# Patient Record
Sex: FEMALE | Race: WHITE | NOT HISPANIC OR LATINO | Employment: OTHER | URBAN - METROPOLITAN AREA
[De-identification: names, ages, dates, MRNs, and addresses within clinical notes are randomized per-mention and may not be internally consistent; named-entity substitution may affect disease eponyms.]

---

## 2017-03-31 ENCOUNTER — RESOLUTE PROFESSIONAL BILLING HOSPITAL PROF FEE (OUTPATIENT)
Dept: HOSPITALIST | Facility: MEDICAL CENTER | Age: 67
End: 2017-03-31
Payer: MEDICARE

## 2017-03-31 ENCOUNTER — APPOINTMENT (OUTPATIENT)
Dept: RADIOLOGY | Facility: MEDICAL CENTER | Age: 67
DRG: 470 | End: 2017-03-31
Attending: EMERGENCY MEDICINE
Payer: MEDICARE

## 2017-03-31 ENCOUNTER — HOSPITAL ENCOUNTER (INPATIENT)
Facility: MEDICAL CENTER | Age: 67
LOS: 5 days | DRG: 470 | End: 2017-04-05
Attending: EMERGENCY MEDICINE | Admitting: INTERNAL MEDICINE
Payer: MEDICARE

## 2017-03-31 DIAGNOSIS — S72.002A HIP FRACTURE, LEFT, CLOSED, INITIAL ENCOUNTER (HCC): ICD-10-CM

## 2017-03-31 LAB
ABO GROUP BLD: NORMAL
ABO GROUP BLD: NORMAL
ALBUMIN SERPL BCP-MCNC: 3.9 G/DL (ref 3.2–4.9)
ALBUMIN/GLOB SERPL: 1.1 G/DL
ALP SERPL-CCNC: 73 U/L (ref 30–99)
ALT SERPL-CCNC: 9 U/L (ref 2–50)
ANION GAP SERPL CALC-SCNC: 8 MMOL/L (ref 0–11.9)
APTT PPP: 27.9 SEC (ref 24.7–36)
AST SERPL-CCNC: 13 U/L (ref 12–45)
BASOPHILS # BLD AUTO: 0.5 % (ref 0–1.8)
BASOPHILS # BLD: 0.06 K/UL (ref 0–0.12)
BILIRUB SERPL-MCNC: 0.4 MG/DL (ref 0.1–1.5)
BLD GP AB SCN SERPL QL: NORMAL
BUN SERPL-MCNC: 15 MG/DL (ref 8–22)
CALCIUM SERPL-MCNC: 9.2 MG/DL (ref 8.5–10.5)
CHLORIDE SERPL-SCNC: 107 MMOL/L (ref 96–112)
CO2 SERPL-SCNC: 24 MMOL/L (ref 20–33)
CREAT SERPL-MCNC: 0.72 MG/DL (ref 0.5–1.4)
EKG IMPRESSION: NORMAL
EOSINOPHIL # BLD AUTO: 0.04 K/UL (ref 0–0.51)
EOSINOPHIL NFR BLD: 0.3 % (ref 0–6.9)
ERYTHROCYTE [DISTWIDTH] IN BLOOD BY AUTOMATED COUNT: 40 FL (ref 35.9–50)
GFR SERPL CREATININE-BSD FRML MDRD: >60 ML/MIN/1.73 M 2
GLOBULIN SER CALC-MCNC: 3.5 G/DL (ref 1.9–3.5)
GLUCOSE BLD-MCNC: 163 MG/DL (ref 65–99)
GLUCOSE SERPL-MCNC: 164 MG/DL (ref 65–99)
HCT VFR BLD AUTO: 43 % (ref 37–47)
HGB BLD-MCNC: 14.2 G/DL (ref 12–16)
IMM GRANULOCYTES # BLD AUTO: 0.04 K/UL (ref 0–0.11)
IMM GRANULOCYTES NFR BLD AUTO: 0.3 % (ref 0–0.9)
INR PPP: 0.98 (ref 0.87–1.13)
LYMPHOCYTES # BLD AUTO: 0.51 K/UL (ref 1–4.8)
LYMPHOCYTES NFR BLD: 4.4 % (ref 22–41)
MCH RBC QN AUTO: 28.7 PG (ref 27–33)
MCHC RBC AUTO-ENTMCNC: 33 G/DL (ref 33.6–35)
MCV RBC AUTO: 87 FL (ref 81.4–97.8)
MONOCYTES # BLD AUTO: 0.33 K/UL (ref 0–0.85)
MONOCYTES NFR BLD AUTO: 2.8 % (ref 0–13.4)
NEUTROPHILS # BLD AUTO: 10.67 K/UL (ref 2–7.15)
NEUTROPHILS NFR BLD: 91.7 % (ref 44–72)
NRBC # BLD AUTO: 0.06 K/UL
NRBC BLD AUTO-RTO: 0.5 /100 WBC
PLATELET # BLD AUTO: 304 K/UL (ref 164–446)
PMV BLD AUTO: 10.4 FL (ref 9–12.9)
POTASSIUM SERPL-SCNC: 3.7 MMOL/L (ref 3.6–5.5)
PROT SERPL-MCNC: 7.4 G/DL (ref 6–8.2)
PROTHROMBIN TIME: 13.3 SEC (ref 12–14.6)
RBC # BLD AUTO: 4.94 M/UL (ref 4.2–5.4)
RH BLD: NORMAL
SODIUM SERPL-SCNC: 139 MMOL/L (ref 135–145)
WBC # BLD AUTO: 11.7 K/UL (ref 4.8–10.8)

## 2017-03-31 PROCEDURE — 500122 HCHG BOVIE, BLADE: Performed by: ORTHOPAEDIC SURGERY

## 2017-03-31 PROCEDURE — 86850 RBC ANTIBODY SCREEN: CPT

## 2017-03-31 PROCEDURE — 86900 BLOOD TYPING SEROLOGIC ABO: CPT

## 2017-03-31 PROCEDURE — 700105 HCHG RX REV CODE 258: Performed by: EMERGENCY MEDICINE

## 2017-03-31 PROCEDURE — 85025 COMPLETE CBC W/AUTO DIFF WBC: CPT

## 2017-03-31 PROCEDURE — 36415 COLL VENOUS BLD VENIPUNCTURE: CPT

## 2017-03-31 PROCEDURE — 700111 HCHG RX REV CODE 636 W/ 250 OVERRIDE (IP): Performed by: EMERGENCY MEDICINE

## 2017-03-31 PROCEDURE — 99223 1ST HOSP IP/OBS HIGH 75: CPT | Performed by: INTERNAL MEDICINE

## 2017-03-31 PROCEDURE — 160035 HCHG PACU - 1ST 60 MINS PHASE I: Performed by: ORTHOPAEDIC SURGERY

## 2017-03-31 PROCEDURE — 93005 ELECTROCARDIOGRAM TRACING: CPT

## 2017-03-31 PROCEDURE — 93005 ELECTROCARDIOGRAM TRACING: CPT | Performed by: EMERGENCY MEDICINE

## 2017-03-31 PROCEDURE — 160031 HCHG SURGERY MINUTES - 1ST 30 MINS LEVEL 5: Performed by: ORTHOPAEDIC SURGERY

## 2017-03-31 PROCEDURE — 80053 COMPREHEN METABOLIC PANEL: CPT

## 2017-03-31 PROCEDURE — 72170 X-RAY EXAM OF PELVIS: CPT

## 2017-03-31 PROCEDURE — 500093 HCHG BONE CEMENT MIXER: Performed by: ORTHOPAEDIC SURGERY

## 2017-03-31 PROCEDURE — 96374 THER/PROPH/DIAG INJ IV PUSH: CPT

## 2017-03-31 PROCEDURE — 110382 HCHG SHELL REV 271: Performed by: ORTHOPAEDIC SURGERY

## 2017-03-31 PROCEDURE — 502240 HCHG MISC OR SUPPLY RC 0272: Performed by: ORTHOPAEDIC SURGERY

## 2017-03-31 PROCEDURE — 700102 HCHG RX REV CODE 250 W/ 637 OVERRIDE(OP): Performed by: INTERNAL MEDICINE

## 2017-03-31 PROCEDURE — 85610 PROTHROMBIN TIME: CPT

## 2017-03-31 PROCEDURE — 500891 HCHG PACK, ORTHO MAJOR: Performed by: ORTHOPAEDIC SURGERY

## 2017-03-31 PROCEDURE — 501486 HCHG STRYKER IRRIG SET HC W/TUBING: Performed by: ORTHOPAEDIC SURGERY

## 2017-03-31 PROCEDURE — 82962 GLUCOSE BLOOD TEST: CPT

## 2017-03-31 PROCEDURE — 501485 HCHG STRYKER BRUSH FEMORAL CANAL: Performed by: ORTHOPAEDIC SURGERY

## 2017-03-31 PROCEDURE — 501487 HCHG STRYKER TIP: Performed by: ORTHOPAEDIC SURGERY

## 2017-03-31 PROCEDURE — 700111 HCHG RX REV CODE 636 W/ 250 OVERRIDE (IP)

## 2017-03-31 PROCEDURE — 500096 HCHG BONE CEMENT, SIMPLEX ANTIBIOTIC: Performed by: ORTHOPAEDIC SURGERY

## 2017-03-31 PROCEDURE — 73552 X-RAY EXAM OF FEMUR 2/>: CPT | Mod: LT

## 2017-03-31 PROCEDURE — 304562 HCHG STAT O2 MASK/CANNULA

## 2017-03-31 PROCEDURE — 700101 HCHG RX REV CODE 250

## 2017-03-31 PROCEDURE — 96375 TX/PRO/DX INJ NEW DRUG ADDON: CPT

## 2017-03-31 PROCEDURE — 99291 CRITICAL CARE FIRST HOUR: CPT

## 2017-03-31 PROCEDURE — 160048 HCHG OR STATISTICAL LEVEL 1-5: Performed by: ORTHOPAEDIC SURGERY

## 2017-03-31 PROCEDURE — 85730 THROMBOPLASTIN TIME PARTIAL: CPT

## 2017-03-31 PROCEDURE — 500921 HCHG PILLOW, ABDUCTION HIP MED: Performed by: ORTHOPAEDIC SURGERY

## 2017-03-31 PROCEDURE — 96376 TX/PRO/DX INJ SAME DRUG ADON: CPT

## 2017-03-31 PROCEDURE — 502000 HCHG MISC OR IMPLANTS RC 0278: Performed by: ORTHOPAEDIC SURGERY

## 2017-03-31 PROCEDURE — A4606 OXYGEN PROBE USED W OXIMETER: HCPCS | Performed by: ORTHOPAEDIC SURGERY

## 2017-03-31 PROCEDURE — 0SRS0J9 REPLACEMENT OF LEFT HIP JOINT, FEMORAL SURFACE WITH SYNTHETIC SUBSTITUTE, CEMENTED, OPEN APPROACH: ICD-10-PCS | Performed by: ORTHOPAEDIC SURGERY

## 2017-03-31 PROCEDURE — 160036 HCHG PACU - EA ADDL 30 MINS PHASE I: Performed by: ORTHOPAEDIC SURGERY

## 2017-03-31 PROCEDURE — 160002 HCHG RECOVERY MINUTES (STAT): Performed by: ORTHOPAEDIC SURGERY

## 2017-03-31 PROCEDURE — 160009 HCHG ANES TIME/MIN: Performed by: ORTHOPAEDIC SURGERY

## 2017-03-31 PROCEDURE — 700111 HCHG RX REV CODE 636 W/ 250 OVERRIDE (IP): Performed by: INTERNAL MEDICINE

## 2017-03-31 PROCEDURE — 501838 HCHG SUTURE GENERAL: Performed by: ORTHOPAEDIC SURGERY

## 2017-03-31 PROCEDURE — 160042 HCHG SURGERY MINUTES - EA ADDL 1 MIN LEVEL 5: Performed by: ORTHOPAEDIC SURGERY

## 2017-03-31 PROCEDURE — 86901 BLOOD TYPING SEROLOGIC RH(D): CPT

## 2017-03-31 PROCEDURE — 770006 HCHG ROOM/CARE - MED/SURG/GYN SEMI*

## 2017-03-31 PROCEDURE — 71010 DX-CHEST-LIMITED (1 VIEW): CPT

## 2017-03-31 DEVICE — IMPLANT TANDEM BIPOLAR COCR 48OD 28ID: Type: IMPLANTABLE DEVICE | Status: FUNCTIONAL

## 2017-03-31 DEVICE — IMPLANT CONQ FX FEM COMP SZ 11: Type: IMPLANTABLE DEVICE | Status: FUNCTIONAL

## 2017-03-31 DEVICE — DISTAL INVIS CENT SZ 10MM: Type: IMPLANTABLE DEVICE | Status: FUNCTIONAL

## 2017-03-31 DEVICE — BONE CEMENT SIMPLEX ANTIBIO - (10/PK): Type: IMPLANTABLE DEVICE | Status: FUNCTIONAL

## 2017-03-31 DEVICE — IMPLANTABLE DEVICE: Type: IMPLANTABLE DEVICE | Status: FUNCTIONAL

## 2017-03-31 DEVICE — IMPLANT COCR 12/14 FEM HEAD 28 + 4: Type: IMPLANTABLE DEVICE | Status: FUNCTIONAL

## 2017-03-31 RX ORDER — MAGNESIUM HYDROXIDE 1200 MG/15ML
LIQUID ORAL
Status: DISCONTINUED | OUTPATIENT
Start: 2017-03-31 | End: 2017-04-01 | Stop reason: HOSPADM

## 2017-03-31 RX ORDER — OXYCODONE HYDROCHLORIDE 5 MG/1
5 TABLET ORAL
Status: DISCONTINUED | OUTPATIENT
Start: 2017-03-31 | End: 2017-04-02

## 2017-03-31 RX ORDER — CELECOXIB 200 MG/1
200 CAPSULE ORAL 2 TIMES DAILY WITH MEALS
Status: DISCONTINUED | OUTPATIENT
Start: 2017-04-01 | End: 2017-04-05 | Stop reason: HOSPADM

## 2017-03-31 RX ORDER — POLYETHYLENE GLYCOL 3350 17 G/17G
1 POWDER, FOR SOLUTION ORAL
Status: DISCONTINUED | OUTPATIENT
Start: 2017-03-31 | End: 2017-04-05 | Stop reason: HOSPADM

## 2017-03-31 RX ORDER — DOCUSATE SODIUM 100 MG/1
100 CAPSULE, LIQUID FILLED ORAL 2 TIMES DAILY
Status: DISCONTINUED | OUTPATIENT
Start: 2017-03-31 | End: 2017-04-05 | Stop reason: HOSPADM

## 2017-03-31 RX ORDER — ONDANSETRON 2 MG/ML
4 INJECTION INTRAMUSCULAR; INTRAVENOUS EVERY 4 HOURS PRN
Status: DISCONTINUED | OUTPATIENT
Start: 2017-03-31 | End: 2017-04-05 | Stop reason: HOSPADM

## 2017-03-31 RX ORDER — HALOPERIDOL 5 MG/ML
1 INJECTION INTRAMUSCULAR EVERY 6 HOURS PRN
Status: DISCONTINUED | OUTPATIENT
Start: 2017-03-31 | End: 2017-04-05 | Stop reason: HOSPADM

## 2017-03-31 RX ORDER — GABAPENTIN 100 MG/1
100 CAPSULE ORAL
Status: DISCONTINUED | OUTPATIENT
Start: 2017-03-31 | End: 2017-04-05 | Stop reason: HOSPADM

## 2017-03-31 RX ORDER — CEFAZOLIN SODIUM 2 G/100ML
2 INJECTION, SOLUTION INTRAVENOUS EVERY 8 HOURS
Status: COMPLETED | OUTPATIENT
Start: 2017-04-01 | End: 2017-04-01

## 2017-03-31 RX ORDER — SODIUM CHLORIDE 9 MG/ML
INJECTION, SOLUTION INTRAVENOUS CONTINUOUS
Status: DISCONTINUED | OUTPATIENT
Start: 2017-03-31 | End: 2017-03-31

## 2017-03-31 RX ORDER — DEXAMETHASONE SODIUM PHOSPHATE 4 MG/ML
4 INJECTION, SOLUTION INTRA-ARTICULAR; INTRALESIONAL; INTRAMUSCULAR; INTRAVENOUS; SOFT TISSUE
Status: DISCONTINUED | OUTPATIENT
Start: 2017-03-31 | End: 2017-04-05 | Stop reason: HOSPADM

## 2017-03-31 RX ORDER — CARVEDILOL 6.25 MG/1
3.12 TABLET ORAL 2 TIMES DAILY WITH MEALS
Status: DISCONTINUED | OUTPATIENT
Start: 2017-03-31 | End: 2017-04-03

## 2017-03-31 RX ORDER — OXYCODONE HYDROCHLORIDE 5 MG/1
2.5 TABLET ORAL
Status: DISCONTINUED | OUTPATIENT
Start: 2017-03-31 | End: 2017-04-02

## 2017-03-31 RX ORDER — ACETAMINOPHEN 325 MG/1
650 TABLET ORAL EVERY 6 HOURS PRN
Status: DISCONTINUED | OUTPATIENT
Start: 2017-04-05 | End: 2017-04-05 | Stop reason: HOSPADM

## 2017-03-31 RX ORDER — ENALAPRILAT 1.25 MG/ML
1.25 INJECTION INTRAVENOUS EVERY 6 HOURS PRN
Status: DISCONTINUED | OUTPATIENT
Start: 2017-03-31 | End: 2017-04-05 | Stop reason: HOSPADM

## 2017-03-31 RX ORDER — HEPARIN SODIUM 5000 [USP'U]/ML
5000 INJECTION, SOLUTION INTRAVENOUS; SUBCUTANEOUS EVERY 8 HOURS
Status: DISCONTINUED | OUTPATIENT
Start: 2017-03-31 | End: 2017-04-05 | Stop reason: HOSPADM

## 2017-03-31 RX ORDER — ATORVASTATIN CALCIUM 40 MG/1
40 TABLET, FILM COATED ORAL EVERY EVENING
Status: DISCONTINUED | OUTPATIENT
Start: 2017-03-31 | End: 2017-04-05 | Stop reason: HOSPADM

## 2017-03-31 RX ORDER — ONDANSETRON 4 MG/1
4 TABLET, ORALLY DISINTEGRATING ORAL EVERY 4 HOURS PRN
Status: DISCONTINUED | OUTPATIENT
Start: 2017-03-31 | End: 2017-04-05 | Stop reason: HOSPADM

## 2017-03-31 RX ORDER — ACETAMINOPHEN 500 MG
1000 TABLET ORAL EVERY 6 HOURS
Status: DISCONTINUED | OUTPATIENT
Start: 2017-04-01 | End: 2017-04-05 | Stop reason: HOSPADM

## 2017-03-31 RX ORDER — ONDANSETRON 2 MG/ML
4 INJECTION INTRAMUSCULAR; INTRAVENOUS ONCE
Status: COMPLETED | OUTPATIENT
Start: 2017-03-31 | End: 2017-03-31

## 2017-03-31 RX ORDER — ACETAMINOPHEN 325 MG/1
650 TABLET ORAL EVERY 6 HOURS PRN
Status: DISCONTINUED | OUTPATIENT
Start: 2017-03-31 | End: 2017-03-31

## 2017-03-31 RX ORDER — AMOXICILLIN 250 MG
2 CAPSULE ORAL 2 TIMES DAILY
Status: DISCONTINUED | OUTPATIENT
Start: 2017-03-31 | End: 2017-04-05 | Stop reason: HOSPADM

## 2017-03-31 RX ORDER — CAPTOPRIL 12.5 MG/1
12.5 TABLET ORAL 2 TIMES DAILY
Status: DISCONTINUED | OUTPATIENT
Start: 2017-04-01 | End: 2017-04-02

## 2017-03-31 RX ORDER — BISACODYL 10 MG
10 SUPPOSITORY, RECTAL RECTAL
Status: DISCONTINUED | OUTPATIENT
Start: 2017-03-31 | End: 2017-04-05 | Stop reason: HOSPADM

## 2017-03-31 RX ORDER — DEXTROSE MONOHYDRATE 25 G/50ML
25 INJECTION, SOLUTION INTRAVENOUS
Status: DISCONTINUED | OUTPATIENT
Start: 2017-03-31 | End: 2017-04-05 | Stop reason: HOSPADM

## 2017-03-31 RX ADMIN — HYDROMORPHONE HYDROCHLORIDE 1 MG: 1 INJECTION, SOLUTION INTRAMUSCULAR; INTRAVENOUS; SUBCUTANEOUS at 16:16

## 2017-03-31 RX ADMIN — SODIUM CHLORIDE 1000 ML: 9 INJECTION, SOLUTION INTRAVENOUS at 16:15

## 2017-03-31 RX ADMIN — HYDROMORPHONE HYDROCHLORIDE 0.25 MG: 1 INJECTION, SOLUTION INTRAMUSCULAR; INTRAVENOUS; SUBCUTANEOUS at 19:24

## 2017-03-31 RX ADMIN — ONDANSETRON 4 MG: 2 INJECTION, SOLUTION INTRAMUSCULAR; INTRAVENOUS at 16:16

## 2017-03-31 ASSESSMENT — PAIN SCALES - GENERAL
PAINLEVEL_OUTOF10: 0
PAINLEVEL_OUTOF10: 10
PAINLEVEL_OUTOF10: 0
PAINLEVEL_OUTOF10: 0

## 2017-03-31 ASSESSMENT — LIFESTYLE VARIABLES: EVER_SMOKED: YES

## 2017-03-31 NOTE — ED PROVIDER NOTES
ED Provider Note    Scribed for Kevin Santiago M.D. by Louis Jensen. 3/31/2017  4:02 PM    Primary care provider: Kevin Rea M.D.  Means of arrival: ambulance  History obtained from: patient  History limited by: none    CHIEF COMPLAINT  Chief Complaint   Patient presents with   • Hip Injury     Left       HPI  Alice Yarbrough is a 66 y.o. female who presents to the Emergency Department for evaluation status post ground level fall that occurred today. Per patient, she fell at the gas station and injured her left hip.  She was unable to get back up. Witnesses called EMS, but she refused to take an ambulance. Someone pulled her car around for her and she pulled herself into her car to drive back home. When she got home, her son helped her into the house. She is experiencing associated hip and back pain. Because of her severe pain, her  was prompted to call EMS again. The patient states her hip pain is excruciating, and is unable to move her left leg as a result. Patient denies neck pain or abdominal pain.     REVIEW OF SYSTEMS  Pertinent positives include falls, hip pain, back pain. Pertinent negatives include no neck pain, abdominal pain.  All other systems reviewed and negative.    PAST MEDICAL HISTORY   has a past medical history of MI (myocardial infarction) (CMS-HCC) (Dec 30, 2007); COPD; CAD (coronary artery disease); Hypertension; Type II or unspecified type diabetes mellitus without mention of complication, not stated as uncontrolled; and Hyperlipidemia.    SURGICAL HISTORY   has past surgical history that includes other abdominal surgery (hysterectomy) and abdominal hysterectomy total.    SOCIAL HISTORY  Social History   Substance Use Topics   • Smoking status: Former Smoker -- 4.00 packs/day for 40 years     Types: Cigarettes     Quit date: 06/03/2008   • Smokeless tobacco: Never Used   • Alcohol Use: No      History   Drug Use No       FAMILY HISTORY  Family History   Problem Relation  "Age of Onset   • Diabetes Mother    • Hypertension Father    • Psychiatry Brother        CURRENT MEDICATIONS  No current facility-administered medications on file prior to encounter.     Current Outpatient Prescriptions on File Prior to Encounter   Medication Sig Dispense Refill   • carvedilol (COREG) 3.125 MG Tab Take 1 Tab by mouth 2 times a day, with meals. 180 Tab 3   • metformin (GLUCOPHAGE) 1000 MG tablet Take 1 Tab by mouth 2 times a day, with meals. 60 Tab 11   • potassium chloride ER (KLOR-CON) 10 MEQ tablet Take 1 Tab by mouth every day. 30 Tab 3   • atorvastatin (LIPITOR) 40 MG Tab Take 1 Tab by mouth every day. 30 Tab 11   • captopril (CAPOTEN) 12.5 MG Tab Take 1 Tab by mouth 2 times a day. 60 Tab 6   • gabapentin (NEURONTIN) 100 MG Cap Take 1 Cap by mouth every bedtime. 30 Cap 6   • lidocaine 5%/vitamin A&D (LIDO/A&D) Apply 1 g to affected area(s) 4 times a day as needed. 360 g 4   • [employer] MICROFILL TEST strip      • furosemide (LASIX) 40 MG TABS Take 1 Tab by mouth every day. 90 Tab 3   • ASPIRIN Take 81 mg by mouth every day. Low dose        ALLERGIES  Allergies   Allergen Reactions   • Codeine        PHYSICAL EXAM  VITAL SIGNS: /73 mmHg  Pulse 81  Temp(Src) 36 °C (96.8 °F)  Resp 18  Ht 1.727 m (5' 7.99\")  Wt 73.483 kg (162 lb)  BMI 24.64 kg/m2  SpO2 93%    Vital signs reviewed.  Constitutional:  Alert, non-toxic appearance. Mild pain distress.  Head: Normocephalic, atraumatic.  Mouth/Throat: Oropharynx dry.   Neck: C-spine soft. Non tender.   Cardiovascular: Normal rate and rhythm. Distal pulses intact.   Pulmonary/Chest: Lungs clear to ausculation.   Abdominal: Soft, nontender  Musculoskeletal: Left leg externally rotated and shortened. Tenderness with internal and external left hip rotation.  Neurological: Awake, Alert and oriented x3  Lymphadenopathy: None  Skin: Warm, dry, no rashes  Psychiatric: Normal mood and affect.     LABS  Results for orders placed or performed during the " hospital encounter of 03/31/17   CBC WITH DIFFERENTIAL   Result Value Ref Range    WBC 11.7 (H) 4.8 - 10.8 K/uL    RBC 4.94 4.20 - 5.40 M/uL    Hemoglobin 14.2 12.0 - 16.0 g/dL    Hematocrit 43.0 37.0 - 47.0 %    MCV 87.0 81.4 - 97.8 fL    MCH 28.7 27.0 - 33.0 pg    MCHC 33.0 (L) 33.6 - 35.0 g/dL    RDW 40.0 35.9 - 50.0 fL    Platelet Count 304 164 - 446 K/uL    MPV 10.4 9.0 - 12.9 fL    Neutrophils-Polys 91.70 (H) 44.00 - 72.00 %    Lymphocytes 4.40 (L) 22.00 - 41.00 %    Monocytes 2.80 0.00 - 13.40 %    Eosinophils 0.30 0.00 - 6.90 %    Basophils 0.50 0.00 - 1.80 %    Immature Granulocytes 0.30 0.00 - 0.90 %    Nucleated RBC 0.50 /100 WBC    Neutrophils (Absolute) 10.67 (H) 2.00 - 7.15 K/uL    Lymphs (Absolute) 0.51 (L) 1.00 - 4.80 K/uL    Monos (Absolute) 0.33 0.00 - 0.85 K/uL    Eos (Absolute) 0.04 0.00 - 0.51 K/uL    Baso (Absolute) 0.06 0.00 - 0.12 K/uL    Immature Granulocytes (abs) 0.04 0.00 - 0.11 K/uL    NRBC (Absolute) 0.06 K/uL   COMP METABOLIC PANEL   Result Value Ref Range    Sodium 139 135 - 145 mmol/L    Potassium 3.7 3.6 - 5.5 mmol/L    Chloride 107 96 - 112 mmol/L    Co2 24 20 - 33 mmol/L    Anion Gap 8.0 0.0 - 11.9    Glucose 164 (H) 65 - 99 mg/dL    Bun 15 8 - 22 mg/dL    Creatinine 0.72 0.50 - 1.40 mg/dL    Calcium 9.2 8.5 - 10.5 mg/dL    AST(SGOT) 13 12 - 45 U/L    ALT(SGPT) 9 2 - 50 U/L    Alkaline Phosphatase 73 30 - 99 U/L    Total Bilirubin 0.4 0.1 - 1.5 mg/dL    Albumin 3.9 3.2 - 4.9 g/dL    Total Protein 7.4 6.0 - 8.2 g/dL    Globulin 3.5 1.9 - 3.5 g/dL    A-G Ratio 1.1 g/dL   PROTHROMBIN TIME   Result Value Ref Range    PT 13.3 12.0 - 14.6 sec    INR 0.98 0.87 - 1.13   APTT   Result Value Ref Range    APTT 27.9 24.7 - 36.0 sec   COD (ADULT)   Result Value Ref Range    ABO Grouping Only O     Rh Grouping Only POS     Antibody Screen-Cod NEG    ESTIMATED GFR   Result Value Ref Range    GFR If African American >60 >60 mL/min/1.73 m 2    GFR If Non African American >60 >60 mL/min/1.73 m 2    EKG (ER)   Result Value Ref Range    Report       [facility] Emergency Dept.    Test Date:  2017  Pt Name:    JANE GARCIA           Department: ER  MRN:        [id number]                      Room:       BL 19  Gender:     F                            Technician: [id number]  :        1950                   Requested By:ER TRIAGE PROTOCOL  Order #:    [id number]                    Reading MD:    Measurements  Intervals                                Axis  Rate:       85                           P:          68  OR:         140                          QRS:        40  QRSD:       112                          T:          259  QT:         404  QTc:        481    Interpretive Statements  SINUS RHYTHM  CONSIDER LEFT ATRIAL ABNORMALITY  NONSPECIFIC INTRAVENTRICULAR CONDUCTION DELAY  INFERIOR INFARCT, AGE INDETERMINATE  Compared to ECG 10/19/2009 18:48:22  Intraventricular conduction delay now present  T-wave abnormality no longer present  Myocardial infarct finding still present        All labs reviewed by me.    EKG  12 Lead EKG interpreted by me to show sinus rhythm at 80. Normal P waves. Abnormal QRS with a nonspecific ventricular conduction relay. Q waves in 2, 3, aVF. Normal ST segments. T waves inverted in 2, 3, aVF, V5, and V6. Abnormal EKG, no old EKG for comparison.     RADIOLOGY  DX-PELVIS-1 OR 2 VIEWS   Final Result         1. Acute displaced fracture through the femoral neck with lateral apex angulation.      DX-FEMUR-2+ LEFT   Final Result         Acute displaced fracture through the left femoral neck with lateral apex angulation.      DX-CHEST-LIMITED (1 VIEW)   Final Result         No pulmonary infiltrates or consolidations are noted.      Cardiomegaly.        The radiologist's interpretation of all radiological studies have been reviewed by me.    COURSE & MEDICAL DECISION MAKING  Pertinent Labs & Imaging studies reviewed. (See chart for details) The patient's [facility]  Nursing and past medical  records were reviewed    4:02 PM - Patient seen and examined at bedside. Patient will be treated with continuous NS infusion, 1 mg Dilaudid, 5 mg Zofran. Ordered pelvic x-ray, femur x-ray, chest x-ray CBC with differential, CMP, PTT, APTT, COD, EKG to evaluate her symptoms. The differential diagnoses include but are not limited to: hip fracture vs. Dislocation. I discussed the treatment plan with the patient as above. The patient understood and verbalized agreement.     5:15 PM - I discussed the patient's case and the above findings with Dr. Galarza (Hospitalist) who agrees to admit the patient under his care.      6:03 PM - Paged Orthopedics    6:09 PM - I discussed the patient's case and the above findings with Dr. Howard (Orthopedics) who agrees to examine the patient at bedside.        DISPOSITION:  Patient will be admitted to Dr. Galarza in guarded condition.     FINAL IMPRESSION  1. Hip fracture, left, closed, initial encounter (CMS-[facility])          Louis WOLFF (Scribe), am scribing for, and in the presence of, Kevin Santiago M.D..    Electronically signed by: Louis Jensen (Scribe), 3/31/2017    Kevin WOLFF M.D. personally performed the services described in this documentation, as scribed by Louis Jensen in my presence, and it is both accurate and complete.    The note accurately reflects work and decisions made by me.  Kevin Santiago  3/31/2017  7:06 PM

## 2017-03-31 NOTE — IP AVS SNAPSHOT
4/5/2017          Alice Yarbrough  [address] [address]    Dear Alice:    [facility] wants to ensure your discharge home is safe and you or your loved ones have had all your questions answered regarding your care after you leave the hospital.    You may receive a telephone call within two days of your discharge.  This call is to make certain you understand your discharge instructions as well as ensure we provided you with the best care possible during your stay with us.     The call will only last approximately 3-5 minutes and will be done by a nurse.    Once again, we want to ensure your discharge home is safe and that you have a clear understanding of any next steps in your care.  If you have any questions or concerns, please do not hesitate to contact us, we are here for you.  Thank you for choosing [facility] for your healthcare needs.    Sincerely,    Jordi Berry    [facility]

## 2017-03-31 NOTE — IP AVS SNAPSHOT
" Home Care Instructions                                                                                                                  Name:Alice Yarbrough  Medical Record Number:[id number]  CSN: [id number]    YOB: 1950   Age: 66 y.o.  Sex: female  HT:1.727 m (5' 7.99\") WT: 74 kg (163 lb 2.3 oz)          Admit Date: 3/31/2017     Discharge Date:   Today's Date: 4/5/2017  Attending Doctor:  Danial Galarza M.D.                  Allergies:  Review of patient's allergies indicates no known allergies.            Discharge Instructions       Discharge Instructions    Discharged to home by car with relative. Discharged via wheelchair, hospital escort: Yes.  Special equipment needed: Walker    Be sure to schedule a follow-up appointment with your primary care doctor or any specialists as instructed.     Discharge Plan:   Diet Plan: Discussed  Activity Level: Discussed  Confirmed Follow up Appointment: Patient to Call and Schedule Appointment  Confirmed Symptoms Management: Discussed  Medication Reconciliation Updated: Yes  Influenza Vaccine Indication: Patient Refuses    I understand that a diet low in cholesterol, fat, and sodium is recommended for good health. Unless I have been given specific instructions below for another diet, I accept this instruction as my diet prescription.   Other diet: Diabetic   Increase water intact    Special Instructions: Discharge instructions for the Orthopedic Patient    Follow up with Primary Care Physician within 2 weeks of discharge to home, regarding:  Review of medications and diagnostic testing.  Surveillance for medical complications.  Workup and treatment of osteoporosis, if appropriate.     -Is this a Joint Replacement patient? Yes   Total Joint Hip Replacement Discharge Instructions    Pain  - The goal is to slowly wean off the prescription pain medicine.  - Ice can be used for pain control.  20 minutes at a time is recommended, and never directly against your " skin or incision.  - Most patients are off the pain pills by 3 weeks; others may require a low level of pain medications for many months. If your pain continues to be severe, follow up with your physician.  Infection  Deep hip joint infections that require removal of the prostheses occur in less than 0.1% of patients. Lesser infections in the skin (cellulites) are more common and much more easily treated.  - Keep the incision as clean and dry as possible.  - Always wash your hands before touching your incision.  - Skin infections tend to develop around 7-10 days after surgery, most can be treated with oral antibiotics.  - Dental Care should be delayed for 3 months after surgery, your surgeon recommends taking a dose of antibiotics 1 hour prior to any dental procedure.  After 2 years, most surgeons recommend antibiotics only before an extensive procedure.  Ask your surgeon what he recommends.  - Signs and symptoms of infection can include:  low grade fever, redness, pain, swelling and drainage from your incision.  Notify your surgeon immediately if you develop any of these symptoms.  Post op Disturbances  - Bowel habits - constipation is extremely common and is caused by a combination of anesthesia, lack of mobility and pain medicine.  Use stool softeners or laxatives if necessary. It is important not to ignore this problem, as bowel obstructions can be a serious complication after joint replacement surgery.  - Mood/Energy Level - Many patients experience a lack of energy and endurance for up to 2-3 months after surgery.  Some may also feel down and can even become depressed.  This is likely due to the postoperative anemia, change in activity level, lack of sleep, pain medicine and just the emotional reaction to the surgery itself that is a big disruption in a person’s life.  This usually passes.  If symptoms persist, follow up with your primary physician.  - Returning to work - Your surgeon will give you more  specific instructions.  Generally, if you work a sedentary job requiring little standing or walking, most patients may return within 2-6 weeks.  Manual labor jobs involving walking, lifting and standing may take 3-4 months.  Your surgeon’s office can provide a release to part-time or light duty work early on in your recovery and progress you to full duty as able.  - Driving - You can begin driving an automatic shift car in 4 to 8 weeks, provided you are no longer taking narcotic pain medication. If you have a stick-shift car and your right hip was replaced, do not begin driving until your doctor says you can.   - Avoiding falls -  throw rugs and tack down loose carpeting.  Be aware of floor hazards such as pets, small objects or uneven surfaces.   -  Airport Metal Detectors - The sensitivity of metal detectors varies and it is likely that your prosthesis will cause an alarm. Inform the  that you have an artificial joint.  Diet  - Resume your normal diet as tolerated.  - It is important to achieve a healthy nutritional status by eating a well balanced diet on a regular basis.  - Your physician may recommend that you take iron and vitamin supplements.   - Continue to drink plenty of fluids.  Shower/Bathing  - You may shower as soon as you get home from the hospital unless otherwise instructed.  - Keep your incision out of water.  To keep the incision dry when showering, cover it with a plastic bag or plastic wrap.  - Pat incision dry if it gets wet.  Don’t rub.  - Do not submerge in a bath until staples are out and the incision is completely healed. (Approximately 6-8 weeks after surgery).  Dressing Change:  Procedure (if recommended by your physician)  - Wash hands.  - Open all dressing change materials.  - Remove old dressing and discard.  - Inspect incision for redness, increase in clear drainage, yellow/green drainage, odor and surrounding skin hot to touch.  -  ABD (large gauze) pad  by one corner and lay over the incision.  Be careful not to touch the inside of the dressing that will lay over the incision.  - Secure in place as instructed (Ace wrap or tape).    Swelling/Bruising  - Swelling is normal after hip replacement and can involve the thigh, knee, calf and foot.  - Swelling can last from 3-6 months.  - Elevate your leg higher than your heart while reclining.  The first week you are home you should elevate your leg an equal amount of time, as you are active.    - Anti-inflammatory pills can be taken once you have stopped the blood thinners.  - The swelling is usually worse after you go home since you are upright for longer periods of time.  - Bruising is common and can involve the entire leg including the thigh, calf and even foot.  Bruising often does not appear until after you arrive home and it can be quite dramatic- purple, black, green.  The bruising you can see is not usually concerning and will subside without any treatment.      Blood Clot Prevention  Blood clots in the legs and the less common, but frightening, clots that travel to the lungs are a real focus of our preventative. Most patients are at standard risk for them, but those patients who are at higher risk include people who have had previous clots, a family history of clotting, smoking, diabetes, obesity, advanced age, use of estrogen and a sedentary lifestyle.    - Signs of blood clots in legs - Swelling in thigh, calf or ankle that does not go down with elevation.  Pain, heat and tenderness in calf, back of calf or groin area.  NOTE: blood clots can occur in either leg.  - You have been receiving anticoagulant therapy (blood thinners) in the hospital and you may be instructed to continue at home depending on your risk factors.  - Your risk for developing a clot continues for up to 2-3 months after surgery.  You should avoid prolonged sitting and dehydration during that time (long air trips and car trips).  If you do  take a trip during this time, please get up and move around every 1- 1.5 hours.  - If you are prescribed blood thinning medication for home, follow instructions as directed. (Handouts provided if applicable).      Activity    Once you get home, you should stay active. The key is not to overdo it! While you can expect some good days and some bad days, you should notice a gradual improvement over time you should notice a gradual improvement and a gradual increase in your endurance over the next 6 to 12 months.    - Weight Bearing - If you have undergone cemented or hybrid hip replacement, you can put some weight on the leg immediately using a cane or walker, and you should continue to use some support for 4 to 6 weeks to help the muscles recover.   - Sleeping Positions - Sleep on your back with your legs slightly apart or on your side with a regular pillow between your knees. Be sure to use the pillow for at least 6 weeks, or until your doctor says you can do without it. Sleeping on your stomach should be all right  - Sitting - For at least the first 3 months, sit only in chairs that have arms. Do not sit on low chairs, low stools, or reclining chairs. Do not cross your legs at the knees. The physical therapist will show you how to sit and stand from a chair, keeping your affected leg out in front of you. Get up and move around on a regular basis--at least once every hour.  - Walking - Walk as much as you like once your doctor gives you the go-ahead, but remember that walking is no substitute for your prescribed exercises. Walking with a pair of trekking poles is helpful and adds as much as 40% to the exercise you get when you walk  - Therapy may be needed in some cases, to strengthen your muscles and improve your gait (walking pattern).  This decision will be made at your post-operative appointment.  Follow your therapist recommended post-operative exercises (handout provided by Therapist).  - Swimming is also  recommended; you can begin as soon as the sutures have been removed and the wound is healed, approximately 6 to 8 weeks after surgery. Using a pair of training fins may make swimming a more enjoyable and effective exercise.  - Other activities - Lower impact activities are preferred.  If you have specific questions, consult your Surgeon.    - Sexual activity - Your surgeon can tell you when it should be safe to resume sexual activity.      When to Call the Doctor   Call the physician if:   - Fever over 100.5? F  - Increased pain, drainage, redness, odor or heat around the incision area  - Shaking chills  - Increased knee pain with activity and rest  - Increased pain in calf, tenderness or redness above or below the knee  - Increased swelling of calf, ankle, foot  - Sudden increased shortness of breath, sudden onset of chest pain, localized chest pain with coughing  - Incision opening  Or, if there are any questions or concerns about medications or care.       -Is this patient being discharged with medication to prevent blood clots?  Yes, Aspirin     · Is patient discharged on Warfarin / Coumadin?   No     · Is patient Post Blood Transfusion?  No    Depression / Suicide Risk    As you are discharged from this [facility] facility, it is important to learn how to keep safe from harming yourself.    Recognize the warning signs:  · Abrupt changes in personality, positive or negative- including increase in energy   · Giving away possessions  · Change in eating patterns- significant weight changes-  positive or negative  · Change in sleeping patterns- unable to sleep or sleeping all the time   · Unwillingness or inability to communicate  · Depression  · Unusual sadness, discouragement and loneliness  · Talk of wanting to die  · Neglect of personal appearance   · Rebelliousness- reckless behavior  · Withdrawal from people/activities they love  · Confusion- inability to concentrate     If you or a loved one observes any  of these behaviors or has concerns about self-harm, here's what you can do:  · Talk about it- your feelings and reasons for harming yourself  · Remove any means that you might use to hurt yourself (examples: pills, rope, extension cords, firearm)  · Get professional help from the community (Mental Health, Substance Abuse, psychological counseling)  · Do not be alone:Call your Safe Contact- someone whom you trust who will be there for you.  · Call your local CRISIS HOTLINE 434-1870 or 851-635-0799  · Call your local Children's Mobile Crisis Response Team [place] (378) 033-3970 or www.SiBEAM  · Call the toll free National Suicide Prevention Hotlines   · National Suicide Prevention Lifeline 920-995-QNYT (7300)  · [employer] [facility] 800-SUICIDE (301-8457)    Aspirin, ASA oral tablets  What is this medicine?  ASPIRIN (AS pir in) is a pain reliever. It is used to treat mild pain and fever. This medicine is also used as directed by a doctor to prevent and to treat heart attacks, to prevent strokes, and to treat arthritis or inflammation.  This medicine may be used for other purposes; ask your health care provider or pharmacist if you have questions.  COMMON BRAND NAME(S): Aspir-Low, Aspir-Jihan , Aspirtab , Life360 Advanced Aspirin, Life360 Aspirin Extra Strength, Life360 Aspirin Plus, Logan Aspirin, Life360 Genuine Aspirin, Life360 Womens Aspirin , Bufferin Extra Strength, Bufferin Low Dose, Bufferin  What should I tell my health care provider before I take this medicine?  They need to know if you have any of these conditions:  -anemia  -asthma  -bleeding problems  -child with chickenpox, the flu, or other viral infection  -diabetes  -gout  -if you frequently drink alcohol containing drinks  -kidney disease  -liver disease  -low level of vitamin K  -lupus  -smoke tobacco  -stomach ulcers or other problems  -an unusual or allergic reaction to aspirin, tartrazine dye, other medicines, dyes, or  preservatives  -pregnant or trying to get pregnant  -breast-feeding  How should I use this medicine?  Take this medicine by mouth with a glass of water. Follow the directions on the package or prescription label. You can take this medicine with or without food. If it upsets your stomach, take it with food. Do not take your medicine more often than directed.  Talk to your pediatrician regarding the use of this medicine in children. While this drug may be prescribed for children as young as 12 years of age for selected conditions, precautions do apply. Children and teenagers should not use this medicine to treat chicken pox or flu symptoms unless directed by a doctor.  Patients over 65 years old may have a stronger reaction and need a smaller dose.  Overdosage: If you think you have taken too much of this medicine contact a poison control center or emergency room at once.  NOTE: This medicine is only for you. Do not share this medicine with others.  What if I miss a dose?  If you are taking this medicine on a regular schedule and miss a dose, take it as soon as you can. If it is almost time for your next dose, take only that dose. Do not take double or extra doses.  What may interact with this medicine?  Do not take this medicine with any of the following medications:  -cidofovir  -ketorolac  -probenecid  This medicine may also interact with the following medications:  -alcohol  -alendronate  -bismuth subsalicylate  -flavocoxid  -herbal supplements like feverfew, garlic, carrie, ginkgo biloba, horse chestnut  -medicines for diabetes or glaucoma like acetazolamide, methazolamide  -medicines for gout  -medicines that treat or prevent blood clots like enoxaparin, heparin, ticlopidine, warfarin  -other aspirin and aspirin-like medicines  -NSAIDs, medicines for pain and inflammation, like ibuprofen or naproxen  -pemetrexed  -sulfinpyrazone  -varicella live vaccine  This list may not describe all possible interactions. Give  your health care provider a list of all the medicines, herbs, non-prescription drugs, or dietary supplements you use. Also tell them if you smoke, drink alcohol, or use illegal drugs. Some items may interact with your medicine.  What should I watch for while using this medicine?  If you are treating yourself for pain, tell your doctor or health care professional if the pain lasts more than 10 days, if it gets worse, or if there is a new or different kind of pain. Tell your doctor if you see redness or swelling. Also, check with your doctor if you have a fever that lasts for more than 3 days. Only take this medicine to prevent heart attacks or blood clotting if prescribed by your doctor or health care professional.  Do not take aspirin or aspirin-like medicines with this medicine. Too much aspirin can be dangerous. Always read the labels carefully.  This medicine can irritate your stomach or cause bleeding problems. Do not smoke cigarettes or drink alcohol while taking this medicine. Do not lie down for 30 minutes after taking this medicine to prevent irritation to your throat.  If you are scheduled for any medical or dental procedure, tell your healthcare provider that you are taking this medicine. You may need to stop taking this medicine before the procedure.  What side effects may I notice from receiving this medicine?  Side effects that you should report to your doctor or health care professional as soon as possible:  -allergic reactions like skin rash, itching or hives, swelling of the face, lips, or tongue  -black, tarry stools  -bloody, coffee ground-like vomit  -breathing problems  -changes in hearing, ringing in the ears  -confusion  -general ill feeling or flu-like symptoms  -pain on swallowing  -redness, blistering, peeling or loosening of the skin, including inside the mouth or nose  -trouble passing urine or change in the amount of urine  -unusual bleeding or bruising  -unusually weak or  tired  -yellowing of the eyes or skin  Side effects that usually do not require medical attention (report to your doctor or health care professional if they continue or are bothersome):  -diarrhea or constipation  -nausea, vomiting  -stomach gas, heartburn  This list may not describe all possible side effects. Call your doctor for medical advice about side effects. You may report side effects to FDA at 2-488-KVI-1842.  Where should I keep my medicine?  Keep out of the reach of children.  Store at room temperature between 15 and 30 degrees C (59 and 86 degrees F). Protect from heat and moisture. Do not use this medicine if it has a strong vinegar smell. Throw away any unused medicine after the expiration date.  NOTE: This sheet is a summary. It may not cover all possible information. If you have questions about this medicine, talk to your doctor, pharmacist, or health care provider.  © 2014, Elsevier/Gold Standard. (3/10/2009 10:44:17 AM)  Captopril tablets  What is this medicine?  CAPTOPRIL (GLENDA toe pril) is an ACE inhibitor. This medicine is used to treat high blood pressure and heart failure. It is used to treat heart damage after a heart attack. It can also slow the progression of kidney disease in diabetic patients.  This medicine may be used for other purposes; ask your health care provider or pharmacist if you have questions.  COMMON BRAND NAME(S): Capoten  What should I tell my health care provider before I take this medicine?  They need to know if you have any of these conditions:  -bone marrow disease  -heart or blood vessel disease  -if you are on a special diet, such as a low salt diet  -immune system disease like lupus or scleroderma  -kidney disease  -low blood pressure  -previous swelling of the tongue, face, or lips with difficulty breathing, difficulty swallowing, hoarseness, or tightening of the throat  -an unusual or allergic reaction to captopril, other ACE inhibitors, insect venom, foods, dyes,  or preservatives  -pregnant or trying to get pregnant  -breast-feeding  How should I use this medicine?  Take this medicine by mouth with a glass of water. Follow the directions on your prescription label. Take this medicine on an empty stomach, at least 1 hour before meals. Take your doses at regular intervals. Do not take more medicine than directed. Do not stop taking this medicine except on the advice of your doctor or health care professional.  Talk to your pediatrician regarding the use of this medicine in children. Special care may be needed.  Overdosage: If you think you have taken too much of this medicine contact a poison control center or emergency room at once.  NOTE: This medicine is only for you. Do not share this medicine with others.  What if I miss a dose?  If you miss a dose, take it as soon as you can. If it is almost time for your next dose, take only that dose. Do not take double or extra doses.  What may interact with this medicine?  -antacids  -diuretics  -lithium  -medicines for chest pain like nitroglycerin  -medicines for high blood pressure  -NSAIDs, medicines for pain and inflammation, like ibuprofen or naproxen  -over-the-counter herbal supplements like hawthorn  -potassium salts or potassium supplements  This list may not describe all possible interactions. Give your health care provider a list of all the medicines, herbs, non-prescription drugs, or dietary supplements you use. Also tell them if you smoke, drink alcohol, or use illegal drugs. Some items may interact with your medicine.  What should I watch for while using this medicine?  Visit your doctor or health care professional for regular checks on your progress. Check your blood pressure as directed. Ask your doctor or health care professional what your blood pressure should be and when you should contact him or her. Call your doctor or health care professional if you notice an irregular or fast heart beat.  Women should inform  their doctor if they wish to become pregnant or think they might be pregnant. There is a potential for serious side effects to an unborn child. Talk to your health care professional or pharmacist for more information.  Check with your doctor or health care professional if you get an attack of severe diarrhea, nausea and vomiting, or if you sweat a lot. The loss of too much body fluid can make it dangerous for you to take this medicine.  You may get drowsy or dizzy. Do not drive, use machinery, or do anything that needs mental alertness until you know how this drug affects you. Do not stand or sit up quickly, especially if you are an older patient. This reduces the risk of dizzy or fainting spells. Alcohol can make you more drowsy and dizzy. Avoid alcoholic drinks.  Avoid salt substitutes unless you are told otherwise by your doctor or health care professional.  Do not treat yourself for coughs, colds, or pain while you are taking this medicine without asking your doctor or health care professional for advice. Some ingredients may increase your blood pressure.  What side effects may I notice from receiving this medicine?  Side effects that you should report to your doctor or health care professional as soon as possible:  -allergic reactions like skin rash, itching or hives, swelling of the face, lips, or tongue  -breathing problems  -chest pain  -dark urine  -feeling faint or lightheaded, falls  -fever or sore throat  -irregular heart beat  -pain or difficulty passing urine  -redness, blistering, peeling or loosening of the skin, including inside the mouth  -stomach pain with or without nausea or vomiting  -unusually weak  -yellowing of the eyes or skin  Side effects that usually do not require medical attention (report to your doctor or health care professional if they continue or are bothersome):  -change in sex drive or performance  -cough  -loss of taste  -sun sensitivity  -tiredness  This list may not describe  all possible side effects. Call your doctor for medical advice about side effects. You may report side effects to FDA at 9-850-FDA-7096.  Where should I keep my medicine?  Keep out of the reach of children.  Store at room temperature below 30 degrees C (86 degrees F). Protect from moisture. Keep container tightly closed. Throw away any unused medicine after the expiration date.  NOTE: This sheet is a summary. It may not cover all possible information. If you have questions about this medicine, talk to your doctor, pharmacist, or health care provider.  © 2014, [employer]vier/Gold Standard. (3/24/2009 3:16:03 PM)  Hydrocodone; Ibuprofen tablets  What is this medicine?  HYDROCODONE; IBUPROFEN (farhan droe KOE done; eye BYOO proe fen) is a pain reliever. It is used to treat moderate to severe pain.  This medicine may be used for other purposes; ask your health care provider or pharmacist if you have questions.  COMMON BRAND NAME(S): Ibudone, Reprexain, Vicoprofen  What should I tell my health care provider before I take this medicine?  They need to know if you have any of these conditions:  -brain tumor  -cigarette smoker  -Crohn's disease, inflammatory bowel disease, or ulcerative colitis  -drink more than 3 alcohol-containing drinks per day  -drug abuse or addiction  -head injury  -heart disease or circulation problems such as heart failure or leg edema (fluid retention)  -hemophilia or bleeding problems  -high blood pressure  -kidney disease or problems going to the bathroom  -liver disease  -lung disease, asthma, or breathing problems  -stomach bleeding or ulcers  -an unusual or allergic reaction to ibuprofen, hydrocodone, aspirin, other NSAIDs, other medicines, foods, dyes, or preservatives  -pregnant or trying to get pregnant  -breast-feeding  How should I use this medicine?  Take this medicine by mouth with a full glass of water. Follow the directions on the prescription label. You can take it with or without food. If it  upsets your stomach, take it with food. Try to not lie down for at least 10 minutes after you take the medicine. Take your medicine at regular intervals. Do not take it more often than directed.  A special MedGuide will be given to you by the pharmacist with each prescription and refill. Be sure to read this information carefully each time.  Talk to your pediatrician regarding the use of this medicine in children. While this drug may be prescribed for children as young as 16 years of age for selected conditions, precautions do apply.  Overdosage: If you think you have taken too much of this medicine contact a poison control center or emergency room at once.  NOTE: This medicine is only for you. Do not share this medicine with others.  What if I miss a dose?  If you miss a dose, take it as soon as you can. If it is almost time for your next dose, take only that dose. Do not take double or extra doses.  What may interact with this medicine?  -alcohol  -antihistamines  -aspirin  -cidofovir  -diuretics  -medicines for depression, anxiety, or psychotic disturbances  -medicines for sleep  -methotrexate  -muscle relaxants  -naltrexone  -narcotic medicines (opiates) for pain  -other drugs for inflammation like ketorolac or prednisone  -pemetrexed  -phenobarbital  -ritonavir  -tramadol  -warfarin  This list may not describe all possible interactions. Give your health care provider a list of all the medicines, herbs, non-prescription drugs, or dietary supplements you use. Also tell them if you smoke, drink alcohol, or use illegal drugs. Some items may interact with your medicine.  What should I watch for while using this medicine?  Tell your doctor or health care professional if your pain does not go away, if it gets worse, or if you have new or a different type of pain. You may develop tolerance to the medicine. Tolerance means that you will need a higher dose of the medicine for pain relief. Tolerance is normal and is  expected if you take this medicine for a long time.  Do not suddenly stop taking this medicine because you may develop a severe reaction. Your body becomes used to this medicine. This does NOT mean you are addicted. Addiction is a behavior related to getting and using a drug for a non-medical reason. If you have pain, you have a medical reason to take pain medicine. Your doctor will tell you how much medicine to take. If your doctor wants you to stop this medicine, the dose will be slowly lowered over time to avoid any side effects.  There are different types of narcotic medicines (opiates) for pain. If you take more than one type at the same time, you may have more side effects. Give your health care provider a list of all medicines you use. Your doctor will tell you how much medicine to take. Do not take more medicine than directed. Call emergency for help if you have problems breathing.  This medicine will cause constipation. Try to have a bowel movement at least every 2 to 3 days. If you do not have a bowel movement for 3 days, call your doctor or health care professional.  This medicine does not prevent heart attack or stroke. In fact, this medicine may increase the chance of a heart attack or stroke. The chance may increase with longer use of this medicine and in people who have heart disease. If you take aspirin to prevent heart attack or stroke, talk with your doctor or health care professional.  Do not take naproxen with this medicine. Side effects such as stomach upset, nausea, or ulcers may be more likely to occur. Many medicines available without a prescription should not be taken with this medicine.  This medicine can cause ulcers and bleeding in the stomach and intestines at any time during treatment. Do not smoke cigarettes or drink alcohol. These increase irritation to your stomach and can make it more susceptible to damage from this medicine. Ulcers and bleeding can happen without warning symptoms  and can cause death.  You may get drowsy or dizzy. Do not drive, use machinery, or do anything that needs mental alertness until you know how this medicine affects you. Do not stand or sit up quickly, especially if you are an older patient. This reduces the risk of dizzy or fainting spells.  This medicine can cause you to bleed more easily. Try to avoid damage to your teeth and gums when you brush or floss your teeth.  What side effects may I notice from receiving this medicine?  Side effects that you should report to your doctor or health care professional as soon as possible:  -confusion  -black or bloody stools, blood in the urine or vomit  -blurred vision  -chest pain  -difficulty breathing or wheezing  -skin rash, skin redness, blistering or peeling skin, hives, or itching  -slurred speech or weakness on one side of the body  -swelling of eyelids, throat, lips  -unexplained weight gain or swelling  -unusually weak or tired  -yellowing of eyes or skin  -vomiting  Side effects that usually do not require medical attention (report to your doctor or health care professional if they continue or are bothersome):  -headache  -heartburn  -nausea  This list may not describe all possible side effects. Call your doctor for medical advice about side effects. You may report side effects to FDA at 6-152-FDA-1458.  Where should I keep my medicine?  Keep out of the reach of children. This medicine can be abused. Keep your medicine in a safe place to protect it from theft. Do not share this medicine with anyone. Selling or giving away this medicine is dangerous and against the law.  Store at room temperature between 15 and 30 degrees C (59 and 86 degrees F). Protect from light. Keep container tightly closed.   Throw away any unused medicine after the expiration date. Discard unused medicine and used packaging carefully. Pets and children can be harmed if they find used or lost packages.  NOTE: This sheet is a summary. It may  not cover all possible information. If you have questions about this medicine, talk to your doctor, pharmacist, or health care provider.  © 2014, [employer]. (8/27/2012 10:18:58 AM)  Tamsulosin capsules  What is this medicine?  TAMSULOSIN (cantor MISAEL tawanda sin) is used to treat enlargement of the prostate gland in men, a condition called benign prostatic hyperplasia or BPH. It is not for use in women. It works by relaxing muscles in the prostate and bladder neck. This improves urine flow and reduces BPH symptoms.  This medicine may be used for other purposes; ask your health care provider or pharmacist if you have questions.  COMMON BRAND NAME(S): Flomax  What should I tell my health care provider before I take this medicine?  They need to know if you have any of the following conditions:  -advanced kidney disease  -advanced liver disease  -low blood pressure  -prostate cancer  -an unusual or allergic reaction to tamsulosin, sulfa drugs, other medicines, foods, dyes, or preservatives  -pregnant or trying to get pregnant  -breast-feeding  How should I use this medicine?  Take this medicine by mouth about 30 minutes after the same meal every day. Follow the directions on the prescription label. Swallow the capsules whole with a glass of water. Do not crush, chew, or open capsules. Do not take your medicine more often than directed. Do not stop taking your medicine unless your doctor tells you to.  Talk to your pediatrician regarding the use of this medicine in children. Special care may be needed.  Overdosage: If you think you have taken too much of this medicine contact a poison control center or emergency room at once.  NOTE: This medicine is only for you. Do not share this medicine with others.  What if I miss a dose?  If you miss a dose, take it as soon as you can. If it is almost time for your next dose, take only that dose. Do not take double or extra doses. If you stop taking your medicine for several  days or more, ask your doctor or health care professional what dose you should start back on.  What may interact with this medicine?  -cimetidine  -fluoxetine  -ketoconazole  -medicines for erectile disfunction like sildenafil, tadalafil, vardenafil  -medicines for high blood pressure  -other alpha-blockers like alfuzosin, doxazosin, phentolamine, phenoxybenzamine, prazosin, terazosin  -warfarin  This list may not describe all possible interactions. Give your health care provider a list of all the medicines, herbs, non-prescription drugs, or dietary supplements you use. Also tell them if you smoke, drink alcohol, or use illegal drugs. Some items may interact with your medicine.  What should I watch for while using this medicine?  Visit your doctor or health care professional for regular check ups. You will need lab work done before you start this medicine and regularly while you are taking it. Check your blood pressure as directed. Ask your health care professional what your blood pressure should be, and when you should contact him or her.  This medicine may make you feel dizzy or lightheaded. This is more likely to happen after the first dose, after an increase in dose, or during hot weather or exercise. Drinking alcohol and taking some medicines can make this worse. Do not drive, use machinery, or do anything that needs mental alertness until you know how this medicine affects you. Do not sit or stand up quickly. If you begin to feel dizzy, sit down until you feel better. These effects can decrease once your body adjusts to the medicine.  Contact your doctor or health care professional right away if you have an erection that lasts longer than 4 hours or if it becomes painful. This may be a sign of a serious problem and must be treated right away to prevent permanent damage.  If you are thinking of having cataract surgery, tell your eye surgeon that you have taken this medicine.  What side effects may I notice from  receiving this medicine?  Side effects that you should report to your doctor or health care professional as soon as possible:  -allergic reactions like skin rash or itching, hives, swelling of the lips, mouth, tongue, or throat  -breathing problems  -change in vision  -feeling faint or lightheaded  -irregular heartbeat  -prolonged or painful erection  -weakness  Side effects that usually do not require medical attention (report to your doctor or health care professional if they continue or are bothersome):  -back pain  -change in sex drive or performance  -constipation, nausea or vomiting  -cough  -drowsy  -runny or stuffy nose  -trouble sleeping  This list may not describe all possible side effects. Call your doctor for medical advice about side effects. You may report side effects to FDA at 9-580-FDA-5731.  Where should I keep my medicine?  Keep out of the reach of children.  Store at room temperature between 15 and 30 degrees C (59 and 86 degrees F). Throw away any unused medicine after the expiration date.  NOTE: This sheet is a summary. It may not cover all possible information. If you have questions about this medicine, talk to your doctor, pharmacist, or health care provider.  © 2014, [employer]/Gold Standard. (12/18/2013 2:11:34 PM)      Your appointments     May 10, 2017  2:20 PM   Established Patient with Kevin Rea M.D.   [facility] [address] [place] [address] ([address])    [address] [place] [id number] [phone]           You will be receiving a confirmation call a few days before your appointment from our automated call confirmation system.              Follow-up Information     1. Follow up with Kevin Rea M.D. In 2 weeks.    Specialty:  Internal Medicine    Contact information    [address] [facility] [address] [facility] [address]  [place] [id number] [phone]          2. Follow up with Deon Howard M.D. In 1 week.    Specialty:  Orthopaedics    Contact information    [address]  [id number]  [phone]          3. Follow up with [facility] .    Specialty:  Home Health Services    Contact information    [address] [phone]           Discharge Medication Instructions:    Below are the medications your physician expects you to take upon discharge:    Review all your home medications and newly ordered medications with your doctor and/or pharmacist. Follow medication instructions as directed by your doctor and/or pharmacist.    Please keep your medication list with you and share with your physician.               Medication List      START taking these medications        Instructions    acetaminophen 325 MG Tabs   Last time this was given:  1,000 mg on 4/5/2017  1:15 PM   Commonly known as:  TYLENOL    Take 2 Tabs by mouth every 6 hours as needed (Mild Pain; (Pain scale 1-3); Temp greater than 100.5 F).   Dose:  650 mg       Aspirin 325 MG Tbec   Last time this was given:  325 mg on 4/5/2017  9:14 AM    Take 1 Tab by mouth 2 Times a Day for 30 days.   Dose:  325 mg       hydrocodone-acetaminophen 5-325 MG Tabs per tablet   Commonly known as:  NORCO    Take 1-2 Tabs by mouth every 6 hours as needed.   Dose:  1-2 Tab       tamsulosin 0.4 MG capsule   Last time this was given:  0.4 mg on 4/5/2017  9:14 AM   Commonly known as:  FLOMAX    Take 1 Cap by mouth every day.   Dose:  0.4 mg         CHANGE how you take these medications        Instructions    captopril 12.5 MG Tabs   What changed:  how much to take   Last time this was given:  6.25 mg on 4/5/2017  9:13 AM   Commonly known as:  CAPOTEN    Take 0.5 Tabs by mouth 2 times a day.   Dose:  6.25 mg         CONTINUE taking these medications        Instructions    atorvastatin 40 MG Tabs   Last time this was given:  40 mg on 4/3/2017  9:20 PM   Commonly known as:  LIPITOR    Take 1 Tab by mouth every day.   Dose:  40 mg       carvedilol 3.125 MG Tabs   Last time this was given:  3.125 mg on 4/5/2017  9:13 AM   Commonly known  as:  COREG    Take 1 Tab by mouth 2 times a day, with meals.   Dose:  3.125 mg       metformin 1000 MG tablet   Commonly known as:  GLUCOPHAGE    Take 1 Tab by mouth 2 times a day, with meals.   Dose:  1000 mg               Instructions           Diet / Nutrition:    Follow any diet instructions given to you by your doctor or the dietician, including how much salt (sodium) you are allowed each day.    If you are overweight, talk to your doctor about a weight reduction plan.    Activity:    Remain physically active following your doctor's instructions about exercise and activity.    Rest often.     Any time you become even a little tired or short of breath, SIT DOWN and rest.    Worsening Symptoms:    Report any of the following signs and symptoms to the doctor's office immediately:    *Pain of jaw, arm, or neck  *Chest pain not relieved by medication                               *Dizziness or loss of consciousness  *Difficulty breathing even when at rest   *More tired than usual                                       *Bleeding drainage or swelling of surgical site  *Swelling of feet, ankles, legs or stomach                 *Fever (>100ºF)  *Pink or blood tinged sputum  *Weight gain (3lbs/day or 5lbs /week)           *Shock from internal defibrillator (if applicable)  *Palpitations or irregular heartbeats                *Cool and/or numb extremities    Stroke Awareness    Common Risk Factors for Stroke include:    Age  Atrial Fibrillation  Carotid Artery Stenosis  Diabetes Mellitus  Excessive alcohol consumption  High blood pressure  Overweight   Physical inactivity  Smoking    Warning signs and symptoms of a stroke include:    *Sudden numbness or weakness of the face, arm or leg (especially on one side of the body).  *Sudden confusion, trouble speaking or understanding.  *Sudden trouble seeing in one or both eyes.  *Sudden trouble walking, dizziness, loss of balance or coordination.Sudden severe headache with no  known cause.    It is very important to get treatment quickly when a stroke occurs. If you experience any of the above warning signs, call 911 immediately.                   Disclaimer         Quit Smoking / Tobacco Use:    I understand the use of any tobacco products increases my chance of suffering from future heart disease or stroke and could cause other illnesses which may shorten my life. Quitting the use of tobacco products is the single most important thing I can do to improve my health. For further information on smoking / tobacco cessation call a Toll Free Quit Line at 1-232.237.5796 (*[employer] [place]) or 1-652.732.2649 (American Lung Association) or you can access the web based program at www.lungusa.org.    [place] Tobacco Users Help Line:  (871) 941-3437       Toll Free: 1-455.288.8629  Quit Tobacco Program [facility] (575)726-6035    Crisis Hotline:    [place] Crisis Hotline:  8-070-PIYVMPF or 1-876.176.2773    [place] Crisis Hotline:    1-981.578.7282 or 796-781-7453    Discharge Survey:   Thank you for choosing [facility]. We hope we did everything we could to make your hospital stay a pleasant one. You may be receiving a phone survey and we would appreciate your time and participation in answering the questions. Your input is very valuable to us in our efforts to improve our service to our patients and their families.        My signature on this form indicates that:    1. I have reviewed and understand the above information.  2. My questions regarding this information have been answered to my satisfaction.  3. I have formulated a plan with my discharge nurse to obtain my prescribed medications for home.                  Disclaimer         __________________________________                     __________       ________                       Patient Signature                                                 Date                    Time

## 2017-03-31 NOTE — IP AVS SNAPSHOT
[facility] Access Code: [email or web address]  Expires: 4/30/2017  4:33 PM    [facility]  A secure, online tool to manage your health information     [employer]’s [employer]® is a secure, online tool that connects you to your personalized health information from the privacy of your home -- day or night - making it very easy for you to manage your healthcare. Once the activation process is completed, you can even access your medical information using the [facility] effie, which is available for free in the Apple Effie store or Google Play store.     [facility] provides the following levels of access (as shown below):   My Chart Features   [facility] Primary Care Doctor [facility]  Specialists [facility]  Urgent  Care Non-[facility]  Primary Care  Doctor   Email your healthcare team securely and privately 24/7 X X X X   Manage appointments: schedule your next appointment; view details of past/upcoming appointments X      Request prescription refills. X      View recent personal medical records, including lab and immunizations X X X X   View health record, including health history, allergies, medications X X X X   Read reports about your outpatient visits, procedures, consult and ER notes X X X X   See your discharge summary, which is a recap of your hospital and/or ER visit that includes your diagnosis, lab results, and care plan. X X       How to register for [employer] [facility]:  1. Go to  [email or web address].  2. Click on the Sign Up Now box, which takes you to the New Member Sign Up page. You will need to provide the following information:  a. Enter your [facility] [employer] Access Code exactly as it appears at the top of this page. (You will not need to use this code after you’ve completed the sign-up process. If you do not sign up before the expiration date, you must request a new code.)   b. Enter your date of birth.   c. Enter your home email address.   d. Click Submit, and follow the next screen’s instructions.  3. Create a [facility] ID. This will be your [facility]  login ID and cannot be changed, so think of one that is secure and easy to remember.  4. Create a [employer] password. You can change your password at any time.  5. Enter your Password Reset Question and Answer. This can be used at a later time if you forget your password.   6. Enter your e-mail address. This allows you to receive e-mail notifications when new information is available in [employer].  7. Click Sign Up. You can now view your health information.    For assistance activating your [employer] account, call [phone]

## 2017-03-31 NOTE — IP AVS SNAPSHOT
" <p align=\"LEFT\"><IMG SRC=\"//[email or web address]$/[email or web address]\" alt=\"Image\" WIDTH=\"50%\" HEIGHT=\"200\" BORDER=\"\"></p>                   Name:Alice Yarbrough  Medical Record Number:[id number]  CSN: [id number]    YOB: 1950   Age: 66 y.o.  Sex: female  HT:1.727 m (5' 7.99\") WT: 74 kg (163 lb 2.3 oz)          Admit Date: 3/31/2017     Discharge Date:   Today's Date: 4/5/2017  Attending Doctor:  Danial Galarza M.D.                  Allergies:  Review of patient's allergies indicates no known allergies.          Your appointments     May 10, 2017  2:20 PM   Established Patient with Kevin Rea M.D.   [address] ([address])    [address] [place] [id number] [phone]           You will be receiving a confirmation call a few days before your appointment from our automated call confirmation system.              Follow-up Information     1. Follow up with Kevin Rea M.D. In 2 weeks.    Specialty:  Internal Medicine    Contact information    [address] [address] [phone]          2. Follow up with Deon Howard M.D. In 1 week.    Specialty:  Orthopaedics    Contact information    [address]  [facility] [address] [phone]          3. Follow up with [facility] .    Specialty:  Home Health Services    Contact information    [address] [facility] [address]           Medication List      Take these Medications        Instructions    acetaminophen 325 MG Tabs   Commonly known as:  TYLENOL    Take 2 Tabs by mouth every 6 hours as needed (Mild Pain; (Pain scale 1-3); Temp greater than 100.5 F).   Dose:  650 mg       Aspirin 325 MG Tbec    Take 1 Tab by mouth 2 Times a Day for 30 days.   Dose:  325 mg       atorvastatin 40 MG Tabs   Commonly known as:  LIPITOR    Take 1 Tab by mouth every day.   Dose:  40 mg       captopril 12.5 MG Tabs   What changed:  how much to take   Commonly known as:  CAPOTEN    Take 0.5 Tabs by " mouth 2 times a day.   Dose:  6.25 mg       carvedilol 3.125 MG Tabs   Commonly known as:  COREG    Take 1 Tab by mouth 2 times a day, with meals.   Dose:  3.125 mg       hydrocodone-acetaminophen 5-325 MG Tabs per tablet   Commonly known as:  NORCO    Take 1-2 Tabs by mouth every 6 hours as needed.   Dose:  1-2 Tab       metformin 1000 MG tablet   Commonly known as:  GLUCOPHAGE    Take 1 Tab by mouth 2 times a day, with meals.   Dose:  1000 mg       tamsulosin 0.4 MG capsule   Commonly known as:  FLOMAX    Take 1 Cap by mouth every day.   Dose:  0.4 mg

## 2017-04-01 PROBLEM — E55.9 VITAMIN D DEFICIENCY: Status: ACTIVE | Noted: 2017-04-01

## 2017-04-01 PROBLEM — M19.90 DEGENERATIVE JOINT DISEASE: Status: ACTIVE | Noted: 2017-04-01

## 2017-04-01 LAB
25(OH)D3 SERPL-MCNC: 16 NG/ML (ref 30–100)
ALBUMIN SERPL BCP-MCNC: 3.4 G/DL (ref 3.2–4.9)
ALBUMIN/GLOB SERPL: 1.1 G/DL
ALP SERPL-CCNC: 65 U/L (ref 30–99)
ALT SERPL-CCNC: 8 U/L (ref 2–50)
ANION GAP SERPL CALC-SCNC: 7 MMOL/L (ref 0–11.9)
AST SERPL-CCNC: 14 U/L (ref 12–45)
BASOPHILS # BLD AUTO: 0.4 % (ref 0–1.8)
BASOPHILS # BLD: 0.03 K/UL (ref 0–0.12)
BILIRUB SERPL-MCNC: 0.4 MG/DL (ref 0.1–1.5)
BUN SERPL-MCNC: 13 MG/DL (ref 8–22)
CALCIUM SERPL-MCNC: 8.7 MG/DL (ref 8.5–10.5)
CHLORIDE SERPL-SCNC: 107 MMOL/L (ref 96–112)
CO2 SERPL-SCNC: 23 MMOL/L (ref 20–33)
CREAT SERPL-MCNC: 0.57 MG/DL (ref 0.5–1.4)
EOSINOPHIL # BLD AUTO: 0.01 K/UL (ref 0–0.51)
EOSINOPHIL NFR BLD: 0.1 % (ref 0–6.9)
ERYTHROCYTE [DISTWIDTH] IN BLOOD BY AUTOMATED COUNT: 40.3 FL (ref 35.9–50)
GFR SERPL CREATININE-BSD FRML MDRD: >60 ML/MIN/1.73 M 2
GLOBULIN SER CALC-MCNC: 3 G/DL (ref 1.9–3.5)
GLUCOSE BLD-MCNC: 127 MG/DL (ref 65–99)
GLUCOSE BLD-MCNC: 130 MG/DL (ref 65–99)
GLUCOSE BLD-MCNC: 164 MG/DL (ref 65–99)
GLUCOSE BLD-MCNC: 168 MG/DL (ref 65–99)
GLUCOSE BLD-MCNC: 201 MG/DL (ref 65–99)
GLUCOSE SERPL-MCNC: 171 MG/DL (ref 65–99)
HCT VFR BLD AUTO: 38.1 % (ref 37–47)
HGB BLD-MCNC: 12.5 G/DL (ref 12–16)
IMM GRANULOCYTES # BLD AUTO: 0.03 K/UL (ref 0–0.11)
IMM GRANULOCYTES NFR BLD AUTO: 0.4 % (ref 0–0.9)
LYMPHOCYTES # BLD AUTO: 0.97 K/UL (ref 1–4.8)
LYMPHOCYTES NFR BLD: 11.8 % (ref 22–41)
MAGNESIUM SERPL-MCNC: 1.8 MG/DL (ref 1.5–2.5)
MCH RBC QN AUTO: 28.8 PG (ref 27–33)
MCHC RBC AUTO-ENTMCNC: 32.8 G/DL (ref 33.6–35)
MCV RBC AUTO: 87.8 FL (ref 81.4–97.8)
MONOCYTES # BLD AUTO: 0.44 K/UL (ref 0–0.85)
MONOCYTES NFR BLD AUTO: 5.3 % (ref 0–13.4)
NEUTROPHILS # BLD AUTO: 6.75 K/UL (ref 2–7.15)
NEUTROPHILS NFR BLD: 82 % (ref 44–72)
NRBC # BLD AUTO: 0 K/UL
NRBC BLD AUTO-RTO: 0 /100 WBC
PHOSPHATE SERPL-MCNC: 3.3 MG/DL (ref 2.5–4.5)
PLATELET # BLD AUTO: 269 K/UL (ref 164–446)
PMV BLD AUTO: 10.3 FL (ref 9–12.9)
POTASSIUM SERPL-SCNC: 3.8 MMOL/L (ref 3.6–5.5)
PROT SERPL-MCNC: 6.4 G/DL (ref 6–8.2)
RBC # BLD AUTO: 4.34 M/UL (ref 4.2–5.4)
SODIUM SERPL-SCNC: 137 MMOL/L (ref 135–145)
WBC # BLD AUTO: 8.2 K/UL (ref 4.8–10.8)

## 2017-04-01 PROCEDURE — G8978 MOBILITY CURRENT STATUS: HCPCS | Mod: CK

## 2017-04-01 PROCEDURE — A9270 NON-COVERED ITEM OR SERVICE: HCPCS

## 2017-04-01 PROCEDURE — 82962 GLUCOSE BLOOD TEST: CPT

## 2017-04-01 PROCEDURE — 36415 COLL VENOUS BLD VENIPUNCTURE: CPT

## 2017-04-01 PROCEDURE — 700111 HCHG RX REV CODE 636 W/ 250 OVERRIDE (IP)

## 2017-04-01 PROCEDURE — 700105 HCHG RX REV CODE 258

## 2017-04-01 PROCEDURE — A9270 NON-COVERED ITEM OR SERVICE: HCPCS | Performed by: INTERNAL MEDICINE

## 2017-04-01 PROCEDURE — 80053 COMPREHEN METABOLIC PANEL: CPT

## 2017-04-01 PROCEDURE — 700111 HCHG RX REV CODE 636 W/ 250 OVERRIDE (IP): Performed by: ORTHOPAEDIC SURGERY

## 2017-04-01 PROCEDURE — 700102 HCHG RX REV CODE 250 W/ 637 OVERRIDE(OP): Performed by: ORTHOPAEDIC SURGERY

## 2017-04-01 PROCEDURE — A9270 NON-COVERED ITEM OR SERVICE: HCPCS | Performed by: ORTHOPAEDIC SURGERY

## 2017-04-01 PROCEDURE — 83735 ASSAY OF MAGNESIUM: CPT

## 2017-04-01 PROCEDURE — 84100 ASSAY OF PHOSPHORUS: CPT

## 2017-04-01 PROCEDURE — 770001 HCHG ROOM/CARE - MED/SURG/GYN PRIV*

## 2017-04-01 PROCEDURE — G8979 MOBILITY GOAL STATUS: HCPCS | Mod: CI

## 2017-04-01 PROCEDURE — 700111 HCHG RX REV CODE 636 W/ 250 OVERRIDE (IP): Performed by: INTERNAL MEDICINE

## 2017-04-01 PROCEDURE — 99232 SBSQ HOSP IP/OBS MODERATE 35: CPT | Performed by: HOSPITALIST

## 2017-04-01 PROCEDURE — 51798 US URINE CAPACITY MEASURE: CPT

## 2017-04-01 PROCEDURE — A9270 NON-COVERED ITEM OR SERVICE: HCPCS | Performed by: HOSPITALIST

## 2017-04-01 PROCEDURE — 97162 PT EVAL MOD COMPLEX 30 MIN: CPT

## 2017-04-01 PROCEDURE — 700102 HCHG RX REV CODE 250 W/ 637 OVERRIDE(OP): Performed by: INTERNAL MEDICINE

## 2017-04-01 PROCEDURE — 700102 HCHG RX REV CODE 250 W/ 637 OVERRIDE(OP)

## 2017-04-01 PROCEDURE — 700112 HCHG RX REV CODE 229: Performed by: ORTHOPAEDIC SURGERY

## 2017-04-01 PROCEDURE — 82306 VITAMIN D 25 HYDROXY: CPT

## 2017-04-01 PROCEDURE — 85025 COMPLETE CBC W/AUTO DIFF WBC: CPT

## 2017-04-01 PROCEDURE — 700102 HCHG RX REV CODE 250 W/ 637 OVERRIDE(OP): Performed by: HOSPITALIST

## 2017-04-01 RX ORDER — OXYCODONE HCL 5 MG/5 ML
SOLUTION, ORAL ORAL
Status: COMPLETED
Start: 2017-04-01 | End: 2017-04-01

## 2017-04-01 RX ORDER — SODIUM CHLORIDE 9 MG/ML
INJECTION, SOLUTION INTRAVENOUS
Status: COMPLETED
Start: 2017-04-01 | End: 2017-04-01

## 2017-04-01 RX ADMIN — STANDARDIZED SENNA CONCENTRATE AND DOCUSATE SODIUM 2 TABLET: 8.6; 5 TABLET, FILM COATED ORAL at 09:27

## 2017-04-01 RX ADMIN — ACETAMINOPHEN 1000 MG: 500 TABLET, FILM COATED ORAL at 01:05

## 2017-04-01 RX ADMIN — OXYCODONE HYDROCHLORIDE 10 MG: 5 SOLUTION ORAL at 00:10

## 2017-04-01 RX ADMIN — CEFAZOLIN SODIUM 2 G: 2 INJECTION, SOLUTION INTRAVENOUS at 13:30

## 2017-04-01 RX ADMIN — OXYCODONE HYDROCHLORIDE 5 MG: 5 TABLET ORAL at 09:19

## 2017-04-01 RX ADMIN — OXYCODONE HYDROCHLORIDE 5 MG: 5 TABLET ORAL at 15:26

## 2017-04-01 RX ADMIN — HEPARIN SODIUM 5000 UNITS: 5000 INJECTION, SOLUTION INTRAVENOUS; SUBCUTANEOUS at 01:04

## 2017-04-01 RX ADMIN — ACETAMINOPHEN 1000 MG: 500 TABLET, FILM COATED ORAL at 17:35

## 2017-04-01 RX ADMIN — CAPTOPRIL 12.5 MG: 12.5 TABLET ORAL at 10:07

## 2017-04-01 RX ADMIN — CELECOXIB 200 MG: 200 CAPSULE ORAL at 09:19

## 2017-04-01 RX ADMIN — CALCIUM CARBONATE-CHOLECALCIFEROL TAB 250 MG-125 UNIT 1 TABLET: 250-125 TAB at 09:19

## 2017-04-01 RX ADMIN — OXYCODONE HYDROCHLORIDE 5 MG: 5 TABLET ORAL at 20:21

## 2017-04-01 RX ADMIN — ATORVASTATIN CALCIUM 40 MG: 40 TABLET, FILM COATED ORAL at 01:05

## 2017-04-01 RX ADMIN — SODIUM CHLORIDE: 9 INJECTION, SOLUTION INTRAVENOUS at 05:15

## 2017-04-01 RX ADMIN — GABAPENTIN 100 MG: 100 CAPSULE ORAL at 01:06

## 2017-04-01 RX ADMIN — HEPARIN SODIUM 5000 UNITS: 5000 INJECTION, SOLUTION INTRAVENOUS; SUBCUTANEOUS at 14:09

## 2017-04-01 RX ADMIN — ASPIRIN 325 MG: 325 TABLET, DELAYED RELEASE ORAL at 20:21

## 2017-04-01 RX ADMIN — DOCUSATE SODIUM 100 MG: 100 CAPSULE ORAL at 20:20

## 2017-04-01 RX ADMIN — ACETAMINOPHEN 1000 MG: 500 TABLET, FILM COATED ORAL at 12:34

## 2017-04-01 RX ADMIN — CARVEDILOL 3.12 MG: 6.25 TABLET, FILM COATED ORAL at 09:18

## 2017-04-01 RX ADMIN — STANDARDIZED SENNA CONCENTRATE AND DOCUSATE SODIUM 2 TABLET: 8.6; 5 TABLET, FILM COATED ORAL at 01:05

## 2017-04-01 RX ADMIN — CAPTOPRIL 12.5 MG: 12.5 TABLET ORAL at 20:20

## 2017-04-01 RX ADMIN — CELECOXIB 200 MG: 200 CAPSULE ORAL at 17:35

## 2017-04-01 RX ADMIN — GABAPENTIN 100 MG: 100 CAPSULE ORAL at 20:20

## 2017-04-01 RX ADMIN — CARVEDILOL 3.12 MG: 6.25 TABLET, FILM COATED ORAL at 17:35

## 2017-04-01 RX ADMIN — ACETAMINOPHEN 1000 MG: 500 TABLET, FILM COATED ORAL at 05:14

## 2017-04-01 RX ADMIN — DOCUSATE SODIUM 100 MG: 100 CAPSULE ORAL at 01:05

## 2017-04-01 RX ADMIN — CEFAZOLIN SODIUM 2 G: 2 INJECTION, SOLUTION INTRAVENOUS at 05:15

## 2017-04-01 RX ADMIN — STANDARDIZED SENNA CONCENTRATE AND DOCUSATE SODIUM 2 TABLET: 8.6; 5 TABLET, FILM COATED ORAL at 20:20

## 2017-04-01 RX ADMIN — HEPARIN SODIUM 5000 UNITS: 5000 INJECTION, SOLUTION INTRAVENOUS; SUBCUTANEOUS at 20:21

## 2017-04-01 RX ADMIN — ATORVASTATIN CALCIUM 40 MG: 40 TABLET, FILM COATED ORAL at 20:20

## 2017-04-01 RX ADMIN — INSULIN LISPRO 1 UNITS: 100 INJECTION, SOLUTION INTRAVENOUS; SUBCUTANEOUS at 20:29

## 2017-04-01 RX ADMIN — ASPIRIN 325 MG: 325 TABLET, DELAYED RELEASE ORAL at 09:19

## 2017-04-01 RX ADMIN — DOCUSATE SODIUM 100 MG: 100 CAPSULE ORAL at 09:19

## 2017-04-01 RX ADMIN — HEPARIN SODIUM 5000 UNITS: 5000 INJECTION, SOLUTION INTRAVENOUS; SUBCUTANEOUS at 05:14

## 2017-04-01 ASSESSMENT — GAIT ASSESSMENTS
DISTANCE (FEET): 2
GAIT LEVEL OF ASSIST: MINIMAL ASSIST
ASSISTIVE DEVICE: FRONT WHEEL WALKER
DEVIATION: ANTALGIC;STEP TO;BRADYKINETIC

## 2017-04-01 ASSESSMENT — ENCOUNTER SYMPTOMS
ABDOMINAL PAIN: 0
CHILLS: 0
DIZZINESS: 0
MYALGIAS: 1
SHORTNESS OF BREATH: 0
HEADACHES: 0
FEVER: 0
SENSORY CHANGE: 0
VOMITING: 0
SPEECH CHANGE: 0
COUGH: 0
WEAKNESS: 1
PALPITATIONS: 0
TREMORS: 0

## 2017-04-01 ASSESSMENT — LIFESTYLE VARIABLES
ALCOHOL_USE: NO
EVER_SMOKED: YES

## 2017-04-01 ASSESSMENT — PAIN SCALES - GENERAL
PAINLEVEL_OUTOF10: 2
PAINLEVEL_OUTOF10: 8
PAINLEVEL_OUTOF10: 5
PAINLEVEL_OUTOF10: 7
PAINLEVEL_OUTOF10: 2
PAINLEVEL_OUTOF10: 4
PAINLEVEL_OUTOF10: 0
PAINLEVEL_OUTOF10: 2

## 2017-04-01 NOTE — PROGRESS NOTES
Pt stated she needed to void and did not want to get up. Put on bedpan, but was unable to urinate. Bladder scan done and showed 256 mL. Pt denies discomfort. Will try again later.

## 2017-04-01 NOTE — H&P
PRIMARY CARE PHYSICIAN:  Kevin Rea MD    CHIEF COMPLAINT:  Ground-level fall.    HISTORY OF PRESENTING ILLNESS:  This is a pleasant 66-year-old female who   presented to the emergency room after having a ground-level fall and landing   on her left hip with subsequent left-sided hip pain.  She is found to have a   left femoral neck fracture.  Hospitalist service has been consulted for   inpatient admission needs.  Upon evaluation, the patient complains of   left-sided hip pain, denies having any other symptoms.  She denies any oxygen   usage at home.  She informed me that at baseline she does not walk a lot.    Because she is unable to walk too good.  She informed me that she is able to   go up and down a flight of stairs by hanging on to the pole without having any   chest pain or shortness of breath.  She goes for her own grocery shopping.    She informed me that she uses the scooter available at the grocery store again   secondary to underlying degenerative joint disease, but reports that whenever   she walks she does not have any chest pain or shortness of breath.  Upon   evaluation, she denies having any headaches, chest pain, shortness of breath   or cough.  She denies any abdominal pain, complains of chronic diarrhea.    Denies any dysuria, frequency, urgency or hematuria.  Otherwise, the patient   does have a history of coronary artery disease, diabetes, hypertension, and   hyperlipidemia.  She had a percutaneous coronary intervention done in 2006   according to the patient where the right-sided stent was placed.    HOME MEDICATIONS:  1.  Carvedilol 3.125 mg twice a day.  2.  Metformin 1000 mg twice a day.  3.  Potassium chloride 10 mEq daily.  4.  Atorvastatin 40 mg daily.  5.  Captopril 12.5 mg twice a day.  6.  Gabapentin 100 mg at bedtime.  7.  Furosemide 40 mg daily.  8.  Aspirin 81 mg daily.    PAST MEDICAL HISTORY:  1.  Coronary artery disease.  2.  Chronic obstructive pulmonary disease.  3.   Diabetes mellitus type 2.  4.  Hypertension.  5.  Hyperlipidemia.  6.  History of percutaneous coronary intervention in 2006.  7.  History of nicotine dependence.    PAST SURGICAL HISTORY:  1.  Hysterectomy  2.  Percutaneous coronary intervention in 2006.    FAMILY HISTORY:  Mother with a history of Alzheimer disease.  Father had a   history of leg amputation.  Grandfather had a history of cancer, unknown to   the type.  Grandmother had a history of diabetes mellitus.    SOCIAL HISTORY:  Patient is an ex-smoker who quit _____.  Denies current use   of alcohol or drugs of abuse.  She lives with her  and 2 sons, reports   independence with activities of daily living and IADLs.    ALLERGIES:  PATIENT HAS REPORTED ALLERGIES TO CODEINE.    REVIEW OF SYSTEMS:  A detailed review of systems was reviewed with the patient   and negative other than as listed in the history of presenting illness and   past medical history.    PHYSICAL EXAMINATION:  VITAL SIGNS:  On presentation, temperature of 36 degrees Celsius, pulse of 81,   respiratory rate of 18, blood pressure of 120/73, weight of 73.4 kilograms   and oxygen saturation of 93% on 3 liters of nasal cannula.  GENERAL:  Patient is alert and oriented x4 in no acute distress.  HEENT:  Head is normocephalic.  Extraocular movements are intact with pupils   equal, round, and reactive to light and accommodation.  No conjunctival pallor   or scleral icterus is seen.  NECK:  No jugular venous distention.  CARDIOVASCULAR:  Regular rate and rhythm.  S1 plus S2.  No murmurs, rubs or   gallops.  RESPIRATORY:  Clear to auscultation bilaterally.  No wheezing or rhonchi.  ABDOMEN:  Soft, nontender, nondistended.  Bowel sounds are positive and normal   in frequency.  GENITOURINARY:  Not examined.  MUSCULOSKELETAL:  No joint swelling or tenderness on examination.  SKIN:  No rashes, erythema or wound.  NEUROLOGIC:  Cranial nerves without any gross deficits.  Motor strength of 5/5   in  bilateral upper and lower extremities.  No gross sensory deficits.  EXTREMITIES:  Pulses are 2+ in bilateral lower extremities.  Bilateral lower   extremities are warm and well perfused.  Left-sided lower extremity pulse is   noted.    LABORATORY STUDIES:  White blood cell count of 11.7, hemoglobin of 14.2,   platelet count of 304.  Sodium 139, potassium of 3.7, BUN of 15, creatinine of   0.72.  Liver function tests are within normal limits.  INR of 0.98.    IMAGING STUDIES:  This reveals acute displaced fracture through the femoral   neck with lateral apex angulation.    IMPRESSION:  1.  Closed left proximal femoral neck fracture.  2.  Stress-induced leukocytosis without any evidence of infectious process.  3.  History of coronary artery disease.  4.  Chronic obstructive pulmonary disease without acute exacerbation.  5.  Hypertension.  6.  Hyperlipidemia.  7.  Diabetes mellitus type 2, controlled with manifestations of neuropathy.  8.  Chronic debility.  9.  Chronic degenerative joint disease.    PLAN:  The patient will be admitted to the hospital.  Patient presents with   acute left femoral neck fracture.  The patient will be admitted to the   orthopedic unit.  I have discussed the case with the emergency room physician   who will be obtaining an orthopedic consultation for this patient.  I   anticipate that this patient will need surgical interventions to correct her   underlying left femoral neck fracture.  Given this, the patient will be kept   n.p.o.  Patient at baseline has a functional capacity of roughly 4 METs.  She   does not have any acute cardiac conditions.  Surgery planned will be an intermediate risk surgery.  At this point   in time, I would recommend obtaining a baseline EKG and proceeding to the OR   with no further interventions given the surgery is semi-emergent.  Given her   underlying fracture, I would recommend obtaining vitamin D levels and   proceeding with an outpatient bone densitometry  scan with her primary care   physician.  Pain control with oral and intravenous narcotics will be ordered.    Recommend continuing her baseline beta-blockers including the day of OR and   withholding her captopril on the day of OR.  DVT preventive prophylaxis with   sequential compression device and subcutaneous heparin has been ordered.    Subcutaneous heparin should be held on the day of OR.  Meanwhile, we will   continue her baseline regimen of aspirin.  Continue baseline regimen of   atorvastatin for underlying hyperlipidemia.  Withholding her baseline regimen   of hypoglycemic agents during hospital stay and maintaining the patient on   sliding scale insulin therapy for now.  Stress ulcer prophylaxis are not   indicated.  Code status was discussed with the patient and patient wishes to   maintain a full code status.       ____________________________________     MD TAMIKA GTZ / ALKA    DD:  03/31/2017 18:01:11  DT:  03/31/2017 20:04:54    D#:  [id number]  Job#:  [id number]

## 2017-04-01 NOTE — PROGRESS NOTES
Pt arrived to floor with  and son at bedside. Pt drowsy but oriented x4. 2LO2 sat is 99%. Abductor wedge in place. Trapeze set up by traction. Skin check done with AGNIESZKA Patel. No skin issues observed. Dressing on left thigh is clean, dry, and intact. Bed alarm is on, call light within reach. Pt updated on plan of care, questions answered. Will monitor.

## 2017-04-01 NOTE — RESPIRATORY CARE
COPD EDUCATION by COPD CLINICAL EDUCATOR  4/1/2017 at 10:32 AM by Brielle Arnold     Patient reviewed by COPD education team. Patient does not qualify for COPD program.

## 2017-04-01 NOTE — OP REPORT
DOS:  3/31/17  Pre-operative Diagnosis: L intracapsular hip fx   Post-operative Diagnosis: Same   Procedure:  hip cemented hemiarthroplasty   Surgeon: Deon Howard   Assistant:  Lefty Kent CSA  Anesthesia: General endotracheal anesthesia   Estimated Blood Loss: 150 mL   Drains: None   Specimens: None   Complications: None   Condition: Extubated and transferred to PACU in stable condition.   Implants: ?   [employer]hew Synergy hemiarthroplasty    48 mm head    Size 11 mm stem     +4mm 28mm head     Indications: ?   The patient sustained a femoral neck fracture, diagnosed by xray. The patient was seen and evaluated by me in the hospital. I have explained all options of treatment for the patient to the patient and their family, and the family has elected to proceed with operative management. I have explained all risks, benefits, and alternatives of the proposed procedure. The risks that we have discussed include death, blindness, nerve damage, infection, failure of surgery to alleviate pre-operative symptoms, and possible need for further operation. In addition to the aforementioned procedure, I also discussed with the patient that other procedures may be indicated during the course of surgery.     Pre-operative:   The proposed incision site was marked in the pre-operative holding area by me. The patient was then taken to the operating room in stable condition. Following smooth induction of general anesthesia, the patient was positioned in the lateral position on a Corona table with all down surfaces well-padded. The patient was then prepped and draped in the usual sterile fashion. Pre-operative antibiotics were administered within one hour of the incision. Tranexamic acid was used to minimze bleeding. A surgical timeout was performed, and all parties involved in the procedure were in agreement on the correct patient, location, and procedure to be performed.     Approach:   A standard posterolateral ([facility])  approach to the hip was taken. The incision was curvilinear, centered on the greater trochanter and in line with the femoral shaft distally. Total incision length was 12cm. The subcutaneous fat was then dissected using electrocautery, and the fascia was cut in line with the femoral shaft using a scalpel. A charnley retractor was then used to spread the fascia. Fat was cauterized and swept from the external rotators using a Felix elevator. The gluteus and medius were then gently retracted using a cobra retractor. The short external rotators and capsule were then taken off of the posterior femur in one layer and tagged with two #5 Ticron sutures for later repair. The hip was then dislocated easily.     Femoral bone preparation:   The femoral head was levered out of place and removed with a Felix elevator. The head was sized to the same size as the native femoral head. A jaws retractor was used under the femur to elevate and present it into the field. A cobra retractor was used under the remaining femoral neck. The remaining femoral neck was then cut using a reciprocating saw and the bone discarded; the cut was made 1cm proximal to the lesser trochanter. A canal finder was then introduced into the femoral canal. A lateralitzing reamer was used. Then sequential reamers were used to a size that was 2mm over the final femoral stem. The canal was broached to a size that was 2mm under the final reamer size and the canal irrigated. A trial reduction using trial implants was performed and was stable. The canal was suctioned dry.     Implant placement:   Two packets of gentamycin-impregnated cement were then mixed. A cement restrictor was placed into the femoral canal. Cement was then pressurized into the canal. The femoral stem with a centralizer was placed into the femoral canal and excess cement was removed. The implant was held in place until the cement was dry. An additional trial reduction was made and the final head and  appropriate offset neck were selected. The trunion was then dried and the head impacted into place. The acetabulum was inspected ad was free of debris. The hip was reduced and noted to be stable throughout a physiologic range of motion.     Closure:   The field was irrigated copiously with pulse lavage. Two drill holes using a 2mm drill were made in the posterior and inferior aspect of the greater trochanter. The ethibond sutures in the capsule/external rotators were passed through using a Hewson suture passer. These were tied together over a bony bridge. The surgical field was then copiously irrigated with sterile saline. Looped 0 PDS suture was used to repair the fascia. Then 2-0 braided interrupted sutures were used to repair the dermal layer, and staples were used to repair the skin. A sterile island dressing was applied over the surgical incision. An abduction pillow was applied to the legs. A surgical count was performed before initiation of closure and following the procedure, and all were correct. I was present for the entire procedure.     Recovery:   The patient was extubated uneventfully in the operating room. The patient was taken to the recovery room in stable condition. Sequential compression devices for VTE prophylaxis were applied to the patients’ lower extremities, and were ordered to be used while the patient was non-ambulatory. Chemical VTE prophylaxis was started post-operatively.     Surgical assistant:   The use of a surgical assistant was deemed necessary for positioning of the hip to facilitate the operation.     Deon Howard MD

## 2017-04-01 NOTE — OR NURSING
REPORT TO AGNIESZKA SOLITARIO. AT TRANSFER PT STATED THAT L HIP WITH BURNING SENSATION. REPORTS 3-4 PAIN. MEDICATED WITH 10 MG OXYCODONE. AGNIESZKA SOLITARIO UPDATED.

## 2017-04-01 NOTE — OR NURSING
Pt received via bed with sr^ escourted by or staff. Report received vs wnl. Oral airway in use. Dressing left hip sliver wedge between legs . c or rn note for type. No visible bleeding or drainage. l foot cool + dp pulse. Movement assessed at 2300. Oral airway. Prior. Denies pain. Sensation unable to assess due to post op sedation. Vs wnl .

## 2017-04-01 NOTE — PROGRESS NOTES
Hospital Medicine Progress Note, Adult, Complex               Author: Manuel Weinberg Date & Time created: 4/1/2017  8:10 AM     CC:  65 yo hx CAD, COPD, DM II diastolic dysfxn admitted with Fall with left hip pain-- dx left hip fx.     Interval History:    Post left hip ORIF overnight. Co left hip pain. Mild hypotension- sbp 90s , asympt.    Review of Systems:  Review of Systems   Constitutional: Negative for fever and chills.   HENT: Negative for congestion.    Respiratory: Negative for cough and shortness of breath.    Cardiovascular: Negative for chest pain and palpitations.   Gastrointestinal: Negative for vomiting and abdominal pain.   Musculoskeletal: Positive for myalgias and joint pain.   Neurological: Positive for weakness. Negative for dizziness, tremors, sensory change, speech change and headaches.       Physical Exam:  Physical Exam   Constitutional: She is oriented to person, place, and time. No distress.   Eyes: EOM are normal. No scleral icterus.   Neck: Neck supple.   Cardiovascular: Normal rate and regular rhythm.    No murmur heard.  Pulmonary/Chest: No stridor. She has no wheezes. She has no rales.   Abdominal: Soft. Bowel sounds are normal. She exhibits no distension.   Musculoskeletal: She exhibits edema and tenderness.   Left hip surgical site dressed, leg binder present  Mild swelling, warm, perfusing.   Neurological: She is alert and oriented to person, place, and time.   Skin: Skin is warm and dry. She is not diaphoretic.   Psychiatric: She has a normal mood and affect. Her behavior is normal.       Labs:        Invalid input(s): [id number]      Recent Labs      03/31/17   1620  04/01/17   0304   SODIUM  139  137   POTASSIUM  3.7  3.8   CHLORIDE  107  107   CO2  24  23   BUN  15  13   CREATININE  0.72  0.57   MAGNESIUM   --   1.8   PHOSPHORUS   --   3.3   CALCIUM  9.2  8.7     Recent Labs      03/31/17   1620  04/01/17   0304   ALTSGPT  9  8   ASTSGOT  13  14   ALKPHOSPHAT  73  65    TBILIRUBIN  0.4  0.4   GLUCOSE  164*  171*     Recent Labs      17   1620  17   0304   RBC  4.94  4.34   HEMOGLOBIN  14.2  12.5   HEMATOCRIT  43.0  38.1   PLATELETCT  304  269   PROTHROMBTM  13.3   --    APTT  27.9   --    INR  0.98   --      Recent Labs      17   1620  17   0304   WBC  11.7*  8.2   NEUTSPOLYS  91.70*  82.00*   LYMPHOCYTES  4.40*  11.80*   MONOCYTES  2.80  5.30   EOSINOPHILS  0.30  0.10   BASOPHILS  0.50  0.40   ASTSGOT  13  14   ALTSGPT  9  8   ALKPHOSPHAT  73  65   TBILIRUBIN  0.4  0.4           Hemodynamics:  Temp (24hrs), Av.2 °C (97.2 °F), Min:35.9 °C (96.6 °F), Max:36.7 °C (98 °F)  Temperature: 36.7 °C (98 °F)  Pulse  Av.6  Min: 77  Max: 97Heart Rate (Monitored): 97  Blood Pressure : (!) 99/63 mmHg, NIBP: 144/79 mmHg     Respiratory:    Respiration: 16, Pulse Oximetry: 96 %     Work Of Breathing / Effort: Mild  RML Breath Sounds: Diminished, RLL Breath Sounds: Diminished, LLL Breath Sounds: Diminished  Fluids:    Intake/Output Summary (Last 24 hours) at 17 0810  Last data filed at 17 0000   Gross per 24 hour   Intake   1640 ml   Output    350 ml   Net   1290 ml     Weight: 73.483 kg (162 lb)  GI/Nutrition:  Orders Placed This Encounter   Procedures   • DIET ORDER     Standing Status: Standing      Number of Occurrences: 1      Standing Expiration Date:      Order Specific Question:  Diet:     Answer:  Regular [1]     Order Specific Question:  Diet:     Answer:  Diabetic [3]     Medical Decision Making, by Problem:  Active Hospital Problems    Diagnosis   • *Closed left hip fracture (CMS-HCC) [S72.002A]- post ORIF  Pain control - prn oxycodone ,  IV dilaudid.  Dvt prophyl heparin   Wound care.  Complete ivania op atbs  Ortho to assess for activity    • Vitamin D deficiency [E55.9]  Start Vit D / Ca   • Diabetes type 2, labile  (CMS-HCC) [E11.65]  ISS coverage   • COPD (chronic obstructive pulmonary disease) (CMS-HCC) [J44.9] stable symptoms  RT  protocols, encourage IS   • CAD (coronary artery disease) [I25.10] asympt  Asa, statin, bb therapy    • Degenerative joint disease [M19.90]   • Dyslipidemia [E78.5]       Labs reviewed, Medications reviewed and Radiology images reviewed  Greene catheter: No Greene      DVT Prophylaxis: Heparin

## 2017-04-01 NOTE — THERAPY
"Physical Therapy Evaluation completed.   Bed Mobility:  Supine to Sit: Minimal Assist  Transfers: Sit to Stand: Contact Guard Assist  Gait: Level Of Assist: Minimal Assist with Front-Wheel Walker       Plan of Care: Will benefit from Physical Therapy 4 times per week  Discharge Recommendations: Equipment: Will Continue to Assess for Equipment Needs. Post-acute therapy Discharge to a transitional care facility for continued skilled therapy services. and Discharge to home with outpatient or home health for additional skilled therapy services.    See \"Rehab Therapy-Acute\" Patient Summary Report for complete documentation.     "

## 2017-04-02 LAB
GLUCOSE BLD-MCNC: 112 MG/DL (ref 65–99)
GLUCOSE BLD-MCNC: 120 MG/DL (ref 65–99)
GLUCOSE BLD-MCNC: 144 MG/DL (ref 65–99)
GLUCOSE BLD-MCNC: 172 MG/DL (ref 65–99)

## 2017-04-02 PROCEDURE — 700102 HCHG RX REV CODE 250 W/ 637 OVERRIDE(OP): Performed by: HOSPITALIST

## 2017-04-02 PROCEDURE — 770001 HCHG ROOM/CARE - MED/SURG/GYN PRIV*

## 2017-04-02 PROCEDURE — 82962 GLUCOSE BLOOD TEST: CPT | Mod: 91

## 2017-04-02 PROCEDURE — A9270 NON-COVERED ITEM OR SERVICE: HCPCS | Performed by: HOSPITALIST

## 2017-04-02 PROCEDURE — 99233 SBSQ HOSP IP/OBS HIGH 50: CPT | Performed by: HOSPITALIST

## 2017-04-02 PROCEDURE — A9270 NON-COVERED ITEM OR SERVICE: HCPCS | Performed by: ORTHOPAEDIC SURGERY

## 2017-04-02 PROCEDURE — 700111 HCHG RX REV CODE 636 W/ 250 OVERRIDE (IP): Performed by: HOSPITALIST

## 2017-04-02 PROCEDURE — G8987 SELF CARE CURRENT STATUS: HCPCS | Mod: CK

## 2017-04-02 PROCEDURE — 700111 HCHG RX REV CODE 636 W/ 250 OVERRIDE (IP): Performed by: INTERNAL MEDICINE

## 2017-04-02 PROCEDURE — 51798 US URINE CAPACITY MEASURE: CPT

## 2017-04-02 PROCEDURE — 700112 HCHG RX REV CODE 229: Performed by: ORTHOPAEDIC SURGERY

## 2017-04-02 PROCEDURE — 700102 HCHG RX REV CODE 250 W/ 637 OVERRIDE(OP): Performed by: ORTHOPAEDIC SURGERY

## 2017-04-02 PROCEDURE — 97166 OT EVAL MOD COMPLEX 45 MIN: CPT

## 2017-04-02 PROCEDURE — A9270 NON-COVERED ITEM OR SERVICE: HCPCS | Performed by: INTERNAL MEDICINE

## 2017-04-02 PROCEDURE — G8988 SELF CARE GOAL STATUS: HCPCS | Mod: CI

## 2017-04-02 PROCEDURE — 700102 HCHG RX REV CODE 250 W/ 637 OVERRIDE(OP): Performed by: INTERNAL MEDICINE

## 2017-04-02 PROCEDURE — 700105 HCHG RX REV CODE 258: Performed by: HOSPITALIST

## 2017-04-02 RX ORDER — TAMSULOSIN HYDROCHLORIDE 0.4 MG/1
0.4 CAPSULE ORAL
Status: DISCONTINUED | OUTPATIENT
Start: 2017-04-02 | End: 2017-04-05 | Stop reason: HOSPADM

## 2017-04-02 RX ORDER — SODIUM CHLORIDE 9 MG/ML
500 INJECTION, SOLUTION INTRAVENOUS ONCE
Status: COMPLETED | OUTPATIENT
Start: 2017-04-02 | End: 2017-04-02

## 2017-04-02 RX ORDER — CAPTOPRIL 12.5 MG/1
6.25 TABLET ORAL 2 TIMES DAILY
Status: DISCONTINUED | OUTPATIENT
Start: 2017-04-02 | End: 2017-04-02

## 2017-04-02 RX ORDER — OXYCODONE HYDROCHLORIDE 10 MG/1
10 TABLET ORAL EVERY 4 HOURS PRN
Status: DISCONTINUED | OUTPATIENT
Start: 2017-04-02 | End: 2017-04-02

## 2017-04-02 RX ORDER — SODIUM CHLORIDE 9 MG/ML
1000 INJECTION, SOLUTION INTRAVENOUS CONTINUOUS
Status: DISCONTINUED | OUTPATIENT
Start: 2017-04-02 | End: 2017-04-05 | Stop reason: HOSPADM

## 2017-04-02 RX ORDER — OXYCODONE HYDROCHLORIDE 10 MG/1
10 TABLET ORAL EVERY 4 HOURS PRN
Status: DISCONTINUED | OUTPATIENT
Start: 2017-04-02 | End: 2017-04-05 | Stop reason: HOSPADM

## 2017-04-02 RX ORDER — OXYCODONE HYDROCHLORIDE 5 MG/1
5 TABLET ORAL EVERY 4 HOURS PRN
Status: DISCONTINUED | OUTPATIENT
Start: 2017-04-02 | End: 2017-04-02

## 2017-04-02 RX ORDER — CAPTOPRIL 12.5 MG/1
6.25 TABLET ORAL TWICE DAILY
Status: DISCONTINUED | OUTPATIENT
Start: 2017-04-02 | End: 2017-04-03

## 2017-04-02 RX ORDER — OXYCODONE HYDROCHLORIDE 5 MG/1
5 TABLET ORAL EVERY 4 HOURS PRN
Status: DISCONTINUED | OUTPATIENT
Start: 2017-04-02 | End: 2017-04-05 | Stop reason: HOSPADM

## 2017-04-02 RX ADMIN — ACETAMINOPHEN 1000 MG: 500 TABLET, FILM COATED ORAL at 05:53

## 2017-04-02 RX ADMIN — OXYCODONE HYDROCHLORIDE 10 MG: 10 TABLET ORAL at 18:13

## 2017-04-02 RX ADMIN — ACETAMINOPHEN 1000 MG: 500 TABLET, FILM COATED ORAL at 00:17

## 2017-04-02 RX ADMIN — CAPTOPRIL 6.25 MG: 12.5 TABLET ORAL at 20:35

## 2017-04-02 RX ADMIN — ASPIRIN 325 MG: 325 TABLET, DELAYED RELEASE ORAL at 20:35

## 2017-04-02 RX ADMIN — SODIUM CHLORIDE 500 ML: 9 INJECTION, SOLUTION INTRAVENOUS at 11:46

## 2017-04-02 RX ADMIN — INSULIN LISPRO 1 UNITS: 100 INJECTION, SOLUTION INTRAVENOUS; SUBCUTANEOUS at 20:41

## 2017-04-02 RX ADMIN — OXYCODONE HYDROCHLORIDE 5 MG: 5 TABLET ORAL at 05:53

## 2017-04-02 RX ADMIN — CAPTOPRIL 12.5 MG: 12.5 TABLET ORAL at 08:59

## 2017-04-02 RX ADMIN — SODIUM CHLORIDE 1000 ML: 9 INJECTION, SOLUTION INTRAVENOUS at 23:11

## 2017-04-02 RX ADMIN — STANDARDIZED SENNA CONCENTRATE AND DOCUSATE SODIUM 2 TABLET: 8.6; 5 TABLET, FILM COATED ORAL at 20:35

## 2017-04-02 RX ADMIN — GABAPENTIN 100 MG: 100 CAPSULE ORAL at 20:35

## 2017-04-02 RX ADMIN — OXYCODONE HYDROCHLORIDE 5 MG: 5 TABLET ORAL at 08:58

## 2017-04-02 RX ADMIN — STANDARDIZED SENNA CONCENTRATE AND DOCUSATE SODIUM 2 TABLET: 8.6; 5 TABLET, FILM COATED ORAL at 08:59

## 2017-04-02 RX ADMIN — HEPARIN SODIUM 5000 UNITS: 5000 INJECTION, SOLUTION INTRAVENOUS; SUBCUTANEOUS at 05:53

## 2017-04-02 RX ADMIN — CARVEDILOL 3.12 MG: 6.25 TABLET, FILM COATED ORAL at 18:12

## 2017-04-02 RX ADMIN — TAMSULOSIN HYDROCHLORIDE 0.4 MG: 0.4 CAPSULE ORAL at 11:45

## 2017-04-02 RX ADMIN — HEPARIN SODIUM 5000 UNITS: 5000 INJECTION, SOLUTION INTRAVENOUS; SUBCUTANEOUS at 15:10

## 2017-04-02 RX ADMIN — DOCUSATE SODIUM 100 MG: 100 CAPSULE ORAL at 08:58

## 2017-04-02 RX ADMIN — DOCUSATE SODIUM 100 MG: 100 CAPSULE ORAL at 20:35

## 2017-04-02 RX ADMIN — HEPARIN SODIUM 5000 UNITS: 5000 INJECTION, SOLUTION INTRAVENOUS; SUBCUTANEOUS at 22:59

## 2017-04-02 RX ADMIN — CARVEDILOL 3.12 MG: 6.25 TABLET, FILM COATED ORAL at 08:59

## 2017-04-02 RX ADMIN — CALCIUM CARBONATE-CHOLECALCIFEROL TAB 250 MG-125 UNIT 1 TABLET: 250-125 TAB at 08:59

## 2017-04-02 RX ADMIN — ACETAMINOPHEN 1000 MG: 500 TABLET, FILM COATED ORAL at 22:59

## 2017-04-02 RX ADMIN — SODIUM CHLORIDE 1000 ML: 9 INJECTION, SOLUTION INTRAVENOUS at 12:23

## 2017-04-02 RX ADMIN — CELECOXIB 200 MG: 200 CAPSULE ORAL at 08:58

## 2017-04-02 RX ADMIN — ASPIRIN 325 MG: 325 TABLET, DELAYED RELEASE ORAL at 08:59

## 2017-04-02 RX ADMIN — ACETAMINOPHEN 1000 MG: 500 TABLET, FILM COATED ORAL at 18:13

## 2017-04-02 RX ADMIN — ATORVASTATIN CALCIUM 40 MG: 40 TABLET, FILM COATED ORAL at 20:39

## 2017-04-02 RX ADMIN — CELECOXIB 200 MG: 200 CAPSULE ORAL at 18:12

## 2017-04-02 RX ADMIN — ACETAMINOPHEN 1000 MG: 500 TABLET, FILM COATED ORAL at 11:44

## 2017-04-02 ASSESSMENT — PAIN SCALES - GENERAL
PAINLEVEL_OUTOF10: 8
PAINLEVEL_OUTOF10: 8
PAINLEVEL_OUTOF10: 3
PAINLEVEL_OUTOF10: 7
PAINLEVEL_OUTOF10: 2
PAINLEVEL_OUTOF10: 3
PAINLEVEL_OUTOF10: 2
PAINLEVEL_OUTOF10: 3

## 2017-04-02 ASSESSMENT — ENCOUNTER SYMPTOMS
MYALGIAS: 1
SHORTNESS OF BREATH: 0
CHILLS: 0
FEVER: 0
COUGH: 0
SPEECH CHANGE: 0
DIZZINESS: 0
SENSORY CHANGE: 0
TREMORS: 0
ABDOMINAL PAIN: 0
PALPITATIONS: 0
VOMITING: 0
WEAKNESS: 1

## 2017-04-02 ASSESSMENT — ACTIVITIES OF DAILY LIVING (ADL): TOILETING: INDEPENDENT

## 2017-04-02 NOTE — THERAPY
"Occupational Therapy Evaluation completed.   Functional Status:  Mod A supine to sit.  Max A LB dressing.  Pt with max prompts for posterior hip precautions, pt continued to bend over despite frequent education.  Pt became slightly disoriented when sitting EOB, pt's BP found to be 65/44.  Pt returned to supine with mod A, BP increased to 87/47.  Plan of Care: Will benefit from Occupational Therapy 3 times per week  Discharge Recommendations:  Equipment: Will Continue to Assess for Equipment Needs. Post-acute therapy Discharge to a transitional care facility for continued skilled therapy services.    See \"Rehab Therapy-Acute\" Patient Summary Report for complete documentation.    "

## 2017-04-02 NOTE — PROGRESS NOTES
Hospital Medicine Progress Note, Adult, Complex               Author: Manuel Weinberg Date & Time created: 4/2/2017  10:12 AM     CC:  65 yo hx CAD, COPD, DM II diastolic dysfxn admitted with Fall with left hip pain-- dx left hip fx.     Interval History:    Hypotensive this morning- sbp 80s.  Up to chair w assist. PT eval pending. Urinary retention noted.   notes wife has chronic incontinence.     Review of Systems:  Review of Systems   Constitutional: Negative for fever and chills.   Respiratory: Negative for cough and shortness of breath.    Cardiovascular: Negative for chest pain and palpitations.   Gastrointestinal: Negative for vomiting and abdominal pain.   Genitourinary: Positive for frequency (incontinence, chronic ).   Musculoskeletal: Positive for myalgias and joint pain.   Neurological: Positive for weakness. Negative for dizziness, tremors, sensory change and speech change.       Physical Exam:  Physical Exam   Constitutional: She is oriented to person, place, and time. No distress.   Eyes: EOM are normal. No scleral icterus.   Neck: Neck supple.   Cardiovascular: Normal rate and regular rhythm.    No murmur heard.  Pulmonary/Chest: No stridor. She has no wheezes. She has no rales.   Abdominal: Soft. Bowel sounds are normal. She exhibits no distension.   Musculoskeletal: She exhibits edema and tenderness.   Left hip surgical site dressed,   Mild swelling, warm, perfusing.   Neurological: She is alert and oriented to person, place, and time.   Skin: Skin is warm and dry. She is not diaphoretic.   Psychiatric: She has a normal mood and affect. Her behavior is normal.       Labs:        Invalid input(s): [id number]      Recent Labs      03/31/17   1620  04/01/17   0304   SODIUM  139  137   POTASSIUM  3.7  3.8   CHLORIDE  107  107   CO2  24  23   BUN  15  13   CREATININE  0.72  0.57   MAGNESIUM   --   1.8   PHOSPHORUS   --   3.3   CALCIUM  9.2  8.7     Recent Labs      03/31/17   1620  04/01/17    0304   ALTSGPT  9  8   ASTSGOT  13  14   ALKPHOSPHAT  73  65   TBILIRUBIN  0.4  0.4   GLUCOSE  164*  171*     Recent Labs      17   1620  17   0304   RBC  4.94  4.34   HEMOGLOBIN  14.2  12.5   HEMATOCRIT  43.0  38.1   PLATELETCT  304  269   PROTHROMBTM  13.3   --    APTT  27.9   --    INR  0.98   --      Recent Labs      17   1620  17   0304   WBC  11.7*  8.2   NEUTSPOLYS  91.70*  82.00*   LYMPHOCYTES  4.40*  11.80*   MONOCYTES  2.80  5.30   EOSINOPHILS  0.30  0.10   BASOPHILS  0.50  0.40   ASTSGOT  13  14   ALTSGPT  9  8   ALKPHOSPHAT  73  65   TBILIRUBIN  0.4  0.4           Hemodynamics:  Temp (24hrs), Av.4 °C (97.6 °F), Min:36 °C (96.8 °F), Max:37.2 °C (98.9 °F)  Temperature: 36.4 °C (97.6 °F)  Pulse  Av.5  Min: 77  Max: 98   Blood Pressure : (!) 83/52 mmHg     Respiratory:    Respiration: 15, Pulse Oximetry: 97 %        RML Breath Sounds: Diminished, RLL Breath Sounds: Diminished, LLL Breath Sounds: Diminished  Fluids:    Intake/Output Summary (Last 24 hours) at 17 1012  Last data filed at 17 0500   Gross per 24 hour   Intake    559 ml   Output    625 ml   Net    -66 ml        GI/Nutrition:  Orders Placed This Encounter   Procedures   • DIET ORDER     Standing Status: Standing      Number of Occurrences: 1      Standing Expiration Date:      Order Specific Question:  Diet:     Answer:  Regular [1]     Order Specific Question:  Diet:     Answer:  Diabetic [3]     Medical Decision Making, by Problem:  Active Hospital Problems    Diagnosis   • *Closed left hip fracture (CMS-[facility]) [S72.002A]- post ORIF w debility , pain  PT,  prn oxycodone ,  IV dilaudid.  Dvt prophyl heparin   Wound care.  WBAT.    • Vitamin D deficiency [E55.9]   Vit D / Ca   • Diabetes type 2, labile  (CMS-HCC) [E11.65] fair control.  ISS coverage   • COPD (chronic obstructive pulmonary disease) (CMS-HCC) [J44.9] stable symptoms  RT protocols, encourage IS   • CAD (coronary artery disease) [I25.10]  asympt  Asa, statin, bb therapy    • Degenerative joint disease [M19.90]   • Dyslipidemia [E78.5]  Hypotension, mild - reported chronic - gentle ivf bolus.  Decrease captopril w holding parameters  Hx systolic CHF- w/o decompensation   Coreg , ace inh.   Urinary incontinence chronic  w  Increased residuals 600s  Trial flomax.       Labs reviewed, Medications reviewed and Radiology images reviewed  Greene catheter: No Greene      DVT Prophylaxis: Heparin

## 2017-04-02 NOTE — PROGRESS NOTES
Pt is AAOx4.  Reports a 6/10 L hip pain.  Medicated per MAR.  Pt very fatigued, but easily aroused.  L hip dressing in place, CDI.  1L O2 running via NC, pt usually price snot wear O2.  Pt reports she still cannot void, will bladder scan q6hrs from last cath.  SCDs in place.  POC discussed.  All needs met at this time.  Bed in low position.  Call light within reach.  Rounding in place.

## 2017-04-02 NOTE — PROGRESS NOTES
"Patient unable to void today. Up to commode, bed pan, says \"she doesn't feel like she needs to\". Straight cathed for 625 cc of concentrated urine.  "

## 2017-04-02 NOTE — CARE PLAN
Problem: Pain Management  Goal: Pain level will decrease to patient’s comfort goal  Pain managed with PRN pain med. Will continue to keep at comfort goal.     Problem: Fluid Volume:  Goal: Will maintain balanced intake and output  Pt unable to urinate. Will bladder scan and straight cath PRN.

## 2017-04-03 LAB
ANION GAP SERPL CALC-SCNC: 6 MMOL/L (ref 0–11.9)
BASOPHILS # BLD AUTO: 0.3 % (ref 0–1.8)
BASOPHILS # BLD: 0.02 K/UL (ref 0–0.12)
BUN SERPL-MCNC: 20 MG/DL (ref 8–22)
CALCIUM SERPL-MCNC: 8.7 MG/DL (ref 8.5–10.5)
CHLORIDE SERPL-SCNC: 110 MMOL/L (ref 96–112)
CO2 SERPL-SCNC: 23 MMOL/L (ref 20–33)
CREAT SERPL-MCNC: 0.62 MG/DL (ref 0.5–1.4)
EOSINOPHIL # BLD AUTO: 0.25 K/UL (ref 0–0.51)
EOSINOPHIL NFR BLD: 4.2 % (ref 0–6.9)
ERYTHROCYTE [DISTWIDTH] IN BLOOD BY AUTOMATED COUNT: 41.5 FL (ref 35.9–50)
GFR SERPL CREATININE-BSD FRML MDRD: >60 ML/MIN/1.73 M 2
GLUCOSE BLD-MCNC: 104 MG/DL (ref 65–99)
GLUCOSE BLD-MCNC: 104 MG/DL (ref 65–99)
GLUCOSE BLD-MCNC: 138 MG/DL (ref 65–99)
GLUCOSE BLD-MCNC: 159 MG/DL (ref 65–99)
GLUCOSE SERPL-MCNC: 137 MG/DL (ref 65–99)
HCT VFR BLD AUTO: 31.7 % (ref 37–47)
HGB BLD-MCNC: 10.3 G/DL (ref 12–16)
IMM GRANULOCYTES # BLD AUTO: 0.02 K/UL (ref 0–0.11)
IMM GRANULOCYTES NFR BLD AUTO: 0.3 % (ref 0–0.9)
LYMPHOCYTES # BLD AUTO: 1.09 K/UL (ref 1–4.8)
LYMPHOCYTES NFR BLD: 18.4 % (ref 22–41)
MCH RBC QN AUTO: 28.9 PG (ref 27–33)
MCHC RBC AUTO-ENTMCNC: 32.5 G/DL (ref 33.6–35)
MCV RBC AUTO: 89 FL (ref 81.4–97.8)
MONOCYTES # BLD AUTO: 0.42 K/UL (ref 0–0.85)
MONOCYTES NFR BLD AUTO: 7.1 % (ref 0–13.4)
NEUTROPHILS # BLD AUTO: 4.14 K/UL (ref 2–7.15)
NEUTROPHILS NFR BLD: 69.7 % (ref 44–72)
NRBC # BLD AUTO: 0 K/UL
NRBC BLD AUTO-RTO: 0 /100 WBC
PLATELET # BLD AUTO: 220 K/UL (ref 164–446)
PMV BLD AUTO: 10.5 FL (ref 9–12.9)
POTASSIUM SERPL-SCNC: 3.7 MMOL/L (ref 3.6–5.5)
RBC # BLD AUTO: 3.56 M/UL (ref 4.2–5.4)
SODIUM SERPL-SCNC: 139 MMOL/L (ref 135–145)
WBC # BLD AUTO: 5.9 K/UL (ref 4.8–10.8)

## 2017-04-03 PROCEDURE — A9270 NON-COVERED ITEM OR SERVICE: HCPCS | Performed by: ORTHOPAEDIC SURGERY

## 2017-04-03 PROCEDURE — 82962 GLUCOSE BLOOD TEST: CPT | Mod: 91

## 2017-04-03 PROCEDURE — 80048 BASIC METABOLIC PNL TOTAL CA: CPT

## 2017-04-03 PROCEDURE — 700105 HCHG RX REV CODE 258: Performed by: HOSPITALIST

## 2017-04-03 PROCEDURE — A9270 NON-COVERED ITEM OR SERVICE: HCPCS | Performed by: INTERNAL MEDICINE

## 2017-04-03 PROCEDURE — 770001 HCHG ROOM/CARE - MED/SURG/GYN PRIV*

## 2017-04-03 PROCEDURE — 700112 HCHG RX REV CODE 229: Performed by: ORTHOPAEDIC SURGERY

## 2017-04-03 PROCEDURE — 700111 HCHG RX REV CODE 636 W/ 250 OVERRIDE (IP): Performed by: INTERNAL MEDICINE

## 2017-04-03 PROCEDURE — 51798 US URINE CAPACITY MEASURE: CPT

## 2017-04-03 PROCEDURE — 700102 HCHG RX REV CODE 250 W/ 637 OVERRIDE(OP): Performed by: INTERNAL MEDICINE

## 2017-04-03 PROCEDURE — 99232 SBSQ HOSP IP/OBS MODERATE 35: CPT | Performed by: HOSPITALIST

## 2017-04-03 PROCEDURE — 85025 COMPLETE CBC W/AUTO DIFF WBC: CPT

## 2017-04-03 PROCEDURE — A9270 NON-COVERED ITEM OR SERVICE: HCPCS | Performed by: HOSPITALIST

## 2017-04-03 PROCEDURE — 700102 HCHG RX REV CODE 250 W/ 637 OVERRIDE(OP): Performed by: ORTHOPAEDIC SURGERY

## 2017-04-03 PROCEDURE — 36415 COLL VENOUS BLD VENIPUNCTURE: CPT

## 2017-04-03 PROCEDURE — 700102 HCHG RX REV CODE 250 W/ 637 OVERRIDE(OP): Performed by: HOSPITALIST

## 2017-04-03 RX ORDER — CARVEDILOL 6.25 MG/1
3.12 TABLET ORAL 2 TIMES DAILY WITH MEALS
Status: DISCONTINUED | OUTPATIENT
Start: 2017-04-03 | End: 2017-04-05 | Stop reason: HOSPADM

## 2017-04-03 RX ORDER — CAPTOPRIL 12.5 MG/1
6.25 TABLET ORAL TWICE DAILY
Status: DISCONTINUED | OUTPATIENT
Start: 2017-04-03 | End: 2017-04-05 | Stop reason: HOSPADM

## 2017-04-03 RX ADMIN — ATORVASTATIN CALCIUM 40 MG: 40 TABLET, FILM COATED ORAL at 21:20

## 2017-04-03 RX ADMIN — HEPARIN SODIUM 5000 UNITS: 5000 INJECTION, SOLUTION INTRAVENOUS; SUBCUTANEOUS at 06:36

## 2017-04-03 RX ADMIN — STANDARDIZED SENNA CONCENTRATE AND DOCUSATE SODIUM 2 TABLET: 8.6; 5 TABLET, FILM COATED ORAL at 09:00

## 2017-04-03 RX ADMIN — SODIUM CHLORIDE 1000 ML: 9 INJECTION, SOLUTION INTRAVENOUS at 10:38

## 2017-04-03 RX ADMIN — HEPARIN SODIUM 5000 UNITS: 5000 INJECTION, SOLUTION INTRAVENOUS; SUBCUTANEOUS at 21:21

## 2017-04-03 RX ADMIN — INSULIN LISPRO 1 UNITS: 100 INJECTION, SOLUTION INTRAVENOUS; SUBCUTANEOUS at 16:37

## 2017-04-03 RX ADMIN — CARVEDILOL 3.12 MG: 6.25 TABLET, FILM COATED ORAL at 09:02

## 2017-04-03 RX ADMIN — ACETAMINOPHEN 1000 MG: 500 TABLET, FILM COATED ORAL at 12:38

## 2017-04-03 RX ADMIN — CELECOXIB 200 MG: 200 CAPSULE ORAL at 08:59

## 2017-04-03 RX ADMIN — STANDARDIZED SENNA CONCENTRATE AND DOCUSATE SODIUM 2 TABLET: 8.6; 5 TABLET, FILM COATED ORAL at 21:20

## 2017-04-03 RX ADMIN — DOCUSATE SODIUM 100 MG: 100 CAPSULE ORAL at 09:00

## 2017-04-03 RX ADMIN — ACETAMINOPHEN 1000 MG: 500 TABLET, FILM COATED ORAL at 17:43

## 2017-04-03 RX ADMIN — ASPIRIN 325 MG: 325 TABLET, DELAYED RELEASE ORAL at 21:21

## 2017-04-03 RX ADMIN — CALCIUM CARBONATE-CHOLECALCIFEROL TAB 250 MG-125 UNIT 1 TABLET: 250-125 TAB at 08:59

## 2017-04-03 RX ADMIN — CELECOXIB 200 MG: 200 CAPSULE ORAL at 17:43

## 2017-04-03 RX ADMIN — OXYCODONE HYDROCHLORIDE 5 MG: 5 TABLET ORAL at 03:58

## 2017-04-03 RX ADMIN — ASPIRIN 325 MG: 325 TABLET, DELAYED RELEASE ORAL at 08:59

## 2017-04-03 RX ADMIN — ACETAMINOPHEN 1000 MG: 500 TABLET, FILM COATED ORAL at 23:40

## 2017-04-03 RX ADMIN — DOCUSATE SODIUM 100 MG: 100 CAPSULE ORAL at 21:21

## 2017-04-03 RX ADMIN — CAPTOPRIL 6.25 MG: 12.5 TABLET ORAL at 22:16

## 2017-04-03 RX ADMIN — GABAPENTIN 100 MG: 100 CAPSULE ORAL at 21:21

## 2017-04-03 RX ADMIN — TAMSULOSIN HYDROCHLORIDE 0.4 MG: 0.4 CAPSULE ORAL at 09:00

## 2017-04-03 RX ADMIN — ACETAMINOPHEN 1000 MG: 500 TABLET, FILM COATED ORAL at 06:35

## 2017-04-03 RX ADMIN — HEPARIN SODIUM 5000 UNITS: 5000 INJECTION, SOLUTION INTRAVENOUS; SUBCUTANEOUS at 14:39

## 2017-04-03 ASSESSMENT — PAIN SCALES - GENERAL
PAINLEVEL_OUTOF10: 4
PAINLEVEL_OUTOF10: 6
PAINLEVEL_OUTOF10: 3
PAINLEVEL_OUTOF10: 4
PAINLEVEL_OUTOF10: 7
PAINLEVEL_OUTOF10: 6
PAINLEVEL_OUTOF10: 5
PAINLEVEL_OUTOF10: 3

## 2017-04-03 ASSESSMENT — ENCOUNTER SYMPTOMS
FEVER: 0
SPEECH CHANGE: 0
MYALGIAS: 1
DIZZINESS: 0
SHORTNESS OF BREATH: 0
CHILLS: 0
ABDOMINAL PAIN: 0
VOMITING: 0
FOCAL WEAKNESS: 1
WEAKNESS: 1
COUGH: 0
TREMORS: 0

## 2017-04-03 NOTE — CARE PLAN
Problem: Safety  Goal: Will remain free from falls  Outcome: PROGRESSING AS EXPECTED  Bed in low position, wheels locked, call light within reach, hourly rounding in place.    Problem: Venous Thromboembolism (VTW)/Deep Vein Thrombosis (DVT) Prevention:  Goal: Patient will participate in Venous Thrombosis (VTE)/Deep Vein Thrombosis (DVT)Prevention Measures  Outcome: PROGRESSING AS EXPECTED  Intervention: Assess and monitor for anticoagulation complications  No complications noted  Intervention: Ensure patient wears graduated elastic stockings (OSVALDO hose) and/or SCDs, if ordered, when in bed or chair (Remove at least once per shift for skin check)  SCDs in place.

## 2017-04-03 NOTE — PROGRESS NOTES
Assumed care of pt @1900. Bedside report received. Pt AOX 4. Pt complains about pain. Scheduled Celebrex given. Greene in place draining to gravity. Pt up to chair for lunch. Left hip dressing CDI. PIV running NS at 100 ml/hr. Fall precaution in place. POC discussed with pt, all questions answered at this time. Pt makes needs known, call light within reach, hourly rounding in place.

## 2017-04-03 NOTE — CARE PLAN
Problem: Mobility  Goal: Risk for activity intolerance will decrease  Outcome: PROGRESSING SLOWER THAN EXPECTED  Pt sitting up in chair for lunch.     Problem: Respiratory:  Goal: Respiratory status will improve  Outcome: PROGRESSING AS EXPECTED  Pt encouraged to use IS.

## 2017-04-03 NOTE — DISCHARGE PLANNING
Care Transition Team Assessment    Information Source  Orientation : Oriented x 4  Information Given By: Patient  Informant's Name: Alice  Who is responsible for making decisions for patient? : Patient    Readmission Evaluation  Is this a readmission?: No    Elopement Risk  Legal Hold: No  Ambulatory or Self Mobile in Wheelchair: No-Not an Elopement Risk  Elopement Risk: Not at Risk for Elopement    Interdisciplinary Discharge Planning  Does Admitting Nurse Feel This Could be a Complex Discharge?: No  Primary Care Physician: Dr. Rea  Lives with - Patient's Self Care Capacity: Spouse, Adult Children  Patient or legal guardian wants to designate a caregiver (see row info): No  Support Systems: Children, Friends / Neighbors, Spouse / Significant Other  Housing / Facility: [address]  Do You Take your Prescribed Medications Regularly: Yes  Able to Return to Previous ADL's: Future Time w/Therapy  Mobility Issues: Yes  Prior Services: Home-Independent  Assistance Needed: Yes  Durable Medical Equipment: Walker    Discharge Preparedness  What is your plan after discharge?: Home with help  What are your discharge supports?: Child, Spouse  Prior Functional Level: Ambulatory, Drives Self, Independent with Activities of Daily Living, Independent with Medication Management  Difficulity with ADLs: None  Difficulity with IADLs: None    Functional Assesment  Prior Functional Level: Ambulatory, Drives Self, Independent with Activities of Daily Living, Independent with Medication Management    Finances  Financial Barriers to Discharge: No  Prescription Coverage: Yes    Vision / Hearing Impairment  Vision Impairment : Yes  Right Eye Vision: Impaired, Wears Glasses  Left Eye Vision: Impaired, Wears Glasses  Hearing Impairment : No    Values / Beliefs / Concerns  Values / Beliefs Concerns : No         Domestic Abuse  Have you ever been the victim of abuse or violence?: No  Physical Abuse or Sexual Abuse: No  Verbal Abuse or  Emotional Abuse: No    Psychological Assessment  History of Substance Abuse: None  History of Psychiatric Problems: No  Non-compliant with Treatment: No  Newly Diagnosed Illness: No    Discharge Risks or Barriers  Discharge risks or barriers?: No    Anticipated Discharge Information  Anticipated discharge disposition: Home

## 2017-04-03 NOTE — DIETARY
Nutrition Services: Unplanned weight loss noted in nutrition admit screen. 67 yo female with admit dx: closed left hip fracture. The patient is currently on a diabetic diet. Documentation of meals shows % PO intake. No other triggers noted per chart review. Nutrition Rep to see patient daily for meal choices. RD will continue to monitor per department policy.

## 2017-04-03 NOTE — DISCHARGE PLANNING
TCN met with patient at bedside to discuss her transitional care options. Patient reports she lives in [place] with her  and older children who can all assist her as needed. Patient reports her plan is to DC home without HH. Patient report she cant afford co pays for SNF and feels if she needs therapy she will do OP. Patient is aware that if she needs HH or OP therapy she will need to contact her PCP or orthopedic surgeon to submit referral. At this time patient is declining all transitional care services. TCN available if needed. Per IDT rounds anticipate DC today or tomorrow.

## 2017-04-03 NOTE — CONSULTS
DATE OF SERVICE:  03/31/2017    DATE AND TIME:  03/31/2017 at 7 p.m.    REASON FOR CONSULTATION:  Left hip pain.    HISTORY OF PRESENT ILLNESS:  Patient is a pleasant 66-year-old female who   sustained a ground level fall earlier today, resulting in a left hip injury.    X-rays were taken in the emergency room, she was diagnosed with left   intracapsular hip fracture.  Of note, baseline of the patient is somewhat   limited.  Physically, she uses a walker most times and occasionally even a   wheelchair.  Overall, I get the impression that she is not extremely high   functioning.    PHYSICAL EXAMINATION:  Left lower extremity is shortened and externally   rotated.  Grossly, she is neurologically intact about the foot and ankle.  She   has no sensory, vascular and motor deficits that I can detect, limited by   pain.    X-rays reviewed, demonstrate a left femoral neck fracture.    IMPRESSION:  Left femoral neck fracture.    ASSESSMENT AND PLAN:  I went over surgical options with the patient.    Obviously, this is not a reparable fracture pattern.  Given the patient's age,   one might consider a total hip replacement, but given her functional status,   I think a bipolar hemiarthroplasty is certainly going to be the best option   for her.  In light of her anticipated poor bone quality, I will probably use   cement to ensure proper fixation of this.  I explained all risks, benefits,   and alternatives to the patient as well as her  and son who were   present and they agreed to move forward with operative management.  All   questions were answered at this time.       ____________________________________     MD CHRIS Lemon / ALKA    DD:  04/03/2017 10:09:29  DT:  04/03/2017 11:09:32    D#:  [id number]  Job#:  [id number]

## 2017-04-03 NOTE — PROGRESS NOTES
Hospital Medicine Progress Note, Adult, Complex               Author: Manuel Weinberg Date & Time created: 4/3/2017  9:31 AM     CC:  65 yo hx CAD, COPD, DM II diastolic dysfxn admitted with Fall with left hip pain-- dx left hip fx.     Interval History:    BP low overnight- Recovered w Ivf boluses . Refused getting up. Urinary retention recurrent - silver placed.    Review of Systems:  Review of Systems   Constitutional: Negative for fever and chills.   Respiratory: Negative for cough and shortness of breath.    Cardiovascular: Positive for leg swelling. Negative for chest pain.   Gastrointestinal: Negative for vomiting and abdominal pain.   Genitourinary: Positive for frequency (incontinence, chronic ).   Musculoskeletal: Positive for myalgias and joint pain.   Neurological: Positive for focal weakness and weakness. Negative for dizziness, tremors and speech change.       Physical Exam:  Physical Exam   Constitutional: She is oriented to person, place, and time. No distress.   Eyes: EOM are normal. No scleral icterus.   Neck: No JVD present.   Cardiovascular: Normal rate and regular rhythm.    No murmur heard.  Pulmonary/Chest: No stridor. She has no wheezes. She has no rales.   Abdominal: Soft. Bowel sounds are normal. She exhibits no distension. There is no tenderness.   Musculoskeletal: She exhibits edema and tenderness.   Left hip surgical site dressed,   Mild swelling, warm, perfusing. Sensation intact.   Neurological: She is alert and oriented to person, place, and time. No cranial nerve deficit. Coordination abnormal.   Skin: Skin is warm and dry. She is not diaphoretic.   Psychiatric: She has a normal mood and affect. Her behavior is normal.       Labs:        Invalid input(s): [id number]      Recent Labs      03/31/17   1620  04/01/17   0304  04/03/17   0303   SODIUM  139  137  139   POTASSIUM  3.7  3.8  3.7   CHLORIDE  107  107  110   CO2  24  23  23   BUN  15  13  20   CREATININE  0.72  0.57  0.62    MAGNESIUM   --   1.8   --    PHOSPHORUS   --   3.3   --    CALCIUM  9.2  8.7  8.7     Recent Labs      17   0303   ALTSGPT  9  8   --    ASTSGOT  13  14   --    ALKPHOSPHAT  73  65   --    TBILIRUBIN  0.4  0.4   --    GLUCOSE  164*  171*  137*     Recent Labs      17   030   RBC  4.94  4.34  3.56*   HEMOGLOBIN  14.2  12.5  10.3*   HEMATOCRIT  43.0  38.1  31.7*   PLATELETCT  304  269  220   PROTHROMBTM  13.3   --    --    APTT  27.9   --    --    INR  0.98   --    --      Recent Labs      17   030   WBC  11.7*  8.2  5.9   NEUTSPOLYS  91.70*  82.00*  69.70   LYMPHOCYTES  4.40*  11.80*  18.40*   MONOCYTES  2.80  5.30  7.10   EOSINOPHILS  0.30  0.10  4.20   BASOPHILS  0.50  0.40  0.30   ASTSGOT  13  14   --    ALTSGPT  9  8   --    ALKPHOSPHAT  73  65   --    TBILIRUBIN  0.4  0.4   --            Hemodynamics:  Temp (24hrs), Av.3 °C (97.4 °F), Min:36 °C (96.8 °F), Max:36.8 °C (98.2 °F)  Temperature: 36.3 °C (97.3 °F)  Pulse  Av.4  Min: 77  Max: 98   Blood Pressure : 109/61 mmHg     Respiratory:    Respiration: 16, Pulse Oximetry: 99 %     Work Of Breathing / Effort: Mild  RML Breath Sounds: Diminished, RLL Breath Sounds: Diminished, LLL Breath Sounds: Diminished  Fluids:    Intake/Output Summary (Last 24 hours) at 17 0931  Last data filed at 17 0330   Gross per 24 hour   Intake    500 ml   Output    875 ml   Net   -375 ml        GI/Nutrition:  Orders Placed This Encounter   Procedures   • DIET ORDER     Standing Status: Standing      Number of Occurrences: 1      Standing Expiration Date:      Order Specific Question:  Diet:     Answer:  Regular [1]     Order Specific Question:  Diet:     Answer:  Diabetic [3]     Medical Decision Making, by Problem:  Active Hospital Problems    Diagnosis   • *Closed left hip fracture (CMS-HCC) [S72.002A]- post ORIF w debility , pain  Encouraged   mobility / participation in PT, WBAT  prn oxycodone ,  IV dilaudid.  Dvt prophyl heparin    • Vitamin D deficiency [E55.9]   Vit D / Ca   • Diabetes type 2, labile  (CMS-HCC) [E11.65] fair control.  ISS coverage   • COPD (chronic obstructive pulmonary disease) (CMS-HCC) [J44.9] stable symptoms  RT protocols, encourage IS   • CAD (coronary artery disease) [I25.10] asympt  Asa, statin, bb therapy    • Degenerative joint disease [M19.90]   • Dyslipidemia [E78.5]  Hypotension, mild - reported chronic, possible component of systolic dsfxn-  gentle ivf bolus.  Coreg , recently decreased captopril w holding parameters  Hx systolic CHF- w/o decompensation   Coreg , ace inh.   Urinary incontinence chronic  w  Increased residuals 600s  Started  flomax- dc silver when more fxnal.       Labs reviewed, Medications reviewed and Radiology images reviewed  Silver catheter: No Silver      DVT Prophylaxis: Heparin

## 2017-04-04 ENCOUNTER — HOME HEALTH ADMISSION (OUTPATIENT)
Dept: HOME HEALTH SERVICES | Facility: HOME HEALTHCARE | Age: 67
End: 2017-04-04
Payer: MEDICARE

## 2017-04-04 LAB
GLUCOSE BLD-MCNC: 126 MG/DL (ref 65–99)
GLUCOSE BLD-MCNC: 128 MG/DL (ref 65–99)
GLUCOSE BLD-MCNC: 135 MG/DL (ref 65–99)

## 2017-04-04 PROCEDURE — 700102 HCHG RX REV CODE 250 W/ 637 OVERRIDE(OP): Performed by: INTERNAL MEDICINE

## 2017-04-04 PROCEDURE — A9270 NON-COVERED ITEM OR SERVICE: HCPCS | Performed by: ORTHOPAEDIC SURGERY

## 2017-04-04 PROCEDURE — 97116 GAIT TRAINING THERAPY: CPT

## 2017-04-04 PROCEDURE — A9270 NON-COVERED ITEM OR SERVICE: HCPCS | Performed by: HOSPITALIST

## 2017-04-04 PROCEDURE — 82962 GLUCOSE BLOOD TEST: CPT | Mod: 91

## 2017-04-04 PROCEDURE — 700102 HCHG RX REV CODE 250 W/ 637 OVERRIDE(OP): Performed by: ORTHOPAEDIC SURGERY

## 2017-04-04 PROCEDURE — 99232 SBSQ HOSP IP/OBS MODERATE 35: CPT | Performed by: HOSPITALIST

## 2017-04-04 PROCEDURE — 770006 HCHG ROOM/CARE - MED/SURG/GYN SEMI*

## 2017-04-04 PROCEDURE — 700102 HCHG RX REV CODE 250 W/ 637 OVERRIDE(OP): Performed by: HOSPITALIST

## 2017-04-04 PROCEDURE — 700112 HCHG RX REV CODE 229: Performed by: ORTHOPAEDIC SURGERY

## 2017-04-04 PROCEDURE — A9270 NON-COVERED ITEM OR SERVICE: HCPCS | Performed by: INTERNAL MEDICINE

## 2017-04-04 PROCEDURE — 97535 SELF CARE MNGMENT TRAINING: CPT

## 2017-04-04 PROCEDURE — 97530 THERAPEUTIC ACTIVITIES: CPT

## 2017-04-04 PROCEDURE — 700111 HCHG RX REV CODE 636 W/ 250 OVERRIDE (IP): Performed by: INTERNAL MEDICINE

## 2017-04-04 RX ADMIN — CELECOXIB 200 MG: 200 CAPSULE ORAL at 18:15

## 2017-04-04 RX ADMIN — GABAPENTIN 100 MG: 100 CAPSULE ORAL at 21:39

## 2017-04-04 RX ADMIN — ACETAMINOPHEN 1000 MG: 500 TABLET, FILM COATED ORAL at 11:48

## 2017-04-04 RX ADMIN — CALCIUM CARBONATE-CHOLECALCIFEROL TAB 250 MG-125 UNIT 1 TABLET: 250-125 TAB at 09:16

## 2017-04-04 RX ADMIN — STANDARDIZED SENNA CONCENTRATE AND DOCUSATE SODIUM 2 TABLET: 8.6; 5 TABLET, FILM COATED ORAL at 21:39

## 2017-04-04 RX ADMIN — CARVEDILOL 3.12 MG: 6.25 TABLET, FILM COATED ORAL at 18:16

## 2017-04-04 RX ADMIN — CAPTOPRIL 6.25 MG: 12.5 TABLET ORAL at 21:40

## 2017-04-04 RX ADMIN — ASPIRIN 325 MG: 325 TABLET, DELAYED RELEASE ORAL at 09:17

## 2017-04-04 RX ADMIN — TAMSULOSIN HYDROCHLORIDE 0.4 MG: 0.4 CAPSULE ORAL at 09:17

## 2017-04-04 RX ADMIN — ASPIRIN 325 MG: 325 TABLET, DELAYED RELEASE ORAL at 21:39

## 2017-04-04 RX ADMIN — OXYCODONE HYDROCHLORIDE 5 MG: 5 TABLET ORAL at 04:18

## 2017-04-04 RX ADMIN — HEPARIN SODIUM 5000 UNITS: 5000 INJECTION, SOLUTION INTRAVENOUS; SUBCUTANEOUS at 14:00

## 2017-04-04 RX ADMIN — HEPARIN SODIUM 5000 UNITS: 5000 INJECTION, SOLUTION INTRAVENOUS; SUBCUTANEOUS at 06:33

## 2017-04-04 RX ADMIN — CELECOXIB 200 MG: 200 CAPSULE ORAL at 09:16

## 2017-04-04 RX ADMIN — HEPARIN SODIUM 5000 UNITS: 5000 INJECTION, SOLUTION INTRAVENOUS; SUBCUTANEOUS at 21:41

## 2017-04-04 RX ADMIN — ACETAMINOPHEN 1000 MG: 500 TABLET, FILM COATED ORAL at 18:16

## 2017-04-04 RX ADMIN — ACETAMINOPHEN 1000 MG: 500 TABLET, FILM COATED ORAL at 06:33

## 2017-04-04 RX ADMIN — DOCUSATE SODIUM 100 MG: 100 CAPSULE ORAL at 21:39

## 2017-04-04 ASSESSMENT — ENCOUNTER SYMPTOMS
COUGH: 0
CHILLS: 0
SHORTNESS OF BREATH: 0
SPEECH CHANGE: 0
FOCAL WEAKNESS: 1
MYALGIAS: 1
FEVER: 0
WEAKNESS: 1
VOMITING: 0
ABDOMINAL PAIN: 0

## 2017-04-04 ASSESSMENT — PAIN SCALES - GENERAL
PAINLEVEL_OUTOF10: 4
PAINLEVEL_OUTOF10: 4
PAINLEVEL_OUTOF10: 3
PAINLEVEL_OUTOF10: 6
PAINLEVEL_OUTOF10: 6

## 2017-04-04 ASSESSMENT — GAIT ASSESSMENTS
DEVIATION: ANTALGIC;STEP TO;BRADYKINETIC;SHUFFLED GAIT
ASSISTIVE DEVICE: FRONT WHEEL WALKER
DISTANCE (FEET): 25
GAIT LEVEL OF ASSIST: CONTACT GUARD ASSIST

## 2017-04-04 NOTE — THERAPY
"Physical Therapy Evaluation completed.   Bed Mobility:  Supine to Sit:  (pt up at EOB)  Transfers: Sit to Stand: Minimal Assist (cueing for post hip precautions, poor attention, high fall risk  Gait: Level Of Assist: Contact Guard Assist with Front-Wheel Walker x 25 feet, poor attention, high fall risk   Plan of Care: Will benefit from Physical Therapy 4 times per week  Discharge Recommendations: Equipment: Front-Wheel Walker. Post-acute therapy Discharge to a transitional care facility for continued skilled therapy services or Discharge to home with outpatient or home health for additional skilled therapy  Family training is set up for noon 4/5 with OT and PT, plan to DC home with home health services pending training.     See \"Rehab Therapy-Acute\" Patient Summary Report for complete documentation.     "

## 2017-04-04 NOTE — FACE TO FACE
Face to Face Note  -  Durable Medical Equipment    Manuel Weinberg M.D. - NPI: [id number]  I certify that this patient is under my care and that they had a durable medical equipment(DME)face to face encounter by myself that meets the physician DME face-to-face encounter requirements with this patient on:    Date of encounter:   Patient:                    MRN:                       YOB: 2016  Alice Yarbrough  [id number]  1950     The encounter with the patient was in whole, or in part, for the following medical condition, which is the primary reason for durable medical equipment:  Total Joint Replacement and Post-Op Surgery    I certify that, based on my findings, the following durable medical equipment is medically necessary:  Walkers.    HOME O2 Saturation Measurements:(Values must be present for Home Oxygen orders)         ,     ,         My Clinical findings support the need for the above equipment due to:  Abnormal Gait    Supporting Symptoms: left hip fx orif.

## 2017-04-04 NOTE — DISCHARGE PLANNING
[facility] received a DME choice form per the choice form the referral has been sent to [facility]

## 2017-04-04 NOTE — FACE TO FACE
Face to Face Note  -  Durable Medical Equipment    Manuel Weinberg M.D. - NPI: [id number]  I certify that this patient is under my care and that they have had a durable medical equipment(DME)face to face encounter by myself that meets the physician DME face-to-face encounter requirements with this patient on:    Date of encounter:   Patient:                    MRN:                       YOB: 2016  Alice Yarbrough  [id number]  1950     The encounter with the patient was in whole, or in part, for the following medical condition, which is the primary reason for durable medical equipment:  Total Joint Replacement and Post-Op Surgery    I certify that, based on my findings, the following durable medical equipment is medically necessary:  Walkers.    HOME O2 Saturation Measurements:(Values must be present for Home Oxygen orders)         ,     ,         My Clinical findings support the need for the above equipment due to:  Abnormal Gait, Wound/Incision    Supporting Symptoms: post hip surgery , unsteady gait        ------------------------------------------------------------------------------------------------------------------    Face to Face Supporting Documentation - Home Health    The encounter with this patient was in whole or in part the primary reason for home health admission.    Date of encounter:   Patient:                    MRN:                       YOB: 2016  Alice Yarbrough  [id number]  1950     Home health to see patient for:  Skilled Nursing care for assessment, interventions & education, Physical Therapy evaluation and treatment, Wound Care, Occupational therapy evaluation and treatment, Medical social work consult and Home health aide    Skilled need for:  Surgical Aftercare left hip orif    Skilled nursing interventions to include:  Comment: PT/OT    Homebound evidenced status by:  Need the aid of supportive devices such as crutches,  canes, wheelchairs or walkers or Needs the assistance of another person in order to leave the home. Leaving home must require a considerable and taxing effort. There must exist a normal inability to leave the home.    Community Physician to provide follow up care: Kevin Rea M.D.     Optional Interventions    Wound information & treatment:    Home Infusion Therapy orders:    Line/Drain/Airway:    I certify the face to face encounter for this home care referral meets the CMS requirements and the encounter/clinical assessment with the patient was, in whole, or in part, for the medical condition(s) listed above, which is the primary reason for home health care. Based on my clinical findings: the service(s) are medically necessary, support the need for home health care, and the homebound criteria are met.  I certify that this patient has had a face to face encounter by myself.  Manuel Weinberg M.D. - NPI: [id number]    *Debility, frailty and advanced age in the absence of an acute deterioration or exacerbation of a condition do not qualify a patient for home health.

## 2017-04-04 NOTE — DISCHARGE PLANNING
Medical Social Work    Discharge Plan: Attending physician ordered a walker for the pt. DME choice form initialed and signed consenting for referral to be sent to [facility]. RACHEL faxed choice form to [facility].     Home Health choice form initialed and signed consenting for referral to be sent to [facility]. RACHEL faxed choice form to [facility].     PCP: Kevin Rea M.D.  Address: [address] [place] [address]  Phone Number: [phone]

## 2017-04-04 NOTE — CARE PLAN
Problem: Safety  Goal: Will remain free from injury  Intervention: Provide assistance with mobility  Hourly rounding, safety education, fall precautions including bed alarm      Problem: Pain Management  Goal: Pain level will decrease to patient’s comfort goal  Intervention: Follow pain managment plan developed in collaboration with patient and Interdisciplinary Team  Pain meds given as needed per orders

## 2017-04-04 NOTE — PROGRESS NOTES
Hospital Medicine Progress Note, Adult, Complex               Author: Manuel Weinberg Date & Time created: 4/4/2017  9:12 AM     CC:  65 yo hx CAD, COPD, DM II diastolic dysfxn admitted with Fall with left hip pain-- dx left hip fx.     Interval History:    Hypotension recovered. Improved pain control.  Up chair w assist.     Review of Systems:  Review of Systems   Constitutional: Negative for fever and chills.   Respiratory: Negative for cough and shortness of breath.    Cardiovascular: Positive for leg swelling. Negative for chest pain.   Gastrointestinal: Negative for vomiting and abdominal pain.   Genitourinary: Positive for frequency (incontinence, chronic ).   Musculoskeletal: Positive for myalgias and joint pain.   Neurological: Positive for focal weakness and weakness. Negative for speech change.       Physical Exam:  Physical Exam   Constitutional: She is oriented to person, place, and time. No distress.   Eyes: EOM are normal. No scleral icterus.   Neck: No JVD present.   Cardiovascular: Normal rate and regular rhythm.    No murmur heard.  Pulmonary/Chest: No stridor. She has no wheezes. She has no rales.   Abdominal: Soft. Bowel sounds are normal. She exhibits no distension. There is no tenderness.   Musculoskeletal: She exhibits edema and tenderness.   Left hip surgical site dressed,   Mild swelling, warm, perfusing. Sensation intact.   Neurological: She is alert and oriented to person, place, and time. No cranial nerve deficit. Coordination abnormal.   Skin: Skin is warm and dry. She is not diaphoretic.       Labs:        Invalid input(s): [id number]      Recent Labs      04/03/17   0303   SODIUM  139   POTASSIUM  3.7   CHLORIDE  110   CO2  23   BUN  20   CREATININE  0.62   CALCIUM  8.7     Recent Labs      04/03/17   0303   GLUCOSE  137*     Recent Labs      04/03/17   0304   RBC  3.56*   HEMOGLOBIN  10.3*   HEMATOCRIT  31.7*   PLATELETCT  220     Recent Labs      04/03/17   0304   WBC  5.9    NEUTSPOLYS  69.70   LYMPHOCYTES  18.40*   MONOCYTES  7.10   EOSINOPHILS  4.20   BASOPHILS  0.30           Hemodynamics:  Temp (24hrs), Av.5 °C (97.7 °F), Min:35.9 °C (96.7 °F), Max:37.1 °C (98.8 °F)  Temperature: 36.4 °C (97.6 °F)  Pulse  Av.6  Min: 77  Max: 104   Blood Pressure : 108/49 mmHg     Respiratory:    Respiration: 17, Pulse Oximetry: 96 %     Work Of Breathing / Effort: Mild  RML Breath Sounds: Diminished, RLL Breath Sounds: Diminished, LLL Breath Sounds: Diminished  Fluids:    Intake/Output Summary (Last 24 hours) at 17 0912  Last data filed at 17 0400   Gross per 24 hour   Intake   1450 ml   Output   1525 ml   Net    -75 ml     Weight: 74 kg (163 lb 2.3 oz)  GI/Nutrition:  Orders Placed This Encounter   Procedures   • DIET ORDER     Standing Status: Standing      Number of Occurrences: 1      Standing Expiration Date:      Order Specific Question:  Diet:     Answer:  Regular [1]     Order Specific Question:  Diet:     Answer:  Diabetic [3]     Medical Decision Making, by Problem:  Active Hospital Problems    Diagnosis   • *Closed left hip fracture (CMS-HCC) [S72.002A]- post ORIF w debility , pain   PT, WBAT- PT re eval.  prn oxycodone   Dvt prophyl heparin    • Vitamin D deficiency [E55.9]  Recently started Vit D / Ca   • Diabetes type 2, labile  (CMS-HCC) [E11.65] fair control.  ISS coverage   • COPD (chronic obstructive pulmonary disease) (CMS-HCC) [J44.9] stable symptoms  RT protocols, encourage IS   • CAD (coronary artery disease) [I25.10] asympt  Asa, statin, bb therapy    • Degenerative joint disease [M19.90]   • Dyslipidemia [E78.5]  Hypotension, mild - reported chronic, possible component of systolic dsfxn-- corrected with ivfs.  Coreg ,captopril w holding parameters  Hx systolic CHF- w/o decompensation   Coreg , ace inh.   Urinary incontinence chronic  w  Urinary retention   flomax- dc silver - monitor voiding, uop.     Declined SNF -now more willing to have Home PT will  arrange walker- possible dc home to  depending on PT eval.    Labs reviewed, Medications reviewed and Radiology images reviewed  Greene catheter: No Greene      DVT Prophylaxis: Heparin

## 2017-04-04 NOTE — DISCHARGE PLANNING
Received choice form from RACHEL Cash at 1415. Referral sent to [facility] at [phone].

## 2017-04-04 NOTE — PROGRESS NOTES
Bedside report received from Heri AARON, pt a/a attempting to sit on side of bed, reminded of fall precautions however due to pt's impulsiveness and noncompliance bed alarm is in place and pt re-educated.  Oxygen by NC, unable to establish IV and pt prefers none.  Greene in place for retention, dressing to Lt hip is cdi, scds on, pt given call light again and reminded of fall precautions.  Patient is planning for discharge home, however due to impulsive behaviors and instability with ambulation pt will need to have HHC at minimum.

## 2017-04-04 NOTE — PROGRESS NOTES
Greene out per physician verbal order, pt tolerated well, fluids encouraged, pt ambulated with FWW from bed to chair with minimal assistance and tolerated well, re-educated on proper movement and alignment as well as fall precautions, chair alarm active.

## 2017-04-05 VITALS
HEIGHT: 68 IN | WEIGHT: 163.14 LBS | TEMPERATURE: 97.1 F | BODY MASS INDEX: 24.73 KG/M2 | RESPIRATION RATE: 16 BRPM | SYSTOLIC BLOOD PRESSURE: 133 MMHG | DIASTOLIC BLOOD PRESSURE: 63 MMHG | OXYGEN SATURATION: 95 % | HEART RATE: 94 BPM

## 2017-04-05 LAB — GLUCOSE BLD-MCNC: 142 MG/DL (ref 65–99)

## 2017-04-05 PROCEDURE — 97530 THERAPEUTIC ACTIVITIES: CPT

## 2017-04-05 PROCEDURE — 700102 HCHG RX REV CODE 250 W/ 637 OVERRIDE(OP): Performed by: HOSPITALIST

## 2017-04-05 PROCEDURE — A9270 NON-COVERED ITEM OR SERVICE: HCPCS | Performed by: ORTHOPAEDIC SURGERY

## 2017-04-05 PROCEDURE — 700111 HCHG RX REV CODE 636 W/ 250 OVERRIDE (IP): Performed by: INTERNAL MEDICINE

## 2017-04-05 PROCEDURE — 97535 SELF CARE MNGMENT TRAINING: CPT

## 2017-04-05 PROCEDURE — 97116 GAIT TRAINING THERAPY: CPT

## 2017-04-05 PROCEDURE — 99239 HOSP IP/OBS DSCHRG MGMT >30: CPT | Performed by: HOSPITALIST

## 2017-04-05 PROCEDURE — G8980 MOBILITY D/C STATUS: HCPCS | Mod: CJ

## 2017-04-05 PROCEDURE — 82962 GLUCOSE BLOOD TEST: CPT

## 2017-04-05 PROCEDURE — G8979 MOBILITY GOAL STATUS: HCPCS | Mod: CJ

## 2017-04-05 PROCEDURE — 700102 HCHG RX REV CODE 250 W/ 637 OVERRIDE(OP): Performed by: ORTHOPAEDIC SURGERY

## 2017-04-05 PROCEDURE — A9270 NON-COVERED ITEM OR SERVICE: HCPCS | Performed by: HOSPITALIST

## 2017-04-05 RX ORDER — TAMSULOSIN HYDROCHLORIDE 0.4 MG/1
0.4 CAPSULE ORAL DAILY
Qty: 30 CAP | Refills: 0 | Status: SHIPPED | OUTPATIENT
Start: 2017-04-05 | End: 2017-04-19

## 2017-04-05 RX ORDER — ASPIRIN 325 MG
325 TABLET, DELAYED RELEASE (ENTERIC COATED) ORAL 2 TIMES DAILY
Qty: 60 TAB | Refills: 0 | Status: SHIPPED | OUTPATIENT
Start: 2017-04-05 | End: 2017-05-05

## 2017-04-05 RX ORDER — HYDROCODONE BITARTRATE AND ACETAMINOPHEN 5; 325 MG/1; MG/1
1-2 TABLET ORAL EVERY 6 HOURS PRN
Qty: 30 TAB | Refills: 0 | Status: SHIPPED | OUTPATIENT
Start: 2017-04-05 | End: 2017-08-17

## 2017-04-05 RX ORDER — CAPTOPRIL 12.5 MG/1
6.25 TABLET ORAL 2 TIMES DAILY
Qty: 60 TAB | Refills: 0 | Status: SHIPPED | OUTPATIENT
Start: 2017-04-05 | End: 2017-10-04 | Stop reason: SDUPTHER

## 2017-04-05 RX ORDER — ACETAMINOPHEN 325 MG/1
650 TABLET ORAL EVERY 6 HOURS PRN
Qty: 30 TAB | Refills: 0 | Status: SHIPPED | OUTPATIENT
Start: 2017-04-05 | End: 2018-01-01

## 2017-04-05 RX ADMIN — ASPIRIN 325 MG: 325 TABLET, DELAYED RELEASE ORAL at 09:14

## 2017-04-05 RX ADMIN — CELECOXIB 200 MG: 200 CAPSULE ORAL at 09:14

## 2017-04-05 RX ADMIN — ACETAMINOPHEN 1000 MG: 500 TABLET, FILM COATED ORAL at 13:15

## 2017-04-05 RX ADMIN — CARVEDILOL 3.12 MG: 6.25 TABLET, FILM COATED ORAL at 09:13

## 2017-04-05 RX ADMIN — HEPARIN SODIUM 5000 UNITS: 5000 INJECTION, SOLUTION INTRAVENOUS; SUBCUTANEOUS at 06:00

## 2017-04-05 RX ADMIN — TAMSULOSIN HYDROCHLORIDE 0.4 MG: 0.4 CAPSULE ORAL at 09:14

## 2017-04-05 RX ADMIN — CALCIUM CARBONATE-CHOLECALCIFEROL TAB 250 MG-125 UNIT 1 TABLET: 250-125 TAB at 09:14

## 2017-04-05 RX ADMIN — CAPTOPRIL 6.25 MG: 12.5 TABLET ORAL at 09:13

## 2017-04-05 RX ADMIN — ACETAMINOPHEN 1000 MG: 500 TABLET, FILM COATED ORAL at 05:39

## 2017-04-05 ASSESSMENT — LIFESTYLE VARIABLES
EVER_SMOKED: YES
DO YOU DRINK ALCOHOL: NO

## 2017-04-05 ASSESSMENT — GAIT ASSESSMENTS
DISTANCE (FEET): 30
ASSISTIVE DEVICE: FRONT WHEEL WALKER
DEVIATION: ANTALGIC;STEP TO
GAIT LEVEL OF ASSIST: CONTACT GUARD ASSIST

## 2017-04-05 NOTE — THERAPY
"Physical Therapy Treatment completed.   Bed Mobility:  Supine to Sit: Minimal Assist  Transfers: Sit to Stand: Contact Guard Assist  Gait: Level Of Assist: Contact Guard Assist with Front-Wheel Walker       Plan of Care: Patient with no further skilled PT needs in the acute care setting at this time  Discharge Recommendations: Equipment: Front-Wheel Walker. Post-acute therapy Discharge to home with outpatient or home health for additional skilled therapy services. Pt will need home health PT services at [place].     See \"Rehab Therapy-Acute\" Patient Summary Report for complete documentation.       "

## 2017-04-05 NOTE — CARE PLAN
Problem: Discharge Barriers/Planning  Goal: Patient’s continuum of care needs will be met  Outcome: PROGRESSING SLOWER THAN EXPECTED  Will need HH set up for safe DC home and continued PT    Problem: Skin Integrity  Goal: Risk for impaired skin integrity will decrease  Outcome: PROGRESSING AS EXPECTED  No breakdown noted

## 2017-04-05 NOTE — PROGRESS NOTES
Bedside report received from Idalmis RN, pt resting quietly with no s/s of discomfort or distress noted, room air with oxygen by NC as needed, dressing to lt hip is cdi, scds on, fall precautions including bed alarm in place, pt calls for assistance.

## 2017-04-05 NOTE — THERAPY
"Occupational Therapy Treatment completed with focus on ADLs, ADL transfers, patient education and caregiver training.  Functional Status:  Assist with adls and assist with safe tub/toilet transfers to adhere to precautions  Plan of Care: Will benefit from Occupational Therapy 2 times per week  Discharge Recommendations:  Equipment Front-Wheel Walker, Shower Chair, Raised Toilet Seat, Bedside Commode and Grab Bars ADL dressing kit. Post-acute therapy Discharge to home with outpatient or home health for additional skilled therapy services.    Patient seen for OT treat focused on ADLs, txfrs, and family training with ADL kit. Issued resource guide and reviewed as presented items/scenarios. Patient demo poor carryover of ADL kit tools and precaution adherence. Family verbalize will assist with ADLs, txfrs, and general safety and verbalize familiarity with dressing kit prior to this event. Patient would benefit from repitition of ADL kit use with family guidance and higher chairs to environmentall support precautions. Anticipate DC to home with services and family support.     See \"Rehab Therapy-Acute\" Patient Summary Report for complete documentation.   "

## 2017-04-05 NOTE — DISCHARGE INSTRUCTIONS
Discharge Instructions    Discharged to home by car with relative. Discharged via wheelchair, hospital escort: Yes.  Special equipment needed: Walker    Be sure to schedule a follow-up appointment with your primary care doctor or any specialists as instructed.     Discharge Plan:   Diet Plan: Discussed  Activity Level: Discussed  Confirmed Follow up Appointment: Patient to Call and Schedule Appointment  Confirmed Symptoms Management: Discussed  Medication Reconciliation Updated: Yes  Influenza Vaccine Indication: Patient Refuses    I understand that a diet low in cholesterol, fat, and sodium is recommended for good health. Unless I have been given specific instructions below for another diet, I accept this instruction as my diet prescription.   Other diet: Diabetic   Increase water intact    Special Instructions: Discharge instructions for the Orthopedic Patient    Follow up with Primary Care Physician within 2 weeks of discharge to home, regarding:  Review of medications and diagnostic testing.  Surveillance for medical complications.  Workup and treatment of osteoporosis, if appropriate.     -Is this a Joint Replacement patient? Yes   Total Joint Hip Replacement Discharge Instructions    Pain  - The goal is to slowly wean off the prescription pain medicine.  - Ice can be used for pain control.  20 minutes at a time is recommended, and never directly against your skin or incision.  - Most patients are off the pain pills by 3 weeks; others may require a low level of pain medications for many months. If your pain continues to be severe, follow up with your physician.  Infection  Deep hip joint infections that require removal of the prostheses occur in less than 0.1% of patients. Lesser infections in the skin (cellulites) are more common and much more easily treated.  - Keep the incision as clean and dry as possible.  - Always wash your hands before touching your incision.  - Skin infections tend to develop around  7-10 days after surgery, most can be treated with oral antibiotics.  - Dental Care should be delayed for 3 months after surgery, your surgeon recommends taking a dose of antibiotics 1 hour prior to any dental procedure.  After 2 years, most surgeons recommend antibiotics only before an extensive procedure.  Ask your surgeon what he recommends.  - Signs and symptoms of infection can include:  low grade fever, redness, pain, swelling and drainage from your incision.  Notify your surgeon immediately if you develop any of these symptoms.  Post op Disturbances  - Bowel habits - constipation is extremely common and is caused by a combination of anesthesia, lack of mobility and pain medicine.  Use stool softeners or laxatives if necessary. It is important not to ignore this problem, as bowel obstructions can be a serious complication after joint replacement surgery.  - Mood/Energy Level - Many patients experience a lack of energy and endurance for up to 2-3 months after surgery.  Some may also feel down and can even become depressed.  This is likely due to the postoperative anemia, change in activity level, lack of sleep, pain medicine and just the emotional reaction to the surgery itself that is a big disruption in a person’s life.  This usually passes.  If symptoms persist, follow up with your primary physician.  - Returning to work - Your surgeon will give you more specific instructions.  Generally, if you work a sedentary job requiring little standing or walking, most patients may return within 2-6 weeks.  Manual labor jobs involving walking, lifting and standing may take 3-4 months.  Your surgeon’s office can provide a release to part-time or light duty work early on in your recovery and progress you to full duty as able.  - Driving - You can begin driving an automatic shift car in 4 to 8 weeks, provided you are no longer taking narcotic pain medication. If you have a stick-shift car and your right hip was replaced,  do not begin driving until your doctor says you can.   - Avoiding falls -  throw rugs and tack down loose carpeting.  Be aware of floor hazards such as pets, small objects or uneven surfaces.   -  Airport Metal Detectors - The sensitivity of metal detectors varies and it is likely that your prosthesis will cause an alarm. Inform the  that you have an artificial joint.  Diet  - Resume your normal diet as tolerated.  - It is important to achieve a healthy nutritional status by eating a well balanced diet on a regular basis.  - Your physician may recommend that you take iron and vitamin supplements.   - Continue to drink plenty of fluids.  Shower/Bathing  - You may shower as soon as you get home from the hospital unless otherwise instructed.  - Keep your incision out of water.  To keep the incision dry when showering, cover it with a plastic bag or plastic wrap.  - Pat incision dry if it gets wet.  Don’t rub.  - Do not submerge in a bath until staples are out and the incision is completely healed. (Approximately 6-8 weeks after surgery).  Dressing Change:  Procedure (if recommended by your physician)  - Wash hands.  - Open all dressing change materials.  - Remove old dressing and discard.  - Inspect incision for redness, increase in clear drainage, yellow/green drainage, odor and surrounding skin hot to touch.  -  ABD (large gauze) pad by one corner and lay over the incision.  Be careful not to touch the inside of the dressing that will lay over the incision.  - Secure in place as instructed (Ace wrap or tape).    Swelling/Bruising  - Swelling is normal after hip replacement and can involve the thigh, knee, calf and foot.  - Swelling can last from 3-6 months.  - Elevate your leg higher than your heart while reclining.  The first week you are home you should elevate your leg an equal amount of time, as you are active.    - Anti-inflammatory pills can be taken once you have stopped the blood  thinners.  - The swelling is usually worse after you go home since you are upright for longer periods of time.  - Bruising is common and can involve the entire leg including the thigh, calf and even foot.  Bruising often does not appear until after you arrive home and it can be quite dramatic- purple, black, green.  The bruising you can see is not usually concerning and will subside without any treatment.      Blood Clot Prevention  Blood clots in the legs and the less common, but frightening, clots that travel to the lungs are a real focus of our preventative. Most patients are at standard risk for them, but those patients who are at higher risk include people who have had previous clots, a family history of clotting, smoking, diabetes, obesity, advanced age, use of estrogen and a sedentary lifestyle.    - Signs of blood clots in legs - Swelling in thigh, calf or ankle that does not go down with elevation.  Pain, heat and tenderness in calf, back of calf or groin area.  NOTE: blood clots can occur in either leg.  - You have been receiving anticoagulant therapy (blood thinners) in the hospital and you may be instructed to continue at home depending on your risk factors.  - Your risk for developing a clot continues for up to 2-3 months after surgery.  You should avoid prolonged sitting and dehydration during that time (long air trips and car trips).  If you do take a trip during this time, please get up and move around every 1- 1.5 hours.  - If you are prescribed blood thinning medication for home, follow instructions as directed. (Handouts provided if applicable).      Activity    Once you get home, you should stay active. The key is not to overdo it! While you can expect some good days and some bad days, you should notice a gradual improvement over time you should notice a gradual improvement and a gradual increase in your endurance over the next 6 to 12 months.    - Weight Bearing - If you have undergone cemented  or hybrid hip replacement, you can put some weight on the leg immediately using a cane or walker, and you should continue to use some support for 4 to 6 weeks to help the muscles recover.   - Sleeping Positions - Sleep on your back with your legs slightly apart or on your side with a regular pillow between your knees. Be sure to use the pillow for at least 6 weeks, or until your doctor says you can do without it. Sleeping on your stomach should be all right  - Sitting - For at least the first 3 months, sit only in chairs that have arms. Do not sit on low chairs, low stools, or reclining chairs. Do not cross your legs at the knees. The physical therapist will show you how to sit and stand from a chair, keeping your affected leg out in front of you. Get up and move around on a regular basis--at least once every hour.  - Walking - Walk as much as you like once your doctor gives you the go-ahead, but remember that walking is no substitute for your prescribed exercises. Walking with a pair of trekking poles is helpful and adds as much as 40% to the exercise you get when you walk  - Therapy may be needed in some cases, to strengthen your muscles and improve your gait (walking pattern).  This decision will be made at your post-operative appointment.  Follow your therapist recommended post-operative exercises (handout provided by Therapist).  - Swimming is also recommended; you can begin as soon as the sutures have been removed and the wound is healed, approximately 6 to 8 weeks after surgery. Using a pair of training fins may make swimming a more enjoyable and effective exercise.  - Other activities - Lower impact activities are preferred.  If you have specific questions, consult your Surgeon.    - Sexual activity - Your surgeon can tell you when it should be safe to resume sexual activity.      When to Call the Doctor   Call the physician if:   - Fever over 100.5? F  - Increased pain, drainage, redness, odor or heat  around the incision area  - Shaking chills  - Increased knee pain with activity and rest  - Increased pain in calf, tenderness or redness above or below the knee  - Increased swelling of calf, ankle, foot  - Sudden increased shortness of breath, sudden onset of chest pain, localized chest pain with coughing  - Incision opening  Or, if there are any questions or concerns about medications or care.       -Is this patient being discharged with medication to prevent blood clots?  Yes, Aspirin     · Is patient discharged on Warfarin / Coumadin?   No     · Is patient Post Blood Transfusion?  No    Depression / Suicide Risk    As you are discharged from this [facility] facility, it is important to learn how to keep safe from harming yourself.    Recognize the warning signs:  · Abrupt changes in personality, positive or negative- including increase in energy   · Giving away possessions  · Change in eating patterns- significant weight changes-  positive or negative  · Change in sleeping patterns- unable to sleep or sleeping all the time   · Unwillingness or inability to communicate  · Depression  · Unusual sadness, discouragement and loneliness  · Talk of wanting to die  · Neglect of personal appearance   · Rebelliousness- reckless behavior  · Withdrawal from people/activities they love  · Confusion- inability to concentrate     If you or a loved one observes any of these behaviors or has concerns about self-harm, here's what you can do:  · Talk about it- your feelings and reasons for harming yourself  · Remove any means that you might use to hurt yourself (examples: pills, rope, extension cords, firearm)  · Get professional help from the community (Mental Health, Substance Abuse, psychological counseling)  · Do not be alone:Call your Safe Contact- someone whom you trust who will be there for you.  · Call your local CRISIS HOTLINE 742-9677 or 399-204-1157  · Call your local Children's Mobile Crisis Response Team [place] (436) 876-0413 or www.[employer]  · Call the toll free National Suicide Prevention Hotlines   · National Suicide Prevention Lifeline 604-286-IAFT (7981)  · [facility] Hope Line Network 800-SUICIDE (145-9955)    Aspirin, ASA oral tablets  What is this medicine?  ASPIRIN (AS pir in) is a pain reliever. It is used to treat mild pain and fever. This medicine is also used as directed by a doctor to prevent and to treat heart attacks, to prevent strokes, and to treat arthritis or inflammation.  This medicine may be used for other purposes; ask your health care provider or pharmacist if you have questions.  COMMON BRAND NAME(S): Aspir-Low, Aspir-Jihan , Aspirtab , Logan Advanced Aspirin, Logan Aspirin Extra Strength, Logan Aspirin Plus, Logan Aspirin, Logan Genuine Aspirin, Logan Womens Aspirin , Bufferin Extra Strength, Bufferin Low Dose, Bufferin  What should I tell my health care provider before I take this medicine?  They need to know if you have any of these conditions:  -anemia  -asthma  -bleeding problems  -child with chickenpox, the flu, or other viral infection  -diabetes  -gout  -if you frequently drink alcohol containing drinks  -kidney disease  -liver disease  -low level of vitamin K  -lupus  -smoke tobacco  -stomach ulcers or other problems  -an unusual or allergic reaction to aspirin, tartrazine dye, other medicines, dyes, or preservatives  -pregnant or trying to get pregnant  -breast-feeding  How should I use this medicine?  Take this medicine by mouth with a glass of water. Follow the directions on the package or prescription label. You can take this medicine with or without food. If it upsets your stomach, take it with food. Do not take your medicine more often than directed.  Talk to your pediatrician regarding the use of this medicine in children. While this drug may be prescribed for children as young as 12 years of age for selected conditions, precautions do apply. Children and teenagers should  not use this medicine to treat chicken pox or flu symptoms unless directed by a doctor.  Patients over 65 years old may have a stronger reaction and need a smaller dose.  Overdosage: If you think you have taken too much of this medicine contact a poison control center or emergency room at once.  NOTE: This medicine is only for you. Do not share this medicine with others.  What if I miss a dose?  If you are taking this medicine on a regular schedule and miss a dose, take it as soon as you can. If it is almost time for your next dose, take only that dose. Do not take double or extra doses.  What may interact with this medicine?  Do not take this medicine with any of the following medications:  -cidofovir  -ketorolac  -probenecid  This medicine may also interact with the following medications:  -alcohol  -alendronate  -bismuth subsalicylate  -flavocoxid  -herbal supplements like feverfew, garlic, carrie, ginkgo biloba, horse chestnut  -medicines for diabetes or glaucoma like acetazolamide, methazolamide  -medicines for gout  -medicines that treat or prevent blood clots like enoxaparin, heparin, ticlopidine, warfarin  -other aspirin and aspirin-like medicines  -NSAIDs, medicines for pain and inflammation, like ibuprofen or naproxen  -pemetrexed  -sulfinpyrazone  -varicella live vaccine  This list may not describe all possible interactions. Give your health care provider a list of all the medicines, herbs, non-prescription drugs, or dietary supplements you use. Also tell them if you smoke, drink alcohol, or use illegal drugs. Some items may interact with your medicine.  What should I watch for while using this medicine?  If you are treating yourself for pain, tell your doctor or health care professional if the pain lasts more than 10 days, if it gets worse, or if there is a new or different kind of pain. Tell your doctor if you see redness or swelling. Also, check with your doctor if you have a fever that lasts for more  than 3 days. Only take this medicine to prevent heart attacks or blood clotting if prescribed by your doctor or health care professional.  Do not take aspirin or aspirin-like medicines with this medicine. Too much aspirin can be dangerous. Always read the labels carefully.  This medicine can irritate your stomach or cause bleeding problems. Do not smoke cigarettes or drink alcohol while taking this medicine. Do not lie down for 30 minutes after taking this medicine to prevent irritation to your throat.  If you are scheduled for any medical or dental procedure, tell your healthcare provider that you are taking this medicine. You may need to stop taking this medicine before the procedure.  What side effects may I notice from receiving this medicine?  Side effects that you should report to your doctor or health care professional as soon as possible:  -allergic reactions like skin rash, itching or hives, swelling of the face, lips, or tongue  -black, tarry stools  -bloody, coffee ground-like vomit  -breathing problems  -changes in hearing, ringing in the ears  -confusion  -general ill feeling or flu-like symptoms  -pain on swallowing  -redness, blistering, peeling or loosening of the skin, including inside the mouth or nose  -trouble passing urine or change in the amount of urine  -unusual bleeding or bruising  -unusually weak or tired  -yellowing of the eyes or skin  Side effects that usually do not require medical attention (report to your doctor or health care professional if they continue or are bothersome):  -diarrhea or constipation  -nausea, vomiting  -stomach gas, heartburn  This list may not describe all possible side effects. Call your doctor for medical advice about side effects. You may report side effects to FDA at 3-568-FDA-9036.  Where should I keep my medicine?  Keep out of the reach of children.  Store at room temperature between 15 and 30 degrees C (59 and 86 degrees F). Protect from heat and moisture.  Do not use this medicine if it has a strong vinegar smell. Throw away any unused medicine after the expiration date.  NOTE: This sheet is a summary. It may not cover all possible information. If you have questions about this medicine, talk to your doctor, pharmacist, or health care provider.  © 2014, [employer]. (3/10/2009 10:44:17 AM)  Captopril tablets  What is this medicine?  CAPTOPRIL (GLENDA toe pril) is an ACE inhibitor. This medicine is used to treat high blood pressure and heart failure. It is used to treat heart damage after a heart attack. It can also slow the progression of kidney disease in diabetic patients.  This medicine may be used for other purposes; ask your health care provider or pharmacist if you have questions.  COMMON BRAND NAME(S): Capoten  What should I tell my health care provider before I take this medicine?  They need to know if you have any of these conditions:  -bone marrow disease  -heart or blood vessel disease  -if you are on a special diet, such as a low salt diet  -immune system disease like lupus or scleroderma  -kidney disease  -low blood pressure  -previous swelling of the tongue, face, or lips with difficulty breathing, difficulty swallowing, hoarseness, or tightening of the throat  -an unusual or allergic reaction to captopril, other ACE inhibitors, insect venom, foods, dyes, or preservatives  -pregnant or trying to get pregnant  -breast-feeding  How should I use this medicine?  Take this medicine by mouth with a glass of water. Follow the directions on your prescription label. Take this medicine on an empty stomach, at least 1 hour before meals. Take your doses at regular intervals. Do not take more medicine than directed. Do not stop taking this medicine except on the advice of your doctor or health care professional.  Talk to your pediatrician regarding the use of this medicine in children. Special care may be needed.  Overdosage: If you think you have taken too  much of this medicine contact a poison control center or emergency room at once.  NOTE: This medicine is only for you. Do not share this medicine with others.  What if I miss a dose?  If you miss a dose, take it as soon as you can. If it is almost time for your next dose, take only that dose. Do not take double or extra doses.  What may interact with this medicine?  -antacids  -diuretics  -lithium  -medicines for chest pain like nitroglycerin  -medicines for high blood pressure  -NSAIDs, medicines for pain and inflammation, like ibuprofen or naproxen  -over-the-counter herbal supplements like hawthorn  -potassium salts or potassium supplements  This list may not describe all possible interactions. Give your health care provider a list of all the medicines, herbs, non-prescription drugs, or dietary supplements you use. Also tell them if you smoke, drink alcohol, or use illegal drugs. Some items may interact with your medicine.  What should I watch for while using this medicine?  Visit your doctor or health care professional for regular checks on your progress. Check your blood pressure as directed. Ask your doctor or health care professional what your blood pressure should be and when you should contact him or her. Call your doctor or health care professional if you notice an irregular or fast heart beat.  Women should inform their doctor if they wish to become pregnant or think they might be pregnant. There is a potential for serious side effects to an unborn child. Talk to your health care professional or pharmacist for more information.  Check with your doctor or health care professional if you get an attack of severe diarrhea, nausea and vomiting, or if you sweat a lot. The loss of too much body fluid can make it dangerous for you to take this medicine.  You may get drowsy or dizzy. Do not drive, use machinery, or do anything that needs mental alertness until you know how this drug affects you. Do not stand or  sit up quickly, especially if you are an older patient. This reduces the risk of dizzy or fainting spells. Alcohol can make you more drowsy and dizzy. Avoid alcoholic drinks.  Avoid salt substitutes unless you are told otherwise by your doctor or health care professional.  Do not treat yourself for coughs, colds, or pain while you are taking this medicine without asking your doctor or health care professional for advice. Some ingredients may increase your blood pressure.  What side effects may I notice from receiving this medicine?  Side effects that you should report to your doctor or health care professional as soon as possible:  -allergic reactions like skin rash, itching or hives, swelling of the face, lips, or tongue  -breathing problems  -chest pain  -dark urine  -feeling faint or lightheaded, falls  -fever or sore throat  -irregular heart beat  -pain or difficulty passing urine  -redness, blistering, peeling or loosening of the skin, including inside the mouth  -stomach pain with or without nausea or vomiting  -unusually weak  -yellowing of the eyes or skin  Side effects that usually do not require medical attention (report to your doctor or health care professional if they continue or are bothersome):  -change in sex drive or performance  -cough  -loss of taste  -sun sensitivity  -tiredness  This list may not describe all possible side effects. Call your doctor for medical advice about side effects. You may report side effects to FDA at 3-355-FDA-5809.  Where should I keep my medicine?  Keep out of the reach of children.  Store at room temperature below 30 degrees C (86 degrees F). Protect from moisture. Keep container tightly closed. Throw away any unused medicine after the expiration date.  NOTE: This sheet is a summary. It may not cover all possible information. If you have questions about this medicine, talk to your doctor, pharmacist, or health care provider.  © 2014, [employer]. (3/24/2009  3:16:03 PM)  Hydrocodone; Ibuprofen tablets  What is this medicine?  HYDROCODONE; IBUPROFEN (farhan droe KOE done; eye BYOO proe fen) is a pain reliever. It is used to treat moderate to severe pain.  This medicine may be used for other purposes; ask your health care provider or pharmacist if you have questions.  COMMON BRAND NAME(S): Ibudone, Reprexain, Vicoprofen  What should I tell my health care provider before I take this medicine?  They need to know if you have any of these conditions:  -brain tumor  -cigarette smoker  -Crohn's disease, inflammatory bowel disease, or ulcerative colitis  -drink more than 3 alcohol-containing drinks per day  -drug abuse or addiction  -head injury  -heart disease or circulation problems such as heart failure or leg edema (fluid retention)  -hemophilia or bleeding problems  -high blood pressure  -kidney disease or problems going to the bathroom  -liver disease  -lung disease, asthma, or breathing problems  -stomach bleeding or ulcers  -an unusual or allergic reaction to ibuprofen, hydrocodone, aspirin, other NSAIDs, other medicines, foods, dyes, or preservatives  -pregnant or trying to get pregnant  -breast-feeding  How should I use this medicine?  Take this medicine by mouth with a full glass of water. Follow the directions on the prescription label. You can take it with or without food. If it upsets your stomach, take it with food. Try to not lie down for at least 10 minutes after you take the medicine. Take your medicine at regular intervals. Do not take it more often than directed.  A special MedGuide will be given to you by the pharmacist with each prescription and refill. Be sure to read this information carefully each time.  Talk to your pediatrician regarding the use of this medicine in children. While this drug may be prescribed for children as young as 16 years of age for selected conditions, precautions do apply.  Overdosage: If you think you have taken too much of this  medicine contact a poison control center or emergency room at once.  NOTE: This medicine is only for you. Do not share this medicine with others.  What if I miss a dose?  If you miss a dose, take it as soon as you can. If it is almost time for your next dose, take only that dose. Do not take double or extra doses.  What may interact with this medicine?  -alcohol  -antihistamines  -aspirin  -cidofovir  -diuretics  -medicines for depression, anxiety, or psychotic disturbances  -medicines for sleep  -methotrexate  -muscle relaxants  -naltrexone  -narcotic medicines (opiates) for pain  -other drugs for inflammation like ketorolac or prednisone  -pemetrexed  -phenobarbital  -ritonavir  -tramadol  -warfarin  This list may not describe all possible interactions. Give your health care provider a list of all the medicines, herbs, non-prescription drugs, or dietary supplements you use. Also tell them if you smoke, drink alcohol, or use illegal drugs. Some items may interact with your medicine.  What should I watch for while using this medicine?  Tell your doctor or health care professional if your pain does not go away, if it gets worse, or if you have new or a different type of pain. You may develop tolerance to the medicine. Tolerance means that you will need a higher dose of the medicine for pain relief. Tolerance is normal and is expected if you take this medicine for a long time.  Do not suddenly stop taking this medicine because you may develop a severe reaction. Your body becomes used to this medicine. This does NOT mean you are addicted. Addiction is a behavior related to getting and using a drug for a non-medical reason. If you have pain, you have a medical reason to take pain medicine. Your doctor will tell you how much medicine to take. If your doctor wants you to stop this medicine, the dose will be slowly lowered over time to avoid any side effects.  There are different types of narcotic medicines (opiates) for  pain. If you take more than one type at the same time, you may have more side effects. Give your health care provider a list of all medicines you use. Your doctor will tell you how much medicine to take. Do not take more medicine than directed. Call emergency for help if you have problems breathing.  This medicine will cause constipation. Try to have a bowel movement at least every 2 to 3 days. If you do not have a bowel movement for 3 days, call your doctor or health care professional.  This medicine does not prevent heart attack or stroke. In fact, this medicine may increase the chance of a heart attack or stroke. The chance may increase with longer use of this medicine and in people who have heart disease. If you take aspirin to prevent heart attack or stroke, talk with your doctor or health care professional.  Do not take naproxen with this medicine. Side effects such as stomach upset, nausea, or ulcers may be more likely to occur. Many medicines available without a prescription should not be taken with this medicine.  This medicine can cause ulcers and bleeding in the stomach and intestines at any time during treatment. Do not smoke cigarettes or drink alcohol. These increase irritation to your stomach and can make it more susceptible to damage from this medicine. Ulcers and bleeding can happen without warning symptoms and can cause death.  You may get drowsy or dizzy. Do not drive, use machinery, or do anything that needs mental alertness until you know how this medicine affects you. Do not stand or sit up quickly, especially if you are an older patient. This reduces the risk of dizzy or fainting spells.  This medicine can cause you to bleed more easily. Try to avoid damage to your teeth and gums when you brush or floss your teeth.  What side effects may I notice from receiving this medicine?  Side effects that you should report to your doctor or health care professional as soon as  possible:  -confusion  -black or bloody stools, blood in the urine or vomit  -blurred vision  -chest pain  -difficulty breathing or wheezing  -skin rash, skin redness, blistering or peeling skin, hives, or itching  -slurred speech or weakness on one side of the body  -swelling of eyelids, throat, lips  -unexplained weight gain or swelling  -unusually weak or tired  -yellowing of eyes or skin  -vomiting  Side effects that usually do not require medical attention (report to your doctor or health care professional if they continue or are bothersome):  -headache  -heartburn  -nausea  This list may not describe all possible side effects. Call your doctor for medical advice about side effects. You may report side effects to FDA at 1-421-FDA-0478.  Where should I keep my medicine?  Keep out of the reach of children. This medicine can be abused. Keep your medicine in a safe place to protect it from theft. Do not share this medicine with anyone. Selling or giving away this medicine is dangerous and against the law.  Store at room temperature between 15 and 30 degrees C (59 and 86 degrees F). Protect from light. Keep container tightly closed.   Throw away any unused medicine after the expiration date. Discard unused medicine and used packaging carefully. Pets and children can be harmed if they find used or lost packages.  NOTE: This sheet is a summary. It may not cover all possible information. If you have questions about this medicine, talk to your doctor, pharmacist, or health care provider.  © 2014, [employer]. (8/27/2012 10:18:58 AM)  Tamsulosin capsules  What is this medicine?  TAMSULOSIN (cantor MISAEL tawanda sin) is used to treat enlargement of the prostate gland in men, a condition called benign prostatic hyperplasia or BPH. It is not for use in women. It works by relaxing muscles in the prostate and bladder neck. This improves urine flow and reduces BPH symptoms.  This medicine may be used for other purposes; ask  your health care provider or pharmacist if you have questions.  COMMON BRAND NAME(S): Flomax  What should I tell my health care provider before I take this medicine?  They need to know if you have any of the following conditions:  -advanced kidney disease  -advanced liver disease  -low blood pressure  -prostate cancer  -an unusual or allergic reaction to tamsulosin, sulfa drugs, other medicines, foods, dyes, or preservatives  -pregnant or trying to get pregnant  -breast-feeding  How should I use this medicine?  Take this medicine by mouth about 30 minutes after the same meal every day. Follow the directions on the prescription label. Swallow the capsules whole with a glass of water. Do not crush, chew, or open capsules. Do not take your medicine more often than directed. Do not stop taking your medicine unless your doctor tells you to.  Talk to your pediatrician regarding the use of this medicine in children. Special care may be needed.  Overdosage: If you think you have taken too much of this medicine contact a poison control center or emergency room at once.  NOTE: This medicine is only for you. Do not share this medicine with others.  What if I miss a dose?  If you miss a dose, take it as soon as you can. If it is almost time for your next dose, take only that dose. Do not take double or extra doses. If you stop taking your medicine for several days or more, ask your doctor or health care professional what dose you should start back on.  What may interact with this medicine?  -cimetidine  -fluoxetine  -ketoconazole  -medicines for erectile disfunction like sildenafil, tadalafil, vardenafil  -medicines for high blood pressure  -other alpha-blockers like alfuzosin, doxazosin, phentolamine, phenoxybenzamine, prazosin, terazosin  -warfarin  This list may not describe all possible interactions. Give your health care provider a list of all the medicines, herbs, non-prescription drugs, or dietary supplements you use.  Also tell them if you smoke, drink alcohol, or use illegal drugs. Some items may interact with your medicine.  What should I watch for while using this medicine?  Visit your doctor or health care professional for regular check ups. You will need lab work done before you start this medicine and regularly while you are taking it. Check your blood pressure as directed. Ask your health care professional what your blood pressure should be, and when you should contact him or her.  This medicine may make you feel dizzy or lightheaded. This is more likely to happen after the first dose, after an increase in dose, or during hot weather or exercise. Drinking alcohol and taking some medicines can make this worse. Do not drive, use machinery, or do anything that needs mental alertness until you know how this medicine affects you. Do not sit or stand up quickly. If you begin to feel dizzy, sit down until you feel better. These effects can decrease once your body adjusts to the medicine.  Contact your doctor or health care professional right away if you have an erection that lasts longer than 4 hours or if it becomes painful. This may be a sign of a serious problem and must be treated right away to prevent permanent damage.  If you are thinking of having cataract surgery, tell your eye surgeon that you have taken this medicine.  What side effects may I notice from receiving this medicine?  Side effects that you should report to your doctor or health care professional as soon as possible:  -allergic reactions like skin rash or itching, hives, swelling of the lips, mouth, tongue, or throat  -breathing problems  -change in vision  -feeling faint or lightheaded  -irregular heartbeat  -prolonged or painful erection  -weakness  Side effects that usually do not require medical attention (report to your doctor or health care professional if they continue or are bothersome):  -back pain  -change in sex drive or performance  -constipation,  nausea or vomiting  -cough  -drowsy  -runny or stuffy nose  -trouble sleeping  This list may not describe all possible side effects. Call your doctor for medical advice about side effects. You may report side effects to FDA at 0-392-WQX-7842.  Where should I keep my medicine?  Keep out of the reach of children.  Store at room temperature between 15 and 30 degrees C (59 and 86 degrees F). Throw away any unused medicine after the expiration date.  NOTE: This sheet is a summary. It may not cover all possible information. If you have questions about this medicine, talk to your doctor, pharmacist, or health care provider.  © 2014, [employer]. (12/18/2013 2:11:34 PM)

## 2017-04-05 NOTE — PROGRESS NOTES
Dr. Weinberg paged for discharge orders, medications and plans.  Previously confirmed with Luciano CARRILLO pt may transition to 325mg Aspirin PO BID per protocol.  Patient aware of plan, DME delivered this morning and pt/ot completed.  Family at bedside, awaiting discharge.  Per verbal approval from Dr. Weinberg patient is cleared to discharge home with family and [facility], all scripts reviewed with pt and family extensively prior to discharge.  All discharge paperwork reviewed with patient and family including follow up.  1440Patient taken by volunteer wheelchair with all belongings to private vehicle home with family.

## 2017-04-05 NOTE — CARE PLAN
Problem: Safety  Goal: Will remain free from injury  Intervention: Provide assistance with mobility  Hourly rounding, safety education, fall precautions including bed/chair alarm, pt calls for assistance      Problem: Pain Management  Goal: Pain level will decrease to patient’s comfort goal  Intervention: Follow pain managment plan developed in collaboration with patient and Interdisciplinary Team  Pain meds given as needed per orders

## 2017-04-06 ENCOUNTER — PATIENT OUTREACH (OUTPATIENT)
Dept: HEALTH INFORMATION MANAGEMENT | Facility: OTHER | Age: 67
End: 2017-04-06

## 2017-04-07 ENCOUNTER — HOME CARE VISIT (OUTPATIENT)
Dept: HOME HEALTH SERVICES | Facility: HOME HEALTHCARE | Age: 67
End: 2017-04-07
Payer: MEDICARE

## 2017-04-07 VITALS
BODY MASS INDEX: 25.43 KG/M2 | TEMPERATURE: 98.6 F | HEIGHT: 67 IN | HEART RATE: 77 BPM | SYSTOLIC BLOOD PRESSURE: 106 MMHG | RESPIRATION RATE: 18 BRPM | WEIGHT: 162 LBS | DIASTOLIC BLOOD PRESSURE: 62 MMHG

## 2017-04-07 PROCEDURE — G0162 HHC RN E&M PLAN SVS, 15 MIN: HCPCS

## 2017-04-07 PROCEDURE — 665001 SOC-HOME HEALTH

## 2017-04-07 SDOH — ECONOMIC STABILITY: HOUSING INSECURITY: HOME SAFETY: CLUTTERS AND A AREA RUG

## 2017-04-07 SDOH — ECONOMIC STABILITY: HOUSING INSECURITY: UNSAFE COOKING RANGE AREA: 0

## 2017-04-07 SDOH — ECONOMIC STABILITY: HOUSING INSECURITY: UNSAFE APPLIANCES: 0

## 2017-04-07 ASSESSMENT — ACTIVITIES OF DAILY LIVING (ADL)
HOME_HEALTH_OASIS: 01
OASIS_M1830: 03

## 2017-04-07 ASSESSMENT — PATIENT HEALTH QUESTIONNAIRE - PHQ9
2. FEELING DOWN, DEPRESSED, IRRITABLE, OR HOPELESS: 00
1. LITTLE INTEREST OR PLEASURE IN DOING THINGS: 00

## 2017-04-07 NOTE — DISCHARGE SUMMARY
ADMISSION DIAGNOSES:  1.  Closed left femoral neck fracture, post-ground level fall.  2.  Steroid-induced leukocytosis.  3.  History of coronary artery disease, stable.  4.  Chronic obstructive pulmonary disease with exacerbation.  5.  Hypertension.  6.  Diabetes mellitus type 2.  7.  Chronic debility.  8.  Chronic degenerative disease.  9.  Dyslipidemia.    DISCHARGE DIAGNOSES:  1.  Post hip hemiarthroplasty 03/31/2017.  2.  Improved debility.  3.  Vitamin D deficiency.  4.  Chronic urinary incontinence with mild urinary retention symptoms,   improved.  5.  History of systolic heart failure without decompensation, EF of 30% per   March 2008 echocardiogram.      IMAGING TESTS:  Patient had a pelvic x-ray 03/31/2017, revealing acute   displaced fracture through the femoral neck and apex angulation.    Femur x-ray revealed acute displaced left femoral neck and lateral and apex   angulation.  Chest x-ray 03/31/2017 revealed no acute cardiopulmonary process,   cardiomegaly.    CONSULTS:  Dr. Howard with orthopedics.    HOSPITAL COURSE:  Patient is a 66-year-old female with history of coronary   artery disease, CHF, systolic dysfunction, EF 30%, COPD, diabetes,   hypertension, dyslipidemia, was admitted with ground level fall and left hip   pain.  Patient was found to have acute displaced femoral neck fracture.  She   underwent per Dr. Howard, left hip hemiarthroplasty on 03/31/2017.  She had   little findings of low vitamin D level of 16 and was started on supplements.    Her pain was controlled with p.r.n. oxycodone.  She had physical therapy with   recommended weightbearing as tolerated, activities, physical therapy.  Her    states he could care for her and was not interested in rehab,   eventually changed her mind about home physical therapy and this was arranged.    Discussed with orthopedics PA, recommended aspirin for DVT prophylaxis.  She   did have mild low blood pressure, asymptomatic, possibly due to  her systolic   heart failure in which she had no acute respiratory symptoms or swelling.  Her   captopril was decreased at discharge.  Her  reports she is chronically   incontinent, did have some increased postvoid residuals, started on Flomax.    Greene was removed without any urinary complaints, recommend outpatient   followup with primary care provider and future referral to urology in addition   to orthopedics.    DISCHARGE INSTRUCTIONS:  Discharge home.    DIET:  ADA 1800 kilocalorie.    DISCHARGE MEDICATIONS:  1.  Tylenol 650 q. 6 hours p.r.n. mild pain.  2.  Aspirin 325 mg b.i.d. for 30 days and continue with 81 mg daily.  3.  Norco 10/325 one to two q. 6 hours p.r.n. moderate pain.  4.  Flomax 0.4 mg daily.  5.  Captopril decreased to 6.25 b.i.d.  6.  Lipitor 40 mg daily.  7.  Coreg 3.125 b.i.d.  8.  Metformin 1000 mg b.i.d.    Follow up with Dr. Bala Edwards, primary care provider in approximately 2   weeks, Dr. Deon Howard in approximately 1 week.  [facility] is   arranged.    Total discharge time approximately 40 minutes.       ____________________________________     EFRAIN MELÉNDEZ MD    QBV / NTS    DD:  04/06/2017 07:50:27  DT:  04/06/2017 11:45:04    D#:  [id number]  Job#:  [id number]    cc: Deon Howard MD, BALA EDWARDS MD

## 2017-04-10 ENCOUNTER — HOME CARE VISIT (OUTPATIENT)
Dept: HOME HEALTH SERVICES | Facility: HOME HEALTHCARE | Age: 67
End: 2017-04-10
Payer: MEDICARE

## 2017-04-10 PROCEDURE — G0495 RN CARE TRAIN/EDU IN HH: HCPCS

## 2017-04-10 PROCEDURE — G0151 HHCP-SERV OF PT,EA 15 MIN: HCPCS

## 2017-04-11 ENCOUNTER — HOME CARE VISIT (OUTPATIENT)
Dept: HOME HEALTH SERVICES | Facility: HOME HEALTHCARE | Age: 67
End: 2017-04-11
Payer: MEDICARE

## 2017-04-11 VITALS
TEMPERATURE: 97.3 F | SYSTOLIC BLOOD PRESSURE: 94 MMHG | RESPIRATION RATE: 19 BRPM | HEART RATE: 78 BPM | DIASTOLIC BLOOD PRESSURE: 60 MMHG

## 2017-04-11 PROCEDURE — G0152 HHCP-SERV OF OT,EA 15 MIN: HCPCS

## 2017-04-11 ASSESSMENT — ENCOUNTER SYMPTOMS
DEBILITATING PAIN: 1
POOR JUDGMENT: 1

## 2017-04-11 ASSESSMENT — ACTIVITIES OF DAILY LIVING (ADL)
ADLS_COMMENTS: <!--EPICS-->SEE OT EVALUATION<!--EPICE-->
IADLS_COMMENTS: <!--EPICS-->SEE OT EVALUATION.<!--EPICE-->

## 2017-04-12 ENCOUNTER — HOME CARE VISIT (OUTPATIENT)
Dept: HOME HEALTH SERVICES | Facility: HOME HEALTHCARE | Age: 67
End: 2017-04-12
Payer: MEDICARE

## 2017-04-12 VITALS
TEMPERATURE: 97.3 F | HEART RATE: 78 BPM | BODY MASS INDEX: 25.44 KG/M2 | WEIGHT: 160 LBS | SYSTOLIC BLOOD PRESSURE: 94 MMHG | RESPIRATION RATE: 19 BRPM | DIASTOLIC BLOOD PRESSURE: 60 MMHG

## 2017-04-12 VITALS
TEMPERATURE: 207.9 F | HEART RATE: 86 BPM | WEIGHT: 158 LBS | SYSTOLIC BLOOD PRESSURE: 90 MMHG | BODY MASS INDEX: 25.12 KG/M2 | RESPIRATION RATE: 18 BRPM | DIASTOLIC BLOOD PRESSURE: 52 MMHG

## 2017-04-12 PROCEDURE — G0495 RN CARE TRAIN/EDU IN HH: HCPCS

## 2017-04-12 PROCEDURE — G0151 HHCP-SERV OF PT,EA 15 MIN: HCPCS

## 2017-04-12 SDOH — ECONOMIC STABILITY: HOUSING INSECURITY: UNSAFE APPLIANCES: 0

## 2017-04-12 SDOH — ECONOMIC STABILITY: HOUSING INSECURITY: UNSAFE COOKING RANGE AREA: 0

## 2017-04-12 ASSESSMENT — ENCOUNTER SYMPTOMS
NAUSEA: DENIES
VOMITING: DENIES
DEBILITATING PAIN: 1

## 2017-04-13 ENCOUNTER — HOME CARE VISIT (OUTPATIENT)
Dept: HOME HEALTH SERVICES | Facility: HOME HEALTHCARE | Age: 67
End: 2017-04-13
Payer: MEDICARE

## 2017-04-13 VITALS
TEMPERATURE: 97.7 F | HEART RATE: 80 BPM | SYSTOLIC BLOOD PRESSURE: 90 MMHG | DIASTOLIC BLOOD PRESSURE: 52 MMHG | RESPIRATION RATE: 14 BRPM | WEIGHT: 159 LBS | BODY MASS INDEX: 25.28 KG/M2

## 2017-04-13 VITALS — TEMPERATURE: 99 F | WEIGHT: 161.2 LBS | BODY MASS INDEX: 25.63 KG/M2 | RESPIRATION RATE: 16 BRPM

## 2017-04-13 PROCEDURE — G0156 HHCP-SVS OF AIDE,EA 15 MIN: HCPCS

## 2017-04-13 ASSESSMENT — ENCOUNTER SYMPTOMS
VOMITING: DENIES
NAUSEA: DENIES

## 2017-04-14 ENCOUNTER — HOME CARE VISIT (OUTPATIENT)
Dept: HOME HEALTH SERVICES | Facility: HOME HEALTHCARE | Age: 67
End: 2017-04-14
Payer: MEDICARE

## 2017-04-14 VITALS
DIASTOLIC BLOOD PRESSURE: 48 MMHG | BODY MASS INDEX: 25.76 KG/M2 | RESPIRATION RATE: 12 BRPM | TEMPERATURE: 98.7 F | SYSTOLIC BLOOD PRESSURE: 84 MMHG | WEIGHT: 162 LBS | HEART RATE: 97 BPM

## 2017-04-14 PROCEDURE — G0495 RN CARE TRAIN/EDU IN HH: HCPCS

## 2017-04-14 PROCEDURE — G0151 HHCP-SERV OF PT,EA 15 MIN: HCPCS

## 2017-04-14 ASSESSMENT — ACTIVITIES OF DAILY LIVING (ADL)
TOILETING_ASSISTANCE: 1
TOILETING_EQUIPMENT_USED: 3:1 COMMODE
BATHING_ASSISTANCE: 6
LAUNDRY_ASSISTANCE: 6
ORAL_CARE_ASSISTANCE: 1
HOUSEKEEPING_ASSISTANCE: 6
DRESSING_UB_ASSISTANCE: 4
DRESSING_LB_ASSISTANCE: 5
BATHING_ASSISTIVE_EQUIPMENT_USED: 3:1 COMMODE
TRANSPORTATION_ASSISTANCE: 6
EATING_ASSISTANCE: 1
BATHING_ASSISTIVE_EQUIPMENT_NEEDED: TUB TRANSFER BENCH
GROOMING_ASSISTANCE: 1
MEAL_PREP_ASSISTANCE: 6
SHOPPING_ASSISTANCE: 6
BATHING_ASSISTANCE: 5
TELEPHONE_ASSISTANCE: 0

## 2017-04-14 ASSESSMENT — ENCOUNTER SYMPTOMS: DEBILITATING PAIN: 1

## 2017-04-15 VITALS
WEIGHT: 163 LBS | RESPIRATION RATE: 16 BRPM | HEART RATE: 102 BPM | SYSTOLIC BLOOD PRESSURE: 86 MMHG | BODY MASS INDEX: 25.92 KG/M2 | DIASTOLIC BLOOD PRESSURE: 54 MMHG | TEMPERATURE: 99.7 F

## 2017-04-15 ASSESSMENT — ENCOUNTER SYMPTOMS
VOMITING: DENIES
NAUSEA: DENIES

## 2017-04-17 ENCOUNTER — HOME CARE VISIT (OUTPATIENT)
Dept: HOME HEALTH SERVICES | Facility: HOME HEALTHCARE | Age: 67
End: 2017-04-17
Payer: MEDICARE

## 2017-04-17 VITALS — HEART RATE: 87 BPM | BODY MASS INDEX: 25.31 KG/M2 | WEIGHT: 159.2 LBS | TEMPERATURE: 98.2 F | RESPIRATION RATE: 16 BRPM

## 2017-04-17 VITALS
SYSTOLIC BLOOD PRESSURE: 82 MMHG | RESPIRATION RATE: 18 BRPM | TEMPERATURE: 98.9 F | DIASTOLIC BLOOD PRESSURE: 48 MMHG | HEART RATE: 102 BPM

## 2017-04-17 PROCEDURE — G0152 HHCP-SERV OF OT,EA 15 MIN: HCPCS

## 2017-04-17 PROCEDURE — G0151 HHCP-SERV OF PT,EA 15 MIN: HCPCS

## 2017-04-17 PROCEDURE — G0180 MD CERTIFICATION HHA PATIENT: HCPCS | Performed by: INTERNAL MEDICINE

## 2017-04-17 ASSESSMENT — ENCOUNTER SYMPTOMS
DEBILITATING PAIN: 1
MENTAL STATUS CHANGE: 0

## 2017-04-18 ENCOUNTER — HOME CARE VISIT (OUTPATIENT)
Dept: HOME HEALTH SERVICES | Facility: HOME HEALTHCARE | Age: 67
End: 2017-04-18
Payer: MEDICARE

## 2017-04-18 VITALS
HEART RATE: 87 BPM | BODY MASS INDEX: 25.31 KG/M2 | TEMPERATURE: 98.2 F | SYSTOLIC BLOOD PRESSURE: 98 MMHG | RESPIRATION RATE: 16 BRPM | WEIGHT: 159.2 LBS | DIASTOLIC BLOOD PRESSURE: 52 MMHG

## 2017-04-18 ASSESSMENT — ENCOUNTER SYMPTOMS: DEBILITATING PAIN: 1

## 2017-04-19 ENCOUNTER — HOME CARE VISIT (OUTPATIENT)
Dept: HOME HEALTH SERVICES | Facility: HOME HEALTHCARE | Age: 67
End: 2017-04-19
Payer: MEDICARE

## 2017-04-19 ENCOUNTER — OFFICE VISIT (OUTPATIENT)
Dept: MEDICAL GROUP | Facility: MEDICAL CENTER | Age: 67
End: 2017-04-19
Payer: MEDICARE

## 2017-04-19 VITALS
RESPIRATION RATE: 16 BRPM | OXYGEN SATURATION: 94 % | DIASTOLIC BLOOD PRESSURE: 58 MMHG | TEMPERATURE: 98.6 F | WEIGHT: 160 LBS | HEIGHT: 68 IN | BODY MASS INDEX: 24.25 KG/M2 | HEART RATE: 72 BPM | SYSTOLIC BLOOD PRESSURE: 92 MMHG

## 2017-04-19 VITALS
SYSTOLIC BLOOD PRESSURE: 90 MMHG | BODY MASS INDEX: 24.55 KG/M2 | HEART RATE: 73 BPM | DIASTOLIC BLOOD PRESSURE: 60 MMHG | TEMPERATURE: 97.4 F | WEIGHT: 161.4 LBS | RESPIRATION RATE: 18 BRPM

## 2017-04-19 DIAGNOSIS — Z78.0 POST-MENOPAUSAL: ICD-10-CM

## 2017-04-19 DIAGNOSIS — E78.5 DYSLIPIDEMIA: ICD-10-CM

## 2017-04-19 DIAGNOSIS — Z12.31 VISIT FOR SCREENING MAMMOGRAM: ICD-10-CM

## 2017-04-19 DIAGNOSIS — S72.002D CLOSED LEFT HIP FRACTURE, WITH ROUTINE HEALING, SUBSEQUENT ENCOUNTER: ICD-10-CM

## 2017-04-19 DIAGNOSIS — E11.42 CONTROLLED TYPE 2 DIABETES MELLITUS WITH DIABETIC POLYNEUROPATHY, WITHOUT LONG-TERM CURRENT USE OF INSULIN (HCC): ICD-10-CM

## 2017-04-19 DIAGNOSIS — Z12.11 SCREENING FOR COLON CANCER: ICD-10-CM

## 2017-04-19 LAB
HBA1C MFR BLD: 6 % (ref ?–5.8)
INT CON NEG: NEGATIVE
INT CON POS: POSITIVE

## 2017-04-19 PROCEDURE — G8432 DEP SCR NOT DOC, RNG: HCPCS | Performed by: INTERNAL MEDICINE

## 2017-04-19 PROCEDURE — G0156 HHCP-SVS OF AIDE,EA 15 MIN: HCPCS

## 2017-04-19 PROCEDURE — 1111F DSCHRG MED/CURRENT MED MERGE: CPT | Performed by: INTERNAL MEDICINE

## 2017-04-19 PROCEDURE — 3014F SCREEN MAMMO DOC REV: CPT | Performed by: INTERNAL MEDICINE

## 2017-04-19 PROCEDURE — 1100F PTFALLS ASSESS-DOCD GE2>/YR: CPT | Performed by: INTERNAL MEDICINE

## 2017-04-19 PROCEDURE — 0518F FALL PLAN OF CARE DOCD: CPT | Mod: 8P | Performed by: INTERNAL MEDICINE

## 2017-04-19 PROCEDURE — 92250 FUNDUS PHOTOGRAPHY W/I&R: CPT | Mod: TC | Performed by: INTERNAL MEDICINE

## 2017-04-19 PROCEDURE — G8420 CALC BMI NORM PARAMETERS: HCPCS | Performed by: INTERNAL MEDICINE

## 2017-04-19 PROCEDURE — 1036F TOBACCO NON-USER: CPT | Performed by: INTERNAL MEDICINE

## 2017-04-19 PROCEDURE — 99214 OFFICE O/P EST MOD 30 MIN: CPT | Performed by: INTERNAL MEDICINE

## 2017-04-19 PROCEDURE — 83036 HEMOGLOBIN GLYCOSYLATED A1C: CPT | Performed by: INTERNAL MEDICINE

## 2017-04-19 PROCEDURE — 3044F HG A1C LEVEL LT 7.0%: CPT | Performed by: INTERNAL MEDICINE

## 2017-04-19 PROCEDURE — 4040F PNEUMOC VAC/ADMIN/RCVD: CPT | Performed by: INTERNAL MEDICINE

## 2017-04-19 PROCEDURE — G8598 ASA/ANTIPLAT THER USED: HCPCS | Performed by: INTERNAL MEDICINE

## 2017-04-19 PROCEDURE — 3288F FALL RISK ASSESSMENT DOCD: CPT | Mod: 8P | Performed by: INTERNAL MEDICINE

## 2017-04-19 RX ORDER — ATORVASTATIN CALCIUM 40 MG/1
40 TABLET, FILM COATED ORAL DAILY
Qty: 30 TAB | Refills: 11 | Status: SHIPPED | OUTPATIENT
Start: 2017-04-19 | End: 2017-08-17 | Stop reason: SDUPTHER

## 2017-04-19 NOTE — PROGRESS NOTES
CC: Follow-up hip fracture and other issues.    HPI:   Alice presents today with the following.    1. Closed left hip fracture, with routine healing, subsequent encounter  Presents after a fall in parking lot fracture hip status post surgery with lm placement. She does have follow to orthopedist and was just seen yesterday to have suture removal. She is getting pain medications from the specialist and reports adequate control. No signs of infection the wound. She has not had a bone density as of yet.    2. Controlled type 2 diabetes mellitus with diabetic polyneuropathy, without long-term current use of insulin (CMS-HCC)  Dear for recheck of A1c checked in office today at 6.0 reports good control at home.    3. Post-menopausal  Never had bone density.    4. Visit for screening mammogram      5. Dyslipidemia  Needing refills of statin good control as last check.      Patient Active Problem List    Diagnosis Date Noted   • Closed left hip fracture (CMS-HCC) 03/31/2017     Priority: High   • Vitamin D deficiency 04/01/2017     Priority: Medium   • Controlled type 2 diabetes mellitus without complication, without long-term current use of insulin (CMS-HCC) 09/05/2016     Priority: Medium   • COPD (chronic obstructive pulmonary disease) (CMS-HCC) 09/15/2014     Priority: Medium   • CAD (coronary artery disease) 08/26/2013     Priority: Medium   • Degenerative joint disease 04/01/2017     Priority: Low   • Dyslipidemia 08/26/2013     Priority: Low   • Controlled type 2 diabetes mellitus with diabetic polyneuropathy, without long-term current use of insulin (CMS-HCC) 11/08/2016   • Low back pain 09/17/2015   • Carotid bruit present 09/17/2015   • Diastolic dysfunction 08/26/2013       Current Outpatient Prescriptions   Medication Sig Dispense Refill   • atorvastatin (LIPITOR) 40 MG Tab Take 1 Tab by mouth every day. 30 Tab 11   • [employer] MICROFILL TEST strip      • acetaminophen (TYLENOL) 325 MG Tab Take 2 Tabs by  "mouth every 6 hours as needed (Mild Pain; (Pain scale 1-3); Temp greater than 100.5 F). 30 Tab 0   • aspirin  MG Tablet Delayed Response Take 1 Tab by mouth 2 Times a Day for 30 days. 60 Tab 0   • hydrocodone-acetaminophen (NORCO) 5-325 MG Tab per tablet Take 1-2 Tabs by mouth every 6 hours as needed. 30 Tab 0   • captopril (CAPOTEN) 12.5 MG Tab Take 0.5 Tabs by mouth 2 times a day. 60 Tab 0   • carvedilol (COREG) 3.125 MG Tab Take 1 Tab by mouth 2 times a day, with meals. 180 Tab 3   • metformin (GLUCOPHAGE) 1000 MG tablet Take 1 Tab by mouth 2 times a day, with meals. 60 Tab 11     No current facility-administered medications for this visit.         Allergies as of 04/19/2017   • (No Known Allergies)        ROS: As per HPI.    BP 92/58 mmHg  Pulse 72  Temp(Src) 37 °C (98.6 °F)  Resp 16  Ht 1.727 m (5' 8\")  Wt 72.576 kg (160 lb)  BMI 24.33 kg/m2  SpO2 94%    Physical Exam:  Gen:         Alert and oriented, No apparent distress.  Neck:        No Lymphadenopathy or Bruits.  Lungs:     Clear to auscultation bilaterally  CV:          Regular rate and rhythm. No murmurs, rubs or gallops.               Ext:          No clubbing, cyanosis, edema.      Assessment and Plan.   66 y.o. female with the following issues.    1. Closed left hip fracture, with routine healing, subsequent encounter  Reports feeling well does have follow-up with surgeon. I have written for bone density as below.    2. Controlled type 2 diabetes mellitus with diabetic polyneuropathy, without long-term current use of insulin (CMS-[facility])  Currently well controlled no changes. Discussed diet and exercise recheck A1c in 6 months. Reminded about yearly eye exam. Attemti amosy exam in office today we'll await results.  - POCT  A1C  - POCT Retinal Eye Exam    3. Post-menopausal    - DS-BONE DENSITY STUDY (DEXA); Future    4. Visit for screening mammogram    - MA-SCREEN MAMMO W/CAD-BILAT; Future    5. Dyslipidemia  Refill medication  - atorvastatin " (LIPITOR) 40 MG Tab; Take 1 Tab by mouth every day.  Dispense: 30 Tab; Refill: 11      Addendum:  As a result of her hip fracture patient having difficulties with ADLs would benefit from having a shower chair as well as an adjustable showerhead.

## 2017-04-19 NOTE — MR AVS SNAPSHOT
"        Alice Yarbrough   2017 2:40 PM   Office Visit   MRN: [id number]    Department:  [address] [place] [address]   Dept Phone:  [phone]    Description:  Female : 1950   Provider:  Kevin Rea M.D.           Reason for Visit     Hospital Follow-up           Allergies as of 2017     No Known Allergies      You were diagnosed with     Closed left hip fracture, with routine healing, subsequent encounter   [288085]       Controlled type 2 diabetes mellitus with diabetic polyneuropathy, without long-term current use of insulin (CMS-[facility])   [6000525]       Post-menopausal   [412314]       Visit for screening mammogram   [918640]       Dyslipidemia   [912965]       Screening for colon cancer   [916805]         Vital Signs     Blood Pressure Pulse Temperature Respirations Height Weight    92/58 mmHg 72 37 °C (98.6 °F) 16 1.727 m (5' 8\") 72.576 kg (160 lb)    Body Mass Index Oxygen Saturation Smoking Status             24.33 kg/m2 94% Former Smoker         Basic Information     Date Of Birth Sex Race Ethnicity Preferred Language    1950 Female White Non- English      Your appointments     2017 12:00 PM   OT-HH-HOME VISIT with [facility] (--)    [address]  [place] [address]   [phone]            2017 To Be Determined   SN-HH-HOME VISIT with Ne Ingram R.N.   [facility] (--)    [address]  [place] [address]   [phone]            2017 11:00 AM   PT-HH-HOME VISIT with Joan K Ormonde, P.T.   [facility] (--)    [address]  [place] [address]   [phone]            2017 12:00 PM   OT-HH-HOME VISIT with [facility] (--)    [address]  [place] [address]   [phone]            2017 To Be Determined   SN-HH-HOME VISIT with Nina Puri R.N.   [facility] (--)    [address]  [facility] [address] [phone]   "         Apr 26, 2017 To Be Determined   AIDE-HH-HOME VISIT with Raina Carrera C.N.A.   [facility] (--)    [address]  [place] [address]   [phone]            Apr 27, 2017 12:00 PM   OT-HH-HOME VISIT with MAHESH WhaleyTAmrit   [facility] (--)    [address]  [place] [address]   [phone]            Apr 28, 2017 To Be Determined   SN-HH-HOME VISIT with Nina Puri R.N.   [facility] (--)    [address]  [place] [address]   [phone]            May 01, 2017 12:00 PM   OT-HH-HOME VISIT with MAHESH WhaleyTAmrit   [facility] (--)    [address]  [place] [address]   [phone]            May 02, 2017 To Be Determined   SN-HH-HOME VISIT with Nina Puri R.N.   [facility] (--)    [address]  [place] [address]   [phone]            May 03, 2017 To Be Determined   AIDE-HH-HOME VISIT with Raina Carrera C.N.A.   [facility] (--)    [address]  [place] [address]   [phone]            May 04, 2017 12:00 PM   MO-LR-KDWVLDTVEH DISCHARGE with MAHESH WhaleyTAmrit   [facility] (--)    [address]  [place] [address]   [phone]            May 05, 2017 To Be Determined   SN-HH-HOME VISIT with Nina Puri R.N.   [facility] (--)    [address]  [place] [address]   [phone]            May 09, 2017 To Be Determined   SN-HH-HOME VISIT with Nina Puri R.N.   [facility] (--)    [address]  [place] [address]   [phone]            May 10, 2017  2:20 PM   Established Patient with Kevin Rea M.D.   [facility] [address])    [address] [facility] [address]  [place] [address]   [phone]           You will be receiving a confirmation call a few days before your appointment from our automated call confirmation system.            May 11, 2017 To Be Determined   AIDE-HH-HOME VISIT with Raina Carrera,  JAIDEN   [facility] (--)    [address]  [place] [address]   [phone]            May 12, 2017 To Be Determined   SN-HH-HOME VISIT with Nina Puri R.N.   [facility] (--)    [address]  [place] [address]   [phone]            May 16, 2017 To Be Determined   SN-HH-HOME VISIT with Nina Puri R.N.   [facility] (--)    [address]  [place] [address]   [phone]            May 17, 2017 To Be Determined   AIDE-HH-HOME VISIT with Raina Carrera C.N.A.   [facility] (--)    [address]  [place] [address]   [phone]            May 19, 2017 To Be Determined   [facility] (--)    [address]  [place] [address]   [phone]            May 24, 2017 To Be Determined   SN-HH-HOME VISIT with Nina Puri R.N.   [facility] (--)    [address]  [place] [address]   [phone]            May 25, 2017 To Be Determined   AIDE-HH-HOME VISIT with Raina Carrera C.N.A.   [facility] (--)    [address]  [place] [address]   [phone]            May 31, 2017 To Be Determined   SN-HH-OASIS DISCHARGE with Nina Puri R.N.   [facility] (--)    [address]  [place] [address]   [phone]            May 31, 2017 To Be Determined   AIDE-HH-HOME VISIT with Raina Carrera C.N.A.   [facility] (--)    [address]  [place] [address]   [phone]              Problem List              ICD-10-CM Priority Class Noted - Resolved    Diastolic dysfunction I51.9   8/26/2013 - Present    CAD (coronary artery disease) I25.10 Medium  8/26/2013 - Present    Dyslipidemia E78.5 Low  8/26/2013 - Present    COPD (chronic obstructive pulmonary disease) (CMS-HCC) J44.9 Medium  9/15/2014 - Present    Low back pain M54.5   9/17/2015 - Present    Carotid bruit present R09.89   9/17/2015 - Present    Controlled type 2 diabetes  mellitus without complication, without long-term current use of insulin (CMS-HCC) E11.9 Medium  9/5/2016 - Present    Controlled type 2 diabetes mellitus with diabetic polyneuropathy, without long-term current use of insulin (CMS-HCC) E11.42   11/8/2016 - Present    Closed left hip fracture (CMS-HCC) S72.002A High  3/31/2017 - Present    Vitamin D deficiency E55.9 Medium  4/1/2017 - Present    Degenerative joint disease M19.90 Low  4/1/2017 - Present      Health Maintenance        Date Due Completion Dates    COLON CANCER SCREENING ANNUAL FIT 1950 ---    RETINAL SCREENING 12/27/1969 ---    IMM DTaP/Tdap/Td Vaccine (1 - Tdap) 12/27/1970 ---    MAMMOGRAM 12/27/1991 ---    IMM ZOSTER VACCINE 12/27/2011 ---    BONE DENSITY 12/27/2016 ---    A1C SCREENING 4/12/2017 10/12/2016 (Done), 12/30/2007    Override on 10/12/2016: Done    IMM PNEUMOCOCCAL 65+ (ADULT) LOW/MEDIUM RISK SERIES (2 of 2 - PPSV23) 5/6/2017 5/6/2016    FASTING LIPID PROFILE 10/12/2017 10/12/2016, 3/10/2008, 12/30/2007    URINE ACR / MICROALBUMIN 10/12/2017 10/12/2016    DIABETES MONOFILAMENT / LE EXAM 10/12/2017 10/12/2016, 10/12/2016 (Done)    Override on 10/12/2016: Done    PAP SMEAR 1/7/2018 1/7/2015 (Declined)    Override on 1/7/2015: Patient Declined (hysterectomy)    SERUM CREATININE 4/3/2018 4/3/2017, 4/1/2017, 3/31/2017, 10/12/2016, 10/19/2009, 4/30/2008, 3/10/2008, 3/9/2008, 1/16/2008, 1/15/2008, 1/6/2008, 1/5/2008, 1/4/2008, 1/3/2008, 1/2/2008, 1/1/2008, 12/31/2007, 12/30/2007            Results     POCT  A1C      Component    Glycohemoglobin    6.0    Internal Control Negative    Negative    Internal Control Positive    Positive                        Current Immunizations     13-VALENT PCV PREVNAR 5/6/2016    Influenza Vaccine Quad Inj (Pf) 11/8/2016    Influenza Vaccine Quad Inj (Preserved) 9/6/2013      Below and/or attached are the medications your provider expects you to take. Review all of your home medications and newly ordered  medications with your provider and/or pharmacist. Follow medication instructions as directed by your provider and/or pharmacist. Please keep your medication list with you and share with your provider. Update the information when medications are discontinued, doses are changed, or new medications (including over-the-counter products) are added; and carry medication information at all times in the event of emergency situations     Allergies:  No Known Allergies          Medications  Valid as of: April 19, 2017 -  3:03 PM    Generic Name Brand Name Tablet Size Instructions for use    Acetaminophen (Tab) TYLENOL 325 MG Take 2 Tabs by mouth every 6 hours as needed (Mild Pain; (Pain scale 1-3); Temp greater than 100.5 F).        Aspirin (Tablet Delayed Response) Aspirin 325 MG Take 1 Tab by mouth 2 Times a Day for 30 days.        Atorvastatin Calcium (Tab) LIPITOR 40 MG Take 1 Tab by mouth every day.        Captopril (Tab) CAPOTEN 12.5 MG Take 0.5 Tabs by mouth 2 times a day.        Carvedilol (Tab) COREG 3.125 MG Take 1 Tab by mouth 2 times a day, with meals.        Glucose Blood (Strip) ASCENSIA MICROFILL TEST          Hydrocodone-Acetaminophen (Tab) NORCO 5-325 MG Take 1-2 Tabs by mouth every 6 hours as needed.        MetFORMIN HCl (Tab) GLUCOPHAGE 1000 MG Take 1 Tab by mouth 2 times a day, with meals.        .                 Medicines prescribed today were sent to:     [facility] #[id number] - [address] AT [address] [address]    Phone: [phone] Fax: [phone]    Open 24 Hours?: No    [facility] - [place], [place] - [address]     [address] [facility] [address]    Phone: [phone] Fax: [phone]    Open 24 Hours?: No      Medication refill instructions:       If your prescription bottle indicates you have medication refills left, it is not necessary to call your provider’s office. Please contact your pharmacy and they will  refill your medication.    If your prescription bottle indicates you do not have any refills left, you may request refills at any time through one of the following ways: The online [employer] system (except Urgent Care), by calling your provider’s office, or by asking your pharmacy to contact your provider’s office with a refill request. Medication refills are processed only during regular business hours and may not be available until the next business day. Your provider may request additional information or to have a follow-up visit with you prior to refilling your medication.   *Please Note: Medication refills are assigned a new Rx number when refilled electronically. Your pharmacy may indicate that no refills were authorized even though a new prescription for the same medication is available at the pharmacy. Please request the medicine by name with the pharmacy before contacting your provider for a refill.        Your To Do List     Future Labs/Procedures Complete By Expires    DS-BONE DENSITY STUDY (DEXA)  As directed 10/20/2017    MA-SCREEN MAMMO W/CAD-BILAT  As directed 5/21/2018    OCCULT BLOOD FECES IMMUNOASSAY  As directed 4/20/2018         [employer] Access Code: Activation code not generated  Current [employer] Status: Active

## 2017-04-20 ENCOUNTER — HOME CARE VISIT (OUTPATIENT)
Dept: HOME HEALTH SERVICES | Facility: HOME HEALTHCARE | Age: 67
End: 2017-04-20
Payer: MEDICARE

## 2017-04-20 PROCEDURE — G0152 HHCP-SERV OF OT,EA 15 MIN: HCPCS

## 2017-04-21 ENCOUNTER — HOME CARE VISIT (OUTPATIENT)
Dept: HOME HEALTH SERVICES | Facility: HOME HEALTHCARE | Age: 67
End: 2017-04-21
Payer: MEDICARE

## 2017-04-21 VITALS
SYSTOLIC BLOOD PRESSURE: 100 MMHG | HEART RATE: 65 BPM | RESPIRATION RATE: 18 BRPM | TEMPERATURE: 207.7 F | DIASTOLIC BLOOD PRESSURE: 54 MMHG

## 2017-04-21 PROCEDURE — G0495 RN CARE TRAIN/EDU IN HH: HCPCS

## 2017-04-21 PROCEDURE — G0151 HHCP-SERV OF PT,EA 15 MIN: HCPCS

## 2017-04-22 VITALS — RESPIRATION RATE: 18 BRPM | HEART RATE: 78 BPM | TEMPERATURE: 97.6 F

## 2017-04-22 SDOH — ECONOMIC STABILITY: HOUSING INSECURITY: UNSAFE COOKING RANGE AREA: 0

## 2017-04-22 SDOH — ECONOMIC STABILITY: HOUSING INSECURITY: HOME SAFETY: INSTRUCTED ON FALL PREVENTION,REMOVE TRIP HAZRDS LIKE RUGS

## 2017-04-22 SDOH — ECONOMIC STABILITY: HOUSING INSECURITY: UNSAFE APPLIANCES: 0

## 2017-04-22 ASSESSMENT — ENCOUNTER SYMPTOMS
VOMITING: DENIES
NAUSEA: DENIES
RESPIRATORY SYMPTOMS COMMENTS: REPORTS NO BREATHING PROBLEM

## 2017-04-24 ENCOUNTER — TELEPHONE (OUTPATIENT)
Dept: MEDICAL GROUP | Facility: MEDICAL CENTER | Age: 67
End: 2017-04-24

## 2017-04-24 ENCOUNTER — HOME CARE VISIT (OUTPATIENT)
Dept: HOME HEALTH SERVICES | Facility: HOME HEALTHCARE | Age: 67
End: 2017-04-24
Payer: MEDICARE

## 2017-04-24 VITALS
WEIGHT: 159.6 LBS | BODY MASS INDEX: 24.27 KG/M2 | TEMPERATURE: 99.2 F | RESPIRATION RATE: 16 BRPM | SYSTOLIC BLOOD PRESSURE: 90 MMHG | DIASTOLIC BLOOD PRESSURE: 50 MMHG | HEART RATE: 99 BPM

## 2017-04-24 PROCEDURE — G0152 HHCP-SERV OF OT,EA 15 MIN: HCPCS

## 2017-04-24 PROCEDURE — G0151 HHCP-SERV OF PT,EA 15 MIN: HCPCS

## 2017-04-24 ASSESSMENT — ENCOUNTER SYMPTOMS
MENTAL STATUS CHANGE: 0
DEBILITATING PAIN: 1

## 2017-04-24 NOTE — TELEPHONE ENCOUNTER
Patient has been notified via voicemail that her retinal eye exam was normal and to follow up in a year.

## 2017-04-25 ENCOUNTER — HOME CARE VISIT (OUTPATIENT)
Dept: HOME HEALTH SERVICES | Facility: HOME HEALTHCARE | Age: 67
End: 2017-04-25
Payer: MEDICARE

## 2017-04-25 VITALS
DIASTOLIC BLOOD PRESSURE: 64 MMHG | SYSTOLIC BLOOD PRESSURE: 108 MMHG | RESPIRATION RATE: 18 BRPM | TEMPERATURE: 98.3 F | HEART RATE: 91 BPM

## 2017-04-25 VITALS
TEMPERATURE: 208 F | SYSTOLIC BLOOD PRESSURE: 102 MMHG | HEART RATE: 88 BPM | RESPIRATION RATE: 20 BRPM | DIASTOLIC BLOOD PRESSURE: 64 MMHG

## 2017-04-25 PROCEDURE — G0158 HHC OT ASSISTANT EA 15: HCPCS

## 2017-04-25 PROCEDURE — G0495 RN CARE TRAIN/EDU IN HH: HCPCS

## 2017-04-25 ASSESSMENT — ENCOUNTER SYMPTOMS: DEBILITATING PAIN: 1

## 2017-04-26 ENCOUNTER — HOME CARE VISIT (OUTPATIENT)
Dept: HOME HEALTH SERVICES | Facility: HOME HEALTHCARE | Age: 67
End: 2017-04-26
Payer: MEDICARE

## 2017-04-26 VITALS
BODY MASS INDEX: 24.82 KG/M2 | HEART RATE: 91 BPM | TEMPERATURE: 98.3 F | SYSTOLIC BLOOD PRESSURE: 108 MMHG | RESPIRATION RATE: 18 BRPM | DIASTOLIC BLOOD PRESSURE: 64 MMHG | WEIGHT: 163.2 LBS

## 2017-04-26 PROCEDURE — G0151 HHCP-SERV OF PT,EA 15 MIN: HCPCS

## 2017-04-27 ENCOUNTER — TELEPHONE (OUTPATIENT)
Dept: MEDICAL GROUP | Facility: MEDICAL CENTER | Age: 67
End: 2017-04-27

## 2017-04-27 VITALS
TEMPERATURE: 97.3 F | RESPIRATION RATE: 18 BRPM | HEART RATE: 78 BPM | SYSTOLIC BLOOD PRESSURE: 102 MMHG | DIASTOLIC BLOOD PRESSURE: 58 MMHG

## 2017-04-27 NOTE — TELEPHONE ENCOUNTER
1. Caller Name: Armida from [facility]                      Call Back Number: [phone]    2. Message: patient is requesting a shower chair and shower hose ordered to preferred home care    3. Patient approves office to leave a detailed voicemail/MyChart message: N\A

## 2017-04-28 ENCOUNTER — HOME CARE VISIT (OUTPATIENT)
Dept: HOME HEALTH SERVICES | Facility: HOME HEALTHCARE | Age: 67
End: 2017-04-28
Payer: MEDICARE

## 2017-04-28 VITALS
TEMPERATURE: 98.1 F | DIASTOLIC BLOOD PRESSURE: 70 MMHG | BODY MASS INDEX: 24 KG/M2 | SYSTOLIC BLOOD PRESSURE: 100 MMHG | HEART RATE: 80 BPM | RESPIRATION RATE: 16 BRPM | WEIGHT: 157.8 LBS

## 2017-04-28 PROCEDURE — G0495 RN CARE TRAIN/EDU IN HH: HCPCS

## 2017-04-28 SDOH — ECONOMIC STABILITY: HOUSING INSECURITY: UNSAFE COOKING RANGE AREA: 0

## 2017-04-28 SDOH — ECONOMIC STABILITY: HOUSING INSECURITY: UNSAFE APPLIANCES: 0

## 2017-04-28 ASSESSMENT — ENCOUNTER SYMPTOMS
DEBILITATING PAIN: 1
NAUSEA: DENIES
VOMITING: DENIES

## 2017-04-30 VITALS
HEART RATE: 70 BPM | WEIGHT: 161.2 LBS | RESPIRATION RATE: 20 BRPM | SYSTOLIC BLOOD PRESSURE: 110 MMHG | TEMPERATURE: 97.9 F | DIASTOLIC BLOOD PRESSURE: 70 MMHG | BODY MASS INDEX: 24.52 KG/M2

## 2017-04-30 SDOH — ECONOMIC STABILITY: HOUSING INSECURITY: UNSAFE APPLIANCES: 0

## 2017-04-30 SDOH — ECONOMIC STABILITY: HOUSING INSECURITY: UNSAFE COOKING RANGE AREA: 0

## 2017-04-30 ASSESSMENT — ENCOUNTER SYMPTOMS: RESPIRATORY SYMPTOMS COMMENTS: NO S/S OF RESP DISTRESS

## 2017-05-01 ENCOUNTER — HOME CARE VISIT (OUTPATIENT)
Dept: HOME HEALTH SERVICES | Facility: HOME HEALTHCARE | Age: 67
End: 2017-05-01
Payer: MEDICARE

## 2017-05-01 PROCEDURE — G0151 HHCP-SERV OF PT,EA 15 MIN: HCPCS

## 2017-05-02 ENCOUNTER — HOME CARE VISIT (OUTPATIENT)
Dept: HOME HEALTH SERVICES | Facility: HOME HEALTHCARE | Age: 67
End: 2017-05-02
Payer: MEDICARE

## 2017-05-02 VITALS
WEIGHT: 159 LBS | DIASTOLIC BLOOD PRESSURE: 60 MMHG | SYSTOLIC BLOOD PRESSURE: 100 MMHG | HEART RATE: 80 BPM | TEMPERATURE: 210.7 F | BODY MASS INDEX: 24.18 KG/M2 | RESPIRATION RATE: 16 BRPM

## 2017-05-02 VITALS
SYSTOLIC BLOOD PRESSURE: 98 MMHG | TEMPERATURE: 98.2 F | DIASTOLIC BLOOD PRESSURE: 58 MMHG | HEART RATE: 98 BPM | RESPIRATION RATE: 16 BRPM

## 2017-05-02 PROCEDURE — G0152 HHCP-SERV OF OT,EA 15 MIN: HCPCS

## 2017-05-02 PROCEDURE — G0495 RN CARE TRAIN/EDU IN HH: HCPCS

## 2017-05-02 ASSESSMENT — ENCOUNTER SYMPTOMS
VOMITING: DENIES
NAUSEA: DENIES

## 2017-05-03 ENCOUNTER — PATIENT OUTREACH (OUTPATIENT)
Dept: HEALTH INFORMATION MANAGEMENT | Facility: OTHER | Age: 67
End: 2017-05-03

## 2017-05-03 ENCOUNTER — HOME CARE VISIT (OUTPATIENT)
Dept: HOME HEALTH SERVICES | Facility: HOME HEALTHCARE | Age: 67
End: 2017-05-03
Payer: MEDICARE

## 2017-05-03 VITALS
HEART RATE: 18 BPM | DIASTOLIC BLOOD PRESSURE: 60 MMHG | SYSTOLIC BLOOD PRESSURE: 90 MMHG | TEMPERATURE: 98 F | RESPIRATION RATE: 18 BRPM | BODY MASS INDEX: 24.3 KG/M2 | WEIGHT: 159.8 LBS

## 2017-05-03 VITALS
SYSTOLIC BLOOD PRESSURE: 90 MMHG | HEART RATE: 81 BPM | TEMPERATURE: 99.3 F | RESPIRATION RATE: 16 BRPM | DIASTOLIC BLOOD PRESSURE: 50 MMHG

## 2017-05-03 PROCEDURE — G0156 HHCP-SVS OF AIDE,EA 15 MIN: HCPCS

## 2017-05-03 ASSESSMENT — ENCOUNTER SYMPTOMS: DEBILITATING PAIN: 1

## 2017-05-04 ENCOUNTER — HOME CARE VISIT (OUTPATIENT)
Dept: HOME HEALTH SERVICES | Facility: HOME HEALTHCARE | Age: 67
End: 2017-05-04
Payer: MEDICARE

## 2017-05-04 VITALS
HEART RATE: 68 BPM | RESPIRATION RATE: 16 BRPM | SYSTOLIC BLOOD PRESSURE: 90 MMHG | BODY MASS INDEX: 23.97 KG/M2 | DIASTOLIC BLOOD PRESSURE: 62 MMHG | TEMPERATURE: 99.5 F | WEIGHT: 157.6 LBS

## 2017-05-04 PROCEDURE — G0152 HHCP-SERV OF OT,EA 15 MIN: HCPCS

## 2017-05-04 ASSESSMENT — ACTIVITIES OF DAILY LIVING (ADL)
TRANSPORTATION_ASSISTANCE: 6
DRESSING_UB_ASSISTANCE: 1
BATHING_ASSISTANCE: 4
DRESSING_UB_ASSISTANCE: 0
ORAL_CARE_ASSISTANCE: 0
BATHING_ASSISTANCE: 5
SHOPPING_ASSISTANCE: 6
EATING_ASSISTANCE: 0
DRESSING_LB_ASSISTANCE: 4
GROOMING_ASSISTANCE: 0
DRESSING_LB_ASSISTANCE: 1
MEAL_PREP_ASSISTANCE: 1
TOILETING_ASSISTANCE: 0
TELEPHONE_ASSISTANCE: 0
HOUSEKEEPING_ASSISTANCE: 6
LAUNDRY_ASSISTANCE: 6

## 2017-05-05 ENCOUNTER — HOME CARE VISIT (OUTPATIENT)
Dept: HOME HEALTH SERVICES | Facility: HOME HEALTHCARE | Age: 67
End: 2017-05-05
Payer: MEDICARE

## 2017-05-05 ENCOUNTER — TELEPHONE (OUTPATIENT)
Dept: MEDICAL GROUP | Facility: MEDICAL CENTER | Age: 67
End: 2017-05-05

## 2017-05-05 PROCEDURE — G0495 RN CARE TRAIN/EDU IN HH: HCPCS

## 2017-05-05 RX ORDER — HYDROCODONE BITARTRATE AND ACETAMINOPHEN 5; 325 MG/1; MG/1
1-2 TABLET ORAL EVERY 6 HOURS PRN
Qty: 30 TAB | Refills: 0 | OUTPATIENT
Start: 2017-05-05

## 2017-05-05 NOTE — TELEPHONE ENCOUNTER
Phone Number Called: [phone]    Message: Left VM for the patient to call back to give the providers message regarding patients RX.    Left Message for patient to call back: N\A

## 2017-05-05 NOTE — TELEPHONE ENCOUNTER
Was the patient seen in the last year in this department? Yes   4/19/17  Patient has appt with surgeon on May 18th    Does patient have an active prescription for medications requested? No     Received Request Via: Patient

## 2017-05-06 ENCOUNTER — HOME CARE VISIT (OUTPATIENT)
Dept: HOME HEALTH SERVICES | Facility: HOME HEALTHCARE | Age: 67
End: 2017-05-06
Payer: MEDICARE

## 2017-05-06 PROCEDURE — G0155 HHCP-SVS OF CSW,EA 15 MIN: HCPCS

## 2017-05-06 ASSESSMENT — ENCOUNTER SYMPTOMS: DEBILITATING PAIN: 1

## 2017-05-07 VITALS
TEMPERATURE: 98.6 F | WEIGHT: 159.2 LBS | SYSTOLIC BLOOD PRESSURE: 88 MMHG | BODY MASS INDEX: 24.21 KG/M2 | HEART RATE: 80 BPM | RESPIRATION RATE: 16 BRPM | DIASTOLIC BLOOD PRESSURE: 68 MMHG

## 2017-05-07 ASSESSMENT — ENCOUNTER SYMPTOMS
NAUSEA: DENIES
VOMITING: DENIES

## 2017-05-08 ENCOUNTER — HOME CARE VISIT (OUTPATIENT)
Dept: HOME HEALTH SERVICES | Facility: HOME HEALTHCARE | Age: 67
End: 2017-05-08
Payer: MEDICARE

## 2017-05-08 PROCEDURE — G0151 HHCP-SERV OF PT,EA 15 MIN: HCPCS

## 2017-05-09 ENCOUNTER — HOME CARE VISIT (OUTPATIENT)
Dept: HOME HEALTH SERVICES | Facility: HOME HEALTHCARE | Age: 67
End: 2017-05-09
Payer: MEDICARE

## 2017-05-09 VITALS
TEMPERATURE: 98.4 F | BODY MASS INDEX: 23.94 KG/M2 | HEART RATE: 100 BPM | RESPIRATION RATE: 18 BRPM | WEIGHT: 157.4 LBS | DIASTOLIC BLOOD PRESSURE: 68 MMHG | SYSTOLIC BLOOD PRESSURE: 92 MMHG

## 2017-05-09 VITALS
TEMPERATURE: 98.2 F | HEART RATE: 86 BPM | RESPIRATION RATE: 18 BRPM | DIASTOLIC BLOOD PRESSURE: 58 MMHG | SYSTOLIC BLOOD PRESSURE: 100 MMHG

## 2017-05-09 PROCEDURE — G0495 RN CARE TRAIN/EDU IN HH: HCPCS

## 2017-05-09 SDOH — ECONOMIC STABILITY: HOUSING INSECURITY: HOME SAFETY: PT REQUIRED ASSISTANCE CALLING HER INSURANCE COMPANY.

## 2017-05-09 SDOH — ECONOMIC STABILITY: HOUSING INSECURITY: UNSAFE APPLIANCES: 0

## 2017-05-09 SDOH — ECONOMIC STABILITY: HOUSING INSECURITY: UNSAFE COOKING RANGE AREA: 0

## 2017-05-09 ASSESSMENT — ENCOUNTER SYMPTOMS
NAUSEA: DENIES
VOMITING: DENIES
RESPIRATORY SYMPTOMS COMMENTS: DENIES BREATHING PROBLEM

## 2017-05-10 ENCOUNTER — APPOINTMENT (OUTPATIENT)
Dept: MEDICAL GROUP | Facility: MEDICAL CENTER | Age: 67
End: 2017-05-10
Payer: MEDICARE

## 2017-05-10 ENCOUNTER — HOME CARE VISIT (OUTPATIENT)
Dept: HOME HEALTH SERVICES | Facility: HOME HEALTHCARE | Age: 67
End: 2017-05-10
Payer: MEDICARE

## 2017-05-10 VITALS
RESPIRATION RATE: 18 BRPM | DIASTOLIC BLOOD PRESSURE: 60 MMHG | TEMPERATURE: 97 F | WEIGHT: 152.4 LBS | HEART RATE: 67 BPM | BODY MASS INDEX: 23.18 KG/M2 | SYSTOLIC BLOOD PRESSURE: 104 MMHG

## 2017-05-10 PROCEDURE — G0156 HHCP-SVS OF AIDE,EA 15 MIN: HCPCS

## 2017-05-11 ENCOUNTER — HOME CARE VISIT (OUTPATIENT)
Dept: HOME HEALTH SERVICES | Facility: HOME HEALTHCARE | Age: 67
End: 2017-05-11
Payer: MEDICARE

## 2017-05-11 PROCEDURE — G0155 HHCP-SVS OF CSW,EA 15 MIN: HCPCS

## 2017-05-12 ENCOUNTER — HOME CARE VISIT (OUTPATIENT)
Dept: HOME HEALTH SERVICES | Facility: HOME HEALTHCARE | Age: 67
End: 2017-05-12
Payer: MEDICARE

## 2017-05-12 VITALS
TEMPERATURE: 97.8 F | DIASTOLIC BLOOD PRESSURE: 60 MMHG | WEIGHT: 154.4 LBS | HEART RATE: 84 BPM | RESPIRATION RATE: 18 BRPM | SYSTOLIC BLOOD PRESSURE: 103 MMHG | BODY MASS INDEX: 23.48 KG/M2

## 2017-05-12 PROCEDURE — G0495 RN CARE TRAIN/EDU IN HH: HCPCS

## 2017-05-12 SDOH — ECONOMIC STABILITY: HOUSING INSECURITY: HOME SAFETY: PATIENT NOW HAS SHOWER CHAIR AND HOSE AS WELL AS NEW CANE.

## 2017-05-12 ASSESSMENT — ENCOUNTER SYMPTOMS
VOMITING: DENIES
NAUSEA: DENIES

## 2017-05-16 ENCOUNTER — HOME CARE VISIT (OUTPATIENT)
Dept: HOME HEALTH SERVICES | Facility: HOME HEALTHCARE | Age: 67
End: 2017-05-16
Payer: MEDICARE

## 2017-05-16 PROCEDURE — G0495 RN CARE TRAIN/EDU IN HH: HCPCS

## 2017-05-19 ENCOUNTER — HOME CARE VISIT (OUTPATIENT)
Dept: HOME HEALTH SERVICES | Facility: HOME HEALTHCARE | Age: 67
End: 2017-05-19
Payer: MEDICARE

## 2017-05-19 PROCEDURE — G0162 HHC RN E&M PLAN SVS, 15 MIN: HCPCS

## 2017-05-21 VITALS
WEIGHT: 157 LBS | RESPIRATION RATE: 20 BRPM | SYSTOLIC BLOOD PRESSURE: 110 MMHG | TEMPERATURE: 97.5 F | HEART RATE: 60 BPM | DIASTOLIC BLOOD PRESSURE: 70 MMHG | BODY MASS INDEX: 23.88 KG/M2

## 2017-05-21 VITALS
BODY MASS INDEX: 14.93 KG/M2 | DIASTOLIC BLOOD PRESSURE: 70 MMHG | TEMPERATURE: 208.8 F | WEIGHT: 98.2 LBS | RESPIRATION RATE: 20 BRPM | SYSTOLIC BLOOD PRESSURE: 100 MMHG | HEART RATE: 87 BPM

## 2017-05-21 SDOH — ECONOMIC STABILITY: HOUSING INSECURITY: UNSAFE COOKING RANGE AREA: 1

## 2017-05-21 SDOH — ECONOMIC STABILITY: HOUSING INSECURITY: UNSAFE APPLIANCES: 0

## 2017-05-21 ASSESSMENT — ACTIVITIES OF DAILY LIVING (ADL)
OASIS_M1830: 00
HOME_HEALTH_OASIS: 00

## 2017-05-21 ASSESSMENT — ENCOUNTER SYMPTOMS
POOR JUDGMENT: 1
DEBILITATING PAIN: 1
DEBILITATING PAIN: 1
POOR JUDGMENT: 1

## 2017-06-08 ENCOUNTER — OFFICE VISIT (OUTPATIENT)
Dept: MEDICAL GROUP | Facility: MEDICAL CENTER | Age: 67
End: 2017-06-08
Payer: MEDICARE

## 2017-06-08 VITALS
RESPIRATION RATE: 16 BRPM | HEIGHT: 68 IN | OXYGEN SATURATION: 97 % | BODY MASS INDEX: 24.43 KG/M2 | TEMPERATURE: 97.9 F | SYSTOLIC BLOOD PRESSURE: 110 MMHG | HEART RATE: 91 BPM | WEIGHT: 161.2 LBS | DIASTOLIC BLOOD PRESSURE: 58 MMHG

## 2017-06-08 DIAGNOSIS — E78.5 DYSLIPIDEMIA: ICD-10-CM

## 2017-06-08 DIAGNOSIS — J44.9 CHRONIC OBSTRUCTIVE PULMONARY DISEASE, UNSPECIFIED COPD TYPE (HCC): ICD-10-CM

## 2017-06-08 DIAGNOSIS — E11.9 CONTROLLED TYPE 2 DIABETES MELLITUS WITHOUT COMPLICATION, WITHOUT LONG-TERM CURRENT USE OF INSULIN (HCC): ICD-10-CM

## 2017-06-08 DIAGNOSIS — I25.10 CORONARY ARTERY DISEASE INVOLVING NATIVE CORONARY ARTERY OF NATIVE HEART WITHOUT ANGINA PECTORIS: ICD-10-CM

## 2017-06-08 DIAGNOSIS — I51.89 DIASTOLIC DYSFUNCTION: ICD-10-CM

## 2017-06-08 DIAGNOSIS — Z00.00 MEDICARE ANNUAL WELLNESS VISIT, SUBSEQUENT: ICD-10-CM

## 2017-06-08 DIAGNOSIS — Z23 NEED FOR PNEUMOCOCCAL VACCINE: ICD-10-CM

## 2017-06-08 DIAGNOSIS — Z91.81 RISK FOR FALLS: ICD-10-CM

## 2017-06-08 DIAGNOSIS — E11.42 CONTROLLED TYPE 2 DIABETES MELLITUS WITH DIABETIC POLYNEUROPATHY, WITHOUT LONG-TERM CURRENT USE OF INSULIN (HCC): ICD-10-CM

## 2017-06-08 DIAGNOSIS — M25.559 ARTHRALGIA OF HIP, UNSPECIFIED LATERALITY: ICD-10-CM

## 2017-06-08 PROCEDURE — G0009 ADMIN PNEUMOCOCCAL VACCINE: HCPCS | Performed by: INTERNAL MEDICINE

## 2017-06-08 PROCEDURE — G0439 PPPS, SUBSEQ VISIT: HCPCS | Mod: 25 | Performed by: INTERNAL MEDICINE

## 2017-06-08 PROCEDURE — 99213 OFFICE O/P EST LOW 20 MIN: CPT | Mod: 25 | Performed by: INTERNAL MEDICINE

## 2017-06-08 PROCEDURE — 90732 PPSV23 VACC 2 YRS+ SUBQ/IM: CPT | Performed by: INTERNAL MEDICINE

## 2017-06-08 RX ORDER — GABAPENTIN 100 MG/1
100 CAPSULE ORAL
Qty: 30 CAP | Refills: 11 | Status: SHIPPED | OUTPATIENT
Start: 2017-06-08 | End: 2017-08-17 | Stop reason: SDUPTHER

## 2017-06-08 ASSESSMENT — PATIENT HEALTH QUESTIONNAIRE - PHQ9: CLINICAL INTERPRETATION OF PHQ2 SCORE: 1

## 2017-06-08 NOTE — PROGRESS NOTES
CC: Follow-up hip pain due for wellness examination.    HPI:   Alice presents today with the following.    1. Medicare annual wellness visit, subsequent  Screenings performed below    2. Controlled type 2 diabetes mellitus without complication, without long-term current use of insulin (CMS-HCC)  Diabetes well controlled as last check not yet due for A1c denying hypoglycemia.    3. Controlled type 2 diabetes mellitus with diabetic polyneuropathy, without long-term current use of insulin (CMS-HCC)  She is having persistent neuropathic pain has been on gabapentin in the past which tolerated well.    4. Chronic obstructive pulmonary disease, unspecified COPD type (CMS-HCC)  Reports breathing is doing well denying any shortness of breath cough or other symptoms.    5. Diastolic dysfunction  Euvolemic denying any dizziness or swelling.    6. Dyslipidemia  Maintain on statin without myalgias.    7. Coronary artery disease involving native coronary artery of native heart without angina pectoris  Denying any chest pain or shortness of breath.    8. Arthralgia of hip, unspecified laterality  Still having hip pain status post fracture. She does have her bone density scheduled for next month. She would like to go back on gabapentin for the neuropathic pain as well as some nerve pain she had previously going down the leg.    9. Risk for falls  She is using a walker denies any recent falls.    10. Need for pneumococcal vaccine        Depression Screening    Little interest or pleasure in doing things?  0 - not at all  Feeling down, depressed , or hopeless? 1 - several days  Trouble falling or staying asleep, or sleeping too much?     Feeling tired or having little energy?     Poor appetite or overeating?     Feeling bad about yourself - or that you are a failure or have let yourself or your family down?    Trouble concentrating on things, such as reading the newspaper or watching television?    Moving or speaking so slowly  that other people could have noticed.  Or the opposite - being so fidgety or restless that you have been moving around a lot more than usual?     Thoughts that you would be better off dead, or of hurting yourself?     Patient Health Questionnaire Score:    If depressive symptoms identified deferred to follow up visit unless specifically addressed in assessment and plan.    Interpretation of PHQ-9 Total Score   Score Severity   1-4 Minimal Depression   5-9 Mild Depression   10-14 Moderate Depression   15-19 Moderately Severe Depression   20-27 Severe Depression    Screening for Cognitive Impairment    Three Minute Recall (banana, sunrise, fence)  2/3    Draw clock face with all 12 numbers set to the hand to show 10 minures past 11 o'clock  1 4/5  If cognitive concerns identified deferred to follow up visit unless specifically addressed in assessment and plan.    Fall Risk Assessment    Has the patient had two or more falls in the last year or any fall with injury in the last year?  Yes  If Fall Risk identified deferred to follow up visit unless specifically addressed in assessment and plan.    Safety Assessment    Throw rugs on floor.  Yes  Handrails on all stairs.  Yes  Good lighting in all hallways.  Yes  Difficulty hearing.  No  Patient counseled about all safety risks that were identified.    Functional Assessment ADLs    Are there any barriers preventing you from cooking for yourself or meeting nutritional needs?  No.    Are there any barriers preventing you from driving safely or obtaining transportation?  No.    Are there any barriers preventing you from using a telephone or calling for help?  No.    Are there any barriers preventing you from shopping?  No.    Are there any barriers preventing you from taking care of your own finances?  No.    Are there any barriers preventing you from managing your medications?  No.    Are currently engaing any exercise or physical activity?  No.       Health Maintenance  Summary                COLON CANCER SCREENING ANNUAL FIT Overdue 1950     IMM DTaP/Tdap/Td Vaccine Overdue 12/27/1970     MAMMOGRAM Overdue 12/27/1991     PFT SCREENING-FEV1 AND FEV/FVC RATIO / SPIROMETRY SHOULD BE PERFORMED ANNUALLY Overdue 10/1/2009      Done 10/1/2008 PFT DICTATED RESULTS    IMM ZOSTER VACCINE Overdue 12/27/2011     Annual Wellness Visit Overdue 12/24/2016      Done 12/24/2015 Visit Dx: Medicare annual wellness visit, subsequent    BONE DENSITY Overdue 12/27/2016     IMM PNEUMOCOCCAL 65+ (ADULT) LOW/MEDIUM RISK SERIES Overdue 5/6/2017      Done 5/6/2016 Imm Admin: Pneumococcal Conjugate Vaccine (Prevnar/PCV-13)    FASTING LIPID PROFILE Next Due 10/12/2017      Done 10/12/2016 LIPID PROFILE (A)     Patient has more history with this topic...    URINE ACR / MICROALBUMIN Next Due 10/12/2017      Done 10/12/2016 MICROALBUMIN CREAT RATIO URINE    DIABETES MONOFILAMENT / LE EXAM Next Due 10/12/2017      Done 10/12/2016      Patient has more history with this topic...    A1C SCREENING Next Due 10/19/2017      Done 4/19/2017 POCT A1C     Patient has more history with this topic...    PAP SMEAR Next Due 1/7/2018      Patient Declined 1/7/2015 hysterectomy    SERUM CREATININE Next Due 4/3/2018      Done 4/3/2017 BASIC METABOLIC PANEL (A)     Patient has more history with this topic...    RETINAL SCREENING Next Due 4/19/2018      Done 4/19/2017 POCT RETINAL EYE EXAM          Patient Care Team:  Kevin Rea M.D. as PCP - General (Internal Medicine)  Bernardo Moran M.D. as Consulting Physician (Cardiology)  Chito Woodall M.D. as Consulting Physician (Cardiology)  Christian Lewis  as Consulting Physician (Optometry)  Armida Foster as Patient Advocate - [facility] as Home Health Provider  Deon Howard M.D. as Consulting Physician (Orthopaedics)      Patient Active Problem List    Diagnosis Date Noted   • Closed left hip fracture (CMS-HCC) 03/31/2017     Priority: High   •  Vitamin D deficiency 04/01/2017     Priority: Medium   • Controlled type 2 diabetes mellitus without complication, without long-term current use of insulin (CMS-HCC) 09/05/2016     Priority: Medium   • Degenerative joint disease 04/01/2017     Priority: Low   • Dyslipidemia 08/26/2013     Priority: Low   • Chronic obstructive pulmonary disease (CMS-HCC) 06/08/2017   • Coronary artery disease involving native coronary artery of native heart without angina pectoris 06/08/2017   • Controlled type 2 diabetes mellitus with diabetic polyneuropathy, without long-term current use of insulin (CMS-HCC) 11/08/2016   • Low back pain 09/17/2015   • Diastolic dysfunction 08/26/2013       Current Outpatient Prescriptions   Medication Sig Dispense Refill   • gabapentin (NEURONTIN) 100 MG Cap Take 1 Cap by mouth every bedtime. 30 Cap 11   • Polyethylene Glycol 3350 (GENTLELAX PO) Take 17 g by mouth 1 time daily as needed. Indications: constipation     • aspirin (ASA) 325 MG Tab Take 81 mg by mouth every day.     • lovastatin (MEVACOR) 10 MG tablet Take 10 mg by mouth every day. CHOLESTEROL     • ASCENSIA MICROFILL TEST strip      • atorvastatin (LIPITOR) 40 MG Tab Take 1 Tab by mouth every day. 30 Tab 11   • acetaminophen (TYLENOL) 325 MG Tab Take 2 Tabs by mouth every 6 hours as needed (Mild Pain; (Pain scale 1-3); Temp greater than 100.5 F). 30 Tab 0   • hydrocodone-acetaminophen (NORCO) 5-325 MG Tab per tablet Take 1-2 Tabs by mouth every 6 hours as needed. 30 Tab 0   • captopril (CAPOTEN) 12.5 MG Tab Take 0.5 Tabs by mouth 2 times a day. 60 Tab 0   • carvedilol (COREG) 3.125 MG Tab Take 1 Tab by mouth 2 times a day, with meals. 180 Tab 3   • metformin (GLUCOPHAGE) 1000 MG tablet Take 1 Tab by mouth 2 times a day, with meals. 60 Tab 11     No current facility-administered medications for this visit.         Allergies as of 06/08/2017   • (No Known Allergies)        ROS: As per HPI.    /58 mmHg  Pulse 91  Temp(Src) 36.6 °C  "(97.9 °F)  Resp 16  Ht 1.727 m (5' 7.99\")  Wt 73.12 kg (161 lb 3.2 oz)  BMI 24.52 kg/m2  SpO2 97%    Physical Exam:  Gen:         Alert and oriented, No apparent distress.  Neck:        No Lymphadenopathy or Bruits.  Lungs:     Clear to auscultation bilaterally  CV:          Regular rate and rhythm. No murmurs, rubs or gallops.  Abd:         Soft non tender, non distended. Normal active bowel sounds.  No  Hepatosplenomegaly, No pulsatile masses.                   Ext:          No clubbing, cyanosis, edema.      Assessment and Plan.   66 y.o. female with the following issues.    1. Medicare annual wellness visit, subsequent  Discussed healthy lifestyle habits as well as screening regimens. Best rectus arterial line  - Annual Wellness Visit - Includes PPPS Subsequent ()    2. Controlled type 2 diabetes mellitus without complication, without long-term current use of insulin (CMS-HCC)  Currently well controlled no changes. Discussed diet and exercise recheck A1c in 6 months. Reminded about yearly eye exam.  - Annual Wellness Visit - Includes PPPS Subsequent ()    3. Controlled type 2 diabetes mellitus with diabetic polyneuropathy, without long-term current use of insulin (CMS-HCC)  As mentioned below restarting gabapentin at night  - Annual Wellness Visit - Includes PPPS Subsequent ()    4. Chronic obstructive pulmonary disease, unspecified COPD type (CMS-HCC)  Likely stable no change treatment  - Annual Wellness Visit - Includes PPPS Subsequent ()    5. Diastolic dysfunction  Euvolemic with good pressure control  - Annual Wellness Visit - Includes PPPS Subsequent ()    6. Dyslipidemia  Continue statin  - Annual Wellness Visit - Includes PPPS Subsequent ()    7. Coronary artery disease involving native coronary artery of native heart without angina pectoris  Without any symptomology.  - Annual Wellness Visit - Includes PPPS Subsequent ()    8. Arthralgia of hip, unspecified " laterality  Have started back on gabapentin she will follow up with orthopedist  - gabapentin (NEURONTIN) 100 MG Cap; Take 1 Cap by mouth every bedtime.  Dispense: 30 Cap; Refill: 11    9. Risk for falls  Fall precautions given  - Annual Wellness Visit - Includes PPPS Subsequent ()  - Patient identified as fall risk.  Appropriate orders and counseling given.    10. Need for pneumococcal vaccine    - PNEUMOCOCCAL POLYSACCHARIDE VACCINE 23-VALENT =>1YO SQ/IM

## 2017-06-08 NOTE — MR AVS SNAPSHOT
"        Alice Yarbrough   2017 2:00 PM   Office Visit   MRN: [id number]    Department:  [address] [place] [address]   Dept Phone:  [phone]    Description:  Female : 1950   Provider:  Kevin Rea M.D.           Allergies as of 2017     No Known Allergies      You were diagnosed with     Medicare annual wellness visit, subsequent   [413549]       Controlled type 2 diabetes mellitus without complication, without long-term current use of insulin (CMS-HCC)   [2511673]       Controlled type 2 diabetes mellitus with diabetic polyneuropathy, without long-term current use of insulin (CMS-HCC)   [0044694]       Chronic obstructive pulmonary disease, unspecified COPD type (CMS-HCC)   [2436022]       Diastolic dysfunction   [622975]       Dyslipidemia   [830108]       Coronary artery disease involving native coronary artery of native heart without angina pectoris   [0241606]       Arthralgia of hip, unspecified laterality   [209113]       Risk for falls   [114043]       Need for pneumococcal vaccine   [976815]         Vital Signs     Blood Pressure Pulse Temperature Respirations Height Weight    110/58 mmHg 91 36.6 °C (97.9 °F) 16 1.727 m (5' 7.99\") 73.12 kg (161 lb 3.2 oz)    Body Mass Index Oxygen Saturation Smoking Status             24.52 kg/m2 97% Former Smoker         Basic Information     Date Of Birth Sex Race Ethnicity Preferred Language    1950 Female White Non- English      Your appointments     2017  1:30 PM   BONE DENSITY (DEXASCAN) with [facility] BD 1   [facility] ([address])    [address] [place] [id number]   [phone]           No calcium supplements 24 hours prior to exam, including antacids or multivitamins w/calcium.  Pt may eat and drink normally.  No injection procedures prior to Dexa on the same day.  No barium contrast, no CTs with IV or oral contrast, no Pet/CTs and no Nuc Med injections for 7 days prior to a Dexa " (including Barium Swallow, Upper GI, Small Bowel Series).  If scheduling a Dexa on the same day as a CT with IV or oral contrast, any test with barium, Pet/CT or a Nuc Med with injection, always schedule the Dexa before the other study.            Jul 03, 2017  2:00 PM   MA SCRN10 with [facility] MG 2   [facility] ([address])    [address]  [place] [address]   [phone]           No deodorant, powder, perfume or lotion under the arm or breast area.            Oct 09, 2017  2:20 PM   Established Patient with Kevin Rea M.D.   [facility] [address]    [address]  [address]  [place] [address]   [phone]           You will be receiving a confirmation call a few days before your appointment from our automated call confirmation system.              Problem List              ICD-10-CM Priority Class Noted - Resolved    Diastolic dysfunction I51.9   8/26/2013 - Present    Dyslipidemia E78.5 Low  8/26/2013 - Present    Low back pain M54.5   9/17/2015 - Present    Controlled type 2 diabetes mellitus without complication, without long-term current use of insulin (CMS-HCC) E11.9 Medium  9/5/2016 - Present    Controlled type 2 diabetes mellitus with diabetic polyneuropathy, without long-term current use of insulin (CMS-HCC) E11.42   11/8/2016 - Present    Closed left hip fracture (CMS-HCC) S72.002A High  3/31/2017 - Present    Vitamin D deficiency E55.9 Medium  4/1/2017 - Present    Degenerative joint disease M19.90 Low  4/1/2017 - Present    Chronic obstructive pulmonary disease (CMS-HCC) J44.9   6/8/2017 - Present    Coronary artery disease involving native coronary artery of native heart without angina pectoris I25.10   6/8/2017 - Present      Health Maintenance        Date Due Completion Dates    COLON CANCER SCREENING ANNUAL FIT 1950 ---    IMM DTaP/Tdap/Td Vaccine (1 - Tdap) 12/27/1970 ---    MAMMOGRAM 12/27/1991 ---    IMM ZOSTER VACCINE  12/27/2011 ---    BONE DENSITY 12/27/2016 ---    IMM PNEUMOCOCCAL 65+ (ADULT) LOW/MEDIUM RISK SERIES (2 of 2 - PPSV23) 5/6/2017 5/6/2016    FASTING LIPID PROFILE 10/12/2017 10/12/2016, 3/10/2008, 12/30/2007    URINE ACR / MICROALBUMIN 10/12/2017 10/12/2016    DIABETES MONOFILAMENT / LE EXAM 10/12/2017 10/12/2016, 10/12/2016 (Done)    Override on 10/12/2016: Done    A1C SCREENING 10/19/2017 4/19/2017, 10/12/2016 (Done), 12/30/2007    Override on 10/12/2016: Done    PAP SMEAR 1/7/2018 1/7/2015 (Declined)    Override on 1/7/2015: Patient Declined (hysterectomy)    SERUM CREATININE 4/3/2018 4/3/2017, 4/1/2017, 3/31/2017, 10/12/2016, 10/19/2009, 4/30/2008, 3/10/2008, 3/9/2008, 1/16/2008, 1/15/2008, 1/6/2008, 1/5/2008, 1/4/2008, 1/3/2008, 1/2/2008, 1/1/2008, 12/31/2007, 12/30/2007    RETINAL SCREENING 4/19/2018 4/19/2017            Current Immunizations     13-VALENT PCV PREVNAR 5/6/2016    Influenza Vaccine Quad Inj (Pf) 11/8/2016    Influenza Vaccine Quad Inj (Preserved) 9/6/2013    Pneumococcal polysaccharide vaccine (PPSV-23)  Incomplete      Below and/or attached are the medications your provider expects you to take. Review all of your home medications and newly ordered medications with your provider and/or pharmacist. Follow medication instructions as directed by your provider and/or pharmacist. Please keep your medication list with you and share with your provider. Update the information when medications are discontinued, doses are changed, or new medications (including over-the-counter products) are added; and carry medication information at all times in the event of emergency situations     Allergies:  No Known Allergies          Medications  Valid as of: June 08, 2017 -  2:09 PM    Generic Name Brand Name Tablet Size Instructions for use    Acetaminophen (Tab) TYLENOL 325 MG Take 2 Tabs by mouth every 6 hours as needed (Mild Pain; (Pain scale 1-3); Temp greater than 100.5 F).        Aspirin (Tab)  MG Take  81 mg by mouth every day.        Atorvastatin Calcium (Tab) LIPITOR 40 MG Take 1 Tab by mouth every day.        Captopril (Tab) CAPOTEN 12.5 MG Take 0.5 Tabs by mouth 2 times a day.        Carvedilol (Tab) COREG 3.125 MG Take 1 Tab by mouth 2 times a day, with meals.        Gabapentin (Cap) NEURONTIN 100 MG Take 1 Cap by mouth every bedtime.        Glucose Blood (Strip) ASCENSIA MICROFILL TEST          Hydrocodone-Acetaminophen (Tab) NORCO 5-325 MG Take 1-2 Tabs by mouth every 6 hours as needed.        Lovastatin (Tab) MEVACOR 10 MG Take 10 mg by mouth every day. CHOLESTEROL        MetFORMIN HCl (Tab) GLUCOPHAGE 1000 MG Take 1 Tab by mouth 2 times a day, with meals.        Polyethylene Glycol 3350   Take 17 g by mouth 1 time daily as needed. Indications: constipation        .                 Medicines prescribed today were sent to:     [facility] #[id number] - [address] AT [address] [address]    Phone: [phone] Fax: [phone]    Open 24 Hours?: No    [facility] - [place], [place] - [address] [facility]    [address] [facility] [address]    Phone: [phone] Fax: [phone]    Open 24 Hours?: No      Medication refill instructions:       If your prescription bottle indicates you have medication refills left, it is not necessary to call your provider’s office. Please contact your pharmacy and they will refill your medication.    If your prescription bottle indicates you do not have any refills left, you may request refills at any time through one of the following ways: The online [employer] system (except Urgent Care), by calling your provider’s office, or by asking your pharmacy to contact your provider’s office with a refill request. Medication refills are processed only during regular business hours and may not be available until the next business day. Your provider may request additional information or to have a follow-up visit with you  prior to refilling your medication.   *Please Note: Medication refills are assigned a new Rx number when refilled electronically. Your pharmacy may indicate that no refills were authorized even though a new prescription for the same medication is available at the pharmacy. Please request the medicine by name with the pharmacy before contacting your provider for a refill.           [employer] Access Code: Activation code not generated  Current Yarraa Status: Active

## 2017-06-15 ENCOUNTER — HOME CARE VISIT (OUTPATIENT)
Dept: HOME HEALTH SERVICES | Facility: HOME HEALTHCARE | Age: 67
End: 2017-06-15

## 2017-07-03 ENCOUNTER — HOSPITAL ENCOUNTER (OUTPATIENT)
Dept: RADIOLOGY | Facility: MEDICAL CENTER | Age: 67
End: 2017-07-03
Attending: INTERNAL MEDICINE
Payer: MEDICARE

## 2017-07-03 DIAGNOSIS — Z78.0 POST-MENOPAUSAL: ICD-10-CM

## 2017-07-03 DIAGNOSIS — Z12.31 VISIT FOR SCREENING MAMMOGRAM: ICD-10-CM

## 2017-07-03 DIAGNOSIS — M85.88 OTHER SPECIFIED DISORDERS OF BONE DENSITY AND STRUCTURE, OTHER SITE: ICD-10-CM

## 2017-07-03 PROCEDURE — 77080 DXA BONE DENSITY AXIAL: CPT

## 2017-07-03 PROCEDURE — 77063 BREAST TOMOSYNTHESIS BI: CPT

## 2017-07-19 NOTE — PROGRESS NOTES
Alice Yarbrough was discharged from [facility] on 4/5/17 with a LACE score of 76. She was discharged home with instructions to follow up with her PCP Dr. Kevin Rea and Orthopedic provider Dr. Howard within one to two weeks. Patient was also discharged with [facility] for Aide, Physical Therapy, Skilled Nursing and O.T. Patient kept 2 of the 2 follow up appointments and is waiting for a follow up with her Primary Care Provider and Orthopedic provider next month, which patient plans to keep. Patient Advocate was able to confirm she also [facility]. Patient is not seeing any other providers at this time. [facility]’s patient advocate was able to engage with patient several time upon discharge, and confirmed that he picked up all medications upon discharge. Patient Advocate assisted patient with getting appointment scheduled with her PCP Dr. Rea as instructed to do. Patient kept her appointment IHD scheduled with her PCP. .Please see patient’s appointments below.  • PCP Dr. ADELE Rea was scheduled 4/19/17 kept. Follow up appointment 5/10/17.  • Orthopedic Dr. Howard was scheduled 4/18/2017 kept. Follow up appointment 5/8/17.  • [facility]-Physical Therapy, Skilled Nursing, Aide and O.T. began on 4/7/17-6/3/17 once day/week with a total of 24 visits.

## 2017-08-09 ENCOUNTER — TELEPHONE (OUTPATIENT)
Dept: MEDICAL GROUP | Facility: MEDICAL CENTER | Age: 67
End: 2017-08-09

## 2017-08-09 NOTE — TELEPHONE ENCOUNTER
1. Caller Name: Alice                                         Call Back Number: [phone] (home)         Patient approves a detailed voicemail message: yes    Patient called wanting to let you know she fell yesterday patient not in pain just has slight cut. Per vitor she wants to know if you need to see her sooner that her October appt to review bone scan

## 2017-08-15 NOTE — TELEPHONE ENCOUNTER
Patient called back and declined going to urgent care she stated that she does not have money to go and be seen at urgent care and insisted on being seen with Dr. Rea.

## 2017-08-16 ENCOUNTER — APPOINTMENT (OUTPATIENT)
Dept: MEDICAL GROUP | Facility: MEDICAL CENTER | Age: 67
End: 2017-08-16
Payer: MEDICARE

## 2017-08-17 ENCOUNTER — OFFICE VISIT (OUTPATIENT)
Dept: MEDICAL GROUP | Facility: MEDICAL CENTER | Age: 67
End: 2017-08-17
Payer: MEDICARE

## 2017-08-17 ENCOUNTER — HOSPITAL ENCOUNTER (OUTPATIENT)
Dept: RADIOLOGY | Facility: MEDICAL CENTER | Age: 67
End: 2017-08-17
Attending: INTERNAL MEDICINE
Payer: MEDICARE

## 2017-08-17 VITALS
HEART RATE: 92 BPM | SYSTOLIC BLOOD PRESSURE: 110 MMHG | HEIGHT: 68 IN | RESPIRATION RATE: 16 BRPM | OXYGEN SATURATION: 95 % | TEMPERATURE: 97.5 F | DIASTOLIC BLOOD PRESSURE: 62 MMHG

## 2017-08-17 DIAGNOSIS — E78.5 DYSLIPIDEMIA: ICD-10-CM

## 2017-08-17 DIAGNOSIS — M25.562 ACUTE PAIN OF LEFT KNEE: ICD-10-CM

## 2017-08-17 DIAGNOSIS — E11.9 CONTROLLED TYPE 2 DIABETES MELLITUS WITHOUT COMPLICATION, WITHOUT LONG-TERM CURRENT USE OF INSULIN (HCC): ICD-10-CM

## 2017-08-17 DIAGNOSIS — M25.559 ARTHRALGIA OF HIP, UNSPECIFIED LATERALITY: ICD-10-CM

## 2017-08-17 PROCEDURE — 99214 OFFICE O/P EST MOD 30 MIN: CPT | Performed by: INTERNAL MEDICINE

## 2017-08-17 PROCEDURE — 73562 X-RAY EXAM OF KNEE 3: CPT | Mod: LT

## 2017-08-17 RX ORDER — GABAPENTIN 100 MG/1
100 CAPSULE ORAL
Qty: 30 CAP | Refills: 11 | Status: SHIPPED | OUTPATIENT
Start: 2017-08-17 | End: 2017-12-28

## 2017-08-17 RX ORDER — ATORVASTATIN CALCIUM 40 MG/1
40 TABLET, FILM COATED ORAL DAILY
Qty: 30 TAB | Refills: 11 | Status: SHIPPED | OUTPATIENT
Start: 2017-08-17 | End: 2017-12-28 | Stop reason: SDUPTHER

## 2017-08-17 RX ORDER — IBUPROFEN 200 MG
200 TABLET ORAL EVERY 6 HOURS PRN
COMMUNITY
End: 2018-01-01

## 2017-08-17 NOTE — PROGRESS NOTES
CC: Multiple problems complaining of knee pain.    HPI:   Alice presents today with the following.    1. Dyslipidemia  Presents unclear if she's exiting taking her statin or not. She does request refills. She is coming due for blood work.    2. Controlled type 2 diabetes mellitus without complication, without long-term current use of insulin (CMS-HCC)  Last check an A1c in good control she is coming due for repeat. Denying any hypoglycemia.    3. Acute pain of left knee  Main complaint today is left knee pain and instability. Approximately 10 days ago she was in the parking lot and fell to her left side twisting her knee and landing directly on it. She reports pain is 5 out of 10 in intensity and is more difficult to walk especially when straightening. She does again report instability.        Patient Active Problem List    Diagnosis Date Noted   • Closed left hip fracture (CMS-HCC) 03/31/2017     Priority: High   • Vitamin D deficiency 04/01/2017     Priority: Medium   • Controlled type 2 diabetes mellitus without complication, without long-term current use of insulin (CMS-HCC) 09/05/2016     Priority: Medium   • Degenerative joint disease 04/01/2017     Priority: Low   • Dyslipidemia 08/26/2013     Priority: Low   • Chronic obstructive pulmonary disease (CMS-HCC) 06/08/2017   • Coronary artery disease involving native coronary artery of native heart without angina pectoris 06/08/2017   • Controlled type 2 diabetes mellitus with diabetic polyneuropathy, without long-term current use of insulin (CMS-HCC) 11/08/2016   • Low back pain 09/17/2015   • Diastolic dysfunction 08/26/2013       Current Outpatient Prescriptions   Medication Sig Dispense Refill   • ibuprofen (MOTRIN) 200 MG Tab Take 200 mg by mouth every 6 hours as needed.     • atorvastatin (LIPITOR) 40 MG Tab Take 1 Tab by mouth every day. 30 Tab 11   • gabapentin (NEURONTIN) 100 MG Cap Take 1 Cap by mouth every bedtime. 30 Cap 11   • aspirin (ASA) 325 MG  "Tab Take 81 mg by mouth every day.     • ASCENSIA MICROFILL TEST strip      • captopril (CAPOTEN) 12.5 MG Tab Take 0.5 Tabs by mouth 2 times a day. 60 Tab 0   • carvedilol (COREG) 3.125 MG Tab Take 1 Tab by mouth 2 times a day, with meals. 180 Tab 3   • metformin (GLUCOPHAGE) 1000 MG tablet Take 1 Tab by mouth 2 times a day, with meals. 60 Tab 11   • Polyethylene Glycol 3350 (GENTLELAX PO) Take 17 g by mouth 1 time daily as needed. Indications: constipation     • acetaminophen (TYLENOL) 325 MG Tab Take 2 Tabs by mouth every 6 hours as needed (Mild Pain; (Pain scale 1-3); Temp greater than 100.5 F). (Patient not taking: Reported on 8/17/2017) 30 Tab 0     No current facility-administered medications for this visit.         Allergies as of 08/17/2017   • (No Known Allergies)        ROS: As per HPI.    /62 mmHg  Pulse 92  Temp(Src) 36.4 °C (97.5 °F)  Resp 16  Ht 1.727 m (5' 7.99\")  SpO2 95%    Physical Exam:  Gen:         Alert and oriented, No apparent distress.  Neck:        No Lymphadenopathy or Bruits.  Lungs:     Clear to auscultation bilaterally  CV:          Regular rate and rhythm. No murmurs, rubs or gallops.               Ext:          No clubbing, cyanosis, edema.  Knee       mild effusion no major deformity. She does have laxity with anterior drawer testing.    Assessment and Plan.   66 y.o. female with the following issues.    1. Dyslipidemia  Refill cholesterol medication rechecking labs.  - atorvastatin (LIPITOR) 40 MG Tab; Take 1 Tab by mouth every day.  Dispense: 30 Tab; Refill: 11  - COMP METABOLIC PANEL; Future  - LIPID PROFILE; Future    2. Controlled type 2 diabetes mellitus without complication, without long-term current use of insulin (CMS-HCC)  Rechecking lab work will see back in one month.  - HEMOGLOBIN A1C; Future  - MICROALBUMIN CREAT RATIO URINE; Future    3. Acute pain of left knee  Given that it was mechanical fall have written for x-rays but given the laxity concern possible " ligament damage. Have written for MRI.  - DX-KNEE 3 VIEWS LEFT; Future  - MR-KNEE-W/O LEFT; Future

## 2017-08-17 NOTE — MR AVS SNAPSHOT
"        Alice Billy Linnea   2017 3:00 PM   Office Visit   MRN: [id number]    Department:  [address] [place] [address]   Dept Phone:  [phone]    Description:  Female : 1950   Provider:  Kevin Rea M.D.           Reason for Visit     Knee Pain           Allergies as of 2017     No Known Allergies      You were diagnosed with     Dyslipidemia   [800187]       Controlled type 2 diabetes mellitus without complication, without long-term current use of insulin (CMS-HCC)   [0300224]       Acute pain of left knee   [9448430]       Arthralgia of hip, unspecified laterality   [752531]         Vital Signs     Blood Pressure Pulse Temperature Respirations Height Oxygen Saturation    110/62 mmHg 92 36.4 °C (97.5 °F) 16 1.727 m (5' 7.99\") 95%    Smoking Status                   Former Smoker           Basic Information     Date Of Birth Sex Race Ethnicity Preferred Language    1950 Female White Non- English      Problem List              ICD-10-CM Priority Class Noted - Resolved    Diastolic dysfunction I51.9   2013 - Present    Dyslipidemia E78.5 Low  2013 - Present    Low back pain M54.5   2015 - Present    Controlled type 2 diabetes mellitus without complication, without long-term current use of insulin (CMS-HCC) E11.9 Medium  2016 - Present    Controlled type 2 diabetes mellitus with diabetic polyneuropathy, without long-term current use of insulin (CMS-HCC) E11.42   2016 - Present    Closed left hip fracture (CMS-HCC) S72.002A High  3/31/2017 - Present    Vitamin D deficiency E55.9 Medium  2017 - Present    Degenerative joint disease M19.90 Low  2017 - Present    Chronic obstructive pulmonary disease (CMS-HCC) J44.9   2017 - Present    Coronary artery disease involving native coronary artery of native heart without angina pectoris I25.10   2017 - Present      Health Maintenance        Date Due Completion Dates    IMM DTaP/Tdap/Td Vaccine (1 - Tdap) " 12/27/1970 ---    COLON CANCER SCREENING ANNUAL FIT 12/27/2001 ---    IMM ZOSTER VACCINE 12/27/2011 ---    IMM INFLUENZA (1) 9/1/2017 11/8/2016, 9/6/2013    FASTING LIPID PROFILE 10/12/2017 10/12/2016, 3/10/2008, 12/30/2007    URINE ACR / MICROALBUMIN 10/12/2017 10/12/2016    DIABETES MONOFILAMENT / LE EXAM 10/12/2017 10/12/2016, 10/12/2016 (Done)    Override on 10/12/2016: Done    A1C SCREENING 10/19/2017 4/19/2017, 10/12/2016 (Done), 12/30/2007    Override on 10/12/2016: Done    PAP SMEAR 1/7/2018 1/7/2015 (Declined)    Override on 1/7/2015: Patient Declined (hysterectomy)    SERUM CREATININE 4/3/2018 4/3/2017, 4/1/2017, 3/31/2017, 10/12/2016, 10/19/2009, 4/30/2008, 3/10/2008, 3/9/2008, 1/16/2008, 1/15/2008, 1/6/2008, 1/5/2008, 1/4/2008, 1/3/2008, 1/2/2008, 1/1/2008, 12/31/2007, 12/30/2007    RETINAL SCREENING 4/19/2018 4/19/2017    MAMMOGRAM 7/3/2018 7/3/2017    BONE DENSITY 7/3/2022 7/3/2017            Current Immunizations     13-VALENT PCV PREVNAR 5/6/2016    Influenza Vaccine Quad Inj (Pf) 11/8/2016    Influenza Vaccine Quad Inj (Preserved) 9/6/2013    Pneumococcal polysaccharide vaccine (PPSV-23) 6/8/2017      Below and/or attached are the medications your provider expects you to take. Review all of your home medications and newly ordered medications with your provider and/or pharmacist. Follow medication instructions as directed by your provider and/or pharmacist. Please keep your medication list with you and share with your provider. Update the information when medications are discontinued, doses are changed, or new medications (including over-the-counter products) are added; and carry medication information at all times in the event of emergency situations     Allergies:  No Known Allergies          Medications  Valid as of: August 17, 2017 -  3:33 PM    Generic Name Brand Name Tablet Size Instructions for use    Acetaminophen (Tab) TYLENOL 325 MG Take 2 Tabs by mouth every 6 hours as needed (Mild Pain;  (Pain scale 1-3); Temp greater than 100.5 F).        Aspirin (Tab)  MG Take 81 mg by mouth every day.        Atorvastatin Calcium (Tab) LIPITOR 40 MG Take 1 Tab by mouth every day.        Captopril (Tab) CAPOTEN 12.5 MG Take 0.5 Tabs by mouth 2 times a day.        Carvedilol (Tab) COREG 3.125 MG Take 1 Tab by mouth 2 times a day, with meals.        Gabapentin (Cap) NEURONTIN 100 MG Take 1 Cap by mouth every bedtime.        Glucose Blood (Strip) ASCENSIA MICROFILL TEST          Ibuprofen (Tab) MOTRIN 200 MG Take 200 mg by mouth every 6 hours as needed.        MetFORMIN HCl (Tab) GLUCOPHAGE 1000 MG Take 1 Tab by mouth 2 times a day, with meals.        Polyethylene Glycol 3350   Take 17 g by mouth 1 time daily as needed. Indications: constipation        .                 Medicines prescribed today were sent to:     [facility] #[id number] - [address] AT [address] [address]    Phone: [phone] Fax: [phone]    Open 24 Hours?: No    [facility] - [place], [place] - [address] [facility]    [address] [facility] [address]    Phone: [phone] Fax: [phone]    Open 24 Hours?: No      Medication refill instructions:       If your prescription bottle indicates you have medication refills left, it is not necessary to call your provider’s office. Please contact your pharmacy and they will refill your medication.    If your prescription bottle indicates you do not have any refills left, you may request refills at any time through one of the following ways: The online [employer] system (except Urgent Care), by calling your provider’s office, or by asking your pharmacy to contact your provider’s office with a refill request. Medication refills are processed only during regular business hours and may not be available until the next business day. Your provider may request additional information or to have a follow-up visit with you prior to  refilling your medication.   *Please Note: Medication refills are assigned a new Rx number when refilled electronically. Your pharmacy may indicate that no refills were authorized even though a new prescription for the same medication is available at the pharmacy. Please request the medicine by name with the pharmacy before contacting your provider for a refill.        Your To Do List     Future Labs/Procedures Complete By Expires    COMP METABOLIC PANEL  As directed 8/18/2018    DX-KNEE 3 VIEWS LEFT  As directed 8/17/2018    HEMOGLOBIN A1C  As directed 8/18/2018    LIPID PROFILE  As directed 8/18/2018    MICROALBUMIN CREAT RATIO URINE  As directed 8/18/2018    MR-KNEE-W/O LEFT  As directed 8/17/2018         [employer] Access Code: [email or web address]  Expires: 9/15/2017  3:32 PM    [employer]  A secure, online tool to manage your health information     [employer]® is a secure, online tool that connects you to your personalized health information from the privacy of your home -- day or night - making it very easy for you to manage your healthcare. Once the activation process is completed, you can even access your medical information using the [employer] effie, which is available for free in the Apple Effie store or Google Play store.     [employer] provides the following levels of access (as shown below):   My Chart Features   [facility]  Specialists [facility]  Urgent  Care Non-[facility]  Primary Care  Doctor   Email your healthcare team securely and privately 24/7 X X X    Manage appointments: schedule your next appointment; view details of past/upcoming appointments X      Request prescription refills. X      View recent personal medical records, including lab and immunizations X X X X   View health record, including health history, allergies, medications X X X X   Read reports about your outpatient visits, procedures, consult and ER notes X X X X   See your discharge summary, which is a recap of your  hospital and/or ER visit that includes your diagnosis, lab results, and care plan. X X       How to register for [employer]:  1. Go to  [email or web address].  2. Click on the Sign Up Now box, which takes you to the New Member Sign Up page. You will need to provide the following information:  a. Enter your [employer] Access Code exactly as it appears at the top of this page. (You will not need to use this code after you’ve completed the sign-up process. If you do not sign up before the expiration date, you must request a new code.)   b. Enter your date of birth.   c. Enter your home email address.   d. Click Submit, and follow the next screen’s instructions.  3. Create a [employer] ID. This will be your [employer] login ID and cannot be changed, so think of one that is secure and easy to remember.  4. Create a [employer] password. You can change your password at any time.  5. Enter your Password Reset Question and Answer. This can be used at a later time if you forget your password.   6. Enter your e-mail address. This allows you to receive e-mail notifications when new information is available in [employer].  7. Click Sign Up. You can now view your health information.    For assistance activating your [employer] account, call [phone]

## 2017-10-04 RX ORDER — CAPTOPRIL 12.5 MG/1
12.5 TABLET ORAL 2 TIMES DAILY
Qty: 60 TAB | Refills: 11 | Status: SHIPPED | OUTPATIENT
Start: 2017-10-04 | End: 2018-01-01 | Stop reason: SDUPTHER

## 2017-12-07 ENCOUNTER — HOSPITAL ENCOUNTER (OUTPATIENT)
Dept: LAB | Facility: MEDICAL CENTER | Age: 67
End: 2017-12-07
Attending: INTERNAL MEDICINE
Payer: MEDICARE

## 2017-12-07 DIAGNOSIS — E78.5 DYSLIPIDEMIA: ICD-10-CM

## 2017-12-07 DIAGNOSIS — E11.9 CONTROLLED TYPE 2 DIABETES MELLITUS WITHOUT COMPLICATION, WITHOUT LONG-TERM CURRENT USE OF INSULIN (HCC): ICD-10-CM

## 2017-12-07 LAB
ALBUMIN SERPL BCP-MCNC: 3.8 G/DL (ref 3.2–4.9)
ALBUMIN/GLOB SERPL: 1.2 G/DL
ALP SERPL-CCNC: 64 U/L (ref 30–99)
ALT SERPL-CCNC: 7 U/L (ref 2–50)
ANION GAP SERPL CALC-SCNC: 7 MMOL/L (ref 0–11.9)
AST SERPL-CCNC: 14 U/L (ref 12–45)
BILIRUB SERPL-MCNC: 0.6 MG/DL (ref 0.1–1.5)
BUN SERPL-MCNC: 25 MG/DL (ref 8–22)
CALCIUM SERPL-MCNC: 9.9 MG/DL (ref 8.5–10.5)
CHLORIDE SERPL-SCNC: 103 MMOL/L (ref 96–112)
CHOLEST SERPL-MCNC: 193 MG/DL (ref 100–199)
CO2 SERPL-SCNC: 27 MMOL/L (ref 20–33)
CREAT SERPL-MCNC: 0.77 MG/DL (ref 0.5–1.4)
CREAT UR-MCNC: 130.4 MG/DL
EST. AVERAGE GLUCOSE BLD GHB EST-MCNC: 140 MG/DL
GFR SERPL CREATININE-BSD FRML MDRD: >60 ML/MIN/1.73 M 2
GLOBULIN SER CALC-MCNC: 3.3 G/DL (ref 1.9–3.5)
GLUCOSE SERPL-MCNC: 133 MG/DL (ref 65–99)
HBA1C MFR BLD: 6.5 % (ref 0–5.6)
HDLC SERPL-MCNC: 35 MG/DL
LDLC SERPL CALC-MCNC: 126 MG/DL
MICROALBUMIN UR-MCNC: 1.4 MG/DL
MICROALBUMIN/CREAT UR: 11 MG/G (ref 0–30)
POTASSIUM SERPL-SCNC: 4.4 MMOL/L (ref 3.6–5.5)
PROT SERPL-MCNC: 7.1 G/DL (ref 6–8.2)
SODIUM SERPL-SCNC: 137 MMOL/L (ref 135–145)
TRIGL SERPL-MCNC: 162 MG/DL (ref 0–149)

## 2017-12-07 PROCEDURE — 36415 COLL VENOUS BLD VENIPUNCTURE: CPT

## 2017-12-07 PROCEDURE — 82043 UR ALBUMIN QUANTITATIVE: CPT

## 2017-12-07 PROCEDURE — 80053 COMPREHEN METABOLIC PANEL: CPT

## 2017-12-07 PROCEDURE — 83036 HEMOGLOBIN GLYCOSYLATED A1C: CPT

## 2017-12-07 PROCEDURE — 82570 ASSAY OF URINE CREATININE: CPT

## 2017-12-07 PROCEDURE — 80061 LIPID PANEL: CPT

## 2017-12-28 ENCOUNTER — OFFICE VISIT (OUTPATIENT)
Dept: MEDICAL GROUP | Facility: MEDICAL CENTER | Age: 67
End: 2017-12-28
Payer: MEDICARE

## 2017-12-28 VITALS
DIASTOLIC BLOOD PRESSURE: 80 MMHG | SYSTOLIC BLOOD PRESSURE: 116 MMHG | RESPIRATION RATE: 16 BRPM | HEART RATE: 94 BPM | WEIGHT: 161.6 LBS | BODY MASS INDEX: 24.49 KG/M2 | HEIGHT: 68 IN | OXYGEN SATURATION: 100 % | TEMPERATURE: 97.7 F

## 2017-12-28 DIAGNOSIS — Z23 INFLUENZA VACCINE NEEDED: ICD-10-CM

## 2017-12-28 DIAGNOSIS — G89.29 CHRONIC MIDLINE LOW BACK PAIN WITHOUT SCIATICA: ICD-10-CM

## 2017-12-28 DIAGNOSIS — E11.42 CONTROLLED TYPE 2 DIABETES MELLITUS WITH DIABETIC POLYNEUROPATHY, WITHOUT LONG-TERM CURRENT USE OF INSULIN (HCC): ICD-10-CM

## 2017-12-28 DIAGNOSIS — M25.552 PAIN OF LEFT HIP JOINT: ICD-10-CM

## 2017-12-28 DIAGNOSIS — E78.5 DYSLIPIDEMIA: ICD-10-CM

## 2017-12-28 DIAGNOSIS — M54.50 CHRONIC MIDLINE LOW BACK PAIN WITHOUT SCIATICA: ICD-10-CM

## 2017-12-28 PROCEDURE — G0008 ADMIN INFLUENZA VIRUS VAC: HCPCS | Performed by: INTERNAL MEDICINE

## 2017-12-28 PROCEDURE — 99214 OFFICE O/P EST MOD 30 MIN: CPT | Mod: 25 | Performed by: INTERNAL MEDICINE

## 2017-12-28 PROCEDURE — 90662 IIV NO PRSV INCREASED AG IM: CPT | Performed by: INTERNAL MEDICINE

## 2017-12-28 RX ORDER — ATORVASTATIN CALCIUM 40 MG/1
40 TABLET, FILM COATED ORAL DAILY
Qty: 30 TAB | Refills: 11 | Status: SHIPPED | OUTPATIENT
Start: 2017-12-28 | End: 2018-01-01 | Stop reason: SDUPTHER

## 2017-12-28 RX ORDER — GABAPENTIN 300 MG/1
300 CAPSULE ORAL 3 TIMES DAILY
Qty: 90 CAP | Refills: 11 | Status: SHIPPED | OUTPATIENT
Start: 2017-12-28 | End: 2018-01-01 | Stop reason: SDUPTHER

## 2017-12-28 NOTE — PROGRESS NOTES
CC: Follow-up multiple issues    HPI:   Alice presents today with the following.    1. Controlled type 2 diabetes mellitus with diabetic polyneuropathy, without long-term current use of insulin (CMS-HCC)  Presents after having blood work A1c at 6.5 compliant with metformin denying any side effects.    2. Dyslipidemia  She is currently not taking her statin. She reports she tolerated well the past does not have refill currently. Not having chest pain or shortness breath no edema.    3. Chronic midline low back pain without sciatica  Chronic back pain with neuropathic symptoms in the feet as well. On low-dose gabapentin which she tolerates well.    4. Pain of left hip joint  Also complaining of pain in the hip with difficulty with ambulation and crying about a possible scooter.    5. Influenza vaccine needed        Patient Active Problem List    Diagnosis Date Noted   • Closed left hip fracture (CMS-HCC) 03/31/2017     Priority: High   • Vitamin D deficiency 04/01/2017     Priority: Medium   • Degenerative joint disease 04/01/2017     Priority: Low   • Dyslipidemia 08/26/2013     Priority: Low   • Chronic obstructive pulmonary disease (CMS-HCC) 06/08/2017   • Coronary artery disease involving native coronary artery of native heart without angina pectoris 06/08/2017   • Controlled type 2 diabetes mellitus with diabetic polyneuropathy, without long-term current use of insulin (CMS-HCC) 11/08/2016   • Low back pain 09/17/2015   • Diastolic dysfunction 08/26/2013       Current Outpatient Prescriptions   Medication Sig Dispense Refill   • atorvastatin (LIPITOR) 40 MG Tab Take 1 Tab by mouth every day. 30 Tab 11   • metformin (GLUCOPHAGE) 1000 MG tablet Take 1 Tab by mouth 2 times a day, with meals. 60 Tab 11   • gabapentin (NEURONTIN) 300 MG Cap Take 1 Cap by mouth 3 times a day. 90 Cap 11   • captopril (CAPOTEN) 12.5 MG Tab Take 1 Tab by mouth 2 times a day. 60 Tab 11   • ibuprofen (MOTRIN) 200 MG Tab Take 200 mg by  "mouth every 6 hours as needed.     • Polyethylene Glycol 3350 (GENTLELAX PO) Take 17 g by mouth 1 time daily as needed. Indications: constipation     • aspirin (ASA) 325 MG Tab Take 81 mg by mouth every day.     • [employer] MICROFILL TEST strip      • acetaminophen (TYLENOL) 325 MG Tab Take 2 Tabs by mouth every 6 hours as needed (Mild Pain; (Pain scale 1-3); Temp greater than 100.5 F). (Patient not taking: Reported on 8/17/2017) 30 Tab 0   • carvedilol (COREG) 3.125 MG Tab Take 1 Tab by mouth 2 times a day, with meals. 180 Tab 3     No current facility-administered medications for this visit.          Allergies as of 12/28/2017   • (No Known Allergies)        ROS: As per HPI.    /80   Pulse 94   Temp 36.5 °C (97.7 °F)   Resp 16   Ht 1.727 m (5' 8\")   Wt 73.3 kg (161 lb 9.6 oz)   SpO2 100%   BMI 24.57 kg/m²     Physical Exam:  Gen:         Alert and oriented, No apparent distress.  Neck:        No Lymphadenopathy or Bruits.  Lungs:     Clear to auscultation bilaterally  CV:          Regular rate and rhythm. No murmurs, rubs or gallops.               Ext:          No clubbing, cyanosis, edema.      Assessment and Plan.   67 y.o. female with the following issues.    1. Controlled type 2 diabetes mellitus with diabetic polyneuropathy, without long-term current use of insulin (CMS-HCC)  Currently well controlled no changes. Discussed diet and exercise recheck A1c in 6 months. Reminded about yearly eye exam.  - metformin (GLUCOPHAGE) 1000 MG tablet; Take 1 Tab by mouth 2 times a day, with meals.  Dispense: 60 Tab; Refill: 11  - Diabetic Monofilament Lower Extremity Exam    2. Dyslipidemia  Restarting on statin recheck next lab draw.  - atorvastatin (LIPITOR) 40 MG Tab; Take 1 Tab by mouth every day.  Dispense: 30 Tab; Refill: 11    3. Chronic midline low back pain without sciatica  Increasing gabapentin by 300 mg every 2 weeks cautioned about side effects.  - gabapentin (NEURONTIN) 300 MG Cap; Take 1 Cap by " mouth 3 times a day.  Dispense: 90 Cap; Refill: 11    4. Pain of left hip joint  Peripheral physical therapy for evaluation for electric mobility device.  - REFERRAL TO PHYSICAL THERAPY Reason for Therapy: Eval/Treat/Report    5. Influenza vaccine needed    - INFLUENZA VACCINE, HIGH DOSE (65+ ONLY)

## 2018-01-01 ENCOUNTER — OFFICE VISIT (OUTPATIENT)
Dept: MEDICAL GROUP | Facility: MEDICAL CENTER | Age: 68
End: 2018-01-01
Payer: MEDICARE

## 2018-01-01 ENCOUNTER — HOSPITAL ENCOUNTER (OUTPATIENT)
Dept: RADIOLOGY | Facility: MEDICAL CENTER | Age: 68
End: 2018-08-02
Attending: INTERNAL MEDICINE
Payer: MEDICARE

## 2018-01-01 ENCOUNTER — APPOINTMENT (OUTPATIENT)
Dept: RADIOLOGY | Facility: MEDICAL CENTER | Age: 68
DRG: 467 | End: 2018-01-01
Attending: HOSPITALIST
Payer: MEDICARE

## 2018-01-01 ENCOUNTER — HOSPITAL ENCOUNTER (INPATIENT)
Facility: MEDICAL CENTER | Age: 68
LOS: 5 days | DRG: 467 | End: 2018-12-26
Attending: ORTHOPAEDIC SURGERY | Admitting: ORTHOPAEDIC SURGERY
Payer: MEDICARE

## 2018-01-01 ENCOUNTER — HOSPITAL ENCOUNTER (OUTPATIENT)
Dept: RADIOLOGY | Facility: MEDICAL CENTER | Age: 68
End: 2018-09-05
Attending: INTERNAL MEDICINE
Payer: MEDICARE

## 2018-01-01 ENCOUNTER — APPOINTMENT (OUTPATIENT)
Dept: RADIOLOGY | Facility: MEDICAL CENTER | Age: 68
DRG: 467 | End: 2018-01-01
Attending: PHYSICIAN ASSISTANT
Payer: MEDICARE

## 2018-01-01 ENCOUNTER — TELEPHONE (OUTPATIENT)
Dept: MEDICAL GROUP | Facility: MEDICAL CENTER | Age: 68
End: 2018-01-01

## 2018-01-01 ENCOUNTER — APPOINTMENT (OUTPATIENT)
Dept: MEDICAL GROUP | Facility: MEDICAL CENTER | Age: 68
End: 2018-01-01
Payer: MEDICARE

## 2018-01-01 ENCOUNTER — HOSPITAL ENCOUNTER (OUTPATIENT)
Dept: LAB | Facility: MEDICAL CENTER | Age: 68
End: 2018-05-07
Attending: ORTHOPAEDIC SURGERY
Payer: MEDICARE

## 2018-01-01 ENCOUNTER — PATIENT OUTREACH (OUTPATIENT)
Dept: HEALTH INFORMATION MANAGEMENT | Facility: OTHER | Age: 68
End: 2018-01-01

## 2018-01-01 ENCOUNTER — TELEPHONE (OUTPATIENT)
Dept: CARDIOLOGY | Facility: MEDICAL CENTER | Age: 68
End: 2018-01-01

## 2018-01-01 ENCOUNTER — APPOINTMENT (OUTPATIENT)
Dept: RADIOLOGY | Facility: MEDICAL CENTER | Age: 68
DRG: 467 | End: 2018-01-01
Attending: ORTHOPAEDIC SURGERY
Payer: MEDICARE

## 2018-01-01 ENCOUNTER — HOSPITAL ENCOUNTER (OUTPATIENT)
Dept: RADIOLOGY | Facility: MEDICAL CENTER | Age: 68
DRG: 467 | End: 2018-12-11
Attending: ORTHOPAEDIC SURGERY | Admitting: ORTHOPAEDIC SURGERY
Payer: MEDICARE

## 2018-01-01 VITALS
WEIGHT: 169.53 LBS | DIASTOLIC BLOOD PRESSURE: 70 MMHG | BODY MASS INDEX: 25.69 KG/M2 | TEMPERATURE: 96.3 F | OXYGEN SATURATION: 94 % | SYSTOLIC BLOOD PRESSURE: 102 MMHG | HEIGHT: 68 IN | HEART RATE: 91 BPM

## 2018-01-01 VITALS
SYSTOLIC BLOOD PRESSURE: 118 MMHG | HEIGHT: 68 IN | TEMPERATURE: 98.7 F | DIASTOLIC BLOOD PRESSURE: 64 MMHG | WEIGHT: 163 LBS | HEART RATE: 63 BPM | RESPIRATION RATE: 16 BRPM | BODY MASS INDEX: 24.71 KG/M2 | OXYGEN SATURATION: 96 %

## 2018-01-01 VITALS
SYSTOLIC BLOOD PRESSURE: 132 MMHG | DIASTOLIC BLOOD PRESSURE: 84 MMHG | HEART RATE: 98 BPM | TEMPERATURE: 98.2 F | BODY MASS INDEX: 25.16 KG/M2 | OXYGEN SATURATION: 92 % | HEIGHT: 68 IN | WEIGHT: 166 LBS | RESPIRATION RATE: 16 BRPM

## 2018-01-01 VITALS
WEIGHT: 164.46 LBS | HEART RATE: 78 BPM | DIASTOLIC BLOOD PRESSURE: 73 MMHG | BODY MASS INDEX: 25.81 KG/M2 | HEIGHT: 67 IN | TEMPERATURE: 99 F | SYSTOLIC BLOOD PRESSURE: 93 MMHG | RESPIRATION RATE: 18 BRPM | OXYGEN SATURATION: 93 %

## 2018-01-01 VITALS
SYSTOLIC BLOOD PRESSURE: 126 MMHG | HEIGHT: 68 IN | OXYGEN SATURATION: 97 % | HEART RATE: 89 BPM | BODY MASS INDEX: 24.71 KG/M2 | RESPIRATION RATE: 16 BRPM | TEMPERATURE: 99 F | DIASTOLIC BLOOD PRESSURE: 74 MMHG | WEIGHT: 163 LBS

## 2018-01-01 DIAGNOSIS — G89.29 CHRONIC MIDLINE LOW BACK PAIN WITHOUT SCIATICA: ICD-10-CM

## 2018-01-01 DIAGNOSIS — Z01.810 PREOP CARDIOVASCULAR EXAM: ICD-10-CM

## 2018-01-01 DIAGNOSIS — M25.552 ACUTE PAIN OF LEFT HIP: ICD-10-CM

## 2018-01-01 DIAGNOSIS — I25.10 CORONARY ARTERY DISEASE INVOLVING NATIVE CORONARY ARTERY OF NATIVE HEART WITHOUT ANGINA PECTORIS: ICD-10-CM

## 2018-01-01 DIAGNOSIS — E11.42 CONTROLLED TYPE 2 DIABETES MELLITUS WITH DIABETIC POLYNEUROPATHY, WITHOUT LONG-TERM CURRENT USE OF INSULIN (HCC): ICD-10-CM

## 2018-01-01 DIAGNOSIS — J44.9 CHRONIC OBSTRUCTIVE PULMONARY DISEASE, UNSPECIFIED COPD TYPE (HCC): ICD-10-CM

## 2018-01-01 DIAGNOSIS — Z11.59 NEED FOR HEPATITIS C SCREENING TEST: ICD-10-CM

## 2018-01-01 DIAGNOSIS — Z12.31 VISIT FOR SCREENING MAMMOGRAM: ICD-10-CM

## 2018-01-01 DIAGNOSIS — E78.5 DYSLIPIDEMIA: ICD-10-CM

## 2018-01-01 DIAGNOSIS — M79.672 PAIN IN BOTH FEET: ICD-10-CM

## 2018-01-01 DIAGNOSIS — Z00.00 MEDICARE ANNUAL WELLNESS VISIT, SUBSEQUENT: ICD-10-CM

## 2018-01-01 DIAGNOSIS — Z12.11 SCREEN FOR COLON CANCER: ICD-10-CM

## 2018-01-01 DIAGNOSIS — Z01.812 PRE-OPERATIVE LABORATORY EXAMINATION: ICD-10-CM

## 2018-01-01 DIAGNOSIS — M25.559 ARTHRALGIA OF HIP, UNSPECIFIED LATERALITY: ICD-10-CM

## 2018-01-01 DIAGNOSIS — N32.81 OVERACTIVE BLADDER: ICD-10-CM

## 2018-01-01 DIAGNOSIS — I51.89 DIASTOLIC DYSFUNCTION: ICD-10-CM

## 2018-01-01 DIAGNOSIS — Z01.810 PRE-OPERATIVE CARDIOVASCULAR EXAMINATION: ICD-10-CM

## 2018-01-01 DIAGNOSIS — M54.50 CHRONIC MIDLINE LOW BACK PAIN WITHOUT SCIATICA: ICD-10-CM

## 2018-01-01 DIAGNOSIS — Z01.811 PRE-OPERATIVE RESPIRATORY EXAMINATION: ICD-10-CM

## 2018-01-01 DIAGNOSIS — M79.671 PAIN IN BOTH FEET: ICD-10-CM

## 2018-01-01 DIAGNOSIS — M54.40 LOW BACK PAIN WITH SCIATICA, SCIATICA LATERALITY UNSPECIFIED, UNSPECIFIED BACK PAIN LATERALITY, UNSPECIFIED CHRONICITY: ICD-10-CM

## 2018-01-01 DIAGNOSIS — Z91.81 RISK FOR FALLS: ICD-10-CM

## 2018-01-01 DIAGNOSIS — R19.7 DIARRHEA, UNSPECIFIED TYPE: ICD-10-CM

## 2018-01-01 DIAGNOSIS — M15.3 POST-TRAUMATIC OSTEOARTHRITIS OF MULTIPLE JOINTS: ICD-10-CM

## 2018-01-01 DIAGNOSIS — Z23 NEED FOR VACCINATION: ICD-10-CM

## 2018-01-01 DIAGNOSIS — M80.052G: ICD-10-CM

## 2018-01-01 LAB
25(OH)D3 SERPL-MCNC: 9 NG/ML (ref 30–100)
ALBUMIN SERPL BCP-MCNC: 3 G/DL (ref 3.2–4.9)
ALBUMIN/GLOB SERPL: 1.1 G/DL
ALP SERPL-CCNC: 63 U/L (ref 30–99)
ALT SERPL-CCNC: 7 U/L (ref 2–50)
AMMONIA PLAS-SCNC: 46 UMOL/L (ref 11–45)
ANION GAP SERPL CALC-SCNC: 6 MMOL/L (ref 0–11.9)
ANION GAP SERPL CALC-SCNC: 7 MMOL/L (ref 0–11.9)
APPEARANCE UR: CLEAR
APPEARANCE UR: CLEAR
APTT PPP: 34.7 SEC (ref 24.7–36)
AST SERPL-CCNC: 15 U/L (ref 12–45)
BACTERIA #/AREA URNS HPF: ABNORMAL /HPF
BACTERIA #/AREA URNS HPF: NEGATIVE /HPF
BASOPHILS # BLD AUTO: 0.9 % (ref 0–1.8)
BASOPHILS # BLD: 0.04 K/UL (ref 0–0.12)
BILIRUB SERPL-MCNC: 0.7 MG/DL (ref 0.1–1.5)
BILIRUB UR QL STRIP.AUTO: NEGATIVE
BILIRUB UR QL STRIP.AUTO: NEGATIVE
BUN SERPL-MCNC: 15 MG/DL (ref 8–22)
BUN SERPL-MCNC: 22 MG/DL (ref 8–22)
CALCIUM SERPL-MCNC: 9.1 MG/DL (ref 8.5–10.5)
CALCIUM SERPL-MCNC: 9.8 MG/DL (ref 8.5–10.5)
CHLORIDE SERPL-SCNC: 106 MMOL/L (ref 96–112)
CHLORIDE SERPL-SCNC: 107 MMOL/L (ref 96–112)
CO2 SERPL-SCNC: 26 MMOL/L (ref 20–33)
CO2 SERPL-SCNC: 27 MMOL/L (ref 20–33)
COLOR UR: YELLOW
COLOR UR: YELLOW
CREAT SERPL-MCNC: 0.63 MG/DL (ref 0.5–1.4)
CREAT SERPL-MCNC: 0.87 MG/DL (ref 0.5–1.4)
CRP SERPL HS-MCNC: 0.13 MG/DL (ref 0–0.75)
EKG IMPRESSION: NORMAL
EKG IMPRESSION: NORMAL
EOSINOPHIL # BLD AUTO: 0.12 K/UL (ref 0–0.51)
EOSINOPHIL NFR BLD: 2.6 % (ref 0–6.9)
EPI CELLS #/AREA URNS HPF: ABNORMAL /HPF
EPI CELLS #/AREA URNS HPF: NEGATIVE /HPF
ERYTHROCYTE [DISTWIDTH] IN BLOOD BY AUTOMATED COUNT: 44.4 FL (ref 35.9–50)
ERYTHROCYTE [DISTWIDTH] IN BLOOD BY AUTOMATED COUNT: 44.5 FL (ref 35.9–50)
ERYTHROCYTE [DISTWIDTH] IN BLOOD BY AUTOMATED COUNT: 44.8 FL (ref 35.9–50)
ERYTHROCYTE [DISTWIDTH] IN BLOOD BY AUTOMATED COUNT: 45.4 FL (ref 35.9–50)
ERYTHROCYTE [DISTWIDTH] IN BLOOD BY AUTOMATED COUNT: 46.1 FL (ref 35.9–50)
ERYTHROCYTE [DISTWIDTH] IN BLOOD BY AUTOMATED COUNT: 46.1 FL (ref 35.9–50)
ERYTHROCYTE [SEDIMENTATION RATE] IN BLOOD BY WESTERGREN METHOD: 20 MM/HOUR (ref 0–30)
ERYTHROCYTE [SEDIMENTATION RATE] IN BLOOD BY WESTERGREN METHOD: 20 MM/HOUR (ref 0–30)
GLOBULIN SER CALC-MCNC: 2.8 G/DL (ref 1.9–3.5)
GLUCOSE BLD-MCNC: 107 MG/DL (ref 65–99)
GLUCOSE BLD-MCNC: 107 MG/DL (ref 65–99)
GLUCOSE BLD-MCNC: 114 MG/DL (ref 65–99)
GLUCOSE BLD-MCNC: 119 MG/DL (ref 65–99)
GLUCOSE BLD-MCNC: 126 MG/DL (ref 65–99)
GLUCOSE BLD-MCNC: 155 MG/DL (ref 65–99)
GLUCOSE SERPL-MCNC: 117 MG/DL (ref 65–99)
GLUCOSE SERPL-MCNC: 168 MG/DL (ref 65–99)
GLUCOSE UR STRIP.AUTO-MCNC: 100 MG/DL
GLUCOSE UR STRIP.AUTO-MCNC: NEGATIVE MG/DL
HBA1C MFR BLD: 6 % (ref ?–5.8)
HCT VFR BLD AUTO: 26.4 % (ref 37–47)
HCT VFR BLD AUTO: 26.4 % (ref 37–47)
HCT VFR BLD AUTO: 28.9 % (ref 37–47)
HCT VFR BLD AUTO: 29.7 % (ref 37–47)
HCT VFR BLD AUTO: 32.4 % (ref 37–47)
HCT VFR BLD AUTO: 40.2 % (ref 37–47)
HGB BLD-MCNC: 10.4 G/DL (ref 12–16)
HGB BLD-MCNC: 12.9 G/DL (ref 12–16)
HGB BLD-MCNC: 8.4 G/DL (ref 12–16)
HGB BLD-MCNC: 8.8 G/DL (ref 12–16)
HGB BLD-MCNC: 9.5 G/DL (ref 12–16)
HGB BLD-MCNC: 9.6 G/DL (ref 12–16)
HYALINE CASTS #/AREA URNS LPF: ABNORMAL /LPF
HYALINE CASTS #/AREA URNS LPF: ABNORMAL /LPF
IMM GRANULOCYTES # BLD AUTO: 0.01 K/UL (ref 0–0.11)
IMM GRANULOCYTES NFR BLD AUTO: 0.2 % (ref 0–0.9)
INR PPP: 1.01 (ref 0.87–1.13)
INT CON NEG: NEGATIVE
INT CON POS: POSITIVE
KETONES UR STRIP.AUTO-MCNC: ABNORMAL MG/DL
KETONES UR STRIP.AUTO-MCNC: ABNORMAL MG/DL
LEUKOCYTE ESTERASE UR QL STRIP.AUTO: ABNORMAL
LEUKOCYTE ESTERASE UR QL STRIP.AUTO: ABNORMAL
LYMPHOCYTES # BLD AUTO: 1.03 K/UL (ref 1–4.8)
LYMPHOCYTES NFR BLD: 22 % (ref 22–41)
MCH RBC QN AUTO: 29.1 PG (ref 27–33)
MCH RBC QN AUTO: 29.3 PG (ref 27–33)
MCH RBC QN AUTO: 29.5 PG (ref 27–33)
MCH RBC QN AUTO: 29.9 PG (ref 27–33)
MCH RBC QN AUTO: 30 PG (ref 27–33)
MCH RBC QN AUTO: 30.4 PG (ref 27–33)
MCHC RBC AUTO-ENTMCNC: 31.8 G/DL (ref 33.6–35)
MCHC RBC AUTO-ENTMCNC: 32 G/DL (ref 33.6–35)
MCHC RBC AUTO-ENTMCNC: 32.1 G/DL (ref 33.6–35)
MCHC RBC AUTO-ENTMCNC: 32.3 G/DL (ref 33.6–35)
MCHC RBC AUTO-ENTMCNC: 32.9 G/DL (ref 33.6–35)
MCHC RBC AUTO-ENTMCNC: 33.3 G/DL (ref 33.6–35)
MCV RBC AUTO: 90.9 FL (ref 81.4–97.8)
MCV RBC AUTO: 91 FL (ref 81.4–97.8)
MCV RBC AUTO: 91.3 FL (ref 81.4–97.8)
MCV RBC AUTO: 91.4 FL (ref 81.4–97.8)
MCV RBC AUTO: 92.6 FL (ref 81.4–97.8)
MCV RBC AUTO: 92.8 FL (ref 81.4–97.8)
MICRO URNS: ABNORMAL
MICRO URNS: ABNORMAL
MONOCYTES # BLD AUTO: 0.42 K/UL (ref 0–0.85)
MONOCYTES NFR BLD AUTO: 9 % (ref 0–13.4)
NEUTROPHILS # BLD AUTO: 3.07 K/UL (ref 2–7.15)
NEUTROPHILS NFR BLD: 65.3 % (ref 44–72)
NITRITE UR QL STRIP.AUTO: NEGATIVE
NITRITE UR QL STRIP.AUTO: NEGATIVE
NRBC # BLD AUTO: 0 K/UL
NRBC BLD-RTO: 0 /100 WBC
PH UR STRIP.AUTO: 5.5 [PH]
PH UR STRIP.AUTO: 6.5 [PH]
PLATELET # BLD AUTO: 228 K/UL (ref 164–446)
PLATELET # BLD AUTO: 230 K/UL (ref 164–446)
PLATELET # BLD AUTO: 255 K/UL (ref 164–446)
PLATELET # BLD AUTO: 284 K/UL (ref 164–446)
PLATELET # BLD AUTO: 326 K/UL (ref 164–446)
PLATELET # BLD AUTO: 328 K/UL (ref 164–446)
PMV BLD AUTO: 10 FL (ref 9–12.9)
PMV BLD AUTO: 10.2 FL (ref 9–12.9)
PMV BLD AUTO: 10.3 FL (ref 9–12.9)
PMV BLD AUTO: 10.3 FL (ref 9–12.9)
PMV BLD AUTO: 10.5 FL (ref 9–12.9)
PMV BLD AUTO: 9.9 FL (ref 9–12.9)
POTASSIUM SERPL-SCNC: 3.8 MMOL/L (ref 3.6–5.5)
POTASSIUM SERPL-SCNC: 4.1 MMOL/L (ref 3.6–5.5)
PROT SERPL-MCNC: 5.8 G/DL (ref 6–8.2)
PROT UR QL STRIP: 30 MG/DL
PROT UR QL STRIP: NEGATIVE MG/DL
PROTHROMBIN TIME: 13.4 SEC (ref 12–14.6)
RBC # BLD AUTO: 2.85 M/UL (ref 4.2–5.4)
RBC # BLD AUTO: 2.89 M/UL (ref 4.2–5.4)
RBC # BLD AUTO: 3.18 M/UL (ref 4.2–5.4)
RBC # BLD AUTO: 3.2 M/UL (ref 4.2–5.4)
RBC # BLD AUTO: 3.54 M/UL (ref 4.2–5.4)
RBC # BLD AUTO: 4.4 M/UL (ref 4.2–5.4)
RBC # URNS HPF: ABNORMAL /HPF
RBC # URNS HPF: ABNORMAL /HPF
RBC UR QL AUTO: ABNORMAL
RBC UR QL AUTO: NEGATIVE
RETINAL SCREEN: NEGATIVE
SCCMEC + MECA PNL NOSE NAA+PROBE: NEGATIVE
SCCMEC + MECA PNL NOSE NAA+PROBE: NEGATIVE
SODIUM SERPL-SCNC: 139 MMOL/L (ref 135–145)
SODIUM SERPL-SCNC: 140 MMOL/L (ref 135–145)
SP GR UR STRIP.AUTO: 1.02
SP GR UR STRIP.AUTO: 1.04
TREPONEMA PALLIDUM IGG+IGM AB [PRESENCE] IN SERUM OR PLASMA BY IMMUNOASSAY: NON REACTIVE
TSH SERPL DL<=0.005 MIU/L-ACNC: 0.82 UIU/ML (ref 0.38–5.33)
UROBILINOGEN UR STRIP.AUTO-MCNC: 0.2 MG/DL
UROBILINOGEN UR STRIP.AUTO-MCNC: 1 MG/DL
VIT B12 SERPL-MCNC: 150 PG/ML (ref 211–911)
WBC # BLD AUTO: 4.7 K/UL (ref 4.8–10.8)
WBC # BLD AUTO: 5.3 K/UL (ref 4.8–10.8)
WBC # BLD AUTO: 6.2 K/UL (ref 4.8–10.8)
WBC # BLD AUTO: 6.4 K/UL (ref 4.8–10.8)
WBC # BLD AUTO: 7.2 K/UL (ref 4.8–10.8)
WBC # BLD AUTO: 7.9 K/UL (ref 4.8–10.8)
WBC #/AREA URNS HPF: ABNORMAL /HPF
WBC #/AREA URNS HPF: ABNORMAL /HPF

## 2018-01-01 PROCEDURE — G0439 PPPS, SUBSEQ VISIT: HCPCS | Performed by: INTERNAL MEDICINE

## 2018-01-01 PROCEDURE — 700102 HCHG RX REV CODE 250 W/ 637 OVERRIDE(OP): Performed by: PHYSICIAN ASSISTANT

## 2018-01-01 PROCEDURE — 700111 HCHG RX REV CODE 636 W/ 250 OVERRIDE (IP)

## 2018-01-01 PROCEDURE — 86140 C-REACTIVE PROTEIN: CPT

## 2018-01-01 PROCEDURE — 97166 OT EVAL MOD COMPLEX 45 MIN: CPT

## 2018-01-01 PROCEDURE — 0SPS0JZ REMOVAL OF SYNTHETIC SUBSTITUTE FROM LEFT HIP JOINT, FEMORAL SURFACE, OPEN APPROACH: ICD-10-PCS | Performed by: ORTHOPAEDIC SURGERY

## 2018-01-01 PROCEDURE — 160035 HCHG PACU - 1ST 60 MINS PHASE I: Performed by: ORTHOPAEDIC SURGERY

## 2018-01-01 PROCEDURE — 700105 HCHG RX REV CODE 258

## 2018-01-01 PROCEDURE — 82962 GLUCOSE BLOOD TEST: CPT | Mod: 91

## 2018-01-01 PROCEDURE — G8988 SELF CARE GOAL STATUS: HCPCS | Mod: CI

## 2018-01-01 PROCEDURE — 36415 COLL VENOUS BLD VENIPUNCTURE: CPT

## 2018-01-01 PROCEDURE — 770001 HCHG ROOM/CARE - MED/SURG/GYN PRIV*

## 2018-01-01 PROCEDURE — 71046 X-RAY EXAM CHEST 2 VIEWS: CPT

## 2018-01-01 PROCEDURE — 99213 OFFICE O/P EST LOW 20 MIN: CPT | Mod: 25 | Performed by: INTERNAL MEDICINE

## 2018-01-01 PROCEDURE — 83036 HEMOGLOBIN GLYCOSYLATED A1C: CPT | Performed by: INTERNAL MEDICINE

## 2018-01-01 PROCEDURE — 51798 US URINE CAPACITY MEASURE: CPT

## 2018-01-01 PROCEDURE — 80048 BASIC METABOLIC PNL TOTAL CA: CPT

## 2018-01-01 PROCEDURE — 99213 OFFICE O/P EST LOW 20 MIN: CPT | Performed by: INTERNAL MEDICINE

## 2018-01-01 PROCEDURE — A9270 NON-COVERED ITEM OR SERVICE: HCPCS | Performed by: HOSPITALIST

## 2018-01-01 PROCEDURE — A9270 NON-COVERED ITEM OR SERVICE: HCPCS | Performed by: PHYSICIAN ASSISTANT

## 2018-01-01 PROCEDURE — G8978 MOBILITY CURRENT STATUS: HCPCS | Mod: CL

## 2018-01-01 PROCEDURE — 85027 COMPLETE CBC AUTOMATED: CPT

## 2018-01-01 PROCEDURE — 87641 MR-STAPH DNA AMP PROBE: CPT

## 2018-01-01 PROCEDURE — 82962 GLUCOSE BLOOD TEST: CPT

## 2018-01-01 PROCEDURE — 97163 PT EVAL HIGH COMPLEX 45 MIN: CPT

## 2018-01-01 PROCEDURE — 84443 ASSAY THYROID STIM HORMONE: CPT

## 2018-01-01 PROCEDURE — 700112 HCHG RX REV CODE 229: Performed by: PHYSICIAN ASSISTANT

## 2018-01-01 PROCEDURE — 700101 HCHG RX REV CODE 250: Performed by: PHYSICIAN ASSISTANT

## 2018-01-01 PROCEDURE — 86780 TREPONEMA PALLIDUM: CPT

## 2018-01-01 PROCEDURE — 99222 1ST HOSP IP/OBS MODERATE 55: CPT | Performed by: HOSPITALIST

## 2018-01-01 PROCEDURE — 99214 OFFICE O/P EST MOD 30 MIN: CPT | Mod: 25 | Performed by: INTERNAL MEDICINE

## 2018-01-01 PROCEDURE — 99232 SBSQ HOSP IP/OBS MODERATE 35: CPT | Performed by: HOSPITALIST

## 2018-01-01 PROCEDURE — 93010 ELECTROCARDIOGRAM REPORT: CPT | Performed by: INTERNAL MEDICINE

## 2018-01-01 PROCEDURE — 90662 IIV NO PRSV INCREASED AG IM: CPT | Performed by: INTERNAL MEDICINE

## 2018-01-01 PROCEDURE — 700111 HCHG RX REV CODE 636 W/ 250 OVERRIDE (IP): Performed by: PHYSICIAN ASSISTANT

## 2018-01-01 PROCEDURE — 85730 THROMBOPLASTIN TIME PARTIAL: CPT

## 2018-01-01 PROCEDURE — 160002 HCHG RECOVERY MINUTES (STAT): Performed by: ORTHOPAEDIC SURGERY

## 2018-01-01 PROCEDURE — 0SRB06Z REPLACEMENT OF LEFT HIP JOINT WITH OXIDIZED ZIRCONIUM ON POLYETHYLENE SYNTHETIC SUBSTITUTE, OPEN APPROACH: ICD-10-PCS | Performed by: ORTHOPAEDIC SURGERY

## 2018-01-01 PROCEDURE — 85652 RBC SED RATE AUTOMATED: CPT

## 2018-01-01 PROCEDURE — 502578 HCHG PACK, TOTAL HIP: Performed by: ORTHOPAEDIC SURGERY

## 2018-01-01 PROCEDURE — 700111 HCHG RX REV CODE 636 W/ 250 OVERRIDE (IP): Performed by: ANESTHESIOLOGY

## 2018-01-01 PROCEDURE — 85610 PROTHROMBIN TIME: CPT

## 2018-01-01 PROCEDURE — 770006 HCHG ROOM/CARE - MED/SURG/GYN SEMI*

## 2018-01-01 PROCEDURE — 82140 ASSAY OF AMMONIA: CPT

## 2018-01-01 PROCEDURE — 160048 HCHG OR STATISTICAL LEVEL 1-5: Performed by: ORTHOPAEDIC SURGERY

## 2018-01-01 PROCEDURE — G8979 MOBILITY GOAL STATUS: HCPCS | Mod: CJ

## 2018-01-01 PROCEDURE — 81001 URINALYSIS AUTO W/SCOPE: CPT

## 2018-01-01 PROCEDURE — 74018 RADEX ABDOMEN 1 VIEW: CPT

## 2018-01-01 PROCEDURE — 92250 FUNDUS PHOTOGRAPHY W/I&R: CPT | Mod: TC | Performed by: INTERNAL MEDICINE

## 2018-01-01 PROCEDURE — 501838 HCHG SUTURE GENERAL: Performed by: ORTHOPAEDIC SURGERY

## 2018-01-01 PROCEDURE — 700101 HCHG RX REV CODE 250

## 2018-01-01 PROCEDURE — 160009 HCHG ANES TIME/MIN: Performed by: ORTHOPAEDIC SURGERY

## 2018-01-01 PROCEDURE — 502000 HCHG MISC OR IMPLANTS RC 0278: Performed by: ORTHOPAEDIC SURGERY

## 2018-01-01 PROCEDURE — 93005 ELECTROCARDIOGRAM TRACING: CPT | Performed by: HOSPITALIST

## 2018-01-01 PROCEDURE — 700102 HCHG RX REV CODE 250 W/ 637 OVERRIDE(OP): Performed by: HOSPITALIST

## 2018-01-01 PROCEDURE — 80053 COMPREHEN METABOLIC PANEL: CPT

## 2018-01-01 PROCEDURE — 700102 HCHG RX REV CODE 250 W/ 637 OVERRIDE(OP): Performed by: ANESTHESIOLOGY

## 2018-01-01 PROCEDURE — 77067 SCR MAMMO BI INCL CAD: CPT

## 2018-01-01 PROCEDURE — 72170 X-RAY EXAM OF PELVIS: CPT

## 2018-01-01 PROCEDURE — A9270 NON-COVERED ITEM OR SERVICE: HCPCS | Performed by: ANESTHESIOLOGY

## 2018-01-01 PROCEDURE — 160042 HCHG SURGERY MINUTES - EA ADDL 1 MIN LEVEL 5: Performed by: ORTHOPAEDIC SURGERY

## 2018-01-01 PROCEDURE — 70450 CT HEAD/BRAIN W/O DYE: CPT

## 2018-01-01 PROCEDURE — A6223 GAUZE >16<=48 NO W/SAL W/O B: HCPCS | Performed by: ORTHOPAEDIC SURGERY

## 2018-01-01 PROCEDURE — G8987 SELF CARE CURRENT STATUS: HCPCS | Mod: CL

## 2018-01-01 PROCEDURE — A4450 NON-WATERPROOF TAPE: HCPCS | Performed by: ORTHOPAEDIC SURGERY

## 2018-01-01 PROCEDURE — G0008 ADMIN INFLUENZA VIRUS VAC: HCPCS | Performed by: INTERNAL MEDICINE

## 2018-01-01 PROCEDURE — 82306 VITAMIN D 25 HYDROXY: CPT

## 2018-01-01 PROCEDURE — 503051 HCHG CLOSURE PRINEO SKIN: Performed by: ORTHOPAEDIC SURGERY

## 2018-01-01 PROCEDURE — C1776 JOINT DEVICE (IMPLANTABLE): HCPCS | Performed by: ORTHOPAEDIC SURGERY

## 2018-01-01 PROCEDURE — 82607 VITAMIN B-12: CPT

## 2018-01-01 PROCEDURE — 160036 HCHG PACU - EA ADDL 30 MINS PHASE I: Performed by: ORTHOPAEDIC SURGERY

## 2018-01-01 PROCEDURE — 85025 COMPLETE CBC W/AUTO DIFF WBC: CPT

## 2018-01-01 PROCEDURE — 93005 ELECTROCARDIOGRAM TRACING: CPT

## 2018-01-01 PROCEDURE — 73502 X-RAY EXAM HIP UNI 2-3 VIEWS: CPT | Mod: LT

## 2018-01-01 PROCEDURE — 160031 HCHG SURGERY MINUTES - 1ST 30 MINS LEVEL 5: Performed by: ORTHOPAEDIC SURGERY

## 2018-01-01 PROCEDURE — 93000 ELECTROCARDIOGRAM COMPLETE: CPT | Performed by: INTERNAL MEDICINE

## 2018-01-01 PROCEDURE — 87640 STAPH A DNA AMP PROBE: CPT

## 2018-01-01 DEVICE — IMPLANTABLE DEVICE: Type: IMPLANTABLE DEVICE | Site: HIP | Status: FUNCTIONAL

## 2018-01-01 RX ORDER — ATORVASTATIN CALCIUM 40 MG/1
40 TABLET, FILM COATED ORAL DAILY
Status: DISCONTINUED | OUTPATIENT
Start: 2018-01-01 | End: 2018-01-01 | Stop reason: HOSPADM

## 2018-01-01 RX ORDER — OXYBUTYNIN CHLORIDE 10 MG/1
10 TABLET, EXTENDED RELEASE ORAL DAILY
Qty: 90 TAB | Refills: 3 | Status: SHIPPED | OUTPATIENT
Start: 2018-01-01 | End: 2018-01-01 | Stop reason: SDUPTHER

## 2018-01-01 RX ORDER — NYSTATIN 100000 [USP'U]/G
1 POWDER TOPICAL 4 TIMES DAILY
Qty: 15 G | Refills: 6 | Status: SHIPPED | OUTPATIENT
Start: 2018-01-01 | End: 2018-01-01 | Stop reason: SDUPTHER

## 2018-01-01 RX ORDER — BISACODYL 10 MG
10 SUPPOSITORY, RECTAL RECTAL
Status: DISCONTINUED | OUTPATIENT
Start: 2018-01-01 | End: 2018-01-01 | Stop reason: HOSPADM

## 2018-01-01 RX ORDER — ZOLPIDEM TARTRATE 5 MG/1
5 TABLET ORAL NIGHTLY PRN
Status: DISCONTINUED | OUTPATIENT
Start: 2018-01-01 | End: 2018-01-01 | Stop reason: HOSPADM

## 2018-01-01 RX ORDER — ACETAMINOPHEN 500 MG
1000 TABLET ORAL ONCE
Status: COMPLETED | OUTPATIENT
Start: 2018-01-01 | End: 2018-01-01

## 2018-01-01 RX ORDER — SODIUM CHLORIDE, SODIUM LACTATE, POTASSIUM CHLORIDE, CALCIUM CHLORIDE 600; 310; 30; 20 MG/100ML; MG/100ML; MG/100ML; MG/100ML
INJECTION, SOLUTION INTRAVENOUS ONCE
Status: COMPLETED | OUTPATIENT
Start: 2018-01-01 | End: 2018-01-01

## 2018-01-01 RX ORDER — GABAPENTIN 300 MG/1
300 CAPSULE ORAL ONCE
Status: COMPLETED | OUTPATIENT
Start: 2018-01-01 | End: 2018-01-01

## 2018-01-01 RX ORDER — DEXTROSE MONOHYDRATE 25 G/50ML
25 INJECTION, SOLUTION INTRAVENOUS
Status: DISCONTINUED | OUTPATIENT
Start: 2018-01-01 | End: 2018-01-01 | Stop reason: HOSPADM

## 2018-01-01 RX ORDER — DIPHENHYDRAMINE HYDROCHLORIDE 50 MG/ML
25 INJECTION INTRAMUSCULAR; INTRAVENOUS EVERY 6 HOURS PRN
Status: DISCONTINUED | OUTPATIENT
Start: 2018-01-01 | End: 2018-01-01

## 2018-01-01 RX ORDER — SODIUM CHLORIDE 9 MG/ML
INJECTION, SOLUTION INTRAVENOUS
Status: COMPLETED
Start: 2018-01-01 | End: 2018-01-01

## 2018-01-01 RX ORDER — OXYBUTYNIN CHLORIDE 10 MG/1
10 TABLET, EXTENDED RELEASE ORAL DAILY
Qty: 90 TAB | Refills: 3 | Status: SHIPPED | OUTPATIENT
Start: 2018-01-01 | End: 2019-01-01

## 2018-01-01 RX ORDER — METHOCARBAMOL 750 MG/1
750 TABLET, FILM COATED ORAL 3 TIMES DAILY
Qty: 63 TAB | Refills: 0 | Status: SHIPPED | OUTPATIENT
Start: 2018-01-01 | End: 2019-01-01

## 2018-01-01 RX ORDER — GABAPENTIN 300 MG/1
300 CAPSULE ORAL 4 TIMES DAILY
COMMUNITY

## 2018-01-01 RX ORDER — MORPHINE SULFATE 10 MG/ML
4 INJECTION, SOLUTION INTRAMUSCULAR; INTRAVENOUS
Status: DISCONTINUED | OUTPATIENT
Start: 2018-01-01 | End: 2018-01-01 | Stop reason: HOSPADM

## 2018-01-01 RX ORDER — OXYCODONE HCL 5 MG/5 ML
10 SOLUTION, ORAL ORAL
Status: COMPLETED | OUTPATIENT
Start: 2018-01-01 | End: 2018-01-01

## 2018-01-01 RX ORDER — AMOXICILLIN 250 MG
1 CAPSULE ORAL NIGHTLY
Status: DISCONTINUED | OUTPATIENT
Start: 2018-01-01 | End: 2018-01-01 | Stop reason: HOSPADM

## 2018-01-01 RX ORDER — ONDANSETRON 2 MG/ML
4 INJECTION INTRAMUSCULAR; INTRAVENOUS
Status: DISCONTINUED | OUTPATIENT
Start: 2018-01-01 | End: 2018-01-01 | Stop reason: HOSPADM

## 2018-01-01 RX ORDER — OXYCODONE HYDROCHLORIDE 10 MG/1
10 TABLET ORAL
Qty: 56 TAB | Refills: 0 | Status: SHIPPED | OUTPATIENT
Start: 2018-01-01 | End: 2019-01-01

## 2018-01-01 RX ORDER — CAPTOPRIL 12.5 MG/1
12.5 TABLET ORAL 2 TIMES DAILY
Qty: 180 TAB | Refills: 3 | Status: SHIPPED | OUTPATIENT
Start: 2018-01-01 | End: 2018-01-01

## 2018-01-01 RX ORDER — OXYCODONE HYDROCHLORIDE 10 MG/1
10 TABLET ORAL
Status: DISCONTINUED | OUTPATIENT
Start: 2018-01-01 | End: 2018-01-01

## 2018-01-01 RX ORDER — KETOROLAC TROMETHAMINE 30 MG/ML
15 INJECTION, SOLUTION INTRAMUSCULAR; INTRAVENOUS EVERY 6 HOURS
Status: COMPLETED | OUTPATIENT
Start: 2018-01-01 | End: 2018-01-01

## 2018-01-01 RX ORDER — CARVEDILOL 3.12 MG/1
3.12 TABLET ORAL 2 TIMES DAILY WITH MEALS
Qty: 180 TAB | Refills: 3 | Status: SHIPPED | OUTPATIENT
Start: 2018-01-01

## 2018-01-01 RX ORDER — ASPIRIN 325 MG
325 TABLET, DELAYED RELEASE (ENTERIC COATED) ORAL 2 TIMES DAILY
Qty: 88 TAB | Refills: 0 | Status: SHIPPED | OUTPATIENT
Start: 2018-01-01 | End: 2019-01-01

## 2018-01-01 RX ORDER — DOCUSATE SODIUM 100 MG/1
100 CAPSULE, LIQUID FILLED ORAL 2 TIMES DAILY
Status: DISCONTINUED | OUTPATIENT
Start: 2018-01-01 | End: 2018-01-01 | Stop reason: HOSPADM

## 2018-01-01 RX ORDER — HYDROMORPHONE HYDROCHLORIDE 1 MG/ML
0.1 INJECTION, SOLUTION INTRAMUSCULAR; INTRAVENOUS; SUBCUTANEOUS
Status: DISCONTINUED | OUTPATIENT
Start: 2018-01-01 | End: 2018-01-01 | Stop reason: HOSPADM

## 2018-01-01 RX ORDER — POLYETHYLENE GLYCOL 3350 17 G/17G
1 POWDER, FOR SOLUTION ORAL DAILY
Status: DISCONTINUED | OUTPATIENT
Start: 2018-01-01 | End: 2018-01-01

## 2018-01-01 RX ORDER — CHLORPROMAZINE HYDROCHLORIDE 10 MG/1
25 TABLET, FILM COATED ORAL EVERY 6 HOURS PRN
Status: DISCONTINUED | OUTPATIENT
Start: 2018-01-01 | End: 2018-01-01

## 2018-01-01 RX ORDER — ATORVASTATIN CALCIUM 40 MG/1
40 TABLET, FILM COATED ORAL DAILY
Qty: 90 TAB | Refills: 3 | Status: SHIPPED | OUTPATIENT
Start: 2018-01-01

## 2018-01-01 RX ORDER — CEPHALEXIN 500 MG/1
500 CAPSULE ORAL 4 TIMES DAILY
Qty: 56 CAP | Refills: 0 | Status: SHIPPED | OUTPATIENT
Start: 2018-01-01 | End: 2019-01-01

## 2018-01-01 RX ORDER — HYDROMORPHONE HYDROCHLORIDE 1 MG/ML
0.4 INJECTION, SOLUTION INTRAMUSCULAR; INTRAVENOUS; SUBCUTANEOUS
Status: DISCONTINUED | OUTPATIENT
Start: 2018-01-01 | End: 2018-01-01 | Stop reason: HOSPADM

## 2018-01-01 RX ORDER — CHLORPROMAZINE HYDROCHLORIDE 25 MG/ML
25 INJECTION INTRAMUSCULAR EVERY 6 HOURS PRN
Status: DISCONTINUED | OUTPATIENT
Start: 2018-01-01 | End: 2018-01-01

## 2018-01-01 RX ORDER — HALOPERIDOL 5 MG/ML
1 INJECTION INTRAMUSCULAR
Status: DISCONTINUED | OUTPATIENT
Start: 2018-01-01 | End: 2018-01-01 | Stop reason: HOSPADM

## 2018-01-01 RX ORDER — DIPHENHYDRAMINE HCL 25 MG
25 TABLET ORAL EVERY 6 HOURS PRN
Status: DISCONTINUED | OUTPATIENT
Start: 2018-01-01 | End: 2018-01-01

## 2018-01-01 RX ORDER — AMOXICILLIN 250 MG
1 CAPSULE ORAL
Status: DISCONTINUED | OUTPATIENT
Start: 2018-01-01 | End: 2018-01-01 | Stop reason: HOSPADM

## 2018-01-01 RX ORDER — HALOPERIDOL 5 MG/ML
1 INJECTION INTRAMUSCULAR EVERY 6 HOURS PRN
Status: DISCONTINUED | OUTPATIENT
Start: 2018-01-01 | End: 2018-01-01

## 2018-01-01 RX ORDER — GABAPENTIN 300 MG/1
300 CAPSULE ORAL 3 TIMES DAILY
Qty: 270 CAP | Refills: 3 | Status: ON HOLD | OUTPATIENT
Start: 2018-01-01 | End: 2018-01-01

## 2018-01-01 RX ORDER — DEXAMETHASONE SODIUM PHOSPHATE 4 MG/ML
6 INJECTION, SOLUTION INTRA-ARTICULAR; INTRALESIONAL; INTRAMUSCULAR; INTRAVENOUS; SOFT TISSUE ONCE
Status: ACTIVE | OUTPATIENT
Start: 2018-01-01 | End: 2018-01-01

## 2018-01-01 RX ORDER — CEFAZOLIN SODIUM 2 G/100ML
2 INJECTION, SOLUTION INTRAVENOUS EVERY 8 HOURS
Status: COMPLETED | OUTPATIENT
Start: 2018-01-01 | End: 2018-01-01

## 2018-01-01 RX ORDER — CELECOXIB 200 MG/1
200 CAPSULE ORAL ONCE
Status: COMPLETED | OUTPATIENT
Start: 2018-01-01 | End: 2018-01-01

## 2018-01-01 RX ORDER — MAGNESIUM HYDROXIDE 1200 MG/15ML
LIQUID ORAL
Status: COMPLETED | OUTPATIENT
Start: 2018-01-01 | End: 2018-01-01

## 2018-01-01 RX ORDER — POLYETHYLENE GLYCOL 3350 17 G/17G
1 POWDER, FOR SOLUTION ORAL 2 TIMES DAILY PRN
Status: DISCONTINUED | OUTPATIENT
Start: 2018-01-01 | End: 2018-01-01 | Stop reason: HOSPADM

## 2018-01-01 RX ORDER — LANCETS 30 GAUGE
EACH MISCELLANEOUS
Qty: 100 EACH | Refills: 11 | Status: SHIPPED
Start: 2018-01-01 | End: 2018-01-01

## 2018-01-01 RX ORDER — OXYCODONE HCL 5 MG/5 ML
5 SOLUTION, ORAL ORAL
Status: COMPLETED | OUTPATIENT
Start: 2018-01-01 | End: 2018-01-01

## 2018-01-01 RX ORDER — MEPERIDINE HYDROCHLORIDE 25 MG/ML
12.5 INJECTION INTRAMUSCULAR; INTRAVENOUS; SUBCUTANEOUS
Status: DISCONTINUED | OUTPATIENT
Start: 2018-01-01 | End: 2018-01-01 | Stop reason: HOSPADM

## 2018-01-01 RX ORDER — DIPHENHYDRAMINE HYDROCHLORIDE 50 MG/ML
12.5 INJECTION INTRAMUSCULAR; INTRAVENOUS
Status: DISCONTINUED | OUTPATIENT
Start: 2018-01-01 | End: 2018-01-01 | Stop reason: HOSPADM

## 2018-01-01 RX ORDER — DEXTROSE MONOHYDRATE, SODIUM CHLORIDE, AND POTASSIUM CHLORIDE 50; 1.49; 4.5 G/1000ML; G/1000ML; G/1000ML
INJECTION, SOLUTION INTRAVENOUS CONTINUOUS
Status: DISPENSED | OUTPATIENT
Start: 2018-01-01 | End: 2018-01-01

## 2018-01-01 RX ORDER — LISINOPRIL 20 MG/1
20 TABLET ORAL DAILY
Qty: 90 TAB | Refills: 3 | Status: ON HOLD | OUTPATIENT
Start: 2018-01-01 | End: 2018-01-01

## 2018-01-01 RX ORDER — THIAMINE MONONITRATE (VIT B1) 100 MG
100 TABLET ORAL DAILY
Status: DISCONTINUED | OUTPATIENT
Start: 2018-01-01 | End: 2018-01-01 | Stop reason: HOSPADM

## 2018-01-01 RX ORDER — HYDRALAZINE HYDROCHLORIDE 20 MG/ML
5 INJECTION INTRAMUSCULAR; INTRAVENOUS
Status: DISCONTINUED | OUTPATIENT
Start: 2018-01-01 | End: 2018-01-01 | Stop reason: HOSPADM

## 2018-01-01 RX ORDER — NICOTINE 21 MG/24HR
14 PATCH, TRANSDERMAL 24 HOURS TRANSDERMAL
Status: DISCONTINUED | OUTPATIENT
Start: 2018-01-01 | End: 2018-01-01

## 2018-01-01 RX ORDER — NYSTATIN 100000 [USP'U]/G
1 POWDER TOPICAL 4 TIMES DAILY
Qty: 15 G | Refills: 6 | Status: SHIPPED | OUTPATIENT
Start: 2018-01-01 | End: 2019-01-01 | Stop reason: CLARIF

## 2018-01-01 RX ORDER — ACETAMINOPHEN 500 MG
1000 TABLET ORAL EVERY 6 HOURS
Status: DISCONTINUED | OUTPATIENT
Start: 2018-01-01 | End: 2018-01-01 | Stop reason: HOSPADM

## 2018-01-01 RX ORDER — HYDROMORPHONE HYDROCHLORIDE 1 MG/ML
0.2 INJECTION, SOLUTION INTRAMUSCULAR; INTRAVENOUS; SUBCUTANEOUS
Status: DISCONTINUED | OUTPATIENT
Start: 2018-01-01 | End: 2018-01-01 | Stop reason: HOSPADM

## 2018-01-01 RX ORDER — CEFAZOLIN SODIUM 2 G/100ML
2 INJECTION, SOLUTION INTRAVENOUS ONCE
Status: DISCONTINUED | OUTPATIENT
Start: 2018-01-01 | End: 2018-01-01 | Stop reason: HOSPADM

## 2018-01-01 RX ORDER — OXYBUTYNIN CHLORIDE 10 MG/1
10 TABLET, EXTENDED RELEASE ORAL DAILY
Status: DISCONTINUED | OUTPATIENT
Start: 2018-01-01 | End: 2018-01-01

## 2018-01-01 RX ORDER — DEXAMETHASONE SODIUM PHOSPHATE 4 MG/ML
4 INJECTION, SOLUTION INTRA-ARTICULAR; INTRALESIONAL; INTRAMUSCULAR; INTRAVENOUS; SOFT TISSUE
Status: COMPLETED | OUTPATIENT
Start: 2018-01-01 | End: 2018-01-01

## 2018-01-01 RX ORDER — POLYETHYLENE GLYCOL 3350 17 G/17G
1 POWDER, FOR SOLUTION ORAL ONCE
Status: COMPLETED | OUTPATIENT
Start: 2018-01-01 | End: 2018-01-01

## 2018-01-01 RX ORDER — CARVEDILOL 3.12 MG/1
3.12 TABLET ORAL 2 TIMES DAILY WITH MEALS
Status: DISCONTINUED | OUTPATIENT
Start: 2018-01-01 | End: 2018-01-01 | Stop reason: HOSPADM

## 2018-01-01 RX ORDER — ONDANSETRON 2 MG/ML
4 INJECTION INTRAMUSCULAR; INTRAVENOUS EVERY 4 HOURS PRN
Status: DISCONTINUED | OUTPATIENT
Start: 2018-01-01 | End: 2018-01-01 | Stop reason: HOSPADM

## 2018-01-01 RX ORDER — OXYCODONE HYDROCHLORIDE 10 MG/1
20 TABLET ORAL
Status: DISCONTINUED | OUTPATIENT
Start: 2018-01-01 | End: 2018-01-01

## 2018-01-01 RX ORDER — OXYCODONE HYDROCHLORIDE 5 MG/1
2.5 TABLET ORAL EVERY 4 HOURS PRN
Status: DISCONTINUED | OUTPATIENT
Start: 2018-01-01 | End: 2018-01-01 | Stop reason: HOSPADM

## 2018-01-01 RX ORDER — VANCOMYCIN HYDROCHLORIDE 500 MG/10ML
INJECTION, POWDER, LYOPHILIZED, FOR SOLUTION INTRAVENOUS
Status: COMPLETED | OUTPATIENT
Start: 2018-01-01 | End: 2018-01-01

## 2018-01-01 RX ORDER — METOPROLOL TARTRATE 1 MG/ML
1 INJECTION, SOLUTION INTRAVENOUS
Status: DISCONTINUED | OUTPATIENT
Start: 2018-01-01 | End: 2018-01-01 | Stop reason: HOSPADM

## 2018-01-01 RX ORDER — LISINOPRIL 20 MG/1
20 TABLET ORAL DAILY
Qty: 90 TAB | Refills: 3 | Status: SHIPPED | OUTPATIENT
Start: 2018-01-01 | End: 2018-01-01 | Stop reason: SDUPTHER

## 2018-01-01 RX ORDER — SCOLOPAMINE TRANSDERMAL SYSTEM 1 MG/1
1 PATCH, EXTENDED RELEASE TRANSDERMAL
Status: DISCONTINUED | OUTPATIENT
Start: 2018-01-01 | End: 2018-01-01

## 2018-01-01 RX ORDER — ENEMA 19; 7 G/133ML; G/133ML
1 ENEMA RECTAL
Status: DISCONTINUED | OUTPATIENT
Start: 2018-01-01 | End: 2018-01-01 | Stop reason: HOSPADM

## 2018-01-01 RX ADMIN — ATORVASTATIN CALCIUM 40 MG: 40 TABLET, FILM COATED ORAL at 16:20

## 2018-01-01 RX ADMIN — MAGNESIUM HYDROXIDE 30 ML: 400 SUSPENSION ORAL at 16:44

## 2018-01-01 RX ADMIN — ASPIRIN 325 MG: 325 TABLET, COATED ORAL at 20:30

## 2018-01-01 RX ADMIN — ONDANSETRON 4 MG: 2 INJECTION INTRAMUSCULAR; INTRAVENOUS at 21:30

## 2018-01-01 RX ADMIN — ASPIRIN 325 MG: 325 TABLET, COATED ORAL at 21:49

## 2018-01-01 RX ADMIN — DOCUSATE SODIUM 100 MG: 100 CAPSULE, LIQUID FILLED ORAL at 05:56

## 2018-01-01 RX ADMIN — KETOROLAC TROMETHAMINE 15 MG: 30 INJECTION, SOLUTION INTRAMUSCULAR at 03:13

## 2018-01-01 RX ADMIN — KETOROLAC TROMETHAMINE 15 MG: 30 INJECTION, SOLUTION INTRAMUSCULAR at 20:31

## 2018-01-01 RX ADMIN — KETOROLAC TROMETHAMINE 15 MG: 30 INJECTION, SOLUTION INTRAMUSCULAR at 09:39

## 2018-01-01 RX ADMIN — ACETAMINOPHEN 1000 MG: 500 TABLET ORAL at 09:23

## 2018-01-01 RX ADMIN — ACETAMINOPHEN 1000 MG: 500 TABLET ORAL at 23:53

## 2018-01-01 RX ADMIN — ACETAMINOPHEN 1000 MG: 500 TABLET ORAL at 11:49

## 2018-01-01 RX ADMIN — OXYCODONE HYDROCHLORIDE 10 MG: 10 TABLET ORAL at 05:41

## 2018-01-01 RX ADMIN — DOCUSATE SODIUM 100 MG: 100 CAPSULE, LIQUID FILLED ORAL at 16:43

## 2018-01-01 RX ADMIN — KETOROLAC TROMETHAMINE 15 MG: 30 INJECTION, SOLUTION INTRAMUSCULAR at 16:00

## 2018-01-01 RX ADMIN — STANDARDIZED SENNA CONCENTRATE AND DOCUSATE SODIUM 1 TABLET: 8.6; 5 TABLET, FILM COATED ORAL at 21:12

## 2018-01-01 RX ADMIN — DOCUSATE SODIUM 100 MG: 100 CAPSULE, LIQUID FILLED ORAL at 06:22

## 2018-01-01 RX ADMIN — ASPIRIN 325 MG: 325 TABLET, COATED ORAL at 20:38

## 2018-01-01 RX ADMIN — HALOPERIDOL LACTATE 1 MG: 5 INJECTION, SOLUTION INTRAMUSCULAR at 20:38

## 2018-01-01 RX ADMIN — GABAPENTIN 300 MG: 300 CAPSULE ORAL at 07:10

## 2018-01-01 RX ADMIN — POLYETHYLENE GLYCOL 3350 1 PACKET: 17 POWDER, FOR SOLUTION ORAL at 17:10

## 2018-01-01 RX ADMIN — OXYCODONE HYDROCHLORIDE 10 MG: 10 TABLET ORAL at 00:25

## 2018-01-01 RX ADMIN — ACETAMINOPHEN 1000 MG: 500 TABLET ORAL at 16:43

## 2018-01-01 RX ADMIN — KETOROLAC TROMETHAMINE 15 MG: 30 INJECTION, SOLUTION INTRAMUSCULAR at 16:20

## 2018-01-01 RX ADMIN — DIPHENHYDRAMINE HYDROCHLORIDE 25 MG: 50 INJECTION, SOLUTION INTRAMUSCULAR; INTRAVENOUS at 18:09

## 2018-01-01 RX ADMIN — STANDARDIZED SENNA CONCENTRATE AND DOCUSATE SODIUM 1 TABLET: 8.6; 5 TABLET, FILM COATED ORAL at 20:38

## 2018-01-01 RX ADMIN — ATORVASTATIN CALCIUM 40 MG: 40 TABLET, FILM COATED ORAL at 05:56

## 2018-01-01 RX ADMIN — METFORMIN HYDROCHLORIDE 1000 MG: 500 TABLET ORAL at 06:31

## 2018-01-01 RX ADMIN — CELECOXIB 200 MG: 200 CAPSULE ORAL at 07:11

## 2018-01-01 RX ADMIN — SODIUM CHLORIDE 1000 ML: 9 INJECTION, SOLUTION INTRAVENOUS at 09:09

## 2018-01-01 RX ADMIN — METFORMIN HYDROCHLORIDE 1000 MG: 500 TABLET ORAL at 05:59

## 2018-01-01 RX ADMIN — DOCUSATE SODIUM 100 MG: 100 CAPSULE, LIQUID FILLED ORAL at 16:49

## 2018-01-01 RX ADMIN — OXYBUTYNIN 10 MG: 10 TABLET, FILM COATED, EXTENDED RELEASE ORAL at 06:22

## 2018-01-01 RX ADMIN — SCOPALAMINE 1 PATCH: 1 PATCH, EXTENDED RELEASE TRANSDERMAL at 06:08

## 2018-01-01 RX ADMIN — ASPIRIN 325 MG: 325 TABLET, COATED ORAL at 21:12

## 2018-01-01 RX ADMIN — CEFAZOLIN SODIUM 2 G: 2 INJECTION, SOLUTION INTRAVENOUS at 23:54

## 2018-01-01 RX ADMIN — ACETAMINOPHEN 1000 MG: 500 TABLET ORAL at 17:13

## 2018-01-01 RX ADMIN — ATORVASTATIN CALCIUM 40 MG: 40 TABLET, FILM COATED ORAL at 06:22

## 2018-01-01 RX ADMIN — ONDANSETRON 4 MG: 2 INJECTION INTRAMUSCULAR; INTRAVENOUS at 13:21

## 2018-01-01 RX ADMIN — OXYCODONE HYDROCHLORIDE 10 MG: 10 TABLET ORAL at 18:07

## 2018-01-01 RX ADMIN — STANDARDIZED SENNA CONCENTRATE AND DOCUSATE SODIUM 1 TABLET: 8.6; 5 TABLET, FILM COATED ORAL at 21:30

## 2018-01-01 RX ADMIN — KETOROLAC TROMETHAMINE 15 MG: 30 INJECTION, SOLUTION INTRAMUSCULAR at 03:46

## 2018-01-01 RX ADMIN — ASPIRIN 325 MG: 325 TABLET, COATED ORAL at 21:31

## 2018-01-01 RX ADMIN — METFORMIN HYDROCHLORIDE 1000 MG: 500 TABLET ORAL at 05:41

## 2018-01-01 RX ADMIN — ONDANSETRON 4 MG: 2 INJECTION INTRAMUSCULAR; INTRAVENOUS at 09:23

## 2018-01-01 RX ADMIN — KETOROLAC TROMETHAMINE 15 MG: 30 INJECTION, SOLUTION INTRAMUSCULAR at 09:03

## 2018-01-01 RX ADMIN — OXYCODONE HYDROCHLORIDE 10 MG: 10 TABLET ORAL at 12:02

## 2018-01-01 RX ADMIN — ACETAMINOPHEN 1000 MG: 500 TABLET ORAL at 05:56

## 2018-01-01 RX ADMIN — ASPIRIN 325 MG: 325 TABLET, COATED ORAL at 09:39

## 2018-01-01 RX ADMIN — ASPIRIN 325 MG: 325 TABLET, COATED ORAL at 09:23

## 2018-01-01 RX ADMIN — STANDARDIZED SENNA CONCENTRATE AND DOCUSATE SODIUM 1 TABLET: 8.6; 5 TABLET, FILM COATED ORAL at 20:30

## 2018-01-01 RX ADMIN — OXYCODONE HYDROCHLORIDE 10 MG: 10 TABLET ORAL at 09:39

## 2018-01-01 RX ADMIN — SODIUM CHLORIDE 1000 ML: 9 INJECTION, SOLUTION INTRAVENOUS at 18:20

## 2018-01-01 RX ADMIN — METFORMIN HYDROCHLORIDE 1000 MG: 500 TABLET ORAL at 16:20

## 2018-01-01 RX ADMIN — ATORVASTATIN CALCIUM 40 MG: 40 TABLET, FILM COATED ORAL at 05:41

## 2018-01-01 RX ADMIN — METFORMIN HYDROCHLORIDE 1000 MG: 500 TABLET ORAL at 05:56

## 2018-01-01 RX ADMIN — ONDANSETRON 4 MG: 2 INJECTION INTRAMUSCULAR; INTRAVENOUS at 00:25

## 2018-01-01 RX ADMIN — ACETAMINOPHEN 1000 MG: 500 TABLET ORAL at 00:20

## 2018-01-01 RX ADMIN — DEXAMETHASONE SODIUM PHOSPHATE 4 MG: 4 INJECTION, SOLUTION INTRAMUSCULAR; INTRAVENOUS at 20:30

## 2018-01-01 RX ADMIN — ACETAMINOPHEN 1000 MG: 500 TABLET ORAL at 16:49

## 2018-01-01 RX ADMIN — DOCUSATE SODIUM 100 MG: 100 CAPSULE, LIQUID FILLED ORAL at 17:13

## 2018-01-01 RX ADMIN — SODIUM CHLORIDE 1000 ML: 9 INJECTION, SOLUTION INTRAVENOUS at 17:14

## 2018-01-01 RX ADMIN — FENTANYL CITRATE 50 MCG: 50 INJECTION, SOLUTION INTRAMUSCULAR; INTRAVENOUS at 11:39

## 2018-01-01 RX ADMIN — OXYCODONE HYDROCHLORIDE 2.5 MG: 5 TABLET ORAL at 21:32

## 2018-01-01 RX ADMIN — SODIUM CHLORIDE 1000 ML: 9 INJECTION, SOLUTION INTRAVENOUS at 01:17

## 2018-01-01 RX ADMIN — KETOROLAC TROMETHAMINE 15 MG: 30 INJECTION, SOLUTION INTRAMUSCULAR at 15:52

## 2018-01-01 RX ADMIN — ASPIRIN 325 MG: 325 TABLET, COATED ORAL at 09:03

## 2018-01-01 RX ADMIN — KETOROLAC TROMETHAMINE 15 MG: 30 INJECTION, SOLUTION INTRAMUSCULAR at 21:48

## 2018-01-01 RX ADMIN — SODIUM CHLORIDE, SODIUM LACTATE, POTASSIUM CHLORIDE, CALCIUM CHLORIDE: 600; 310; 30; 20 INJECTION, SOLUTION INTRAVENOUS at 06:40

## 2018-01-01 RX ADMIN — ACETAMINOPHEN 1000 MG: 500 TABLET ORAL at 12:02

## 2018-01-01 RX ADMIN — ACETAMINOPHEN 1000 MG: 500 TABLET ORAL at 12:11

## 2018-01-01 RX ADMIN — ASPIRIN 325 MG: 325 TABLET, COATED ORAL at 08:42

## 2018-01-01 RX ADMIN — CEFAZOLIN SODIUM 2 G: 2 INJECTION, SOLUTION INTRAVENOUS at 18:18

## 2018-01-01 RX ADMIN — ACETAMINOPHEN 1000 MG: 500 TABLET ORAL at 05:06

## 2018-01-01 RX ADMIN — Medication 100 MG: at 05:08

## 2018-01-01 RX ADMIN — ACETAMINOPHEN 1000 MG: 500 TABLET ORAL at 06:22

## 2018-01-01 RX ADMIN — OXYBUTYNIN 10 MG: 10 TABLET, FILM COATED, EXTENDED RELEASE ORAL at 05:41

## 2018-01-01 RX ADMIN — OXYCODONE HYDROCHLORIDE 10 MG: 10 TABLET ORAL at 17:13

## 2018-01-01 RX ADMIN — ONDANSETRON 4 MG: 2 INJECTION INTRAMUSCULAR; INTRAVENOUS at 16:44

## 2018-01-01 RX ADMIN — ONDANSETRON 4 MG: 2 INJECTION INTRAMUSCULAR; INTRAVENOUS at 18:44

## 2018-01-01 RX ADMIN — ACETAMINOPHEN 1000 MG: 500 TABLET, FILM COATED ORAL at 07:11

## 2018-01-01 RX ADMIN — OXYCODONE HYDROCHLORIDE 10 MG: 5 SOLUTION ORAL at 11:39

## 2018-01-01 RX ADMIN — DOCUSATE SODIUM 100 MG: 100 CAPSULE, LIQUID FILLED ORAL at 17:11

## 2018-01-01 RX ADMIN — POLYETHYLENE GLYCOL 3350 1 PACKET: 17 POWDER, FOR SOLUTION ORAL at 15:18

## 2018-01-01 RX ADMIN — DOCUSATE SODIUM 100 MG: 100 CAPSULE, LIQUID FILLED ORAL at 05:41

## 2018-01-01 RX ADMIN — ASPIRIN 325 MG: 325 TABLET, COATED ORAL at 09:08

## 2018-01-01 RX ADMIN — KETOROLAC TROMETHAMINE 15 MG: 30 INJECTION, SOLUTION INTRAMUSCULAR at 08:42

## 2018-01-01 RX ADMIN — OXYBUTYNIN 10 MG: 10 TABLET, FILM COATED, EXTENDED RELEASE ORAL at 14:30

## 2018-01-01 RX ADMIN — ACETAMINOPHEN 1000 MG: 500 TABLET ORAL at 22:46

## 2018-01-01 RX ADMIN — KETOROLAC TROMETHAMINE 15 MG: 30 INJECTION, SOLUTION INTRAMUSCULAR at 03:29

## 2018-01-01 RX ADMIN — ACETAMINOPHEN 1000 MG: 500 TABLET ORAL at 18:07

## 2018-01-01 RX ADMIN — ATORVASTATIN CALCIUM 40 MG: 40 TABLET, FILM COATED ORAL at 05:07

## 2018-01-01 RX ADMIN — OXYCODONE HYDROCHLORIDE 10 MG: 10 TABLET ORAL at 23:53

## 2018-01-01 RX ADMIN — ACETAMINOPHEN 1000 MG: 500 TABLET ORAL at 05:40

## 2018-01-01 RX ADMIN — ACETAMINOPHEN 1000 MG: 500 TABLET ORAL at 13:15

## 2018-01-01 RX ADMIN — OXYBUTYNIN 10 MG: 10 TABLET, FILM COATED, EXTENDED RELEASE ORAL at 05:56

## 2018-01-01 RX ADMIN — ACETAMINOPHEN 1000 MG: 500 TABLET ORAL at 00:16

## 2018-01-01 RX ADMIN — STANDARDIZED SENNA CONCENTRATE AND DOCUSATE SODIUM 1 TABLET: 8.6; 5 TABLET, FILM COATED ORAL at 21:00

## 2018-01-01 RX ADMIN — ACETAMINOPHEN 1000 MG: 500 TABLET ORAL at 17:11

## 2018-01-01 RX ADMIN — SODIUM CHLORIDE 1000 ML: 9 INJECTION, SOLUTION INTRAVENOUS at 16:01

## 2018-01-01 RX ADMIN — CARVEDILOL 3.12 MG: 3.12 TABLET, FILM COATED ORAL at 16:49

## 2018-01-01 RX ADMIN — OXYCODONE HYDROCHLORIDE 10 MG: 10 TABLET ORAL at 08:41

## 2018-01-01 RX ADMIN — DOCUSATE SODIUM 100 MG: 100 CAPSULE, LIQUID FILLED ORAL at 05:08

## 2018-01-01 RX ADMIN — Medication 100 MG: at 15:18

## 2018-01-01 RX ADMIN — KETOROLAC TROMETHAMINE 15 MG: 30 INJECTION, SOLUTION INTRAMUSCULAR at 21:12

## 2018-01-01 RX ADMIN — DOCUSATE SODIUM 100 MG: 100 CAPSULE, LIQUID FILLED ORAL at 18:08

## 2018-01-01 ASSESSMENT — ENCOUNTER SYMPTOMS
MEMORY LOSS: 1
INSOMNIA: 0
PALPITATIONS: 0
MEMORY LOSS: 1
VOMITING: 0
PALPITATIONS: 0
BLOOD IN STOOL: 0
EYE PAIN: 0
BRUISES/BLEEDS EASILY: 0
HEMOPTYSIS: 0
SPUTUM PRODUCTION: 0
PHOTOPHOBIA: 0
SEIZURES: 0
FLANK PAIN: 0
BLOOD IN STOOL: 0
DIZZINESS: 0
SORE THROAT: 0
EYE PAIN: 0
NERVOUS/ANXIOUS: 0
NAUSEA: 1
LOSS OF CONSCIOUSNESS: 0
TREMORS: 0
EYE DISCHARGE: 0
DIARRHEA: 0
FEVER: 0
NERVOUS/ANXIOUS: 0
ABDOMINAL PAIN: 0
CHILLS: 0
HALLUCINATIONS: 0
COUGH: 0
COUGH: 0
CHILLS: 0
FALLS: 0
EYE DISCHARGE: 0
STRIDOR: 0
FEVER: 0
SEIZURES: 0
FLANK PAIN: 0
VOMITING: 0
POLYDIPSIA: 0
SHORTNESS OF BREATH: 0
ABDOMINAL PAIN: 0
HEMOPTYSIS: 0
FOCAL WEAKNESS: 0
STRIDOR: 0
BRUISES/BLEEDS EASILY: 0
SORE THROAT: 0
MYALGIAS: 0
SPUTUM PRODUCTION: 0
GENERAL WELL-BEING: FAIR
DIARRHEA: 0
SHORTNESS OF BREATH: 0
HEADACHES: 0

## 2018-01-01 ASSESSMENT — PAIN SCALES - GENERAL
PAINLEVEL_OUTOF10: 5
PAINLEVEL_OUTOF10: 10
PAINLEVEL_OUTOF10: 9
PAINLEVEL_OUTOF10: 9
PAINLEVEL_OUTOF10: 10
PAINLEVEL_OUTOF10: 2
PAINLEVEL_OUTOF10: 9
PAINLEVEL_OUTOF10: 4
PAINLEVEL_OUTOF10: 8
PAINLEVEL_OUTOF10: 7
PAINLEVEL_OUTOF10: 8
PAINLEVEL_OUTOF10: 3
PAINLEVEL_OUTOF10: 8
PAINLEVEL_OUTOF10: 4
PAINLEVEL_OUTOF10: ASSUMED PAIN PRESENT
PAINLEVEL_OUTOF10: 7
PAINLEVEL_OUTOF10: 10
PAINLEVEL_OUTOF10: 10
PAINLEVEL_OUTOF10: 4
PAINLEVEL_OUTOF10: 8
PAINLEVEL_OUTOF10: 5
PAINLEVEL_OUTOF10: 8
PAINLEVEL_OUTOF10: 3
PAINLEVEL_OUTOF10: 1
PAINLEVEL_OUTOF10: 3
PAINLEVEL_OUTOF10: 3
PAINLEVEL_OUTOF10: 9
PAINLEVEL_OUTOF10: 3
PAINLEVEL_OUTOF10: 7
PAINLEVEL_OUTOF10: ASSUMED PAIN PRESENT
PAINLEVEL_OUTOF10: 9

## 2018-01-01 ASSESSMENT — COGNITIVE AND FUNCTIONAL STATUS - GENERAL
TURNING FROM BACK TO SIDE WHILE IN FLAT BAD: A LITTLE
SUGGESTED CMS G CODE MODIFIER MOBILITY: CL
WALKING IN HOSPITAL ROOM: A LOT
MOVING FROM LYING ON BACK TO SITTING ON SIDE OF FLAT BED: A LITTLE
DRESSING REGULAR LOWER BODY CLOTHING: A LOT
SUGGESTED CMS G CODE MODIFIER DAILY ACTIVITY: CK
PERSONAL GROOMING: A LITTLE
MOBILITY SCORE: 14
DRESSING REGULAR LOWER BODY CLOTHING: A LOT
STANDING UP FROM CHAIR USING ARMS: A LOT
CLIMB 3 TO 5 STEPS WITH RAILING: A LOT
MOVING FROM LYING ON BACK TO SITTING ON SIDE OF FLAT BED: A LOT
MOBILITY SCORE: 13
DAILY ACTIVITIY SCORE: 17
MOVING TO AND FROM BED TO CHAIR: A LOT
EATING MEALS: A LITTLE
DAILY ACTIVITIY SCORE: 14
PERSONAL GROOMING: A LITTLE
TURNING FROM BACK TO SIDE WHILE IN FLAT BAD: A LITTLE
HELP NEEDED FOR BATHING: A LITTLE
STANDING UP FROM CHAIR USING ARMS: A LITTLE
HELP NEEDED FOR BATHING: A LOT
TOILETING: A LOT
CLIMB 3 TO 5 STEPS WITH RAILING: TOTAL
SUGGESTED CMS G CODE MODIFIER MOBILITY: CL
DRESSING REGULAR UPPER BODY CLOTHING: A LITTLE
DRESSING REGULAR UPPER BODY CLOTHING: A LITTLE
WALKING IN HOSPITAL ROOM: TOTAL
TOILETING: TOTAL
SUGGESTED CMS G CODE MODIFIER DAILY ACTIVITY: CK
MOVING TO AND FROM BED TO CHAIR: A LITTLE

## 2018-01-01 ASSESSMENT — ACTIVITIES OF DAILY LIVING (ADL)
BATHING_REQUIRES_ASSISTANCE: 0
TOILETING: UNABLE TO DETERMINE AT THIS TIME

## 2018-01-01 ASSESSMENT — PAIN SCALES - WONG BAKER
WONGBAKER_NUMERICALRESPONSE: HURTS A LITTLE MORE
WONGBAKER_NUMERICALRESPONSE: HURTS JUST A LITTLE BIT
WONGBAKER_NUMERICALRESPONSE: HURTS A WHOLE LOT

## 2018-01-01 ASSESSMENT — COPD QUESTIONNAIRES
DO YOU EVER COUGH UP ANY MUCUS OR PHLEGM?: NO/ONLY WITH OCCASIONAL COLDS OR INFECTIONS
DURING THE PAST 4 WEEKS HOW MUCH DID YOU FEEL SHORT OF BREATH: NONE/LITTLE OF THE TIME
COPD SCREENING SCORE: 4
HAVE YOU SMOKED AT LEAST 100 CIGARETTES IN YOUR ENTIRE LIFE: YES

## 2018-01-01 ASSESSMENT — PATIENT HEALTH QUESTIONNAIRE - PHQ9
2. FEELING DOWN, DEPRESSED, IRRITABLE, OR HOPELESS: NOT AT ALL
SUM OF ALL RESPONSES TO PHQ9 QUESTIONS 1 AND 2: 0
SUM OF ALL RESPONSES TO PHQ9 QUESTIONS 1 AND 2: 0
1. LITTLE INTEREST OR PLEASURE IN DOING THINGS: NOT AT ALL
CLINICAL INTERPRETATION OF PHQ2 SCORE: 0
2. FEELING DOWN, DEPRESSED, IRRITABLE, OR HOPELESS: NOT AT ALL
1. LITTLE INTEREST OR PLEASURE IN DOING THINGS: NOT AT ALL

## 2018-01-01 ASSESSMENT — LIFESTYLE VARIABLES
EVER_SMOKED: NEVER
ALCOHOL_USE: NO
EVER_SMOKED: NEVER

## 2018-01-01 ASSESSMENT — GAIT ASSESSMENTS: GAIT LEVEL OF ASSIST: UNABLE TO PARTICIPATE

## 2018-01-10 ENCOUNTER — APPOINTMENT (OUTPATIENT)
Dept: PHYSICAL THERAPY | Facility: REHABILITATION | Age: 68
End: 2018-01-10
Attending: INTERNAL MEDICINE
Payer: MEDICARE

## 2018-04-17 ENCOUNTER — HOSPITAL ENCOUNTER (OUTPATIENT)
Dept: LAB | Facility: MEDICAL CENTER | Age: 68
End: 2018-04-17
Attending: PHYSICIAN ASSISTANT
Payer: MEDICARE

## 2018-04-17 LAB
BASOPHILS # BLD AUTO: 0.9 % (ref 0–1.8)
BASOPHILS # BLD: 0.05 K/UL (ref 0–0.12)
EOSINOPHIL # BLD AUTO: 0.11 K/UL (ref 0–0.51)
EOSINOPHIL NFR BLD: 1.9 % (ref 0–6.9)
ERYTHROCYTE [DISTWIDTH] IN BLOOD BY AUTOMATED COUNT: 42.7 FL (ref 35.9–50)
ERYTHROCYTE [SEDIMENTATION RATE] IN BLOOD BY WESTERGREN METHOD: 16 MM/HOUR (ref 0–30)
HCT VFR BLD AUTO: 42.6 % (ref 37–47)
HGB BLD-MCNC: 14 G/DL (ref 12–16)
IMM GRANULOCYTES # BLD AUTO: 0.01 K/UL (ref 0–0.11)
IMM GRANULOCYTES NFR BLD AUTO: 0.2 % (ref 0–0.9)
LYMPHOCYTES # BLD AUTO: 1.11 K/UL (ref 1–4.8)
LYMPHOCYTES NFR BLD: 19.4 % (ref 22–41)
MCH RBC QN AUTO: 29.2 PG (ref 27–33)
MCHC RBC AUTO-ENTMCNC: 32.9 G/DL (ref 33.6–35)
MCV RBC AUTO: 88.9 FL (ref 81.4–97.8)
MONOCYTES # BLD AUTO: 0.3 K/UL (ref 0–0.85)
MONOCYTES NFR BLD AUTO: 5.2 % (ref 0–13.4)
NEUTROPHILS # BLD AUTO: 4.15 K/UL (ref 2–7.15)
NEUTROPHILS NFR BLD: 72.4 % (ref 44–72)
NRBC # BLD AUTO: 0 K/UL
NRBC BLD-RTO: 0 /100 WBC
PLATELET # BLD AUTO: 337 K/UL (ref 164–446)
PMV BLD AUTO: 10.7 FL (ref 9–12.9)
RBC # BLD AUTO: 4.79 M/UL (ref 4.2–5.4)
WBC # BLD AUTO: 5.7 K/UL (ref 4.8–10.8)

## 2018-04-17 PROCEDURE — 85652 RBC SED RATE AUTOMATED: CPT

## 2018-04-17 PROCEDURE — 86140 C-REACTIVE PROTEIN: CPT

## 2018-04-17 PROCEDURE — 85025 COMPLETE CBC W/AUTO DIFF WBC: CPT

## 2018-04-17 PROCEDURE — 36415 COLL VENOUS BLD VENIPUNCTURE: CPT

## 2018-04-18 LAB — CRP SERPL HS-MCNC: 0.15 MG/DL (ref 0–0.75)

## 2018-05-17 NOTE — PROGRESS NOTES
Outcome: Left Message    Please transfer to Patient Outreach Team at [phone] when patient returns call.    HealthConnect Verified: yes    Attempt # 1

## 2018-06-06 NOTE — TELEPHONE ENCOUNTER
1. Caller Name: Alice                      Call Back Number: [phone] (home)       2. Message: patient wanted to know if she can go camping due to her left hip     3. Patient approves office to leave a detailed voicemail/MyChart message: N\A

## 2018-06-13 NOTE — PROGRESS NOTES
Outcome: Left Message    Please transfer to Patient Outreach Team at [phone] when patient returns call.      Attempt # 2

## 2018-07-09 NOTE — PROGRESS NOTES
Outcome: Left Message    Please transfer to Patient Outreach Team at [phone] when patient returns call.    Attempt # 3

## 2018-07-18 PROBLEM — S72.002A CLOSED LEFT HIP FRACTURE (HCC): Status: RESOLVED | Noted: 2017-03-31 | Resolved: 2018-01-01

## 2018-07-18 PROBLEM — Z91.81 RISK FOR FALLS: Status: ACTIVE | Noted: 2018-01-01

## 2018-07-18 NOTE — PROGRESS NOTES
CC: Follow-up diabetes complaining of diarrhea and foot pain.    HPI:   Alice presents today with the following.      1. Controlled type 2 diabetes mellitus with diabetic polyneuropathy, without long-term current use of insulin (HCC)  Overdue for recheck she is maintained on metformin 1000 twice daily has done well on it.  She is complaining of some diarrhea as mentioned below.  She is overdue for eye exam denying any symptoms of hypoglycemia.  A1c in office at 6.0 improved from 6.5.    2. Diarrhea, unspecified type  Reports daily diarrhea no abdominal pain no blood in stool or dark tarry stool.  She has not had a colonoscopy but stool tests have been normal.  She is willing to see a GI doctor.    3. Pain in both feet  Complaining of bilateral foot pain she does have neuropathy maintained on gabapentin would like to see a podiatrist for foot care      Information for advance directives given to patient or instructed to bring in advance directives into to office to put in chart.      Depression Screening    Little interest or pleasure in doing things?  0 - not at all  Feeling down, depressed , or hopeless? 0 - not at all  Patient Health Questionnaire Score: 0     If depressive symptoms identified deferred to follow up visit unless specifically addressed in assessment and plan.    Interpretation of PHQ-9 Total Score   Score Severity   1-4 No Depression   5-9 Mild Depression   10-14 Moderate Depression   15-19 Moderately Severe Depression   20-27 Severe Depression    Screening for Cognitive Impairment    Three Minute Recall (leader, season, table) 2/3    Anastacio clock face with all 12 numbers and set the hands to show 10 past 11.  Yes 4/5  Cognitive concerns identified deferred for follow up unless specifically addressed in assessment and plan.    Fall Risk Assessment    Has the patient had two or more falls in the last year or any fall with injury in the last year?  Yes    Safety Assessment    Throw rugs on floor.   Yes  Cautioned about securing or removing.    Handrails on all stairs.  Yes  Good lighting in all hallways.  Yes  Difficulty hearing.  Yes  Patient counseled about all safety risks that were identified.    Functional Assessment ADLs    Are there any barriers preventing you from cooking for yourself or meeting nutritional needs?  No.    Are there any barriers preventing you from driving safely or obtaining transportation?  No.    Are there any barriers preventing you from using a telephone or calling for help?  No.    Are there any barriers preventing you from shopping?  Yes.    Are there any barriers preventing you from taking care of your own finances?  No.    Are there any barriers preventing you from managing your medications?  No.    Are there any barriers preventing you from showering, bathing or dressing yourself?  No.    Are you currently engaging in any exercise or physical activity?  No.     What is your perception of your health?  Fair.      Health Maintenance Summary                COLONOSCOPY Overdue 12/27/2001     PFT SCREENING-FEV1 AND FEV/FVC RATIO / SPIROMETRY SHOULD BE PERFORMED ANNUALLY Overdue 10/1/2009      Done 10/1/2008 PFT DICTATED RESULTS    RETINAL SCREENING Overdue 4/19/2018      Done 4/19/2017 POCT RETINAL EYE EXAM    A1C SCREENING Overdue 6/7/2018      Done 12/7/2017 HEMOGLOBIN A1C      Patient has more history with this topic...    Annual Wellness Visit Overdue 6/9/2018      Done 6/8/2017 Visit Dx: Medicare annual wellness visit, subsequent     Patient has more history with this topic...    MAMMOGRAM Overdue 7/3/2018      Done 7/3/2017 MA-MAMMO SCREENING BILAT W/TOMOSYNTHESIS W/CAD    IMM DTaP/Tdap/Td Vaccine Postponed 12/12/2023 Originally 12/27/1970. Patient Refused    IMM INFLUENZA Next Due 9/1/2018      Done 12/28/2017 Imm Admin: Influenza Vaccine Adult HD     Patient has more history with this topic...    FASTING LIPID PROFILE Next Due 12/7/2018      Done 12/7/2017 LIPID PROFILE       Patient has more history with this topic...    URINE ACR / MICROALBUMIN Next Due 12/7/2018      Done 12/7/2017 MICROALBUMIN CREAT RATIO URINE     Patient has more history with this topic...    SERUM CREATININE Next Due 12/7/2018      Done 12/7/2017 COMP METABOLIC PANEL      Patient has more history with this topic...    DIABETES MONOFILAMENT / LE EXAM Next Due 12/28/2018      Done 12/28/2017 AMB DIABETIC MONOFILAMENT LOWER EXTREMITY EXAM     Patient has more history with this topic...    BONE DENSITY Next Due 7/3/2022      Done 7/3/2017 DS-BONE DENSITY STUDY (DEXA)          Patient Care Team:  Kevin Rea M.D. as PCP - General (Internal Medicine)  Bernardo Moran M.D. as Consulting Physician (Cardiology)  Chito Woodall M.D. as Consulting Physician (Cardiology)  Christian Lewis  as Consulting Physician (Optometry)  [facility] as Home Health Provider  Deon Howard M.D. as Consulting Physician (Orthopaedics)            Health Care Screening: Age-appropriate preventive services that Medicare covers were discussed today and ordered as indicated and agreed upon by the patient, and as recommended by USPTF and ACIP.     Patient Active Problem List    Diagnosis Date Noted   • Vitamin D deficiency 04/01/2017     Priority: Medium   • Degenerative joint disease 04/01/2017     Priority: Low   • Dyslipidemia 08/26/2013     Priority: Low   • Chronic obstructive pulmonary disease (HCC) 06/08/2017   • Coronary artery disease involving native coronary artery of native heart without angina pectoris 06/08/2017   • Controlled type 2 diabetes mellitus with diabetic polyneuropathy, without long-term current use of insulin (HCC) 11/08/2016   • Low back pain 09/17/2015   • Diastolic dysfunction 08/26/2013       Current Outpatient Prescriptions   Medication Sig Dispense Refill   • lisinopril (PRINIVIL) 20 MG Tab Take 1 Tab by mouth every day. 90 Tab 3   • metformin (GLUCOPHAGE) 1000 MG tablet Take 0.5 Tabs by mouth  "2 times a day, with meals. 90 Tab 3   • nystatin (MYCOSTATIN) powder Apply 1 g to affected area(s) 4 times a day. 15 g 6   • atorvastatin (LIPITOR) 40 MG Tab Take 1 Tab by mouth every day. 90 Tab 3   • carvedilol (COREG) 3.125 MG Tab Take 1 Tab by mouth 2 times a day, with meals. 180 Tab 3   • gabapentin (NEURONTIN) 300 MG Cap Take 1 Cap by mouth 3 times a day. 270 Cap 3   • aspirin (ASA) 325 MG Tab Take 81 mg by mouth every day.     • [employer] MICROFILL TEST strip        No current facility-administered medications for this visit.        Family History   Problem Relation Age of Onset   • Diabetes Mother    • Hypertension Father    • Psychiatry Brother        Social History     Social History   • Marital status:      Spouse name: N/A   • Number of children: N/A   • Years of education: N/A     Occupational History   • Not on file.     Social History Main Topics   • Smoking status: Former Smoker     Packs/day: 4.00     Years: 40.00     Types: Cigarettes     Quit date: 6/3/2008   • Smokeless tobacco: Never Used   • Alcohol use No   • Drug use: No   • Sexual activity: No     Other Topics Concern   • Not on file     Social History Narrative   • No narrative on file       Past Surgical History:   Procedure Laterality Date   • HIP HEMIARTHROPLASTY Left 3/31/2017    Procedure: HIP HEMIARTHROPLASTY;  Surgeon: Deon Howard M.D.;  Location: SURGERY [facility];  Service:    • ABDOMINAL HYSTERECTOMY TOTAL     • OTHER ABDOMINAL SURGERY  hysterectomy       Allergies as of 07/18/2018   • (No Known Allergies)        ROS: Denies Chest pain, SOB, LE edema.    /64   Pulse 63   Temp 37.1 °C (98.7 °F)   Resp 16   Ht 1.727 m (5' 8\")   Wt 73.9 kg (163 lb)   SpO2 96%   BMI 24.78 kg/m²     Physical Exam:  Gen:         Alert and oriented, No apparent distress.  Neck:        No Lymphadenopathy or Bruits.  Lungs:     Clear to auscultation bilaterally  CV:          Regular rate and rhythm. No murmurs, rubs or " gallops.  Abd:         Soft non tender, non distended. Normal active bowel sounds.  No  Hepatosplenomegaly, No pulsatile masses.                   Ext:          No clubbing, cyanosis, edema.      Assessment and Plan.   67 y.o. female with the following issues.    1. Controlled type 2 diabetes mellitus with diabetic polyneuropathy, without long-term current use of insulin (HCC)  A1c under good control decreasing metformin recheck 6 months  - POCT  A1C  - POCT Retinal Eye Exam  - metformin (GLUCOPHAGE) 1000 MG tablet; Take 0.5 Tabs by mouth 2 times a day, with meals.  Dispense: 90 Tab; Refill: 3  - REFERRAL TO PODIATRY  - COMP METABOLIC PANEL; Future  - LIPID PROFILE; Future  - HEMOGLOBIN A1C; Future  - MICROALBUMIN CREAT RATIO URINE; Future    2. Diarrhea, unspecified type  Again reducing metformin if not improving she has been referred to GI for colonoscopy as well as further evaluation.  - REFERRAL TO GASTROENTEROLOGY    3. Pain in both feet  Referred to podiatry  - REFERRAL TO PODIATRY    4. Chronic obstructive pulmonary disease, unspecified COPD type (HCC)  Stable no change of therapy    5. Dyslipidemia  Recheck next visit cholesterol.    6. Coronary artery disease involving native coronary artery of native heart without angina pectoris  Stable denies any chest pain    7. Diastolic dysfunction  No lower extremity edema euvolemic no signs of systolic dysfunction.    8. Chronic midline low back pain without sciatica  Stable no change therapy    9. Screen for colon cancer    - REFERRAL TO GASTROENTEROLOGY    10. Visit for screening mammogram    - MA-SCREEN MAMMO W/CAD-BILAT; Future        Referrals offered: Community-based lifestyle interventions to reduce health risks and promote self-management and wellness, fall prevention, nutrition, physical activity, tobacco-use cessation, weight loss, and mental health services as per orders if indicated.    Discussion today about general wellness and lifestyle habits:     · Prevent falls and reduce trip hazards; Cautioned about securing or removing rugs.  · Have a working fire alarm and carbon monoxide detector;   · Engage in regular physical activity and social activities

## 2018-08-02 NOTE — PROGRESS NOTES
CC: Hip pain    HPI:   Alice presents today with the following.    1. Acute pain of left hip  Presents after rolling out of bed approximately 1 week ago landing on her left side.  She reports she is having pain over the lateral aspect of her hip.  She is status post fracture on the side approximately 1 year ago.  She reports the pain is improving in nature but still 5 out of 10 intensity with ambulation.      Patient Active Problem List    Diagnosis Date Noted   • Vitamin D deficiency 04/01/2017     Priority: Medium   • Degenerative joint disease 04/01/2017     Priority: Low   • Dyslipidemia 08/26/2013     Priority: Low   • Risk for falls 07/18/2018   • Chronic obstructive pulmonary disease (HCC) 06/08/2017   • Coronary artery disease involving native coronary artery of native heart without angina pectoris 06/08/2017   • Controlled type 2 diabetes mellitus with diabetic polyneuropathy, without long-term current use of insulin ([facility]) 11/08/2016   • Low back pain 09/17/2015   • Diastolic dysfunction 08/26/2013       Current Outpatient Prescriptions   Medication Sig Dispense Refill   • lisinopril (PRINIVIL) 20 MG Tab Take 1 Tab by mouth every day. 90 Tab 3   • metformin (GLUCOPHAGE) 1000 MG tablet Take 0.5 Tabs by mouth 2 times a day, with meals. 90 Tab 3   • nystatin (MYCOSTATIN) powder Apply 1 g to affected area(s) 4 times a day. 15 g 6   • atorvastatin (LIPITOR) 40 MG Tab Take 1 Tab by mouth every day. 90 Tab 3   • carvedilol (COREG) 3.125 MG Tab Take 1 Tab by mouth 2 times a day, with meals. 180 Tab 3   • gabapentin (NEURONTIN) 300 MG Cap Take 1 Cap by mouth 3 times a day. 270 Cap 3   • aspirin (ASA) 325 MG Tab Take 81 mg by mouth every day.     • [employer]IA MICROFILL TEST strip        No current facility-administered medications for this visit.          Allergies as of 08/02/2018   • (No Known Allergies)        ROS: Denies Chest pain, SOB, LE edema.    /74   Pulse 89   Temp 37.2 °C (99 °F)   Resp 16   Ht  "1.727 m (5' 8\")   Wt 73.9 kg (163 lb)   SpO2 97%   BMI 24.78 kg/m²     Physical Exam:  Gen:         Alert and oriented, No apparent distress.  Neck:        No Lymphadenopathy or Bruits.  Lungs:     Clear to auscultation bilaterally  CV:          Regular rate and rhythm. No murmurs, rubs or gallops.               Ext:          No clubbing, cyanosis, edema.      Assessment and Plan.   67 y.o. female with the following issues.    1. Acute pain of left hip  No obvious deformities have written for x-ray follow-up abnormalities  - DX-HIP-COMPLETE - UNILATERAL 2+ LEFT; Future      "

## 2018-08-08 NOTE — TELEPHONE ENCOUNTER
1. Caller Name: Alice Mariajose Jorgenson                                         Call Back Number: [phone] (home)       Patient approves a detailed voicemail message: N\A    Pt called to let us know that she saw her Podiatry yesterday and they did a procedure with needles on her feet. After that procedure she experience worse pain and is having a hard time walking.  I told her to contact their office and inform them of this and they can go from there.  She stated that she just wanted to inform us of this issue.

## 2018-08-17 NOTE — PROGRESS NOTES
Outcome:Pt requested a call back.    Please transfer to Patient Outreach Team at [phone] when patient returns call.    WebIZ Checked & Epic Updated:  yes    HealthConnect Verified: yes    Attempt # 1

## 2018-09-06 NOTE — PROGRESS NOTES
Outcome: Left Message    Please transfer to Patient Outreach Team at [phone] when patient returns call.    Attempt # 2

## 2018-09-17 NOTE — PROGRESS NOTES
1. Attempt #:3    2. WebIZ Checked & Epic Updated: Yes  3. HealthConnect Verified: yes  4. Verify PCP: yes    5. Communication Preference Obtained: yes    6. Diabetes Visit Scheduling  Scheduling Status:Scheduled      7. Care Gap Scheduling (Attempt to Schedule EACH Overdue Care Gap!)    Health Maintenance Due   Topic Date Due   • IMM HEP B VACCINE (1 of 3 - Risk 3-dose series) 12/27/1970   • COLONOSCOPY  12/27/2001   • IMM ZOSTER VACCINES (1 of 2) 12/27/2001   • PFT SCREENING-FEV1 AND FEV/FVC RATIO / SPIROMETRY SHOULD BE PERFORMED ANNUALLY  10/01/2009   • IMM INFLUENZA (1) 09/01/2018        8. Patient was directed to Health and Wellness Website: yes     SCHEDULED APPT/DECLINES COLONOSCOPY/ IMMUNIZATIONS ADDED TO APPT    9. Screened for [employer] Prescription? yes  10. [employer] Activation: already active  11. [employer] Effie: no  12. Virtual Visits: no  13. Opt In to Text Messages: no

## 2018-09-24 NOTE — TELEPHONE ENCOUNTER
Patient had her gabapentin changed to 4 times daily by Dr Guerra and she is having more problems then before. She wants to know what she should do regarding her medication.  She is requesting a DFMSimt message instead of a phone call.

## 2018-10-03 NOTE — PROGRESS NOTES
CC: Urinary complaints and follow-up of diabetes.    HPI:   Alice presents today with the following.    1. Controlled type 2 diabetes mellitus with diabetic polyneuropathy, without long-term current use of insulin ([facility])  Last check 2 months ago was under very good control she is needing prescription for testing supplies.  Denies any current hypoglycemia and again is only managed with metformin.      2. Overactive bladder  Main complaint today is overactive bladder.  She reports a sudden urgency and cannot make it to the restroom on time.'s been present for over a year.  She denies any fevers or chills or other related symptoms.  She has not tried any medications        Patient Active Problem List    Diagnosis Date Noted   • Vitamin D deficiency 04/01/2017     Priority: Medium   • Degenerative joint disease 04/01/2017     Priority: Low   • Dyslipidemia 08/26/2013     Priority: Low   • Risk for falls 07/18/2018   • Chronic obstructive pulmonary disease (HCC) 06/08/2017   • Coronary artery disease involving native coronary artery of native heart without angina pectoris 06/08/2017   • Controlled type 2 diabetes mellitus with diabetic polyneuropathy, without long-term current use of insulin ([facility]) 11/08/2016   • Low back pain 09/17/2015   • Diastolic dysfunction 08/26/2013       Current Outpatient Prescriptions   Medication Sig Dispense Refill   • oxybutynin SR (DITROPAN-XL) 10 MG CR tablet Take 1 Tab by mouth every day. 90 Tab 3   • Blood Glucose Monitoring Suppl Supplies Misc Test strips order: Test strips for insurance covered meter. Sig: use qday 100 Each 11   • Lancets Misc Lancets order: Lancets for diabetic testing meter. Sig: use qday 100 Each 11   • Blood Glucose Monitoring Suppl Device Meter: Dispense insurance covered meter. Sig. Use as directed for blood sugar monitoring. #1. NR. 1 Device 0   • lisinopril (PRINIVIL) 20 MG Tab Take 1 Tab by mouth every day. 90 Tab 3   • metformin (GLUCOPHAGE) 1000 MG tablet  "Take 0.5 Tabs by mouth 2 times a day, with meals. 90 Tab 3   • nystatin (MYCOSTATIN) powder Apply 1 g to affected area(s) 4 times a day. 15 g 6   • atorvastatin (LIPITOR) 40 MG Tab Take 1 Tab by mouth every day. 90 Tab 3   • carvedilol (COREG) 3.125 MG Tab Take 1 Tab by mouth 2 times a day, with meals. 180 Tab 3   • gabapentin (NEURONTIN) 300 MG Cap Take 1 Cap by mouth 3 times a day. 270 Cap 3   • aspirin (ASA) 325 MG Tab Take 81 mg by mouth every day.     • [employer] MICROFILL TEST strip        No current facility-administered medications for this visit.          Allergies as of 10/03/2018   • (No Known Allergies)        ROS: Denies Chest pain, SOB, LE edema.    /70   Pulse 91   Temp (!) 35.7 °C (96.3 °F)   Ht 1.727 m (5' 8\")   Wt 76.9 kg (169 lb 8.5 oz)   SpO2 94%   BMI 25.78 kg/m²     Physical Exam:  Gen:         Alert and oriented, No apparent distress.  Neck:        No Lymphadenopathy or Bruits.  Lungs:     Clear to auscultation bilaterally  CV:          Regular rate and rhythm. No murmurs, rubs or gallops.               Ext:          No clubbing, cyanosis, edema.      Assessment and Plan.   67 y.o. female with the following issues.    1. Controlled type 2 diabetes mellitus with diabetic polyneuropathy, without long-term current use of insulin (HCC)  DM have given a prescription for testing supplies recheck in January.  - Blood Glucose Monitoring Suppl Supplies Misc; Test strips order: Test strips for insurance covered meter. Sig: use qday  Dispense: 100 Each; Refill: 11  - Lancets Misc; Lancets order: Lancets for diabetic testing meter. Sig: use qday  Dispense: 100 Each; Refill: 11  - Blood Glucose Monitoring Suppl Device; Meter: Dispense insurance covered meter. Sig. Use as directed for blood sugar monitoring. #1. NR.  Dispense: 1 Device; Refill: 0    2. Overactive bladder  Discussion about lifestyle modifications have written a prescription for medication oxybutynin caution about side " effects.    3. Need for hepatitis C screening test  - HEP C VIRUS ANTIBODY; Future    4. Need for vaccination    - INFLUENZA VACCINE, HIGH DOSE (65+ ONLY)

## 2018-10-19 NOTE — TELEPHONE ENCOUNTER
1. Caller Name: Alice Mariajose Jorgenson              Call Back Number: [phone] (home)         Patient approves a detailed voicemail message: yes    PT called in stated she is no longer able to walk without extreme difficulty, she is having surgery next month, she just wanted you to be aware of what is going on.

## 2018-11-21 NOTE — PROGRESS NOTES
CC: Preop evaluation hip surgery.    HPI:   Alice presents today with the following.    1. Preop cardiovascular exam/ Arthralgia of hip, unspecified laterality  Presents with chronic hip pain about to go undergo surgery.  She reports that she does not have any chest pain or shortness of breath with activity.  She is not physically terribly able however.  More concerns with surgery would be her caring for self post procedure.      Patient Active Problem List    Diagnosis Date Noted   • Vitamin D deficiency 04/01/2017     Priority: Medium   • Degenerative joint disease 04/01/2017     Priority: Low   • Dyslipidemia 08/26/2013     Priority: Low   • Risk for falls 07/18/2018   • Chronic obstructive pulmonary disease (HCC) 06/08/2017   • Coronary artery disease involving native coronary artery of native heart without angina pectoris 06/08/2017   • Controlled type 2 diabetes mellitus with diabetic polyneuropathy, without long-term current use of insulin ([facility]) 11/08/2016   • Low back pain 09/17/2015   • Diastolic dysfunction 08/26/2013       Current Outpatient Prescriptions   Medication Sig Dispense Refill   • oxybutynin SR (DITROPAN-XL) 10 MG CR tablet Take 1 Tab by mouth every day. 90 Tab 3   • lisinopril (PRINIVIL) 20 MG Tab Take 1 Tab by mouth every day. 90 Tab 3   • nystatin (MYCOSTATIN) powder Apply 1 g to affected area(s) 4 times a day. 15 g 6   • Blood Glucose Monitoring Suppl Device Meter: Dispense insurance covered meter. Sig. Use as directed for blood sugar monitoring. #1. NR. 1 Device 0   • Blood Glucose Monitoring Suppl Supplies Misc Test strips order: Test strips for insurance covered meter. Sig: use qday 100 Each 11   • Lancets Misc Lancets order: Lancets for diabetic testing meter. Sig: use qday 100 Each 11   • metformin (GLUCOPHAGE) 1000 MG tablet Take 0.5 Tabs by mouth 2 times a day, with meals. 90 Tab 3   • atorvastatin (LIPITOR) 40 MG Tab Take 1 Tab by mouth every day. 90 Tab 3   • carvedilol (COREG)  "3.125 MG Tab Take 1 Tab by mouth 2 times a day, with meals. 180 Tab 3   • gabapentin (NEURONTIN) 300 MG Cap Take 1 Cap by mouth 3 times a day. 270 Cap 3   • aspirin (ASA) 325 MG Tab Take 81 mg by mouth every day.     • ASCENSIA MICROFILL TEST strip        No current facility-administered medications for this visit.          Allergies as of 11/21/2018   • (No Known Allergies)        ROS: Denies Chest pain, SOB, LE edema.    /84   Pulse 98   Temp 36.8 °C (98.2 °F)   Resp 16   Ht 1.72 m (5' 7.72\")   Wt 75.3 kg (166 lb)   SpO2 92%   BMI 25.45 kg/m²     Physical Exam:  Gen:         Alert and oriented, No apparent distress.  Neck:        No Lymphadenopathy or Bruits.  Lungs:     Clear to auscultation bilaterally  CV:          Regular rate and rhythm. No murmurs, rubs or gallops.               Ext:          No clubbing, cyanosis, edema.    EKG normal sinus rhythm inferior Q waves consistent with previous MI.  No acute ST wave changes.  Unchanged since previous EKG.    Assessment and Plan.   67 y.o. female with the following issues.    1. Preop cardiovascular examArthralgia of hip, unspecified laterality  She is released for surgery with slightly higher than our given her inability to care for self.  Would recommend that she go to a nursing home on discharge.  She also should be cleared from her cardiologist      "

## 2018-11-27 NOTE — PROGRESS NOTES
Called pt to schedule labs for her , she was confused and thought I was calling for her surgery at [facility] [place] [facility] [place]. I let the pt know she would have to give them a call if she wanted to reschedule an appointment with them. I got her scheduled a week before her upcoming appointment with Dr. Rea for her labs

## 2018-11-29 NOTE — TELEPHONE ENCOUNTER
1. Caller Name: Alice Mariajose Jorgenson                                         Call Back Number: [phone] (home)       Patient approves a detailed voicemail message: N\A    Pt wanted to inform you that she's going in for surgery at 8am on the 21st.

## 2018-12-10 NOTE — PROGRESS NOTES
Outcome: Left Message    Please transfer to Patient Outreach Team at [phone] when patient returns call.    WebIZ Checked & Epic Updated:  yes    [facility] Verified: yes    Attempt # 1

## 2018-12-11 NOTE — DISCHARGE PLANNING
DISCHARGE PLANNING NOTE - TOTAL JOINT     Procedure: Procedure(s):  HIP ARTHROPLASTY TOTAL- CONVERSION FROM CORNELIUS ARTHROPLASTY TO TOTAL HIP  Procedure Date: 12/21/2018  Insurance:  Payor: S[employer]IOR C[employer] PLUS / Plan: SENIOR CARE PLUS / Product Type: *No Product type* /   Equipment currently available at home? bedside commode, four-wheel walker, front-wheel walker and shower chair  Steps into the home? 3  Steps within the home? 0  Toilet height? Standard  Type of shower? tub-shower  Who will be with you during your recovery? son, spouse  Is Outpatient Physical Therapy set up after surgery? No   Did you take the Total Joint Class and where? No     Plan:  Home with home health. Pt has had [facility] in the past. Spoke to Dr. Berry's surgery scheduler that she would like home health, she stated she would let him know. Her  and 2 adult sons will be with her after surgery

## 2018-12-20 NOTE — PROGRESS NOTES
Outcome: No answer    Please transfer to Patient Outreach Team at [phone] when patient returns call.      Attempt # 2

## 2018-12-20 NOTE — PROGRESS NOTES
Outcome: Unable to schedule her colonoscopy pt stated she is having hip replacement this week and will contact  the GI consultants soon to schedule and she is aware her lab orders are in her chart and will have thoses done soon.     Please transfer to Patient Outreach Team at [phone] when patient returns call.    Attempt # 3

## 2018-12-21 NOTE — OR NURSING
Patient a+ox4 .  states pain now tolerable. 4/10.  VSS Left hip dressing clean and dry.  Ice pack to site.  Pulses palp dp and pt.  Family updated with patient plan.  Belongings brought from am locker.  Awaiting room assignment

## 2018-12-21 NOTE — OP REPORT
DATE OF SERVICE:  12/21/2018    ADDENDUM    SERVICE:  Orthopedic surgery.    This is addendum to previously dictated operative summary.    I previously dictated an operative summary for left hip revision arthroplasty   with conversion of a hemiarthroplasty to a total hip arthroplasty.  It was   noted at that point in time, the patient had very osteoporotic bone.  During   the time of operation, I did pack a lot of reamings posteriorly to the   acetabular component.  We did not recognize a definitive fracture at that   point in time, but the postoperative x-ray does show a medial acetabular   fracture in the area of the very thin bone just medial to the acetabulum.    This was not recognized at the time of surgery.  Because of the patient's   extremely osteoporotic bone, we did place an acetabular component that was 2   mm above the reamed amount.  Also, I believe that it may be possible that when   we impacted the acetabulum that I pushed some of the graft tissue through the   medial wall and this may have played a part in the fracture.  Also during the   reaming, it is also possible that some fracture of medial bone occurred.    At any rate, our plan initially was to make her touchdown weightbearing   anyways for 6 weeks.  The fracture does not appear to involve the   weightbearing dome and all of the acetabulum.  We will continue our plan of   keeping her toe touch weightbearing for several weeks until her pain subsides,   but this was then unexpected finding on postoperative radiographs, although   it is not at this point change our treatment plan at all.  We will continue to   make her touchdown weightbearing as previously written for in the operative   planning.  I do not feel this will have any effect on the patient's long-term   outcome nor will have an effect the postoperative plan.  There is no evidence   of any weightbearing dome fracture.  The weightbearing columns of the   acetabulum appear to be  completely intact.       ____________________________________     MD SHARYN AMBROSE / ALKA    DD:  12/21/2018 13:26:27  DT:  12/21/2018 13:43:10    D#:  [id number]  Job#:  [id number]

## 2018-12-21 NOTE — DISCHARGE PLANNING
Spoke with LORENA Cerda and this patient will be TTWB LLE.  The orders should be updated shortly.

## 2018-12-21 NOTE — OR NURSING
Patient calm and cooperative. Denies pain or nausea.  VSS. Awaiting room clean.   updated with patient plan to transfer to t318.

## 2018-12-21 NOTE — OR SURGEON
Immediate Post OP Note    PreOp Diagnosis: L hip oa djd s/p L hip hemiarthroplasty osteoporosis    PostOp Diagnosis: same    Procedure(s):  LEFT HIP revison carmen CONVERSION FROM HEMIARTHROPLASTY TO TOTAL HIP - Wound Class: Clean    Surgeon(s):  Shay Berry M.D.    Anesthesiologist/Type of Anesthesia:  Anesthesiologist: Shelia Rodriguez M.D./General    Surgical Staff:  Assistant: DOMINIC Kuhn  Circulator: Rosa Kumari R.N.  Limb Newton: Alexandro Otero Circulator: Sterling Solorio R.N.  Scrub Person: Fuad Conley    Specimens removed if any:  none    Estimated Blood Loss: 150cc    Findings: severe osteoporosis    Complications: none        12/21/2018 10:44 AM Shay Berry M.D.

## 2018-12-21 NOTE — OP REPORT
DATE OF SERVICE:  12/21/2018    SERVICE:  Orthopedic surgery.    PREOPERATIVE DIAGNOSES:  1.  Left hip, status post displaced femoral neck fracture.  2.  Osteoporosis.  3.  Continued severe pain secondary to acetabular osteoarthritis.  4.  Left hip degenerative joint disease, osteoarthritis.    POSTOPERATIVE DIAGNOSES:  1.  Left hip, status post displaced femoral neck fracture.  2.  Osteoporosis.  3.  Continued severe pain secondary to acetabular osteoarthritis.  4.  Left hip degenerative joint disease, osteoarthritis.    PROCEDURES:  1.  Left hip deep hardware removal.  2.  Left hip revision total hip arthroplasty.  3.  Left hip conversion of hemiarthroplasty to total hip arthroplasty.    SURGEON:  Shay Berry MD    ASSISTANT SURGEON:  Prashant Newsome PA-C, [facility]    ANESTHETIC:  General.    ANESTHESIOLOGIST:  Shelia Rodriguez MD    COMPLICATIONS:  None.    BLOOD LOSS:  150 mL.    DESCRIPTION OF PROCEDURE:  After informed consent was obtained, the patient   was brought to the operating room and given general endotracheal anesthetic.    She was placed in lateral decubitus position and given IV Ancef and tranexamic   acid.  After the field was draped, a time-out was called correctly   identifying the patient and procedure to be done.  We then made a curvilinear   incision standard for the posterior approach to the hip.  We utilized the   patient's old incision.  We carefully dissected down through subcutaneous   tissue down to the fascial layer.  The fascia was then carefully cleaned off   with Felix elevator.  We then incised the fascia and carried this proximally in   a curvilinear fashion.  We then placed the deep [place] retractor.  There   were multiple sutures in place.  An attempt had been made to reattach these to   the piriformis fossa; however, there was no competent tendon remaining.  We   removed the piriformis bursa and then dissected down to the posterior capsule   and removed the posterior capsule.   We then skeletonized the proximal femur   and then dislocated the hip.  We then used a tamp to remove the head   hemiarthroplasty component off the Acosta taper.  We then dissected down to the   acetabulum and placed anterior and posterior retractors.  We removed soft   tissue from the acetabulum, did note severe osteoarthritis, degenerative joint   disease and proceeded to ream up to a size 51.  We then placed a size 52   acetabular component and placed multiple all 3 screws in the acetabular shell.    None of these screws appeared to have very good purchase and an x-ray was   taken revealing that there was some medial positioning of the acetabulum, also   secondary to the fact that the screws simply were not in good bone.  We   decided to remove this component in attempt to use more lateral rim acetabular   rim bone for fixation.  What was definitively clear was the patient had   severe osteoporosis and very poor bone quality and it appeared that the very   lateral bone in the acetabulum would be by far the best to hold the acetabulum   in place.  We then proceeded to ream after removing the acetabulum in the   polyethylene.  After we removed the acetabulum, we proceeded to ream into a   larger size utilizing more of the very lateral cortical bone, which was   clearly by far the best integrity of all the bone available.  We reamed up to   a 55 and then put a 56 mm component, which was kept relatively laterally.    Please note, we also placed acetabular reamings under this acetabular   component because of the poor quality of the patient's bone.  We then   proceeded to place a multi-hole acetabular components as mentioned and then   tried to get as many screws as possible.  There was only 2 screw holes that   offered good purchase.  The rest of the screw holes either were in a poor   location or offered virtually no purchase at all with the screws that we   attempted to place, so we did end up placing 2 screws in a  multi-hole   acetabular component.  We then checked the position of our component and our   screws with additional intraoperative x-rays, the screws appeared to be in   good position and appeared to have the best purchase that was obtainable.  I   did also tried to place a lateral more screw, but it simply did not find an   area with good purchase.  We then placed the polyethylene component and then   irrigated the wound with copious amounts of irrigation.  We then trialled off   the previously placed femoral component, noted a +12 to be the ideal size.  A   +12 component was then placed with blows from the mallet.  Digital traction   was placed on it.  The components used were a De[employer]uy Manuel [employer] Manuel   acetabular multi-hole acetabular component with 2 DePuy screws and a posterior   lipped polyethylene liner from DePuy Manuel and Manuel.  The head used was   a 36 mm +12 Oxinium head from [employer].  Once this implant was placed,   we reduced the hip.  We took the patient through range of motion.  In full   extension, she had approximately 2 mm of shuck, maximal internal rotation   failed to dislocate her hip in the extended position.  Also, maximal internal   rotation did not posteriorly dislocate the hip either.  At 45 degrees of   flexion, the patient came to approximately 85 degrees of internal rotation   before dislocating.  At 90 degrees of flexion, patient came to 80 degrees of   internal rotation before starting to dislocate.  In full extension, there was   approximately 2 mm of shuck on axial traction.  We then took hard copy x-rays   off the flat plate x-ray machine in the operating room to document the   position of hardware and make sure that there was no aberrant placement of   hardware.  After irrigating with copious amounts of irrigation, we closed the   wound in layers.  The deep fascial layer was closed with #1 Vicryl suture,   subcutaneous tissue with 2-0 Vicryl and the skin was closed  with Dermabond   mesh closure.  We did inject 20 mL of 0.5% Marcaine with epinephrine into the   wound, which was then dressed.  The procedure was terminated.  No   complications were experienced.  Blood loss was approximately 150 mL.  The   most surprise finding was that the patient had severe osteoporosis of her   pelvis, especially the medial aspect of the acetabulum.  The patient was   aroused from general anesthesia and brought in stable condition to recovery   room.       ____________________________________     MD SHARYN AMBROSE / ALKA    DD:  12/21/2018 10:55:01  DT:  12/21/2018 11:18:19    D#:  [id number]  Job#:  [id number]

## 2018-12-21 NOTE — OR NURSING
Per OR Nurse Roseline, she reported she informed Dr. Berry that pt was not able to void in pre-op for UA. Per RN Roseline, pt may go to surgery w/o giving urine specimen.

## 2018-12-22 NOTE — PROGRESS NOTES
Progress Note               Author: Shay TOSHIA Jamal Date & Time created: 2018  10:18 AM     Interval History:  Medial wall fracture on post op xray discussed with patient    Review of Systems:  ROS    Physical Exam:  Physical Exam   Musculoskeletal:   Sensation and motor at baseline level no change   Pt has some baseline numbness preop with hasnt changed       Labs:          No results for input(s): SODIUM, POTASSIUM, CHLORIDE, CO2, BUN, CREATININE, MAGNESIUM, PHOSPHORUS, CALCIUM in the last 72 hours.  No results for input(s): ALTSGPT, ASTSGOT, ALKPHOSPHAT, TBILIRUBIN, DBILIRUBIN, GAMMAGT, AMYLASE, LIPASE, ALB, PREALBUMIN, GLUCOSE in the last 72 hours.  No results for input(s): RBC, HEMOGLOBIN, HEMATOCRIT, PLATELETCT, PROTHROMBTM, APTT, INR, IRON, FERRITIN, TOTIRONBC in the last 72 hours.      Hemodynamics:  Temp (24hrs), Av.4 °C (97.5 °F), Min:36.1 °C (97 °F), Max:37 °C (98.6 °F)  Temperature: 36.4 °C (97.5 °F)  Pulse  Av.5  Min: 64  Max: 100Heart Rate (Monitored): 77  Blood Pressure : (!) 82/45, NIBP: 103/53     Respiratory:    Respiration: 16, Pulse Oximetry: 98 %           Fluids:    Intake/Output Summary (Last 24 hours) at 18 1018  Last data filed at 18 2100   Gross per 24 hour   Intake             1000 ml   Output              850 ml   Net              150 ml        GI/Nutrition:  Orders Placed This Encounter   Procedures   • Diet Order Regular     Standing Status:   Standing     Number of Occurrences:   1     Order Specific Question:   Diet:     Answer:   Regular [1]     Medical Decision Making, by Problem:  There are no active hospital problems to display for this patient.      Plan:  Discussed medial wall acetabular fracture with patient. I told her it does not change our original plan of ttwb for at least 6 weeks  Fracture is medial wall and not in the weight bearing columns  Pt had severe osteoporosis with very poor bone quality  Will put in order for skilled nursing  Aspirin for  anticoagulation  Pt/ot tdwb  Pt reports she has been non ambulatory for one year secondary to hip pain    Quality-Core Measures

## 2018-12-22 NOTE — CARE PLAN
Problem: Safety  Goal: Will remain free from falls    Intervention: Assess risk factors for falls  Call light within reach. Personal belongings within reach at bedside. Bed in lowest position and locked       Problem: Mobility  Goal: Risk for activity intolerance will decrease    Intervention: Assess and monitor signs of activity intolerance  Pt ambulated to bathroom on POD #0. Pt was a little tired but steady.

## 2018-12-22 NOTE — RESPIRATORY CARE
COPD EDUCATION by COPD CLINICAL EDUCATOR  12/22/2018 at 7:43 AM by Shreya Patel     Patient reviewed by COPD education team. Patient does not qualify for COPD program.

## 2018-12-22 NOTE — CARE PLAN
Problem: Safety  Goal: Will remain free from falls    Intervention: Implement fall precautions  Educated patient to call appropriately when wanting to ambulate out of bed. Pt is agreeable and calls appropriately        Problem: Pain Management  Goal: Pain level will decrease to patient's comfort goal    Intervention: Follow pain managment plan developed in collaboration with patient and Interdisciplinary Team  Assessed pt pain and medicated pt according to pain management plan

## 2018-12-22 NOTE — THERAPY
"Occupational Therapy Evaluation completed.   Functional Status:  Pt s/p L SUDHA, TTWB and posterior hip precautions in place. Pt performed bed mobility with mod a primarily for LLE positioning and cues to maintain hip precautions, LB dressing max a, toileting max a, UB dressing min a, F balance seated eob, attempted STS x 2, pt unable to adhere to TTWB precautions and further oob mobility deferrred at this time. Pt alert but altered attention, unable to recall her living situation, what year it is despite increased time provided to answer, limited recall of posterior hip precautions within session. Pt will benefit from acute skilled while in house and anticipate will likely require SNF level of therapy prior to d/c home unless family comfortable with providing 24/7 assist.   Plan of Care: Will benefit from Occupational Therapy 4 times per week  Discharge Recommendations:  Equipment: Will Continue to Assess for Equipment Needs. Post-acute therapy Recommend inpatient transitional care services for continued occupational therapy services.       See \"Rehab Therapy-Acute\" Patient Summary Report for complete documentation.    "

## 2018-12-22 NOTE — PROGRESS NOTES
Pt A/Ox4. Reporting pain 8-10/10 upon each assessment. No non verbal signs of pain noted. Resting in bed, calm, non labored breathing.  Bolused with 1L NS per active order for BP of 82/45. Pressure increased to 98/61. Dressing in place to left hip- CDI. Abductor pillow in place. Experiencing some nausea. relieved with zofran per MAR. TTWB precautions in place. Rounding in place. All personal items and call light within reach. Pt calls appropriately.

## 2018-12-22 NOTE — PROGRESS NOTES
Put pt on commode to try to urinate very small amount urinated by pt. Placed order for bladder scan.

## 2018-12-22 NOTE — PROGRESS NOTES
· 2 RN skin check complete with AGNIESZKA Mcdermott.   · Devices in place SCD, O2 tubing, surgical dressing wrap.  · Skin assessed under devices yes.  · Confirmed pressure ulcers found on N/A.  · New potential pressure ulcers noted on N/A.  · The following interventions in place Q shift assessments, mobility, soft silicone nasal cannula, pillows in use for positioning and comfort

## 2018-12-23 NOTE — CARE PLAN
Problem: Safety  Goal: Will remain free from falls    Intervention: Implement fall precautions  Call light within reach and patient educated to call before getting out of bed, all assistive devices out of sight, non-slip socks on, hourly rounding in place.

## 2018-12-23 NOTE — THERAPY
pt worked with OT earlier in day; appeared confused and unable to maintain WB precautions; will re-attempt as able/appropriate; pt will likely need SNF placement prior to dc to home given current status

## 2018-12-23 NOTE — CARE PLAN
Problem: Safety  Goal: Will remain free from injury  Call light within reach. Pt calls for assistance at all times.  Bed in lowest position, upper bedrails up, personal possessions within reach, treaded socks on.  Hourly rounding in place.        Problem: Mobility  Goal: Risk for activity intolerance will decrease  Pt up with 2 person assist and FWW. Pt unable to maintain TTWB precautions to left side. Requires constant reminding of weight bearing precautions.

## 2018-12-23 NOTE — THERAPY
"Physical Therapy Evaluation completed.   Bed Mobility:  Supine to Sit: Moderate Assist  Transfers: Sit to Stand: Minimal Assist  Gait: Level Of Assist: Unable to Participate with Front-Wheel Walker       Plan of Care: Will benefit from Physical Therapy 4 times per week  Discharge Recommendations: Equipment: Will Continue to Assess for Equipment Needs. Recommend inpatient transitional care services for continued physical therapy services.      See \"Rehab Therapy-Acute\" Patient Summary Report for complete documentation.     Pt was seen after L SUDHA revision which was a conversion from hemiarthoplasty to total hip. Pt now has TTWB in LLE precautions in place with posterior hip precautions. Pt presented with impaired balance, impaired gait, weakness, pain, poor general locomotion, impaired cognition, and dec activity tolerance. Pt was able to demonstrate Min to Mod A for all functional mobility at this time w/FWW use. Pt was unable to follow WB precautions during standing, and required RN to hold up LLE during transfer back to bed. Pt demonstrated with impaired cognition and very tangential at times requiring constant cues and redirection during functional mobility and to maintain TTWB in LLE. Pt edcuated on posterior hip precautions and ther-ex while in supine position, however, pt does not appear to be very receptive during education and does not appear to retain any information at this time. Pt will continue to benefit from skilled PT while in house, with recommendation for post acute therapy prior to d/c home given current objective findings.   "

## 2018-12-23 NOTE — PROGRESS NOTES
Progress Note               Author: Shay TOSHIA Berry Date & Time created: 2018  11:38 AM     Interval History:  No complaints  Review of Systems:  ROS    Physical Exam:  Physical Exam   Musculoskeletal:   Sensation and motor at preop baseline  Dressing dry       Labs:          No results for input(s): SODIUM, POTASSIUM, CHLORIDE, CO2, BUN, CREATININE, MAGNESIUM, PHOSPHORUS, CALCIUM in the last 72 hours.  No results for input(s): ALTSGPT, ASTSGOT, ALKPHOSPHAT, TBILIRUBIN, DBILIRUBIN, GAMMAGT, AMYLASE, LIPASE, ALB, PREALBUMIN, GLUCOSE in the last 72 hours.  Recent Labs      18   0506  18   0513   RBC  3.18*  3.20*   HEMOGLOBIN  9.5*  9.6*   HEMATOCRIT  28.9*  29.7*   PLATELETCT  255  228     Recent Labs      18   0506  18   0513   WBC  6.2  7.2     Hemodynamics:  Temp (24hrs), Av.4 °C (97.5 °F), Min:36 °C (96.8 °F), Max:36.8 °C (98.2 °F)  Temperature: 36.8 °C (98.2 °F)  Pulse  Av  Min: 64  Max: 100   Blood Pressure : (!) 99/55     Respiratory:    Respiration: 16, Pulse Oximetry: 92 %        RUL Breath Sounds: Clear, RML Breath Sounds: Clear, RLL Breath Sounds: Diminished, JULISSA Breath Sounds: Clear, LLL Breath Sounds: Diminished  Fluids:    Intake/Output Summary (Last 24 hours) at 18 1138  Last data filed at 18 1000   Gross per 24 hour   Intake              240 ml   Output             1250 ml   Net            -1010 ml        GI/Nutrition:  Orders Placed This Encounter   Procedures   • Diet Order Regular     Standing Status:   Standing     Number of Occurrences:   1     Order Specific Question:   Diet:     Answer:   Regular [1]     Medical Decision Making, by Problem:  There are no active hospital problems to display for this patient.      Plan:  rediscussed fracture  ttwb al le  Awaiting skilled placement    Quality-Core Measures

## 2018-12-24 NOTE — DISCHARGE PLANNING
Anticipated Discharge Disposition: SNF    Action: Met with patient to discuss discharge plans agreeable to SNF, verbal choice obtained for #1 [facility]  #2 [facility] and #3 [facility] and faxed to [facility]     Barriers to Discharge:  Medical clearance and pending SNF acceptance    Plan:  SNF

## 2018-12-24 NOTE — CARE PLAN
Problem: Safety  Goal: Will remain free from injury  Call light within reach. Pt calls for assistance at all times.  Bed in lowest position, upper bedrails up, personal possessions within reach, treaded socks on.  Hourly rounding in place. Bed alarm in place.     Problem: Mobility  Goal: Risk for activity intolerance will decrease  Pt requires max assist OOB. Requires constant reminding to maintain TTWB status. Pt unable to maintain status, required additional person to hold leg.

## 2018-12-24 NOTE — PROGRESS NOTES
Progress Note               Author: Shay Humphrieson Date & Time created: 2018  10:06 AM     Interval History:  Pt still max assist with pt and has been noncompliant with weightbearing requirements    Review of Systems:  ROS    Physical Exam:  Physical Exam   Neurological:   Wound c/d/i  Sensation and motor at baseline l and r le       Labs:          No results for input(s): SODIUM, POTASSIUM, CHLORIDE, CO2, BUN, CREATININE, MAGNESIUM, PHOSPHORUS, CALCIUM in the last 72 hours.  No results for input(s): ALTSGPT, ASTSGOT, ALKPHOSPHAT, TBILIRUBIN, DBILIRUBIN, GAMMAGT, AMYLASE, LIPASE, ALB, PREALBUMIN, GLUCOSE in the last 72 hours.  Recent Labs      18   0506  18   0513  18   0400   RBC  3.18*  3.20*  2.85*   HEMOGLOBIN  9.5*  9.6*  8.4*   HEMATOCRIT  28.9*  29.7*  26.4*   PLATELETCT  255  228  230     Recent Labs      18   0506  18   0513  18   0400   WBC  6.2  7.2  6.4     Hemodynamics:  Temp (24hrs), Av.7 °C (98.1 °F), Min:36.3 °C (97.4 °F), Max:37.3 °C (99.1 °F)  Temperature: 36.6 °C (97.9 °F)  Pulse  Av.9  Min: 64  Max: 100   Blood Pressure : (!) 98/64     Respiratory:    Respiration: 16, Pulse Oximetry: 92 %        RUL Breath Sounds: Clear, RML Breath Sounds: Clear, RLL Breath Sounds: Diminished, JULISSA Breath Sounds: Clear, LLL Breath Sounds: Diminished  Fluids:    Intake/Output Summary (Last 24 hours) at 18 1006  Last data filed at 18 0700   Gross per 24 hour   Intake              480 ml   Output              750 ml   Net             -270 ml        GI/Nutrition:  Orders Placed This Encounter   Procedures   • Diet Order Regular     Standing Status:   Standing     Number of Occurrences:   1     Order Specific Question:   Diet:     Answer:   Regular [1]     Medical Decision Making, by Problem:  There are no active hospital problems to display for this patient.      Plan:  Franki silver  Awaiting placement from social work at [facility]   Impressed on pt that she must  be compliant with weightbearing precautions    Quality-Core Measures

## 2018-12-24 NOTE — CARE PLAN
Problem: Venous Thromboembolism (VTW)/Deep Vein Thrombosis (DVT) Prevention:  Goal: Patient will participate in Venous Thrombosis (VTE)/Deep Vein Thrombosis (DVT)Prevention Measures    Intervention: Ensure patient wears graduated elastic stockings (OSVALDO hose) and/or SCDs, if ordered, when in bed or chair (Remove at least once per shift for skin check)  Patient wearing SCDs bilaterally when in bed or chair.       Problem: Mobility  Goal: Risk for activity intolerance will decrease    Intervention: Encourage patient to increase activity level in collaboration with Interdisciplinary Team  Patient was up in chair start of shift. Encouraged patient to work with PT to increase activity level, and constantly reminded patient of weight bearing status while transferring from chair back to bed. Patient does not listen to weight bearing precautions and needs consistent reminders when ambulating.

## 2018-12-25 PROBLEM — R53.81 DEBILITY: Status: ACTIVE | Noted: 2018-01-01

## 2018-12-25 PROBLEM — R41.0 DISORIENTATION: Status: ACTIVE | Noted: 2018-01-01

## 2018-12-25 NOTE — PROGRESS NOTES
Pt has not had BM since PTA. Administered milk of mag along with scheduled stool softeners  as miralex was ineffective 24 hours after given.

## 2018-12-25 NOTE — ASSESSMENT & PLAN NOTE
Without exacerbation  Oxygen as needed, Respiratory protocol, Bronchodilators, Incentive spirometry

## 2018-12-25 NOTE — PROGRESS NOTES
Pt experienced 2 episodes of emesis. Zofran administered and ineffective. Benadryol given per MAR. With relief. Pt resting in bed.

## 2018-12-25 NOTE — CONSULTS
Hospital Medicine Consultation    Date of Service  12/25/2018    Referring Physician  Shay Berry M.D.    Consulting Physician  Indu Greene M.D.    Reason for Consultation  Altered mental status and disorientation    History of Presenting Illness  67 years old female with past medical history of coronary artery disease on aspirin and statin, chronic obstructive pulmonary disease not in exacerbation, dyslipidemia, hypertension, well-controlled type 2 diabetes mellitus admitted and degenerative diseases of the joints on 12/21/2018 for elective left hip arthroplasty.  Patient got left hip deep hardware removal, Left hip revision total hip arthroplasty, &  Left hip conversion of hemiarthroplasty to total hip arthroplasty on 12/21/2018.  On December 24 nursing staff noticed that the patient was disoriented and confused and was consulted on December 25 through Prashant CARRILLO on the behalf of Dr Berry, for the persistent confusion and disorientation.  Patient is on postoperative day #4, she cannot provide much history.  She only complains of left hip pain, and she knew that she was in the hospital for hip replacement.  She denies headache, focal weakness, seizures, vision changes, however her history is not reliable.  She reports not having a bowel movement since admission.  She denies having urgency, frequency, fevers, chills.     Review of Systems  Review of Systems   Constitutional: Negative for chills and fever.   HENT: Negative for congestion, ear discharge, ear pain and sore throat.    Eyes: Negative for photophobia, pain and discharge.   Respiratory: Negative for cough, hemoptysis, sputum production, shortness of breath and stridor.    Cardiovascular: Negative for chest pain, palpitations and leg swelling.   Gastrointestinal: Negative for abdominal pain, blood in stool, diarrhea and vomiting.   Genitourinary: Negative for flank pain, hematuria and urgency.   Musculoskeletal: Positive for joint pain.  Negative for falls.   Skin: Negative for itching and rash.   Neurological: Negative for dizziness, tremors, focal weakness, seizures and headaches.   Endo/Heme/Allergies: Negative for polydipsia. Does not bruise/bleed easily.   Psychiatric/Behavioral: Positive for memory loss. The patient is not nervous/anxious and does not have insomnia.        Past Medical History   has a past medical history of Bowel habit changes; CAD (coronary artery disease); COPD; Dental disorder; Hyperlipidemia; Hypertension; MI (myocardial infarction) (HCC) (Dec 30, 2007); and Type II or unspecified type diabetes mellitus without mention of complication, not stated as uncontrolled.    Surgical History   has a past surgical history that includes other abdominal surgery (hysterectomy); abdominal hysterectomy total; hip hemiarthroplasty (Left, 3/31/2017); and hip arthroplasty total (Left, 12/21/2018).    Family History  family history includes Diabetes in her mother; Hypertension in her father; Psychiatry in her brother.    Social History   reports that she quit smoking about 10 years ago. Her smoking use included Cigarettes. She has a 164.00 pack-year smoking history. She has never used smokeless tobacco. She reports that she does not drink alcohol or use drugs.    Medications  Prior to Admission Medications   Prescriptions Last Dose Informant Patient Reported? Taking?   aspirin EC (ECOTRIN) 81 MG Tablet Delayed Response 12/7/2018 at Unknown time Patient Yes Yes   Sig: Take 81 mg by mouth 3 times a day.   atorvastatin (LIPITOR) 40 MG Tab 12/20/2018 at am Patient No No   Sig: Take 1 Tab by mouth every day.   carvedilol (COREG) 3.125 MG Tab 12/20/2018 at am Patient No No   Sig: Take 1 Tab by mouth 2 times a day, with meals.   gabapentin (NEURONTIN) 300 MG Cap 12/19/2018 at Unknown time Patient Yes Yes   Sig: Take 300 mg by mouth 2 Times a Day.   metformin (GLUCOPHAGE) 1000 MG tablet 12/20/2018 at am Patient Yes Yes   Sig: Take 1,000 mg by mouth  every day.   nystatin (MYCOSTATIN) powder unknown at Unknown time Patient No No   Sig: Apply 1 g to affected area(s) 4 times a day.   oxybutynin SR (DITROPAN-XL) 10 MG CR tablet 12/20/2018 at am Patient No No   Sig: Take 1 Tab by mouth every day.      Facility-Administered Medications: None       Allergies  Allergies   Allergen Reactions   • Codeine Vomiting       Physical Exam  Temp:  [36.4 °C (97.6 °F)-36.7 °C (98 °F)] 36.7 °C (98 °F)  Pulse:  [] 74  Resp:  [16-18] 18  BP: (120-133)/(70-81) 122/70    Physical Exam   Constitutional: She appears well-developed and well-nourished. No distress.   HENT:   Head: Normocephalic and atraumatic.   Right Ear: External ear normal.   Left Ear: External ear normal.   Eyes: Pupils are equal, round, and reactive to light. Conjunctivae are normal. Right eye exhibits no discharge. Left eye exhibits no discharge.   Neck: Normal range of motion. Neck supple. No JVD present. No tracheal deviation present. No thyromegaly present.   Cardiovascular: Normal rate and regular rhythm.  Exam reveals no gallop and no friction rub.    No murmur heard.  Pulmonary/Chest: Effort normal and breath sounds normal. No stridor. No respiratory distress. She has no wheezes. She has no rales. She exhibits no tenderness.   Abdominal: Soft. Bowel sounds are normal. She exhibits no distension. There is no tenderness. There is no rebound and no guarding.   Musculoskeletal: She exhibits tenderness.   Clean dressing on the left hip   Neurological: She is alert. No cranial nerve deficit. Coordination normal.   Oriented to self and place   Skin: Skin is warm and dry. No rash noted. She is not diaphoretic. No erythema. No pallor.   Psychiatric:   Impaired thinking process.  Impaired judgment.   Got 1 out of 3 of 3 item recall  Was not able to draw a clock   Nursing note and vitals reviewed.      Fluids    Intake/Output Summary (Last 24 hours) at 12/25/18 1740  Last data filed at 12/24/18 1800   Gross per 24  hour   Intake                0 ml   Output              350 ml   Net             -350 ml       Laboratory  Recent Labs      12/23/18   0513  12/24/18   0400  12/25/18   0242   WBC  7.2  6.4  7.9   RBC  3.20*  2.85*  3.54*   HEMOGLOBIN  9.6*  8.4*  10.4*   HEMATOCRIT  29.7*  26.4*  32.4*   MCV  92.8  92.6  91.0   MCH  30.0  29.5  29.1   MCHC  32.3*  31.8*  32.0*   RDW  46.1  46.1  44.5   PLATELETCT  228  230  328   MPV  10.2  10.5  10.3                         Imaging  CT-HEAD W/O   Final Result      1.  No acute intracranial abnormality      2.  Underlying cerebral volume loss and white matter change      [id number] VIEW   Final Result      1.  No dilated bowel identified      2.  Revision left hip arthroplasty with inward protrusion which could indicate loosening/erosion      3.  Spinal degenerative change      DX-PELVIS-1 OR 2 VIEWS   Final Result      Left hip prosthesis revision.      DX-PELVIS-1 OR 2 VIEWS   Final Result      1.  Interval revision of LEFT hip arthroplasty   2.  Periprosthetic LEFT acetabular fracture   3.  Osteopenia   4.  Mild RIGHT hip osteoarthritis      Findings were discussed with Dr. WALLACE via circulating nurse Roseline on 12/21/2018 12:58 PM.        No acute hemorrhage or large infarction on CT scan of the head    EKG interpreted by myself sinus tachycardia rate 101, T wave inversions in lead II, 3, aVF similar to previous EKG.  No ST elevation .    Assessment/Plan  Disorientation   Assessment & Plan    Mostly due to polypharmacy [patient got diphenhydramine 25 mg and Haldol 1 mg on December 24, and scopolamine patch on December 25 she also has been getting 10 mg of oxycodone for pain]  Holding nicotine, oxybutynin, diphenhydramine, Haldol, scopolamine  Reducing oxycodone  Accu-Cheks glucose for hypoglycemia  B12, RPR, U/A, KUB, Bladder scans, TSH  CT-head ordered   Window bed, maintain awake daytime state.    Continue frequent re-orientation, encourage activity.   Consider MRI & EEG  if did not improve with the above measure      Debility- (present on admission)   Assessment & Plan    PT OT evaluation, recommended skilled nursing facility  [facility]     Coronary artery disease involving native coronary artery of native heart without angina pectoris- (present on admission)   Assessment & Plan    Carvedilol, atorvastatin, and aspirin     Chronic obstructive pulmonary disease (HCC)- (present on admission)   Assessment & Plan    Without exacerbation  Oxygen as needed, Respiratory protocol, Bronchodilators, Incentive spirometry      Vitamin D deficiency- (present on admission)   Assessment & Plan    Last lab was low ~ 17  Checking level       Controlled type 2 diabetes mellitus with diabetic polyneuropathy, without long-term current use of insulin (HCC)- (present on admission)   Assessment & Plan    Glycated hemoglobin 6  On Metformin   Accu-Checks, hypoglycemia protocol       Low back pain- (present on admission)   Assessment & Plan    At baseline  Pain control     Dyslipidemia- (present on admission)   Assessment & Plan    Atorvastatin

## 2018-12-25 NOTE — PROGRESS NOTES
"Orthopaedic Progress Note    Author: Prashant Newsome Date & Time created: 12/25/2018   11:54 AM     Interval Events:  Pod#4  Review of Systems   Gastrointestinal: Positive for nausea.   Psychiatric/Behavioral: Positive for memory loss.     Hemodynamics:  Blood pressure 122/70, pulse 74, temperature 36.7 °C (98 °F), temperature source Temporal, resp. rate 18, height 1.702 m (5' 7\"), weight 74.6 kg (164 lb 7.4 oz), SpO2 90 %, not currently breastfeeding.     Current Precaution Orders   Procedures   • WEIGHT BEARING STATUS     Standing Status:   Standing     Number of Occurrences:   1     Order Specific Question:   What is the patients weight bearing status?     Answer:   TOE TOUCH WEIGHT BEARING     Respiratory:    Respiration: 18, Pulse Oximetry: 90 %        RUL Breath Sounds: Clear, RML Breath Sounds: Clear, RLL Breath Sounds: Diminished, JULISSA Breath Sounds: Clear, LLL Breath Sounds: Diminished  Fluids:    Intake/Output Summary (Last 24 hours) at 12/25/18 1154  Last data filed at 12/24/18 1800   Gross per 24 hour   Intake                0 ml   Output              350 ml   Net             -350 ml     Admit Weight: 74.6 kg (164 lb 7.4 oz)  Current      Physical Exam   Constitutional: She appears well-developed and well-nourished. No distress.   HENT:   Head: Normocephalic.   Cardiovascular: Normal rate and regular rhythm.    Skin: She is not diaphoretic.   Psychiatric:   Disoriented x 3, doesn't even know it is [place] today.  Very, confused and disoriented pass her baseline.  Not normal thought content and does not respond to questions or direction, much pass her baseline.     Labs:  Recent Labs      12/23/18   0513  12/24/18   0400  12/25/18   0242   WBC  7.2  6.4  7.9   RBC  3.20*  2.85*  3.54*   HEMOGLOBIN  9.6*  8.4*  10.4*   HEMATOCRIT  29.7*  26.4*  32.4*   MCV  92.8  92.6  91.0   MCH  30.0  29.5  29.1   MCHC  32.3*  31.8*  32.0*   RDW  46.1  46.1  44.5   PLATELETCT  228  230  328   MPV  10.2  10.5  10.3 "           Medical Decision Making/Problem List:    There are no active hospital problems to display for this patient.    Core Measures & Quality Metrics:  Current DVT prophylaxis: teds hose, aspirin, mehrdads  Discussed patient condition with RN and Patient, internal medicine  Clearance for lovenox/heparin: okay,on aspirin now per total joint dvt prevention  Weight Bearing Status: Touchdown weight bearing  Wounds & Drains:   Wound cdi, dermabond mesh intect  Disposition and Follow-up:   Disoriented x 3, doesn't even know it is [place] today.  Very, confused and disoriented pass her baseline.  Not normal thought content and does not respond to questions or direction, much pass her baseline.   Awaiting snf, consulting internal med for disorientation, confusion, and acute demetia.  Quality core measures.

## 2018-12-25 NOTE — CARE PLAN
Problem: Safety  Goal: Will remain free from falls  Outcome: PROGRESSING AS EXPECTED   Treaded socks in place, bed in the lowest position, bed alarm on, call light and belongings within reach, pt call for assistance appropriately    Problem: Pain Management  Goal: Pain level will decrease to patient's comfort goal  Outcome: PROGRESSING AS EXPECTED  Medicated patient per MAR for pain. Encouraged multimodal interventions as well such as heat, imagery, positioning, and relaxation

## 2018-12-25 NOTE — ASSESSMENT & PLAN NOTE
Mostly due to polypharmacy [patient got diphenhydramine 25 mg and Haldol 1 mg on December 24, and scopolamine patch on December 25 she also has been getting 10 mg of oxycodone for pain]  Improved with nicotine, oxybutynin, diphenhydramine, Haldol, scopolamine, and reducing oxycodone

## 2018-12-25 NOTE — PROGRESS NOTES
Upon assesment this AM pt extremely disoriented. AO times 0, not even to self. Unable to hold conversation, follow directions, completely noncompliant with WB restrictions.     Paged Prashant CARRILLO for reference of patient's baseline orientation and was reported that this is abnormal for this patient.     Hospitalist service consulted

## 2018-12-25 NOTE — CARE PLAN
Problem: Fluid Volume:  Goal: Will maintain balanced intake and output    Intervention: Monitor, educate, and encourage compliance with therapeutic intake of liquids  Patient has water and several sodas at bedside, and is encouraged to drink frequently to stay hydrated.      Problem: Urinary Elimination:  Goal: Ability to reestablish a normal urinary elimination pattern will improve    Intervention: Assist patient to sit on commode or toilet for voiding  Patient assisted to commode for voiding. Patient is also occasionally incontinent. Patient has been voiding without difficulty since silver removal 12/24.

## 2018-12-26 NOTE — CARE PLAN
Problem: Pain Management  Goal: Pain level will decrease to patient's comfort goal  Patient medicated for pain to left leg.  Patient able to rest comfortably.

## 2018-12-26 NOTE — PROGRESS NOTES
Hospital Medicine Daily Progress Note    Date of Service  12/26/2018    Chief Complaint  Confusion and disorientation.      Hospital Course      67 years old female with past medical history of coronary artery disease on aspirin and statin, chronic obstructive pulmonary disease not in exacerbation, dyslipidemia, hypertension, well-controlled type 2 diabetes mellitus admitted and degenerative diseases of the joints on 12/21/2018 for elective left hip arthroplasty.  Patient got left hip deep hardware removal, Left hip revision total hip arthroplasty, &  Left hip conversion of hemiarthroplasty to total hip arthroplasty on 12/21/2018.  On December 24 nursing staff noticed that the patient was disoriented and confused and was consulted on December 25 for the persistent confusion and disorientation.  Wound was to secondary to polypharmacy with diphenhydramine, oxybutynin, and Haldol.  Patient mental status improved after reducing her her medications.        Interval Problem Update  Hemodynamically stable apart from 2 episodes of asymptomatic systolic hypertension 91-93  Saturating well on room air.  Mental status improved a lot per nursing staff   Today she was oriented to self, place, situation, on my evaluation this morning.  She knows that she is in the hospital for left hip arthroplasty.   Reportedly  says that she has been having problems with memory and concentration.  BladderScan was 415 mL's, however this was not a post void scan, and the patient actually had an urgent and past urine afterwards.   Internal medicine will sign off please do not hesitate to contact us if you have further questions.    Consultants/Specialty  Ortho is primary   Internal medicine is consulting    Code Status  Full    Disposition  [facility] accepted, discharge when medically cleared      Review of Systems  Review of Systems   Constitutional: Negative for chills and fever.   HENT: Negative for congestion and sore throat.    Eyes:  Negative for pain and discharge.   Respiratory: Negative for cough, hemoptysis, sputum production, shortness of breath and stridor.    Cardiovascular: Negative for chest pain, palpitations and leg swelling.   Gastrointestinal: Negative for abdominal pain, blood in stool, diarrhea and vomiting.   Genitourinary: Negative for flank pain and hematuria.   Musculoskeletal: Positive for joint pain. Negative for myalgias.   Skin: Negative.    Neurological: Negative for seizures and loss of consciousness.   Endo/Heme/Allergies: Does not bruise/bleed easily.   Psychiatric/Behavioral: Positive for memory loss. Negative for hallucinations and suicidal ideas. The patient is not nervous/anxious.         Physical Exam  Temp:  [36.1 °C (97 °F)-37.2 °C (99 °F)] 37.2 °C (99 °F)  Pulse:  [] 78  Resp:  [17-18] 18  BP: ()/(51-73) 93/73    Physical Exam   Constitutional: She appears well-developed and well-nourished. No distress.   HENT:   Head: Normocephalic and atraumatic.   Right Ear: External ear normal.   Left Ear: External ear normal.   Eyes: Pupils are equal, round, and reactive to light. Conjunctivae are normal. Right eye exhibits no discharge. Left eye exhibits no discharge.   Neck: Normal range of motion. Neck supple. No JVD present. No tracheal deviation present. No thyromegaly present.   Cardiovascular: Exam reveals no gallop and no friction rub.    No murmur heard.  Pulmonary/Chest: Effort normal and breath sounds normal. No stridor. No respiratory distress. She has no wheezes. She has no rales. She exhibits no tenderness.   Abdominal: Soft. Bowel sounds are normal. She exhibits no distension. There is no tenderness. There is no rebound and no guarding.   Musculoskeletal: Normal range of motion. She exhibits no edema, tenderness or deformity.   Clean dressing on the left hip    Neurological: She is alert. No cranial nerve deficit. Coordination normal.   Oriented to self, place, situation, she knows that she is in  the hospital for left hip arthroplasty   Skin: Skin is warm and dry. No rash noted. She is not diaphoretic. No erythema. No pallor.   Psychiatric:   Nervous, poor concentration    Nursing note and vitals reviewed.      Fluids    Intake/Output Summary (Last 24 hours) at 12/26/18 1529  Last data filed at 12/26/18 1246   Gross per 24 hour   Intake              340 ml   Output              400 ml   Net              -60 ml       Laboratory  Recent Labs      12/24/18   0400  12/25/18   0242  12/26/18   0353   WBC  6.4  7.9  5.3   RBC  2.85*  3.54*  2.89*   HEMOGLOBIN  8.4*  10.4*  8.8*   HEMATOCRIT  26.4*  32.4*  26.4*   MCV  92.6  91.0  91.3   MCH  29.5  29.1  30.4   MCHC  31.8*  32.0*  33.3*   RDW  46.1  44.5  44.4   PLATELETCT  230  328  284   MPV  10.5  10.3  10.0     Recent Labs      12/26/18   1241   SODIUM  140   POTASSIUM  4.1   CHLORIDE  106   CO2  27   GLUCOSE  168*   BUN  15   CREATININE  0.63   CALCIUM  9.1                   Imaging  CT-HEAD W/O   Final Result      1.  No acute intracranial abnormality      2.  Underlying cerebral volume loss and white matter change      [id number] VIEW   Final Result      1.  No dilated bowel identified      2.  Revision left hip arthroplasty with inward protrusion which could indicate loosening/erosion      3.  Spinal degenerative change      DX-PELVIS-1 OR 2 VIEWS   Final Result      Left hip prosthesis revision.      DX-PELVIS-1 OR 2 VIEWS   Final Result      1.  Interval revision of LEFT hip arthroplasty   2.  Periprosthetic LEFT acetabular fracture   3.  Osteopenia   4.  Mild RIGHT hip osteoarthritis      Findings were discussed with Dr. WALLACE via circulating nurse Roseline on 12/21/2018 12:58 PM.           Assessment/Plan  Disorientation   Assessment & Plan    Mostly due to polypharmacy [patient got diphenhydramine 25 mg and Haldol 1 mg on December 24, and scopolamine patch on December 25 she also has been getting 10 mg of oxycodone for pain]  Improved with nicotine,  oxybutynin, diphenhydramine, Haldol, scopolamine, and reducing oxycodone      Debility- (present on admission)   Assessment & Plan    PT OT evaluation, recommended skilled nursing facility  [facility]     Coronary artery disease involving native coronary artery of native heart without angina pectoris- (present on admission)   Assessment & Plan    Carvedilol, atorvastatin, and aspirin      Chronic obstructive pulmonary disease (HCC)- (present on admission)   Assessment & Plan    Without exacerbation  Oxygen as needed, Respiratory protocol, Bronchodilators, Incentive spirometry       Vitamin D deficiency- (present on admission)   Assessment & Plan    Last lab was low ~ 17  Checking level       Controlled type 2 diabetes mellitus with diabetic polyneuropathy, without long-term current use of insulin ([facility])- (present on admission)   Assessment & Plan    Glycated hemoglobin 6  On Metformin   Accu-Checks, hypoglycemia protocol        Low back pain- (present on admission)   Assessment & Plan    At baseline  Pain control      Dyslipidemia- (present on admission)   Assessment & Plan    Atorvastatin            VTE prophylaxis: SCDs

## 2018-12-26 NOTE — DISCHARGE PLANNING
Received Transport Form @ 4255  Spoke to Emily @ [facility]    Transport is scheduled for 12/26 @1500 going to [facility].

## 2018-12-26 NOTE — DISCHARGE SUMMARY
DATE OF ADMISSION:  12/21/2018    DATE OF DISCHARGE TO SKILLED NURSING FACILITY:  12/26/2018    ADMITTING PHYSICIAN:  Shay Berry MD    DISCHARGE DIAGNOSES:  Status post left total hip conversion to total hip from   hemiarthroplasty with delayed wound healing and hip fracture due to   osteoporosis.    DISCHARGE MEDICATIONS:  Include,   1.  Oxycodone 10 mg 1 every 3 hours as needed for severe pain distribution of   56 for 7 days.  2.  Aspirin 325 two times a day for 44 days for DVT prevention, postop total   hip and touchdown weightbearing status for 6 weeks.  3.  Methocarbamol Robaxin for muscle spasms, postoperatively for 21 days, 3   times a day.  4.  Keflex 500 mg 4 times a day for 14 days distribution of 56, this is a   prophylaxis for antibiotic post-surgical prescription.    Follow up in approximately 2 weeks from date of operation, Dr. Shay Berry's   office to reevaluate the patient.    HISTORY OF PRESENT ILLNESS/HOSPITAL COURSE:  The patient is a 67-year-old   female who came to our office with severe left hip pain that pretty much made   her unambulatory for 1 year confined to a scooter or wheelchair.  Previously   had a hemiarthroplasty last year due to a hip fracture and osteoporosis.  The   patient went to the conversion unfortunately suffered acetabulum fracture.    Due to her poor bone quality during the operation; therefore, she will be 6   weeks of touchdown or  toe touch weightbearing for 6 weeks post the operation   with bone heal.  Other than that, she has not had any other complications.    Due to her mobility status being toe touch or touchdown weightbearing it is   recommended she go to the skilled nursing facility, which she has been   accepted to [facility] and like her to be discharged as soon as possible.  On   12/26/2018 to the skilled nursing facility where she can get the adequate   rehabilitation therapy that she needs postoperatively.  The operation itself   went well without any  complications other than the postoperative film shows   acetabular fracture.  She has passed all other nursing and physical therapy   criteria; therefore, I recommend to a skilled nursing facility.  We will   reevaluate the patient in approximately 4 weeks from the date of operation.       ____________________________________     NEHA Gaytan / ALKA    DD:  12/26/2018 12:07:44  DT:  12/26/2018 12:36:30    D#:  [id number]  Job#:  [id number]

## 2018-12-26 NOTE — CARE PLAN
Problem: Safety  Goal: Will remain free from falls    Intervention: Implement fall precautions  Bed alarm in use.      Problem: Pain Management  Goal: Pain level will decrease to patient's comfort goal    Intervention: Follow pain managment plan developed in collaboration with patient and Interdisciplinary Team  Pain well controlled with oral medications at with stime.

## 2018-12-26 NOTE — DISCHARGE INSTRUCTIONS
Discharge Instructions  *Follow up with Dr. Berry in 1 Week  *Weight bearing: Toe Touch                 *Activity as tolerated  *Use assistive device for all activity  *Continue exercises provided by physical therapy  *Elevate leg as needed  *Ice as needed (20 minutes every 1-2 hours)  *Change dressing as needed  *Ok to shower with incision covered.  *No soaking of the incision; no baths, hot tubs, or swimming until cleared by doctor         *No driving until cleared by doctor  *Take medications as prescribed by doctor  *Call doctor’s office with any questions or concerns       Discharged to other by medical transportation with escort. Discharged via wheelchair, hospital escort: Yes.  Special equipment needed: Walker    Be sure to schedule a follow-up appointment with your primary care doctor or any specialists as instructed.     Discharge Plan:   Diet Plan: Discussed  Activity Level: Discussed  Confirmed Follow up Appointment: Appointment Scheduled  Confirmed Symptoms Management: Discussed  Medication Reconciliation Updated: Yes  Pneumococcal Vaccine Administered/Refused: Not given - Patient refused pneumococcal vaccine  Influenza Vaccine Indication: Patient Refuses    I understand that a diet low in cholesterol, fat, and sodium is recommended for good health. Unless I have been given specific instructions below for another diet, I accept this instruction as my diet prescription.   Other diet: Regular Diet    Special Instructions: Discharge instructions for the Orthopedic Patient    Follow up with Primary Care Physician within 2 weeks of discharge to home, regarding:  Review of medications and diagnostic testing.  Surveillance for medical complications.  Workup and treatment of osteoporosis, if appropriate.     -Is this a Joint Replacement patient? Yes   Total Joint Hip Replacement Discharge Instructions    Pain  - The goal is to slowly wean off the prescription pain medicine.  - Ice can be used for pain control.   20 minutes at a time is recommended, and never directly against your skin or incision.  - Most patients are off the pain pills by 3 weeks; others may require a low level of pain medications for many months. If your pain continues to be severe, follow up with your physician.  Infection  Deep hip joint infections that require removal of the prostheses occur in less than 0.1% of patients. Lesser infections in the skin (cellulites) are more common and much more easily treated.  - Keep the incision as clean and dry as possible.  - Always wash your hands before touching your incision.  - Skin infections tend to develop around 7-10 days after surgery, most can be treated with oral antibiotics.  - Dental Care should be delayed for 3 months after surgery, your surgeon recommends taking a dose of antibiotics 1 hour prior to any dental procedure.  After 2 years, most surgeons recommend antibiotics only before an extensive procedure.  Ask your surgeon what he recommends.  - Signs and symptoms of infection can include:  low grade fever, redness, pain, swelling and drainage from your incision.  Notify your surgeon immediately if you develop any of these symptoms.  Post op Disturbances  - Bowel habits - constipation is extremely common and is caused by a combination of anesthesia, lack of mobility and pain medicine.  Use stool softeners or laxatives if necessary. It is important not to ignore this problem, as bowel obstructions can be a serious complication after joint replacement surgery.  - Mood/Energy Level - Many patients experience a lack of energy and endurance for up to 2-3 months after surgery.  Some may also feel down and can even become depressed.  This is likely due to the postoperative anemia, change in activity level, lack of sleep, pain medicine and just the emotional reaction to the surgery itself that is a big disruption in a person’s life.  This usually passes.  If symptoms persist, follow up with your primary  physician.  - Returning to work - Your surgeon will give you more specific instructions.  Generally, if you work a sedentary job requiring little standing or walking, most patients may return within 2-6 weeks.  Manual labor jobs involving walking, lifting and standing may take 3-4 months.  Your surgeon’s office can provide a release to part-time or light duty work early on in your recovery and progress you to full duty as able.  - Driving - You can begin driving an automatic shift car in 4 to 8 weeks, provided you are no longer taking narcotic pain medication. If you have a stick-shift car and your right hip was replaced, do not begin driving until your doctor says you can.   - Avoiding falls -  throw rugs and tack down loose carpeting.  Be aware of floor hazards such as pets, small objects or uneven surfaces.   -  Airport Metal Detectors - The sensitivity of metal detectors varies and it is likely that your prosthesis will cause an alarm. Inform the  that you have an artificial joint.  Diet  - Resume your normal diet as tolerated.  - It is important to achieve a healthy nutritional status by eating a well balanced diet on a regular basis.  - Your physician may recommend that you take iron and vitamin supplements.   - Continue to drink plenty of fluids.  Shower/Bathing  - You may shower as soon as you get home from the hospital unless otherwise instructed.  - Keep your incision out of water.  To keep the incision dry when showering, cover it with a plastic bag or plastic wrap.  - Pat incision dry if it gets wet.  Don’t rub.  - Do not submerge in a bath until staples are out and the incision is completely healed. (Approximately 6-8 weeks after surgery).  Dressing Change:  Procedure (if recommended by your physician)  - Wash hands.  - Open all dressing change materials.  - Remove old dressing and discard.  - Inspect incision for redness, increase in clear drainage, yellow/green drainage, odor  and surrounding skin hot to touch.  -  ABD (large gauze) pad by one corner and lay over the incision.  Be careful not to touch the inside of the dressing that will lay over the incision.  - Secure in place as instructed (Ace wrap or tape).    Swelling/Bruising  - Swelling is normal after hip replacement and can involve the thigh, knee, calf and foot.  - Swelling can last from 3-6 months.  - Elevate your leg higher than your heart while reclining.  The first week you are home you should elevate your leg an equal amount of time, as you are active.    - Anti-inflammatory pills can be taken once you have stopped the blood thinners.  - The swelling is usually worse after you go home since you are upright for longer periods of time.  - Bruising is common and can involve the entire leg including the thigh, calf and even foot.  Bruising often does not appear until after you arrive home and it can be quite dramatic- purple, black, green.  The bruising you can see is not usually concerning and will subside without any treatment.      Blood Clot Prevention  Blood clots in the legs and the less common, but frightening, clots that travel to the lungs are a real focus of our preventative. Most patients are at standard risk for them, but those patients who are at higher risk include people who have had previous clots, a family history of clotting, smoking, diabetes, obesity, advanced age, use of estrogen and a sedentary lifestyle.    - Signs of blood clots in legs - Swelling in thigh, calf or ankle that does not go down with elevation.  Pain, heat and tenderness in calf, back of calf or groin area.  NOTE: blood clots can occur in either leg.  - You have been receiving anticoagulant therapy (blood thinners) in the hospital and you may be instructed to continue at home depending on your risk factors.  - Your risk for developing a clot continues for up to 2-3 months after surgery.  You should avoid prolonged sitting and  dehydration during that time (long air trips and car trips).  If you do take a trip during this time, please get up and move around every 1- 1.5 hours.  - If you are prescribed blood thinning medication for home, follow instructions as directed. (Handouts provided if applicable).      Activity    Once you get home, you should stay active. The key is not to overdo it! While you can expect some good days and some bad days, you should notice a gradual improvement over time you should notice a gradual improvement and a gradual increase in your endurance over the next 6 to 12 months.    - Weight Bearing - If you have undergone cemented or hybrid hip replacement, you can put some weight on the leg immediately using a cane or walker, and you should continue to use some support for 4 to 6 weeks to help the muscles recover.   - Sleeping Positions - Sleep on your back with your legs slightly apart or on your side with a regular pillow between your knees. Be sure to use the pillow for at least 6 weeks, or until your doctor says you can do without it. Sleeping on your stomach should be all right  - Sitting - For at least the first 3 months, sit only in chairs that have arms. Do not sit on low chairs, low stools, or reclining chairs. Do not cross your legs at the knees. The physical therapist will show you how to sit and stand from a chair, keeping your affected leg out in front of you. Get up and move around on a regular basis--at least once every hour.  - Walking - Walk as much as you like once your doctor gives you the go-ahead, but remember that walking is no substitute for your prescribed exercises. Walking with a pair of trekking poles is helpful and adds as much as 40% to the exercise you get when you walk  - Therapy may be needed in some cases, to strengthen your muscles and improve your gait (walking pattern).  This decision will be made at your post-operative appointment.  Follow your therapist recommended  post-operative exercises (handout provided by Therapist).  - Swimming is also recommended; you can begin as soon as the sutures have been removed and the wound is healed, approximately 6 to 8 weeks after surgery. Using a pair of training fins may make swimming a more enjoyable and effective exercise.  - Other activities - Lower impact activities are preferred.  If you have specific questions, consult your Surgeon.    - Sexual activity - Your surgeon can tell you when it should be safe to resume sexual activity.      When to Call the Doctor   Call the physician if:   - Fever over 100.5? F  - Increased pain, drainage, redness, odor or heat around the incision area  - Shaking chills  - Increased knee pain with activity and rest  - Increased pain in calf, tenderness or redness above or below the knee  - Increased swelling of calf, ankle, foot  - Sudden increased shortness of breath, sudden onset of chest pain, localized chest pain with coughing  - Incision opening  Or, if there are any questions or concerns about medications or care.       -Is this patient being discharged with medication to prevent blood clots?  Yes, Aspirin Aspirin, ASA oral tablets  What is this medicine?  ASPIRIN (AS pir in) is a pain reliever. It is used to treat mild pain and fever. This medicine is also used as directed by a doctor to prevent and to treat heart attacks, to prevent strokes, and to treat arthritis or inflammation.  This medicine may be used for other purposes; ask your health care provider or pharmacist if you have questions.  COMMON BRAND NAME(S): Aspir-Low, Aspir-Jihan, Aspirtab, Logan Advanced Aspirin, Logan Aspirin, Logan Aspirin Extra Strength, Logan Aspirin Plus, Logan Extra Strength, Logan Extra Strength Plus, Logan Genuine Aspirin, Logan Womens Aspirin, Bufferin, Bufferin Extra Strength, Bufferin Low Dose  What should I tell my health care provider before I take this medicine?  They need to know if you have any of these  conditions:  -anemia  -asthma  -bleeding problems  -child with chickenpox, the flu, or other viral infection  -diabetes  -gout  -if you frequently drink alcohol containing drinks  -kidney disease  -liver disease  -low level of vitamin K  -lupus  -smoke tobacco  -stomach ulcers or other problems  -an unusual or allergic reaction to aspirin, tartrazine dye, other medicines, dyes, or preservatives  -pregnant or trying to get pregnant  -breast-feeding  How should I use this medicine?  Take this medicine by mouth with a glass of water. Follow the directions on the package or prescription label. You can take this medicine with or without food. If it upsets your stomach, take it with food. Do not take your medicine more often than directed.  Talk to your pediatrician regarding the use of this medicine in children. While this drug may be prescribed for children as young as 12 years of age for selected conditions, precautions do apply. Children and teenagers should not use this medicine to treat chicken pox or flu symptoms unless directed by a doctor.  Patients over 65 years old may have a stronger reaction and need a smaller dose.  Overdosage: If you think you have taken too much of this medicine contact a poison control center or emergency room at once.  NOTE: This medicine is only for you. Do not share this medicine with others.  What if I miss a dose?  If you are taking this medicine on a regular schedule and miss a dose, take it as soon as you can. If it is almost time for your next dose, take only that dose. Do not take double or extra doses.  What may interact with this medicine?  Do not take this medicine with any of the following medications:  -cidofovir  -ketorolac  -probenecid  This medicine may also interact with the following medications:  -alcohol  -alendronate  -bismuth subsalicylate  -flavocoxid  -herbal supplements like feverfew, garlic, carrie, ginkgo biloba, horse chestnut  -medicines for diabetes or  glaucoma like acetazolamide, methazolamide  -medicines for gout  -medicines that treat or prevent blood clots like enoxaparin, heparin, ticlopidine, warfarin  -other aspirin and aspirin-like medicines  -NSAIDs, medicines for pain and inflammation, like ibuprofen or naproxen  -pemetrexed  -sulfinpyrazone  -varicella live vaccine  This list may not describe all possible interactions. Give your health care provider a list of all the medicines, herbs, non-prescription drugs, or dietary supplements you use. Also tell them if you smoke, drink alcohol, or use illegal drugs. Some items may interact with your medicine.  What should I watch for while using this medicine?  If you are treating yourself for pain, tell your doctor or health care professional if the pain lasts more than 10 days, if it gets worse, or if there is a new or different kind of pain. Tell your doctor if you see redness or swelling. Also, check with your doctor if you have a fever that lasts for more than 3 days. Only take this medicine to prevent heart attacks or blood clotting if prescribed by your doctor or health care professional.  Do not take aspirin or aspirin-like medicines with this medicine. Too much aspirin can be dangerous. Always read the labels carefully.  This medicine can irritate your stomach or cause bleeding problems. Do not smoke cigarettes or drink alcohol while taking this medicine. Do not lie down for 30 minutes after taking this medicine to prevent irritation to your throat.  If you are scheduled for any medical or dental procedure, tell your healthcare provider that you are taking this medicine. You may need to stop taking this medicine before the procedure.  This medicine may be used to treat migraines. If you take migraine medicines for 10 or more days a month, your migraines may get worse. Keep a diary of headache days and medicine use. Contact your healthcare professional if your migraine attacks occur more frequently.  What  side effects may I notice from receiving this medicine?  Side effects that you should report to your doctor or health care professional as soon as possible:  -allergic reactions like skin rash, itching or hives, swelling of the face, lips, or tongue  -breathing problems  -changes in hearing, ringing in the ears  -confusion  -general ill feeling or flu-like symptoms  -pain on swallowing  -redness, blistering, peeling or loosening of the skin, including inside the mouth or nose  -signs and symptoms of bleeding such as bloody or black, tarry stools; red or dark-brown urine; spitting up blood or brown material that looks like coffee grounds; red spots on the skin; unusual bruising or bleeding from the eye, gums, or nose  -trouble passing urine or change in the amount of urine  -unusually weak or tired  -yellowing of the eyes or skin  Side effects that usually do not require medical attention (report to your doctor or health care professional if they continue or are bothersome):  -diarrhea or constipation  -headache  -nausea, vomiting  -stomach gas, heartburn  This list may not describe all possible side effects. Call your doctor for medical advice about side effects. You may report side effects to FDA at 8-064-FDA-4994.  Where should I keep my medicine?  Keep out of the reach of children.  Store at room temperature between 15 and 30 degrees C (59 and 86 degrees F). Protect from heat and moisture. Do not use this medicine if it has a strong vinegar smell. Throw away any unused medicine after the expiration date.  NOTE: This sheet is a summary. It may not cover all possible information. If you have questions about this medicine, talk to your doctor, pharmacist, or health care provider.  © 2018 [employer]vier/Gold Standard (2014-08-19 11:30:31)      · Is patient discharged on Warfarin / Coumadin?   No     Depression / Suicide Risk    As you are discharged from this [facility] facility, it is important to learn how to keep  safe from harming yourself.    Recognize the warning signs:  · Abrupt changes in personality, positive or negative- including increase in energy   · Giving away possessions  · Change in eating patterns- significant weight changes-  positive or negative  · Change in sleeping patterns- unable to sleep or sleeping all the time   · Unwillingness or inability to communicate  · Depression  · Unusual sadness, discouragement and loneliness  · Talk of wanting to die  · Neglect of personal appearance   · Rebelliousness- reckless behavior  · Withdrawal from people/activities they love  · Confusion- inability to concentrate     If you or a loved one observes any of these behaviors or has concerns about self-harm, here's what you can do:  · Talk about it- your feelings and reasons for harming yourself  · Remove any means that you might use to hurt yourself (examples: pills, rope, extension cords, firearm)  · Get professional help from the community (Mental Health, Substance Abuse, psychological counseling)  · Do not be alone:Call your Safe Contact- someone whom you trust who will be there for you.  · Call your local CRISIS HOTLINE 386-5683 or 139-647-6495  · Call your local Children's Mobile Crisis Response Team [place] (698) 147-7734 or www.natue  · Call the toll free National Suicide Prevention Hotlines   · National Suicide Prevention Lifeline 551-350-IVAB (7924)  · National Hope Line Network 800-SUICIDE (570-3354)    How can pain medicine affect me?  You were prescribed pain medicine. This medicine may:  · Make you tired or sleepy.  · Make you feel dizzy.  · Affect how well you can:  ¨ Drive  ¨ Do certain activities.  Pain medicine may not make all of your pain go away. You should be comfortable enough to:  · Move.  · Breathe.  · Take care of yourself.  How often should I take pain medicine and how much should I take?  · Take pain medicine only as told by your doctor and only as needed for pain.  · You do not  need to take pain medicine if you are not having pain, unless your doctor tells you to do that.  · You can take less than the prescribed dose if you find that less medicine helps your pain.  · If you have very bad (severe) pain, call your doctor. Do not take more pills than told by your doctor. Do not take pills more often than told by your doctor.  What should I avoid while I am taking pain medicine?  Follow these instructions after you start taking pain medicine, while you are taking the medicine, and for 8 hours after you stop taking the medicine:  · Do not drive.  · Do not use machinery.  · Do not use power tools.  · Do not sign legal documents.  · Do not drink alcohol.  · Do not take sleeping pills.  · Do not take care of children by yourself.  · Do not do any activities that involve climbing or being in high places.  · Do not go into any body of water unless there is an adult nearby who can watch and help you. This includes:  ¨ Lakes.  ¨ Rivers.  ¨ Oceans.  ¨ Spas.  ¨ Swimming pools.  How can I keep others safe while I am taking pain medicine?  · Store your pain medicine as told by your doctor. Make sure that you keep it where children and pets cannot reach it.  · Do not share your pain medicine with anyone.  · Do not save any leftover pills. If you have any leftover pain medicine, get rid of it or destroy it as told by your doctor.  What else do I need to know about taking pain medicine?  · Use a poop (stool) softener if you have trouble pooping (constipation) because of your pain medicine. Eating more fruits and vegetables also helps with constipation.  · Write down the times when you take your pain medicine. Look at the times before you take your next dose of medicine.  · Your pain medicine might have acetaminophen in it. Do not take any other acetaminophen while you are taking this medicine. An overdose of acetaminophen can do very bad damage to your liver. If you are taking any medicines in addition to  your pain medicine, check the active ingredients on those medicines to see if acetaminophen is listed.  When should I call my doctor?  · Your medicine is not helping the pain.  · You do either of these soon after you take the medicine:  ¨ Throw up (vomit).  ¨ Have watery poop (diarrhea).  · You have new pain in areas that did not hurt before.  · You have an allergic reaction to your medicine. This may include:  ¨ Feeling itchy.  ¨ Swelling.  ¨ Feeling dizzy.  ¨ Getting a new rash.  · You cannot put up with feeling:  ¨ Dizzy.  ¨ Sick to your stomach (nauseous).  When should I call 911 or go to the emergency room?  · You pass out (faint).  · You feel very confused.  · You throw up again and again.  · Your skin or lips turn pale or bluish in color.  · You are:  ¨ Short of breath.  ¨ Breathing much more slowly than usual.  · You have a very bad allergic reaction to your medicine. This includes:  ¨ Developing a swollen tongue.  ¨ Having trouble breathing.  This information is not intended to replace advice given to you by your health care provider. Make sure you discuss any questions you have with your health care provider.  Document Released: 06/05/2009 Document Revised: 08/24/2017 Document Reviewed: 10/22/2015  © 2017 Elsevier

## 2018-12-26 NOTE — DISCHARGE PLANNING
Agency/Facility Name: [facility]  Spoke To: JANNIE Morales  Outcome: Asked to resend referral, as they state that they did not receive them.

## 2018-12-26 NOTE — PROGRESS NOTES
Patient discharged to [facility]. Report called to receiving facility. Discharge packet and all belongings sent with patient.

## 2018-12-26 NOTE — PROGRESS NOTES
Bladder scan completed, random bladder scan, not post void. Patients most recent void 3 hours previously. Patient states urge to void and will attempt to ambulate to bathroom.

## 2018-12-26 NOTE — DISCHARGE PLANNING
Per patient's request called spouse Richard @ [phone] and left message that patient will transfer to [facility] at 3pm today with my return call back number

## 2018-12-26 NOTE — CARE PLAN
Problem: Safety  Goal: Will remain free from falls  Patient's room is located near nurses station.  The bed alarm is in use. Frequent rounding.  Charting done outside of room.

## 2018-12-26 NOTE — DISCHARGE SUMMARY
Anticipated Discharge Disposition: SNF     Action: [facility] packet completed, bedside RN informed regarding transfer at 3pm to [facility].    Barriers to Discharge: none    Plan: Pt to transport to SNF at 3pm

## 2018-12-26 NOTE — DISCHARGE PLANNING
Anticipated Discharge Disposition: SNF    Action: Patient is agreeable to go to [facility]. Transportation form completed and faxed to [facility].     Barriers to Discharge: bed availability    Plan: SNF

## 2019-01-01 ENCOUNTER — APPOINTMENT (OUTPATIENT)
Dept: RADIOLOGY | Facility: MEDICAL CENTER | Age: 69
DRG: 871 | End: 2019-01-01
Attending: NURSE PRACTITIONER
Payer: MEDICARE

## 2019-01-01 ENCOUNTER — APPOINTMENT (OUTPATIENT)
Dept: RADIOLOGY | Facility: MEDICAL CENTER | Age: 69
DRG: 871 | End: 2019-01-01
Attending: HOSPITALIST
Payer: MEDICARE

## 2019-01-01 ENCOUNTER — APPOINTMENT (OUTPATIENT)
Dept: RADIOLOGY | Facility: MEDICAL CENTER | Age: 69
DRG: 871 | End: 2019-01-01
Attending: INTERNAL MEDICINE
Payer: MEDICARE

## 2019-01-01 ENCOUNTER — PATIENT OUTREACH (OUTPATIENT)
Dept: HEALTH INFORMATION MANAGEMENT | Facility: OTHER | Age: 69
End: 2019-01-01

## 2019-01-01 ENCOUNTER — HOSPITAL ENCOUNTER (INPATIENT)
Facility: REHABILITATION | Age: 69
End: 2019-01-01
Attending: PHYSICAL MEDICINE & REHABILITATION | Admitting: PHYSICAL MEDICINE & REHABILITATION
Payer: MEDICARE

## 2019-01-01 ENCOUNTER — HOSPITAL ENCOUNTER (OUTPATIENT)
Dept: LAB | Facility: MEDICAL CENTER | Age: 69
End: 2019-01-19
Attending: INTERNAL MEDICINE
Payer: MEDICARE

## 2019-01-01 ENCOUNTER — HOME HEALTH ADMISSION (OUTPATIENT)
Dept: HOME HEALTH SERVICES | Facility: HOME HEALTHCARE | Age: 69
End: 2019-01-01
Payer: MEDICARE

## 2019-01-01 ENCOUNTER — APPOINTMENT (OUTPATIENT)
Dept: RADIOLOGY | Facility: MEDICAL CENTER | Age: 69
DRG: 871 | End: 2019-01-01
Attending: EMERGENCY MEDICINE
Payer: MEDICARE

## 2019-01-01 ENCOUNTER — TELEPHONE (OUTPATIENT)
Dept: MEDICAL GROUP | Facility: MEDICAL CENTER | Age: 69
End: 2019-01-01

## 2019-01-01 ENCOUNTER — TELEPHONE (OUTPATIENT)
Dept: MEDICAL GROUP | Facility: PHYSICIAN GROUP | Age: 69
End: 2019-01-01

## 2019-01-01 ENCOUNTER — APPOINTMENT (OUTPATIENT)
Dept: CARDIOLOGY | Facility: MEDICAL CENTER | Age: 69
DRG: 871 | End: 2019-01-01
Attending: HOSPITALIST
Payer: MEDICARE

## 2019-01-01 ENCOUNTER — OFFICE VISIT (OUTPATIENT)
Dept: MEDICAL GROUP | Facility: MEDICAL CENTER | Age: 69
End: 2019-01-01
Payer: MEDICARE

## 2019-01-01 ENCOUNTER — HOSPITAL ENCOUNTER (INPATIENT)
Facility: MEDICAL CENTER | Age: 69
LOS: 22 days | DRG: 871 | End: 2019-04-28
Attending: EMERGENCY MEDICINE | Admitting: INTERNAL MEDICINE
Payer: MEDICARE

## 2019-01-01 VITALS
TEMPERATURE: 97 F | WEIGHT: 163 LBS | OXYGEN SATURATION: 91 % | SYSTOLIC BLOOD PRESSURE: 116 MMHG | HEART RATE: 79 BPM | HEIGHT: 67 IN | DIASTOLIC BLOOD PRESSURE: 62 MMHG | BODY MASS INDEX: 25.58 KG/M2

## 2019-01-01 VITALS
HEART RATE: 133 BPM | RESPIRATION RATE: 20 BRPM | SYSTOLIC BLOOD PRESSURE: 99 MMHG | TEMPERATURE: 99.2 F | HEIGHT: 68 IN | BODY MASS INDEX: 23.76 KG/M2 | WEIGHT: 156.75 LBS | DIASTOLIC BLOOD PRESSURE: 56 MMHG | OXYGEN SATURATION: 71 %

## 2019-01-01 DIAGNOSIS — N32.81 OAB (OVERACTIVE BLADDER): ICD-10-CM

## 2019-01-01 DIAGNOSIS — E11.42 CONTROLLED TYPE 2 DIABETES MELLITUS WITH DIABETIC POLYNEUROPATHY, WITHOUT LONG-TERM CURRENT USE OF INSULIN (HCC): ICD-10-CM

## 2019-01-01 DIAGNOSIS — R40.1 STUPOR: ICD-10-CM

## 2019-01-01 DIAGNOSIS — R40.20 COMA, UNSPECIFIED COMA DEPTH (HCC): ICD-10-CM

## 2019-01-01 DIAGNOSIS — I51.89 DIASTOLIC DYSFUNCTION: ICD-10-CM

## 2019-01-01 DIAGNOSIS — J44.9 CHRONIC OBSTRUCTIVE PULMONARY DISEASE, UNSPECIFIED COPD TYPE (HCC): ICD-10-CM

## 2019-01-01 DIAGNOSIS — Z11.59 NEED FOR HEPATITIS C SCREENING TEST: ICD-10-CM

## 2019-01-01 DIAGNOSIS — R41.82 ALTERED MENTAL STATUS, UNSPECIFIED ALTERED MENTAL STATUS TYPE: ICD-10-CM

## 2019-01-01 DIAGNOSIS — R40.3 PERSISTENT VEGETATIVE STATE (HCC): ICD-10-CM

## 2019-01-01 LAB
ACTION RANGE TRIGGERED IACRT: NO
ALB CSF/SERPL: 8.4 RATIO (ref 0–9)
ALBUMIN CSF-MCNC: 27 MG/DL (ref 0–35)
ALBUMIN SERPL BCP-MCNC: 2.7 G/DL (ref 3.2–4.9)
ALBUMIN SERPL BCP-MCNC: 2.7 G/DL (ref 3.2–4.9)
ALBUMIN SERPL BCP-MCNC: 2.8 G/DL (ref 3.2–4.9)
ALBUMIN SERPL BCP-MCNC: 2.9 G/DL (ref 3.2–4.9)
ALBUMIN SERPL BCP-MCNC: 3 G/DL (ref 3.2–4.9)
ALBUMIN SERPL BCP-MCNC: 3.1 G/DL (ref 3.2–4.9)
ALBUMIN SERPL BCP-MCNC: 3.3 G/DL (ref 3.2–4.9)
ALBUMIN SERPL BCP-MCNC: 4 G/DL (ref 3.2–4.9)
ALBUMIN SERPL BCP-MCNC: 4.1 G/DL (ref 3.2–4.9)
ALBUMIN SERPL BCP-MCNC: 4.2 G/DL (ref 3.2–4.9)
ALBUMIN SERPL-MCNC: 3210 MG/DL (ref 3500–5200)
ALBUMIN/GLOB SERPL: 0.6 G/DL
ALBUMIN/GLOB SERPL: 0.9 G/DL
ALBUMIN/GLOB SERPL: 1 G/DL
ALBUMIN/GLOB SERPL: 1.1 G/DL
ALBUMIN/GLOB SERPL: 1.2 G/DL
ALBUMIN/GLOB SERPL: 1.4 G/DL
ALP SERPL-CCNC: 125 U/L (ref 30–99)
ALP SERPL-CCNC: 52 U/L (ref 30–99)
ALP SERPL-CCNC: 54 U/L (ref 30–99)
ALP SERPL-CCNC: 54 U/L (ref 30–99)
ALP SERPL-CCNC: 55 U/L (ref 30–99)
ALP SERPL-CCNC: 56 U/L (ref 30–99)
ALP SERPL-CCNC: 56 U/L (ref 30–99)
ALP SERPL-CCNC: 58 U/L (ref 30–99)
ALP SERPL-CCNC: 59 U/L (ref 30–99)
ALP SERPL-CCNC: 64 U/L (ref 30–99)
ALP SERPL-CCNC: 65 U/L (ref 30–99)
ALP SERPL-CCNC: 69 U/L (ref 30–99)
ALP SERPL-CCNC: 69 U/L (ref 30–99)
ALP SERPL-CCNC: 83 U/L (ref 30–99)
ALP SERPL-CCNC: 90 U/L (ref 30–99)
ALT SERPL-CCNC: 10 U/L (ref 2–50)
ALT SERPL-CCNC: 11 U/L (ref 2–50)
ALT SERPL-CCNC: 12 U/L (ref 2–50)
ALT SERPL-CCNC: 13 U/L (ref 2–50)
ALT SERPL-CCNC: 14 U/L (ref 2–50)
ALT SERPL-CCNC: 15 U/L (ref 2–50)
ALT SERPL-CCNC: 8 U/L (ref 2–50)
ALT SERPL-CCNC: 8 U/L (ref 2–50)
AMMONIA PLAS-SCNC: 30 UMOL/L (ref 11–45)
AMMONIA PLAS-SCNC: 32 UMOL/L (ref 11–45)
AMMONIA PLAS-SCNC: 46 UMOL/L (ref 11–45)
AMMONIA PLAS-SCNC: 48 UMOL/L (ref 11–45)
AMPHET UR QL SCN: NEGATIVE
ANA INTERPRETIVE COMMENT Q5143: ABNORMAL
ANA PATTERN Q5144: ABNORMAL
ANA TITER Q5145: ABNORMAL
ANCA IGG TITR SER IF: NORMAL {TITER}
ANION GAP SERPL CALC-SCNC: 10 MMOL/L (ref 0–11.9)
ANION GAP SERPL CALC-SCNC: 11 MMOL/L (ref 0–11.9)
ANION GAP SERPL CALC-SCNC: 11 MMOL/L (ref 0–11.9)
ANION GAP SERPL CALC-SCNC: 12 MMOL/L (ref 0–11.9)
ANION GAP SERPL CALC-SCNC: 14 MMOL/L (ref 0–11.9)
ANION GAP SERPL CALC-SCNC: 6 MMOL/L (ref 0–11.9)
ANION GAP SERPL CALC-SCNC: 8 MMOL/L (ref 0–11.9)
ANION GAP SERPL CALC-SCNC: 9 MMOL/L (ref 0–11.9)
ANTINUCLEAR ANTIBODY (ANA), HEP-2, IGG Q5142: DETECTED
APPEARANCE UR: CLEAR
APPEARANCE UR: CLEAR
APTT PPP: 45.5 SEC (ref 24.7–36)
AST SERPL-CCNC: 11 U/L (ref 12–45)
AST SERPL-CCNC: 12 U/L (ref 12–45)
AST SERPL-CCNC: 12 U/L (ref 12–45)
AST SERPL-CCNC: 13 U/L (ref 12–45)
AST SERPL-CCNC: 13 U/L (ref 12–45)
AST SERPL-CCNC: 14 U/L (ref 12–45)
AST SERPL-CCNC: 15 U/L (ref 12–45)
AST SERPL-CCNC: 15 U/L (ref 12–45)
AST SERPL-CCNC: 16 U/L (ref 12–45)
AST SERPL-CCNC: 17 U/L (ref 12–45)
AST SERPL-CCNC: 17 U/L (ref 12–45)
AST SERPL-CCNC: 19 U/L (ref 12–45)
AST SERPL-CCNC: 20 U/L (ref 12–45)
BACTERIA #/AREA URNS HPF: NEGATIVE /HPF
BACTERIA #/AREA URNS HPF: NEGATIVE /HPF
BACTERIA BLD CULT: NORMAL
BACTERIA BLD CULT: NORMAL
BACTERIA CSF CULT: NORMAL
BACTERIA CSF CULT: NORMAL
BACTERIA SPEC RESP CULT: NORMAL
BACTERIA STL CULT: NORMAL
BACTERIA UR CULT: NORMAL
BARBITURATES UR QL SCN: NEGATIVE
BASE EXCESS BLDA CALC-SCNC: -1 MMOL/L (ref -4–3)
BASE EXCESS BLDA CALC-SCNC: -2 MMOL/L (ref -4–3)
BASE EXCESS BLDA CALC-SCNC: -4 MMOL/L (ref -4–3)
BASE EXCESS BLDA CALC-SCNC: 0 MMOL/L (ref -4–3)
BASE EXCESS BLDA CALC-SCNC: 0 MMOL/L (ref -4–3)
BASOPHILS # BLD AUTO: 0.1 % (ref 0–1.8)
BASOPHILS # BLD AUTO: 0.2 % (ref 0–1.8)
BASOPHILS # BLD AUTO: 0.3 % (ref 0–1.8)
BASOPHILS # BLD AUTO: 0.4 % (ref 0–1.8)
BASOPHILS # BLD AUTO: 0.5 % (ref 0–1.8)
BASOPHILS # BLD AUTO: 0.5 % (ref 0–1.8)
BASOPHILS # BLD AUTO: 0.6 % (ref 0–1.8)
BASOPHILS # BLD: 0.01 K/UL (ref 0–0.12)
BASOPHILS # BLD: 0.02 K/UL (ref 0–0.12)
BASOPHILS # BLD: 0.03 K/UL (ref 0–0.12)
BASOPHILS # BLD: 0.04 K/UL (ref 0–0.12)
BASOPHILS # BLD: 0.04 K/UL (ref 0–0.12)
BASOPHILS # BLD: 0.06 K/UL (ref 0–0.12)
BASOPHILS # BLD: 0.07 K/UL (ref 0–0.12)
BENZODIAZ UR QL SCN: NEGATIVE
BILIRUB SERPL-MCNC: 0.3 MG/DL (ref 0.1–1.5)
BILIRUB SERPL-MCNC: 0.4 MG/DL (ref 0.1–1.5)
BILIRUB SERPL-MCNC: 0.5 MG/DL (ref 0.1–1.5)
BILIRUB SERPL-MCNC: 0.6 MG/DL (ref 0.1–1.5)
BILIRUB UR QL STRIP.AUTO: NEGATIVE
BILIRUB UR QL STRIP.AUTO: NEGATIVE
BODY TEMPERATURE: ABNORMAL CENTIGRADE
BODY TEMPERATURE: ABNORMAL DEGREES
BODY TEMPERATURE: NORMAL CENTIGRADE
BUN SERPL-MCNC: 10 MG/DL (ref 8–22)
BUN SERPL-MCNC: 11 MG/DL (ref 8–22)
BUN SERPL-MCNC: 11 MG/DL (ref 8–22)
BUN SERPL-MCNC: 12 MG/DL (ref 8–22)
BUN SERPL-MCNC: 13 MG/DL (ref 8–22)
BUN SERPL-MCNC: 15 MG/DL (ref 8–22)
BUN SERPL-MCNC: 17 MG/DL (ref 8–22)
BUN SERPL-MCNC: 22 MG/DL (ref 8–22)
BUN SERPL-MCNC: 23 MG/DL (ref 8–22)
BUN SERPL-MCNC: 25 MG/DL (ref 8–22)
BUN SERPL-MCNC: 27 MG/DL (ref 8–22)
BUN SERPL-MCNC: 31 MG/DL (ref 8–22)
BUN SERPL-MCNC: 32 MG/DL (ref 8–22)
BUN SERPL-MCNC: 32 MG/DL (ref 8–22)
BUN SERPL-MCNC: 7 MG/DL (ref 8–22)
BUN SERPL-MCNC: 9 MG/DL (ref 8–22)
BURR CELLS/RBC NFR CSF MANUAL: 0 %
BURR CELLS/RBC NFR CSF MANUAL: 0 %
BZE UR QL SCN: NEGATIVE
C DIFF DNA SPEC QL NAA+PROBE: NEGATIVE
C DIFF TOX GENS STL QL NAA+PROBE: NEGATIVE
C GATTII+NEOFOR DNA CSF QL NAA+NON-PROBE: NOT DETECTED
CALCIUM SERPL-MCNC: 10.2 MG/DL (ref 8.5–10.5)
CALCIUM SERPL-MCNC: 7.8 MG/DL (ref 8.5–10.5)
CALCIUM SERPL-MCNC: 8 MG/DL (ref 8.5–10.5)
CALCIUM SERPL-MCNC: 8.1 MG/DL (ref 8.5–10.5)
CALCIUM SERPL-MCNC: 8.2 MG/DL (ref 8.5–10.5)
CALCIUM SERPL-MCNC: 8.2 MG/DL (ref 8.5–10.5)
CALCIUM SERPL-MCNC: 8.3 MG/DL (ref 8.5–10.5)
CALCIUM SERPL-MCNC: 8.3 MG/DL (ref 8.5–10.5)
CALCIUM SERPL-MCNC: 8.5 MG/DL (ref 8.5–10.5)
CALCIUM SERPL-MCNC: 8.6 MG/DL (ref 8.5–10.5)
CALCIUM SERPL-MCNC: 8.8 MG/DL (ref 8.5–10.5)
CALCIUM SERPL-MCNC: 8.9 MG/DL (ref 8.5–10.5)
CALCIUM SERPL-MCNC: 9 MG/DL (ref 8.5–10.5)
CALCIUM SERPL-MCNC: 9 MG/DL (ref 8.5–10.5)
CALCIUM SERPL-MCNC: 9.4 MG/DL (ref 8.5–10.5)
CALCIUM SERPL-MCNC: 9.5 MG/DL (ref 8.5–10.5)
CANNABINOIDS UR QL SCN: NEGATIVE
CHLORIDE SERPL-SCNC: 101 MMOL/L (ref 96–112)
CHLORIDE SERPL-SCNC: 102 MMOL/L (ref 96–112)
CHLORIDE SERPL-SCNC: 102 MMOL/L (ref 96–112)
CHLORIDE SERPL-SCNC: 103 MMOL/L (ref 96–112)
CHLORIDE SERPL-SCNC: 103 MMOL/L (ref 96–112)
CHLORIDE SERPL-SCNC: 105 MMOL/L (ref 96–112)
CHLORIDE SERPL-SCNC: 106 MMOL/L (ref 96–112)
CHLORIDE SERPL-SCNC: 106 MMOL/L (ref 96–112)
CHLORIDE SERPL-SCNC: 107 MMOL/L (ref 96–112)
CHLORIDE SERPL-SCNC: 109 MMOL/L (ref 96–112)
CHLORIDE SERPL-SCNC: 109 MMOL/L (ref 96–112)
CHLORIDE SERPL-SCNC: 110 MMOL/L (ref 96–112)
CHLORIDE SERPL-SCNC: 95 MMOL/L (ref 96–112)
CHLORIDE SERPL-SCNC: 96 MMOL/L (ref 96–112)
CHLORIDE SERPL-SCNC: 97 MMOL/L (ref 96–112)
CHOLEST SERPL-MCNC: 113 MG/DL (ref 100–199)
CHOLEST SERPL-MCNC: 118 MG/DL (ref 100–199)
CLARITY CSF: CLEAR
CMV DNA CSF QL NAA+NON-PROBE: NOT DETECTED
CO2 BLDA-SCNC: 21 MMOL/L (ref 20–33)
CO2 BLDA-SCNC: 21 MMOL/L (ref 20–33)
CO2 BLDA-SCNC: 25 MMOL/L (ref 20–33)
CO2 SERPL-SCNC: 19 MMOL/L (ref 20–33)
CO2 SERPL-SCNC: 21 MMOL/L (ref 20–33)
CO2 SERPL-SCNC: 22 MMOL/L (ref 20–33)
CO2 SERPL-SCNC: 22 MMOL/L (ref 20–33)
CO2 SERPL-SCNC: 23 MMOL/L (ref 20–33)
CO2 SERPL-SCNC: 24 MMOL/L (ref 20–33)
CO2 SERPL-SCNC: 24 MMOL/L (ref 20–33)
CO2 SERPL-SCNC: 25 MMOL/L (ref 20–33)
CO2 SERPL-SCNC: 25 MMOL/L (ref 20–33)
CO2 SERPL-SCNC: 27 MMOL/L (ref 20–33)
CO2 SERPL-SCNC: 27 MMOL/L (ref 20–33)
CO2 SERPL-SCNC: 30 MMOL/L (ref 20–33)
CO2 SERPL-SCNC: 32 MMOL/L (ref 20–33)
CO2 SERPL-SCNC: 32 MMOL/L (ref 20–33)
CO2 SERPL-SCNC: 33 MMOL/L (ref 20–33)
COLOR CSF: COLORLESS
COLOR SPUN CSF: COLORLESS
COLOR UR: YELLOW
COLOR UR: YELLOW
CREAT SERPL-MCNC: 0.34 MG/DL (ref 0.5–1.4)
CREAT SERPL-MCNC: 0.35 MG/DL (ref 0.5–1.4)
CREAT SERPL-MCNC: 0.37 MG/DL (ref 0.5–1.4)
CREAT SERPL-MCNC: 0.37 MG/DL (ref 0.5–1.4)
CREAT SERPL-MCNC: 0.38 MG/DL (ref 0.5–1.4)
CREAT SERPL-MCNC: 0.42 MG/DL (ref 0.5–1.4)
CREAT SERPL-MCNC: 0.43 MG/DL (ref 0.5–1.4)
CREAT SERPL-MCNC: 0.45 MG/DL (ref 0.5–1.4)
CREAT SERPL-MCNC: 0.46 MG/DL (ref 0.5–1.4)
CREAT SERPL-MCNC: 0.48 MG/DL (ref 0.5–1.4)
CREAT SERPL-MCNC: 0.53 MG/DL (ref 0.5–1.4)
CREAT SERPL-MCNC: 0.54 MG/DL (ref 0.5–1.4)
CREAT SERPL-MCNC: 0.57 MG/DL (ref 0.5–1.4)
CREAT SERPL-MCNC: 0.59 MG/DL (ref 0.5–1.4)
CREAT SERPL-MCNC: 0.62 MG/DL (ref 0.5–1.4)
CREAT SERPL-MCNC: 0.62 MG/DL (ref 0.5–1.4)
CREAT SERPL-MCNC: 0.65 MG/DL (ref 0.5–1.4)
CREAT SERPL-MCNC: 0.66 MG/DL (ref 0.5–1.4)
CREAT SERPL-MCNC: 0.69 MG/DL (ref 0.5–1.4)
CREAT SERPL-MCNC: 0.8 MG/DL (ref 0.5–1.4)
CRP SERPL HS-MCNC: 10.99 MG/DL (ref 0–0.75)
CRP SERPL HS-MCNC: 12.11 MG/DL (ref 0–0.75)
CRP SERPL HS-MCNC: 18.99 MG/DL (ref 0–0.75)
CRP SERPL HS-MCNC: 8.96 MG/DL (ref 0–0.75)
CYTOPLASMIC PATTERN TITER Q5148: ABNORMAL
DSDNA AB TITR SER CLIF: DETECTED {TITER}
DSDNA IGG TITR SER CLIF: NORMAL {TITER}
E COLI K1 DNA CSF QL NAA+NON-PROBE: NOT DETECTED
EEEV IGG TITR CSF IF: NORMAL {TITER}
EEEV IGM TITR CSF IF: NORMAL {TITER}
EKG IMPRESSION: NORMAL
EOSINOPHIL # BLD AUTO: 0 K/UL (ref 0–0.51)
EOSINOPHIL # BLD AUTO: 0.01 K/UL (ref 0–0.51)
EOSINOPHIL # BLD AUTO: 0.01 K/UL (ref 0–0.51)
EOSINOPHIL # BLD AUTO: 0.02 K/UL (ref 0–0.51)
EOSINOPHIL # BLD AUTO: 0.04 K/UL (ref 0–0.51)
EOSINOPHIL # BLD AUTO: 0.05 K/UL (ref 0–0.51)
EOSINOPHIL # BLD AUTO: 0.06 K/UL (ref 0–0.51)
EOSINOPHIL # BLD AUTO: 0.07 K/UL (ref 0–0.51)
EOSINOPHIL # BLD AUTO: 0.1 K/UL (ref 0–0.51)
EOSINOPHIL # BLD AUTO: 0.17 K/UL (ref 0–0.51)
EOSINOPHIL # BLD AUTO: 0.18 K/UL (ref 0–0.51)
EOSINOPHIL # BLD AUTO: 0.24 K/UL (ref 0–0.51)
EOSINOPHIL NFR BLD: 0 % (ref 0–6.9)
EOSINOPHIL NFR BLD: 0.1 % (ref 0–6.9)
EOSINOPHIL NFR BLD: 0.1 % (ref 0–6.9)
EOSINOPHIL NFR BLD: 0.3 % (ref 0–6.9)
EOSINOPHIL NFR BLD: 0.4 % (ref 0–6.9)
EOSINOPHIL NFR BLD: 0.5 % (ref 0–6.9)
EOSINOPHIL NFR BLD: 0.6 % (ref 0–6.9)
EOSINOPHIL NFR BLD: 0.6 % (ref 0–6.9)
EOSINOPHIL NFR BLD: 1.5 % (ref 0–6.9)
EOSINOPHIL NFR BLD: 1.9 % (ref 0–6.9)
EOSINOPHIL NFR BLD: 2.4 % (ref 0–6.9)
EOSINOPHIL NFR BLD: 3 % (ref 0–6.9)
EPI CELLS #/AREA URNS HPF: NEGATIVE /HPF
EPI CELLS #/AREA URNS HPF: NEGATIVE /HPF
ERYTHROCYTE [DISTWIDTH] IN BLOOD BY AUTOMATED COUNT: 41.7 FL (ref 35.9–50)
ERYTHROCYTE [DISTWIDTH] IN BLOOD BY AUTOMATED COUNT: 43.3 FL (ref 35.9–50)
ERYTHROCYTE [DISTWIDTH] IN BLOOD BY AUTOMATED COUNT: 43.8 FL (ref 35.9–50)
ERYTHROCYTE [DISTWIDTH] IN BLOOD BY AUTOMATED COUNT: 43.9 FL (ref 35.9–50)
ERYTHROCYTE [DISTWIDTH] IN BLOOD BY AUTOMATED COUNT: 44.3 FL (ref 35.9–50)
ERYTHROCYTE [DISTWIDTH] IN BLOOD BY AUTOMATED COUNT: 44.4 FL (ref 35.9–50)
ERYTHROCYTE [DISTWIDTH] IN BLOOD BY AUTOMATED COUNT: 45.1 FL (ref 35.9–50)
ERYTHROCYTE [DISTWIDTH] IN BLOOD BY AUTOMATED COUNT: 45.3 FL (ref 35.9–50)
ERYTHROCYTE [DISTWIDTH] IN BLOOD BY AUTOMATED COUNT: 45.6 FL (ref 35.9–50)
ERYTHROCYTE [DISTWIDTH] IN BLOOD BY AUTOMATED COUNT: 45.6 FL (ref 35.9–50)
ERYTHROCYTE [DISTWIDTH] IN BLOOD BY AUTOMATED COUNT: 45.7 FL (ref 35.9–50)
ERYTHROCYTE [DISTWIDTH] IN BLOOD BY AUTOMATED COUNT: 45.8 FL (ref 35.9–50)
ERYTHROCYTE [DISTWIDTH] IN BLOOD BY AUTOMATED COUNT: 45.9 FL (ref 35.9–50)
ERYTHROCYTE [DISTWIDTH] IN BLOOD BY AUTOMATED COUNT: 46.5 FL (ref 35.9–50)
ERYTHROCYTE [DISTWIDTH] IN BLOOD BY AUTOMATED COUNT: 49.4 FL (ref 35.9–50)
ERYTHROCYTE [DISTWIDTH] IN BLOOD BY AUTOMATED COUNT: 52.2 FL (ref 35.9–50)
ERYTHROCYTE [SEDIMENTATION RATE] IN BLOOD BY WESTERGREN METHOD: 43 MM/HOUR (ref 0–30)
EST. AVERAGE GLUCOSE BLD GHB EST-MCNC: 111 MG/DL
EST. AVERAGE GLUCOSE BLD GHB EST-MCNC: 143 MG/DL
ETHANOL BLD-MCNC: 0 G/DL
EV RNA CSF QL NAA+NON-PROBE: NOT DETECTED
FASTING STATUS PATIENT QL REPORTED: NORMAL
GLOBULIN SER CALC-MCNC: 2.5 G/DL (ref 1.9–3.5)
GLOBULIN SER CALC-MCNC: 2.6 G/DL (ref 1.9–3.5)
GLOBULIN SER CALC-MCNC: 2.8 G/DL (ref 1.9–3.5)
GLOBULIN SER CALC-MCNC: 2.9 G/DL (ref 1.9–3.5)
GLOBULIN SER CALC-MCNC: 3 G/DL (ref 1.9–3.5)
GLOBULIN SER CALC-MCNC: 3.1 G/DL (ref 1.9–3.5)
GLOBULIN SER CALC-MCNC: 3.2 G/DL (ref 1.9–3.5)
GLOBULIN SER CALC-MCNC: 3.2 G/DL (ref 1.9–3.5)
GLOBULIN SER CALC-MCNC: 3.5 G/DL (ref 1.9–3.5)
GLOBULIN SER CALC-MCNC: 3.5 G/DL (ref 1.9–3.5)
GLOBULIN SER CALC-MCNC: 4.5 G/DL (ref 1.9–3.5)
GLUCOSE BLD-MCNC: 101 MG/DL (ref 65–99)
GLUCOSE BLD-MCNC: 105 MG/DL (ref 65–99)
GLUCOSE BLD-MCNC: 111 MG/DL (ref 65–99)
GLUCOSE BLD-MCNC: 113 MG/DL (ref 65–99)
GLUCOSE BLD-MCNC: 114 MG/DL (ref 65–99)
GLUCOSE BLD-MCNC: 122 MG/DL (ref 65–99)
GLUCOSE BLD-MCNC: 126 MG/DL (ref 65–99)
GLUCOSE BLD-MCNC: 126 MG/DL (ref 65–99)
GLUCOSE BLD-MCNC: 127 MG/DL (ref 65–99)
GLUCOSE BLD-MCNC: 128 MG/DL (ref 65–99)
GLUCOSE BLD-MCNC: 130 MG/DL (ref 65–99)
GLUCOSE BLD-MCNC: 132 MG/DL (ref 65–99)
GLUCOSE BLD-MCNC: 132 MG/DL (ref 65–99)
GLUCOSE BLD-MCNC: 133 MG/DL (ref 65–99)
GLUCOSE BLD-MCNC: 134 MG/DL (ref 65–99)
GLUCOSE BLD-MCNC: 137 MG/DL (ref 65–99)
GLUCOSE BLD-MCNC: 139 MG/DL (ref 65–99)
GLUCOSE BLD-MCNC: 139 MG/DL (ref 65–99)
GLUCOSE BLD-MCNC: 140 MG/DL (ref 65–99)
GLUCOSE BLD-MCNC: 143 MG/DL (ref 65–99)
GLUCOSE BLD-MCNC: 144 MG/DL (ref 65–99)
GLUCOSE BLD-MCNC: 144 MG/DL (ref 65–99)
GLUCOSE BLD-MCNC: 145 MG/DL (ref 65–99)
GLUCOSE BLD-MCNC: 150 MG/DL (ref 65–99)
GLUCOSE BLD-MCNC: 150 MG/DL (ref 65–99)
GLUCOSE BLD-MCNC: 154 MG/DL (ref 65–99)
GLUCOSE BLD-MCNC: 154 MG/DL (ref 65–99)
GLUCOSE BLD-MCNC: 162 MG/DL (ref 65–99)
GLUCOSE BLD-MCNC: 164 MG/DL (ref 65–99)
GLUCOSE BLD-MCNC: 165 MG/DL (ref 65–99)
GLUCOSE BLD-MCNC: 168 MG/DL (ref 65–99)
GLUCOSE BLD-MCNC: 168 MG/DL (ref 65–99)
GLUCOSE BLD-MCNC: 171 MG/DL (ref 65–99)
GLUCOSE BLD-MCNC: 179 MG/DL (ref 65–99)
GLUCOSE BLD-MCNC: 254 MG/DL (ref 65–99)
GLUCOSE BLD-MCNC: 256 MG/DL (ref 65–99)
GLUCOSE BLD-MCNC: 257 MG/DL (ref 65–99)
GLUCOSE BLD-MCNC: 265 MG/DL (ref 65–99)
GLUCOSE BLD-MCNC: 278 MG/DL (ref 65–99)
GLUCOSE BLD-MCNC: 279 MG/DL (ref 65–99)
GLUCOSE BLD-MCNC: 280 MG/DL (ref 65–99)
GLUCOSE BLD-MCNC: 283 MG/DL (ref 65–99)
GLUCOSE BLD-MCNC: 284 MG/DL (ref 65–99)
GLUCOSE BLD-MCNC: 286 MG/DL (ref 65–99)
GLUCOSE BLD-MCNC: 286 MG/DL (ref 65–99)
GLUCOSE BLD-MCNC: 289 MG/DL (ref 65–99)
GLUCOSE BLD-MCNC: 290 MG/DL (ref 65–99)
GLUCOSE BLD-MCNC: 295 MG/DL (ref 65–99)
GLUCOSE BLD-MCNC: 301 MG/DL (ref 65–99)
GLUCOSE BLD-MCNC: 302 MG/DL (ref 65–99)
GLUCOSE BLD-MCNC: 306 MG/DL (ref 65–99)
GLUCOSE BLD-MCNC: 309 MG/DL (ref 65–99)
GLUCOSE BLD-MCNC: 311 MG/DL (ref 65–99)
GLUCOSE BLD-MCNC: 314 MG/DL (ref 65–99)
GLUCOSE BLD-MCNC: 317 MG/DL (ref 65–99)
GLUCOSE BLD-MCNC: 318 MG/DL (ref 65–99)
GLUCOSE BLD-MCNC: 321 MG/DL (ref 65–99)
GLUCOSE BLD-MCNC: 324 MG/DL (ref 65–99)
GLUCOSE BLD-MCNC: 325 MG/DL (ref 65–99)
GLUCOSE BLD-MCNC: 327 MG/DL (ref 65–99)
GLUCOSE BLD-MCNC: 331 MG/DL (ref 65–99)
GLUCOSE BLD-MCNC: 338 MG/DL (ref 65–99)
GLUCOSE BLD-MCNC: 340 MG/DL (ref 65–99)
GLUCOSE BLD-MCNC: 368 MG/DL (ref 65–99)
GLUCOSE BLD-MCNC: 369 MG/DL (ref 65–99)
GLUCOSE BLD-MCNC: 388 MG/DL (ref 65–99)
GLUCOSE BLD-MCNC: 76 MG/DL (ref 65–99)
GLUCOSE CSF-MCNC: 153 MG/DL (ref 40–80)
GLUCOSE CSF-MCNC: 98 MG/DL (ref 40–80)
GLUCOSE SERPL-MCNC: 106 MG/DL (ref 65–99)
GLUCOSE SERPL-MCNC: 119 MG/DL (ref 65–99)
GLUCOSE SERPL-MCNC: 122 MG/DL (ref 65–99)
GLUCOSE SERPL-MCNC: 123 MG/DL (ref 65–99)
GLUCOSE SERPL-MCNC: 128 MG/DL (ref 65–99)
GLUCOSE SERPL-MCNC: 130 MG/DL (ref 65–99)
GLUCOSE SERPL-MCNC: 134 MG/DL (ref 65–99)
GLUCOSE SERPL-MCNC: 136 MG/DL (ref 65–99)
GLUCOSE SERPL-MCNC: 152 MG/DL (ref 65–99)
GLUCOSE SERPL-MCNC: 165 MG/DL (ref 65–99)
GLUCOSE SERPL-MCNC: 169 MG/DL (ref 65–99)
GLUCOSE SERPL-MCNC: 173 MG/DL (ref 65–99)
GLUCOSE SERPL-MCNC: 186 MG/DL (ref 65–99)
GLUCOSE SERPL-MCNC: 208 MG/DL (ref 65–99)
GLUCOSE SERPL-MCNC: 294 MG/DL (ref 65–99)
GLUCOSE SERPL-MCNC: 316 MG/DL (ref 65–99)
GLUCOSE SERPL-MCNC: 317 MG/DL (ref 65–99)
GLUCOSE SERPL-MCNC: 327 MG/DL (ref 65–99)
GLUCOSE SERPL-MCNC: 362 MG/DL (ref 65–99)
GLUCOSE SERPL-MCNC: 373 MG/DL (ref 65–99)
GLUCOSE UR STRIP.AUTO-MCNC: NEGATIVE MG/DL
GLUCOSE UR STRIP.AUTO-MCNC: NEGATIVE MG/DL
GP B STREP DNA CSF QL NAA+NON-PROBE: NOT DETECTED
GRAM STN SPEC: NORMAL
HAEM INFLU DNA CSF QL NAA+NON-PROBE: NOT DETECTED
HBA1C MFR BLD: 5.5 % (ref 0–5.6)
HBA1C MFR BLD: 6.6 % (ref 0–5.6)
HCG SERPL QL: NEGATIVE
HCO3 BLDA-SCNC: 20.2 MMOL/L (ref 17–25)
HCO3 BLDA-SCNC: 20.5 MMOL/L (ref 17–25)
HCO3 BLDA-SCNC: 21 MMOL/L (ref 17–25)
HCO3 BLDA-SCNC: 24 MMOL/L (ref 17–25)
HCO3 BLDA-SCNC: 24 MMOL/L (ref 17–25)
HCT VFR BLD AUTO: 33.2 % (ref 37–47)
HCT VFR BLD AUTO: 34.1 % (ref 37–47)
HCT VFR BLD AUTO: 34.5 % (ref 37–47)
HCT VFR BLD AUTO: 35.1 % (ref 37–47)
HCT VFR BLD AUTO: 35.2 % (ref 37–47)
HCT VFR BLD AUTO: 35.5 % (ref 37–47)
HCT VFR BLD AUTO: 35.7 % (ref 37–47)
HCT VFR BLD AUTO: 35.8 % (ref 37–47)
HCT VFR BLD AUTO: 36.3 % (ref 37–47)
HCT VFR BLD AUTO: 37 % (ref 37–47)
HCT VFR BLD AUTO: 37 % (ref 37–47)
HCT VFR BLD AUTO: 37.3 % (ref 37–47)
HCT VFR BLD AUTO: 37.9 % (ref 37–47)
HCT VFR BLD AUTO: 38.2 % (ref 37–47)
HCT VFR BLD AUTO: 43.2 % (ref 37–47)
HCT VFR BLD AUTO: 46.8 % (ref 37–47)
HCV AB SER QL: NEGATIVE
HDLC SERPL-MCNC: 33 MG/DL
HDLC SERPL-MCNC: 34 MG/DL
HGB BLD-MCNC: 10.6 G/DL (ref 12–16)
HGB BLD-MCNC: 10.7 G/DL (ref 12–16)
HGB BLD-MCNC: 10.8 G/DL (ref 12–16)
HGB BLD-MCNC: 10.9 G/DL (ref 12–16)
HGB BLD-MCNC: 11.1 G/DL (ref 12–16)
HGB BLD-MCNC: 11.5 G/DL (ref 12–16)
HGB BLD-MCNC: 11.6 G/DL (ref 12–16)
HGB BLD-MCNC: 12 G/DL (ref 12–16)
HGB BLD-MCNC: 12.2 G/DL (ref 12–16)
HGB BLD-MCNC: 12.2 G/DL (ref 12–16)
HGB BLD-MCNC: 12.3 G/DL (ref 12–16)
HGB BLD-MCNC: 12.5 G/DL (ref 12–16)
HGB BLD-MCNC: 13.7 G/DL (ref 12–16)
HGB BLD-MCNC: 15.2 G/DL (ref 12–16)
HHV6 DNA CSF QL NAA+NON-PROBE: NOT DETECTED
HOROWITZ INDEX BLDA+IHG-RTO: 287 MM[HG]
HOROWITZ INDEX BLDA+IHG-RTO: 304 MM[HG]
HOROWITZ INDEX BLDA+IHG-RTO: 334 MM[HG]
HSV1 DNA CSF QL NAA+NON-PROBE: NOT DETECTED
HSV1 GG IGG CSF-ACNC: 0.09 IV
HSV2 DNA CSF QL NAA+NON-PROBE: NOT DETECTED
HSV2 GG IGG CSF-ACNC: 0.03 IV
HYALINE CASTS #/AREA URNS LPF: ABNORMAL /LPF
HYALINE CASTS #/AREA URNS LPF: ABNORMAL /LPF
IGG CSF-MCNC: 2.9 MG/DL (ref 0–6)
IGG SERPL-MCNC: 981 MG/DL (ref 768–1632)
IGG SYNTH RATE SER+CSF CALC-MRATE: <0 MG/D
IGG/ALB CLEAR SER+CSF-RTO: 0.35 RATIO (ref 0.28–0.66)
IGG/ALB CSF: 0.11 RATIO (ref 0.09–0.25)
IMM GRANULOCYTES # BLD AUTO: 0.01 K/UL (ref 0–0.11)
IMM GRANULOCYTES # BLD AUTO: 0.01 K/UL (ref 0–0.11)
IMM GRANULOCYTES # BLD AUTO: 0.03 K/UL (ref 0–0.11)
IMM GRANULOCYTES # BLD AUTO: 0.04 K/UL (ref 0–0.11)
IMM GRANULOCYTES # BLD AUTO: 0.04 K/UL (ref 0–0.11)
IMM GRANULOCYTES # BLD AUTO: 0.05 K/UL (ref 0–0.11)
IMM GRANULOCYTES # BLD AUTO: 0.06 K/UL (ref 0–0.11)
IMM GRANULOCYTES # BLD AUTO: 0.08 K/UL (ref 0–0.11)
IMM GRANULOCYTES # BLD AUTO: 0.09 K/UL (ref 0–0.11)
IMM GRANULOCYTES # BLD AUTO: 0.1 K/UL (ref 0–0.11)
IMM GRANULOCYTES # BLD AUTO: 0.1 K/UL (ref 0–0.11)
IMM GRANULOCYTES # BLD AUTO: 0.15 K/UL (ref 0–0.11)
IMM GRANULOCYTES # BLD AUTO: 0.35 K/UL (ref 0–0.11)
IMM GRANULOCYTES NFR BLD AUTO: 0.2 % (ref 0–0.9)
IMM GRANULOCYTES NFR BLD AUTO: 0.2 % (ref 0–0.9)
IMM GRANULOCYTES NFR BLD AUTO: 0.3 % (ref 0–0.9)
IMM GRANULOCYTES NFR BLD AUTO: 0.4 % (ref 0–0.9)
IMM GRANULOCYTES NFR BLD AUTO: 0.4 % (ref 0–0.9)
IMM GRANULOCYTES NFR BLD AUTO: 0.5 % (ref 0–0.9)
IMM GRANULOCYTES NFR BLD AUTO: 0.5 % (ref 0–0.9)
IMM GRANULOCYTES NFR BLD AUTO: 0.6 % (ref 0–0.9)
IMM GRANULOCYTES NFR BLD AUTO: 0.7 % (ref 0–0.9)
IMM GRANULOCYTES NFR BLD AUTO: 0.7 % (ref 0–0.9)
IMM GRANULOCYTES NFR BLD AUTO: 0.8 % (ref 0–0.9)
IMM GRANULOCYTES NFR BLD AUTO: 0.9 % (ref 0–0.9)
IMM GRANULOCYTES NFR BLD AUTO: 1 % (ref 0–0.9)
IMM GRANULOCYTES NFR BLD AUTO: 1.5 % (ref 0–0.9)
IMM GRANULOCYTES NFR BLD AUTO: 1.7 % (ref 0–0.9)
IMM GRANULOCYTES NFR BLD AUTO: 2.2 % (ref 0–0.9)
INDIA INK PREP CSF: NORMAL
INHALED O2 FLOW RATE: 5 L/MIN (ref 2–10)
INR PPP: 1.03 (ref 0.87–1.13)
INR PPP: 1.18 (ref 0.87–1.13)
INR PPP: 1.25 (ref 0.87–1.13)
INST. QUALIFIED PATIENT IIQPT: YES
KETONES UR STRIP.AUTO-MCNC: ABNORMAL MG/DL
KETONES UR STRIP.AUTO-MCNC: ABNORMAL MG/DL
L MONOCYTOG DNA CSF QL NAA+NON-PROBE: NOT DETECTED
LACTATE BLD-SCNC: 1 MMOL/L (ref 0.5–2)
LACTATE BLD-SCNC: 1.2 MMOL/L (ref 0.5–2)
LACTATE BLD-SCNC: 1.2 MMOL/L (ref 0.5–2)
LACTATE BLD-SCNC: 1.3 MMOL/L (ref 0.5–2)
LACTATE BLD-SCNC: 1.5 MMOL/L (ref 0.5–2)
LACTATE BLD-SCNC: 1.7 MMOL/L (ref 0.5–2)
LACV IGG TITR CSF IF: NORMAL {TITER}
LACV IGM TITR CSF IF: NORMAL {TITER}
LDLC SERPL CALC-MCNC: 60 MG/DL
LDLC SERPL CALC-MCNC: 61 MG/DL
LEUKOCYTE ESTERASE UR QL STRIP.AUTO: NEGATIVE
LEUKOCYTE ESTERASE UR QL STRIP.AUTO: NEGATIVE
LV EJECT FRACT  99904: 30
LV EJECT FRACT MOD 2C 99903: 43.39
LV EJECT FRACT MOD 4C 99902: 30.87
LV EJECT FRACT MOD BP 99901: 39.19
LYMPHOCYTES # BLD AUTO: 0.25 K/UL (ref 1–4.8)
LYMPHOCYTES # BLD AUTO: 0.42 K/UL (ref 1–4.8)
LYMPHOCYTES # BLD AUTO: 0.51 K/UL (ref 1–4.8)
LYMPHOCYTES # BLD AUTO: 0.54 K/UL (ref 1–4.8)
LYMPHOCYTES # BLD AUTO: 0.64 K/UL (ref 1–4.8)
LYMPHOCYTES # BLD AUTO: 0.73 K/UL (ref 1–4.8)
LYMPHOCYTES # BLD AUTO: 0.91 K/UL (ref 1–4.8)
LYMPHOCYTES # BLD AUTO: 0.91 K/UL (ref 1–4.8)
LYMPHOCYTES # BLD AUTO: 0.93 K/UL (ref 1–4.8)
LYMPHOCYTES # BLD AUTO: 0.97 K/UL (ref 1–4.8)
LYMPHOCYTES # BLD AUTO: 0.99 K/UL (ref 1–4.8)
LYMPHOCYTES # BLD AUTO: 1 K/UL (ref 1–4.8)
LYMPHOCYTES # BLD AUTO: 1.11 K/UL (ref 1–4.8)
LYMPHOCYTES # BLD AUTO: 1.13 K/UL (ref 1–4.8)
LYMPHOCYTES # BLD AUTO: 1.18 K/UL (ref 1–4.8)
LYMPHOCYTES # BLD AUTO: 1.2 K/UL (ref 1–4.8)
LYMPHOCYTES NFR BLD: 10.9 % (ref 22–41)
LYMPHOCYTES NFR BLD: 11.2 % (ref 22–41)
LYMPHOCYTES NFR BLD: 11.4 % (ref 22–41)
LYMPHOCYTES NFR BLD: 12.3 % (ref 22–41)
LYMPHOCYTES NFR BLD: 13.2 % (ref 22–41)
LYMPHOCYTES NFR BLD: 14.1 % (ref 22–41)
LYMPHOCYTES NFR BLD: 14.7 % (ref 22–41)
LYMPHOCYTES NFR BLD: 2.6 % (ref 22–41)
LYMPHOCYTES NFR BLD: 3.8 % (ref 22–41)
LYMPHOCYTES NFR BLD: 4.8 % (ref 22–41)
LYMPHOCYTES NFR BLD: 5.8 % (ref 22–41)
LYMPHOCYTES NFR BLD: 6.9 % (ref 22–41)
LYMPHOCYTES NFR BLD: 7.6 % (ref 22–41)
LYMPHOCYTES NFR BLD: 7.7 % (ref 22–41)
LYMPHOCYTES NFR BLD: 8.7 % (ref 22–41)
LYMPHOCYTES NFR BLD: 9.5 % (ref 22–41)
LYMPHOCYTES NFR CSF: 71 %
LYMPHOCYTES NFR CSF: 93 %
LYMPHOCYTES NFR CSF: 93 %
MAGNESIUM SERPL-MCNC: 1.5 MG/DL (ref 1.5–2.5)
MAGNESIUM SERPL-MCNC: 1.6 MG/DL (ref 1.5–2.5)
MAGNESIUM SERPL-MCNC: 1.8 MG/DL (ref 1.5–2.5)
MAGNESIUM SERPL-MCNC: 1.9 MG/DL (ref 1.5–2.5)
MAGNESIUM SERPL-MCNC: 2 MG/DL (ref 1.5–2.5)
MAGNESIUM SERPL-MCNC: 2.1 MG/DL (ref 1.5–2.5)
MCH RBC QN AUTO: 27 PG (ref 27–33)
MCH RBC QN AUTO: 27 PG (ref 27–33)
MCH RBC QN AUTO: 27.1 PG (ref 27–33)
MCH RBC QN AUTO: 27.2 PG (ref 27–33)
MCH RBC QN AUTO: 27.2 PG (ref 27–33)
MCH RBC QN AUTO: 27.3 PG (ref 27–33)
MCH RBC QN AUTO: 27.4 PG (ref 27–33)
MCH RBC QN AUTO: 27.4 PG (ref 27–33)
MCH RBC QN AUTO: 27.6 PG (ref 27–33)
MCH RBC QN AUTO: 27.7 PG (ref 27–33)
MCHC RBC AUTO-ENTMCNC: 30.5 G/DL (ref 33.6–35)
MCHC RBC AUTO-ENTMCNC: 31.3 G/DL (ref 33.6–35)
MCHC RBC AUTO-ENTMCNC: 31.5 G/DL (ref 33.6–35)
MCHC RBC AUTO-ENTMCNC: 31.7 G/DL (ref 33.6–35)
MCHC RBC AUTO-ENTMCNC: 31.9 G/DL (ref 33.6–35)
MCHC RBC AUTO-ENTMCNC: 31.9 G/DL (ref 33.6–35)
MCHC RBC AUTO-ENTMCNC: 32 G/DL (ref 33.6–35)
MCHC RBC AUTO-ENTMCNC: 32 G/DL (ref 33.6–35)
MCHC RBC AUTO-ENTMCNC: 32.1 G/DL (ref 33.6–35)
MCHC RBC AUTO-ENTMCNC: 32.4 G/DL (ref 33.6–35)
MCHC RBC AUTO-ENTMCNC: 32.5 G/DL (ref 33.6–35)
MCHC RBC AUTO-ENTMCNC: 32.5 G/DL (ref 33.6–35)
MCHC RBC AUTO-ENTMCNC: 32.7 G/DL (ref 33.6–35)
MCHC RBC AUTO-ENTMCNC: 32.7 G/DL (ref 33.6–35)
MCHC RBC AUTO-ENTMCNC: 33 G/DL (ref 33.6–35)
MCHC RBC AUTO-ENTMCNC: 33.3 G/DL (ref 33.6–35)
MCV RBC AUTO: 82.4 FL (ref 81.4–97.8)
MCV RBC AUTO: 82.9 FL (ref 81.4–97.8)
MCV RBC AUTO: 83.1 FL (ref 81.4–97.8)
MCV RBC AUTO: 84.2 FL (ref 81.4–97.8)
MCV RBC AUTO: 84.4 FL (ref 81.4–97.8)
MCV RBC AUTO: 84.6 FL (ref 81.4–97.8)
MCV RBC AUTO: 84.7 FL (ref 81.4–97.8)
MCV RBC AUTO: 84.8 FL (ref 81.4–97.8)
MCV RBC AUTO: 84.9 FL (ref 81.4–97.8)
MCV RBC AUTO: 85.1 FL (ref 81.4–97.8)
MCV RBC AUTO: 85.4 FL (ref 81.4–97.8)
MCV RBC AUTO: 85.6 FL (ref 81.4–97.8)
MCV RBC AUTO: 86.3 FL (ref 81.4–97.8)
MCV RBC AUTO: 86.9 FL (ref 81.4–97.8)
MCV RBC AUTO: 87.8 FL (ref 81.4–97.8)
MCV RBC AUTO: 90 FL (ref 81.4–97.8)
METHADONE UR QL SCN: NEGATIVE
MICRO URNS: ABNORMAL
MICRO URNS: ABNORMAL
MONOCYTES # BLD AUTO: 0.05 K/UL (ref 0–0.85)
MONOCYTES # BLD AUTO: 0.28 K/UL (ref 0–0.85)
MONOCYTES # BLD AUTO: 0.3 K/UL (ref 0–0.85)
MONOCYTES # BLD AUTO: 0.46 K/UL (ref 0–0.85)
MONOCYTES # BLD AUTO: 0.46 K/UL (ref 0–0.85)
MONOCYTES # BLD AUTO: 0.53 K/UL (ref 0–0.85)
MONOCYTES # BLD AUTO: 0.55 K/UL (ref 0–0.85)
MONOCYTES # BLD AUTO: 0.59 K/UL (ref 0–0.85)
MONOCYTES # BLD AUTO: 0.82 K/UL (ref 0–0.85)
MONOCYTES # BLD AUTO: 0.82 K/UL (ref 0–0.85)
MONOCYTES # BLD AUTO: 0.84 K/UL (ref 0–0.85)
MONOCYTES # BLD AUTO: 0.84 K/UL (ref 0–0.85)
MONOCYTES # BLD AUTO: 0.94 K/UL (ref 0–0.85)
MONOCYTES # BLD AUTO: 1.12 K/UL (ref 0–0.85)
MONOCYTES # BLD AUTO: 1.15 K/UL (ref 0–0.85)
MONOCYTES # BLD AUTO: 1.17 K/UL (ref 0–0.85)
MONOCYTES NFR BLD AUTO: 1 % (ref 0–13.4)
MONOCYTES NFR BLD AUTO: 1.7 % (ref 0–13.4)
MONOCYTES NFR BLD AUTO: 10.3 % (ref 0–13.4)
MONOCYTES NFR BLD AUTO: 12.9 % (ref 0–13.4)
MONOCYTES NFR BLD AUTO: 5.1 % (ref 0–13.4)
MONOCYTES NFR BLD AUTO: 5.5 % (ref 0–13.4)
MONOCYTES NFR BLD AUTO: 6.2 % (ref 0–13.4)
MONOCYTES NFR BLD AUTO: 6.2 % (ref 0–13.4)
MONOCYTES NFR BLD AUTO: 6.4 % (ref 0–13.4)
MONOCYTES NFR BLD AUTO: 6.7 % (ref 0–13.4)
MONOCYTES NFR BLD AUTO: 7.3 % (ref 0–13.4)
MONOCYTES NFR BLD AUTO: 7.3 % (ref 0–13.4)
MONOCYTES NFR BLD AUTO: 7.5 % (ref 0–13.4)
MONOCYTES NFR BLD AUTO: 8.2 % (ref 0–13.4)
MONOCYTES NFR BLD AUTO: 8.2 % (ref 0–13.4)
MONOCYTES NFR BLD AUTO: 9 % (ref 0–13.4)
MONONUC CELLS NFR CSF: 12 %
MONONUC CELLS NFR CSF: 7 %
MONONUC CELLS NFR CSF: 7 %
N MEN DNA CSF QL NAA+NON-PROBE: NOT DETECTED
NEUTROPHILS # BLD AUTO: 11.3 K/UL (ref 2–7.15)
NEUTROPHILS # BLD AUTO: 12.11 K/UL (ref 2–7.15)
NEUTROPHILS # BLD AUTO: 13.01 K/UL (ref 2–7.15)
NEUTROPHILS # BLD AUTO: 14.08 K/UL (ref 2–7.15)
NEUTROPHILS # BLD AUTO: 15.01 K/UL (ref 2–7.15)
NEUTROPHILS # BLD AUTO: 3.93 K/UL (ref 2–7.15)
NEUTROPHILS # BLD AUTO: 4.46 K/UL (ref 2–7.15)
NEUTROPHILS # BLD AUTO: 4.81 K/UL (ref 2–7.15)
NEUTROPHILS # BLD AUTO: 5.27 K/UL (ref 2–7.15)
NEUTROPHILS # BLD AUTO: 5.61 K/UL (ref 2–7.15)
NEUTROPHILS # BLD AUTO: 5.72 K/UL (ref 2–7.15)
NEUTROPHILS # BLD AUTO: 6.34 K/UL (ref 2–7.15)
NEUTROPHILS # BLD AUTO: 6.6 K/UL (ref 2–7.15)
NEUTROPHILS # BLD AUTO: 8.24 K/UL (ref 2–7.15)
NEUTROPHILS # BLD AUTO: 9.41 K/UL (ref 2–7.15)
NEUTROPHILS # BLD AUTO: 9.94 K/UL (ref 2–7.15)
NEUTROPHILS NFR BLD: 71.5 % (ref 44–72)
NEUTROPHILS NFR BLD: 73.8 % (ref 44–72)
NEUTROPHILS NFR BLD: 74.6 % (ref 44–72)
NEUTROPHILS NFR BLD: 76.7 % (ref 44–72)
NEUTROPHILS NFR BLD: 78.9 % (ref 44–72)
NEUTROPHILS NFR BLD: 79.8 % (ref 44–72)
NEUTROPHILS NFR BLD: 80.4 % (ref 44–72)
NEUTROPHILS NFR BLD: 81.6 % (ref 44–72)
NEUTROPHILS NFR BLD: 81.8 % (ref 44–72)
NEUTROPHILS NFR BLD: 83.8 % (ref 44–72)
NEUTROPHILS NFR BLD: 85.4 % (ref 44–72)
NEUTROPHILS NFR BLD: 85.5 % (ref 44–72)
NEUTROPHILS NFR BLD: 87.9 % (ref 44–72)
NEUTROPHILS NFR BLD: 89.4 % (ref 44–72)
NEUTROPHILS NFR BLD: 92.3 % (ref 44–72)
NEUTROPHILS NFR BLD: 93.4 % (ref 44–72)
NEUTROPHILS NFR CSF: 17 %
NITRITE UR QL STRIP.AUTO: NEGATIVE
NITRITE UR QL STRIP.AUTO: NEGATIVE
NRBC # BLD AUTO: 0 K/UL
NRBC # BLD AUTO: 0.02 K/UL
NRBC BLD-RTO: 0 /100 WBC
NRBC BLD-RTO: 0.1 /100 WBC
NUCLEAR IGG SER QL IA: DETECTED
NUCLEAR IGG SER QL IA: NORMAL
NUCLEAR IGG SER QL IA: NORMAL
O2/TOTAL GAS SETTING VFR VENT: 100 %
O2/TOTAL GAS SETTING VFR VENT: 50 %
O2/TOTAL GAS SETTING VFR VENT: 50 %
OLIGOCLONAL BANDS CSF ELPH-IMP: NORMAL
OLIGOCLONAL BANDS CSF IEF: 0 BANDS (ref 0–1)
OLIGOCLONAL BANDS.IT SER+CSF QL: NEGATIVE
OPIATES UR QL SCN: NEGATIVE
OXYCODONE UR QL SCN: NEGATIVE
PARECHOVIRUS A RNA CSF QL NAA+NON-PROBE: NOT DETECTED
PCO2 BLDA: 27.8 MMHG (ref 26–37)
PCO2 BLDA: 27.9 MMHG (ref 26–37)
PCO2 BLDA: 33.5 MMHG (ref 26–37)
PCO2 BLDA: 35.1 MMHG (ref 26–37)
PCO2 BLDA: 37.6 MMHG (ref 26–37)
PCO2 TEMP ADJ BLDA: 30 MMHG (ref 26–37)
PCO2 TEMP ADJ BLDA: 33.5 MMHG (ref 26–37)
PCO2 TEMP ADJ BLDA: 35.5 MMHG (ref 26–37)
PCP UR QL SCN: NEGATIVE
PH BLDA: 7.39 [PH] (ref 7.4–7.5)
PH BLDA: 7.42 [PH] (ref 7.4–7.5)
PH BLDA: 7.44 [PH] (ref 7.4–7.5)
PH BLDA: 7.47 [PH] (ref 7.4–7.5)
PH BLDA: 7.49 [PH] (ref 7.4–7.5)
PH TEMP ADJ BLDA: 7.39 [PH] (ref 7.4–7.5)
PH TEMP ADJ BLDA: 7.44 [PH] (ref 7.4–7.5)
PH TEMP ADJ BLDA: 7.45 [PH] (ref 7.4–7.5)
PH UR STRIP.AUTO: 6.5 [PH]
PH UR STRIP.AUTO: 7 [PH]
PHOSPHATE SERPL-MCNC: 1.5 MG/DL (ref 2.5–4.5)
PHOSPHATE SERPL-MCNC: 2.2 MG/DL (ref 2.5–4.5)
PHOSPHATE SERPL-MCNC: 2.9 MG/DL (ref 2.5–4.5)
PHOSPHATE SERPL-MCNC: 3 MG/DL (ref 2.5–4.5)
PHOSPHATE SERPL-MCNC: 3.9 MG/DL (ref 2.5–4.5)
PLATELET # BLD AUTO: 209 K/UL (ref 164–446)
PLATELET # BLD AUTO: 244 K/UL (ref 164–446)
PLATELET # BLD AUTO: 261 K/UL (ref 164–446)
PLATELET # BLD AUTO: 275 K/UL (ref 164–446)
PLATELET # BLD AUTO: 298 K/UL (ref 164–446)
PLATELET # BLD AUTO: 330 K/UL (ref 164–446)
PLATELET # BLD AUTO: 333 K/UL (ref 164–446)
PLATELET # BLD AUTO: 338 K/UL (ref 164–446)
PLATELET # BLD AUTO: 350 K/UL (ref 164–446)
PLATELET # BLD AUTO: 372 K/UL (ref 164–446)
PLATELET # BLD AUTO: 417 K/UL (ref 164–446)
PLATELET # BLD AUTO: 429 K/UL (ref 164–446)
PLATELET # BLD AUTO: 446 K/UL (ref 164–446)
PLATELET # BLD AUTO: 454 K/UL (ref 164–446)
PLATELET # BLD AUTO: 476 K/UL (ref 164–446)
PLATELET # BLD AUTO: 572 K/UL (ref 164–446)
PMV BLD AUTO: 10 FL (ref 9–12.9)
PMV BLD AUTO: 10.1 FL (ref 9–12.9)
PMV BLD AUTO: 10.1 FL (ref 9–12.9)
PMV BLD AUTO: 10.2 FL (ref 9–12.9)
PMV BLD AUTO: 10.4 FL (ref 9–12.9)
PMV BLD AUTO: 10.5 FL (ref 9–12.9)
PMV BLD AUTO: 10.5 FL (ref 9–12.9)
PMV BLD AUTO: 10.7 FL (ref 9–12.9)
PMV BLD AUTO: 10.8 FL (ref 9–12.9)
PMV BLD AUTO: 11.7 FL (ref 9–12.9)
PMV BLD AUTO: 9.7 FL (ref 9–12.9)
PMV BLD AUTO: 9.7 FL (ref 9–12.9)
PMV BLD AUTO: 9.8 FL (ref 9–12.9)
PMV BLD AUTO: 9.9 FL (ref 9–12.9)
PO2 BLDA: 152 MMHG (ref 64–87)
PO2 BLDA: 167 MMHG (ref 64–87)
PO2 BLDA: 287 MMHG (ref 64–87)
PO2 BLDA: 67.5 MMHG (ref 64–87)
PO2 BLDA: 84 MMHG (ref 64–87)
PO2 TEMP ADJ BLDA: 152 MMHG (ref 64–87)
PO2 TEMP ADJ BLDA: 177 MMHG (ref 64–87)
PO2 TEMP ADJ BLDA: 288 MMHG (ref 64–87)
POTASSIUM SERPL-SCNC: 2.6 MMOL/L (ref 3.6–5.5)
POTASSIUM SERPL-SCNC: 2.7 MMOL/L (ref 3.6–5.5)
POTASSIUM SERPL-SCNC: 2.9 MMOL/L (ref 3.6–5.5)
POTASSIUM SERPL-SCNC: 3 MMOL/L (ref 3.6–5.5)
POTASSIUM SERPL-SCNC: 3.1 MMOL/L (ref 3.6–5.5)
POTASSIUM SERPL-SCNC: 3.1 MMOL/L (ref 3.6–5.5)
POTASSIUM SERPL-SCNC: 3.2 MMOL/L (ref 3.6–5.5)
POTASSIUM SERPL-SCNC: 3.3 MMOL/L (ref 3.6–5.5)
POTASSIUM SERPL-SCNC: 3.4 MMOL/L (ref 3.6–5.5)
POTASSIUM SERPL-SCNC: 3.4 MMOL/L (ref 3.6–5.5)
POTASSIUM SERPL-SCNC: 3.6 MMOL/L (ref 3.6–5.5)
POTASSIUM SERPL-SCNC: 3.8 MMOL/L (ref 3.6–5.5)
POTASSIUM SERPL-SCNC: 3.8 MMOL/L (ref 3.6–5.5)
POTASSIUM SERPL-SCNC: 3.9 MMOL/L (ref 3.6–5.5)
POTASSIUM SERPL-SCNC: 4 MMOL/L (ref 3.6–5.5)
POTASSIUM SERPL-SCNC: 4 MMOL/L (ref 3.6–5.5)
POTASSIUM SERPL-SCNC: 4.1 MMOL/L (ref 3.6–5.5)
POTASSIUM SERPL-SCNC: 4.1 MMOL/L (ref 3.6–5.5)
POTASSIUM SERPL-SCNC: 4.3 MMOL/L (ref 3.6–5.5)
PREALB SERPL-MCNC: 11 MG/DL (ref 18–38)
PREALB SERPL-MCNC: 3 MG/DL (ref 18–38)
PROCALCITONIN SERPL-MCNC: <0.05 NG/ML
PROPOXYPH UR QL SCN: NEGATIVE
PROT CSF-MCNC: 51 MG/DL (ref 15–45)
PROT CSF-MCNC: 67 MG/DL (ref 15–45)
PROT SERPL-MCNC: 5.3 G/DL (ref 6–8.2)
PROT SERPL-MCNC: 5.5 G/DL (ref 6–8.2)
PROT SERPL-MCNC: 5.6 G/DL (ref 6–8.2)
PROT SERPL-MCNC: 5.7 G/DL (ref 6–8.2)
PROT SERPL-MCNC: 5.8 G/DL (ref 6–8.2)
PROT SERPL-MCNC: 5.8 G/DL (ref 6–8.2)
PROT SERPL-MCNC: 5.9 G/DL (ref 6–8.2)
PROT SERPL-MCNC: 6 G/DL (ref 6–8.2)
PROT SERPL-MCNC: 6 G/DL (ref 6–8.2)
PROT SERPL-MCNC: 6.1 G/DL (ref 6–8.2)
PROT SERPL-MCNC: 6.2 G/DL (ref 6–8.2)
PROT SERPL-MCNC: 7.1 G/DL (ref 6–8.2)
PROT SERPL-MCNC: 7.3 G/DL (ref 6–8.2)
PROT SERPL-MCNC: 7.5 G/DL (ref 6–8.2)
PROT SERPL-MCNC: 7.6 G/DL (ref 6–8.2)
PROT UR QL STRIP: 300 MG/DL
PROT UR QL STRIP: NEGATIVE MG/DL
PROTHROMBIN TIME: 13.6 SEC (ref 12–14.6)
PROTHROMBIN TIME: 15.1 SEC (ref 12–14.6)
PROTHROMBIN TIME: 15.8 SEC (ref 12–14.6)
RBC # BLD AUTO: 3.88 M/UL (ref 4.2–5.4)
RBC # BLD AUTO: 3.9 M/UL (ref 4.2–5.4)
RBC # BLD AUTO: 4 M/UL (ref 4.2–5.4)
RBC # BLD AUTO: 4.01 M/UL (ref 4.2–5.4)
RBC # BLD AUTO: 4.02 M/UL (ref 4.2–5.4)
RBC # BLD AUTO: 4.19 M/UL (ref 4.2–5.4)
RBC # BLD AUTO: 4.24 M/UL (ref 4.2–5.4)
RBC # BLD AUTO: 4.24 M/UL (ref 4.2–5.4)
RBC # BLD AUTO: 4.29 M/UL (ref 4.2–5.4)
RBC # BLD AUTO: 4.35 M/UL (ref 4.2–5.4)
RBC # BLD AUTO: 4.38 M/UL (ref 4.2–5.4)
RBC # BLD AUTO: 4.49 M/UL (ref 4.2–5.4)
RBC # BLD AUTO: 4.5 M/UL (ref 4.2–5.4)
RBC # BLD AUTO: 4.6 M/UL (ref 4.2–5.4)
RBC # BLD AUTO: 4.97 M/UL (ref 4.2–5.4)
RBC # BLD AUTO: 5.48 M/UL (ref 4.2–5.4)
RBC # CSF: 13 CELLS/UL
RBC # CSF: 28 CELLS/UL
RBC # CSF: 319 CELLS/UL
RBC # URNS HPF: ABNORMAL /HPF
RBC # URNS HPF: ABNORMAL /HPF
RBC UR QL AUTO: ABNORMAL
RBC UR QL AUTO: ABNORMAL
RHEUMATOID FACT SER IA-ACNC: 12 IU/ML (ref 0–14)
RHODAMINE-AURAMINE STN SPEC: NORMAL
S PNEUM DNA CSF QL NAA+NON-PROBE: NOT DETECTED
SAO2 % BLDA: 100 % (ref 93–99)
SAO2 % BLDA: 100 % (ref 93–99)
SAO2 % BLDA: 93.3 % (ref 93–99)
SAO2 % BLDA: 95.2 % (ref 93–99)
SAO2 % BLDA: 99 % (ref 93–99)
SIGNIFICANT IND 70042: NORMAL
SITE SITE: NORMAL
SLEV IGG TITR CSF IF: NORMAL {TITER}
SLEV IGM TITR CSF IF: NORMAL {TITER}
SODIUM SERPL-SCNC: 132 MMOL/L (ref 135–145)
SODIUM SERPL-SCNC: 133 MMOL/L (ref 135–145)
SODIUM SERPL-SCNC: 135 MMOL/L (ref 135–145)
SODIUM SERPL-SCNC: 136 MMOL/L (ref 135–145)
SODIUM SERPL-SCNC: 137 MMOL/L (ref 135–145)
SODIUM SERPL-SCNC: 138 MMOL/L (ref 135–145)
SODIUM SERPL-SCNC: 139 MMOL/L (ref 135–145)
SODIUM SERPL-SCNC: 140 MMOL/L (ref 135–145)
SODIUM SERPL-SCNC: 141 MMOL/L (ref 135–145)
SODIUM SERPL-SCNC: 141 MMOL/L (ref 135–145)
SODIUM SERPL-SCNC: 142 MMOL/L (ref 135–145)
SODIUM SERPL-SCNC: 142 MMOL/L (ref 135–145)
SODIUM SERPL-SCNC: 143 MMOL/L (ref 135–145)
SOURCE SOURCE: NORMAL
SP GR UR STRIP.AUTO: 1.02
SP GR UR STRIP.AUTO: 1.02
SPECIMEN DRAWN FROM PATIENT: ABNORMAL
SPECIMEN SOURCE: NORMAL
SPECIMEN VOL CSF: 12.5 ML
SPECIMEN VOL CSF: 12.5 ML
SPECIMEN VOL CSF: 17 ML
THYROGLOB AB SERPL-ACNC: <0.9 IU/ML (ref 0–4)
THYROPEROXIDASE AB SERPL-ACNC: <0.2 IU/ML (ref 0–9)
TRIGL SERPL-MCNC: 124 MG/DL (ref 0–149)
TRIGL SERPL-MCNC: 89 MG/DL (ref 0–149)
TROPONIN I SERPL-MCNC: 0.08 NG/ML (ref 0–0.04)
TSH SERPL DL<=0.005 MIU/L-ACNC: 1.67 UIU/ML (ref 0.38–5.33)
TUBE # CSF: 1
TUBE # CSF: 4
UROBILINOGEN UR STRIP.AUTO-MCNC: 0.2 MG/DL
UROBILINOGEN UR STRIP.AUTO-MCNC: 0.2 MG/DL
VALPROATE SERPL-MCNC: 80.7 UG/ML (ref 50–100)
VALPROATE SERPL-MCNC: 86.2 UG/ML (ref 50–100)
VDRL CSF QL: NON REACTIVE
VIT B1 BLD-MCNC: 214 NMOL/L (ref 70–180)
VIT B12 SERPL-MCNC: 198 PG/ML (ref 211–911)
VIT B12 SERPL-MCNC: 687 PG/ML (ref 211–911)
VZV DNA CSF QL NAA+NON-PROBE: NOT DETECTED
VZV DNA SPEC QL NAA+PROBE: NOT DETECTED
WBC # BLD AUTO: 11.5 K/UL (ref 4.8–10.8)
WBC # BLD AUTO: 12.4 K/UL (ref 4.8–10.8)
WBC # BLD AUTO: 13.2 K/UL (ref 4.8–10.8)
WBC # BLD AUTO: 13.5 K/UL (ref 4.8–10.8)
WBC # BLD AUTO: 15.5 K/UL (ref 4.8–10.8)
WBC # BLD AUTO: 16 K/UL (ref 4.8–10.8)
WBC # BLD AUTO: 16.1 K/UL (ref 4.8–10.8)
WBC # BLD AUTO: 4.8 K/UL (ref 4.8–10.8)
WBC # BLD AUTO: 5.2 K/UL (ref 4.8–10.8)
WBC # BLD AUTO: 5.6 K/UL (ref 4.8–10.8)
WBC # BLD AUTO: 6.9 K/UL (ref 4.8–10.8)
WBC # BLD AUTO: 7.5 K/UL (ref 4.8–10.8)
WBC # BLD AUTO: 8 K/UL (ref 4.8–10.8)
WBC # BLD AUTO: 8.1 K/UL (ref 4.8–10.8)
WBC # BLD AUTO: 8.9 K/UL (ref 4.8–10.8)
WBC # BLD AUTO: 9.7 K/UL (ref 4.8–10.8)
WBC # CSF: 1 CELLS/UL (ref 0–10)
WBC # CSF: 1 CELLS/UL (ref 0–10)
WBC # CSF: 2 CELLS/UL (ref 0–10)
WBC #/AREA URNS HPF: ABNORMAL /HPF
WBC #/AREA URNS HPF: ABNORMAL /HPF
WEEV IGG TITR CSF IF: NORMAL {TITER}
WEEV IGM TITR CSF IF: NORMAL {TITER}
WNV IGG CSF IA-ACNC: 0.04 IV
WNV IGM CSF IA-ACNC: 0 IV

## 2019-01-01 PROCEDURE — 307059 PAD,EAR PROTECTOR: Performed by: HOSPITALIST

## 2019-01-01 PROCEDURE — 99232 SBSQ HOSP IP/OBS MODERATE 35: CPT | Performed by: HOSPITALIST

## 2019-01-01 PROCEDURE — 770006 HCHG ROOM/CARE - MED/SURG/GYN SEMI*

## 2019-01-01 PROCEDURE — 96366 THER/PROPH/DIAG IV INF ADDON: CPT

## 2019-01-01 PROCEDURE — 700102 HCHG RX REV CODE 250 W/ 637 OVERRIDE(OP): Performed by: INTERNAL MEDICINE

## 2019-01-01 PROCEDURE — 700105 HCHG RX REV CODE 258: Performed by: INTERNAL MEDICINE

## 2019-01-01 PROCEDURE — 700102 HCHG RX REV CODE 250 W/ 637 OVERRIDE(OP): Performed by: HOSPITALIST

## 2019-01-01 PROCEDURE — 84443 ASSAY THYROID STIM HORMONE: CPT

## 2019-01-01 PROCEDURE — E0191 PROTECTOR HEEL OR ELBOW: HCPCS | Performed by: HOSPITALIST

## 2019-01-01 PROCEDURE — 94150 VITAL CAPACITY TEST: CPT

## 2019-01-01 PROCEDURE — 700111 HCHG RX REV CODE 636 W/ 250 OVERRIDE (IP): Performed by: HOSPITALIST

## 2019-01-01 PROCEDURE — 87040 BLOOD CULTURE FOR BACTERIA: CPT

## 2019-01-01 PROCEDURE — 83519 RIA NONANTIBODY: CPT

## 2019-01-01 PROCEDURE — 36415 COLL VENOUS BLD VENIPUNCTURE: CPT

## 2019-01-01 PROCEDURE — 86803 HEPATITIS C AB TEST: CPT

## 2019-01-01 PROCEDURE — 84484 ASSAY OF TROPONIN QUANT: CPT

## 2019-01-01 PROCEDURE — 700105 HCHG RX REV CODE 258: Performed by: HOSPITALIST

## 2019-01-01 PROCEDURE — 700111 HCHG RX REV CODE 636 W/ 250 OVERRIDE (IP): Mod: JG | Performed by: PSYCHIATRY & NEUROLOGY

## 2019-01-01 PROCEDURE — 700105 HCHG RX REV CODE 258: Performed by: PSYCHIATRY & NEUROLOGY

## 2019-01-01 PROCEDURE — A9270 NON-COVERED ITEM OR SERVICE: HCPCS | Performed by: HOSPITALIST

## 2019-01-01 PROCEDURE — 99223 1ST HOSP IP/OBS HIGH 75: CPT | Performed by: INTERNAL MEDICINE

## 2019-01-01 PROCEDURE — A9270 NON-COVERED ITEM OR SERVICE: HCPCS | Performed by: PSYCHIATRY & NEUROLOGY

## 2019-01-01 PROCEDURE — 87086 URINE CULTURE/COLONY COUNT: CPT

## 2019-01-01 PROCEDURE — 82140 ASSAY OF AMMONIA: CPT

## 2019-01-01 PROCEDURE — 009U3ZX DRAINAGE OF SPINAL CANAL, PERCUTANEOUS APPROACH, DIAGNOSTIC: ICD-10-PCS | Performed by: INTERNAL MEDICINE

## 2019-01-01 PROCEDURE — A9270 NON-COVERED ITEM OR SERVICE: HCPCS | Performed by: INTERNAL MEDICINE

## 2019-01-01 PROCEDURE — 84132 ASSAY OF SERUM POTASSIUM: CPT

## 2019-01-01 PROCEDURE — 700101 HCHG RX REV CODE 250

## 2019-01-01 PROCEDURE — 82962 GLUCOSE BLOOD TEST: CPT

## 2019-01-01 PROCEDURE — 99214 OFFICE O/P EST MOD 30 MIN: CPT | Performed by: INTERNAL MEDICINE

## 2019-01-01 PROCEDURE — 94003 VENT MGMT INPAT SUBQ DAY: CPT

## 2019-01-01 PROCEDURE — 700111 HCHG RX REV CODE 636 W/ 250 OVERRIDE (IP): Performed by: INTERNAL MEDICINE

## 2019-01-01 PROCEDURE — 74018 RADEX ABDOMEN 1 VIEW: CPT

## 2019-01-01 PROCEDURE — 97535 SELF CARE MNGMENT TRAINING: CPT

## 2019-01-01 PROCEDURE — 86038 ANTINUCLEAR ANTIBODIES: CPT

## 2019-01-01 PROCEDURE — 80053 COMPREHEN METABOLIC PANEL: CPT

## 2019-01-01 PROCEDURE — 8041 PR SCP AHA: Performed by: INTERNAL MEDICINE

## 2019-01-01 PROCEDURE — 86341 ISLET CELL ANTIBODY: CPT

## 2019-01-01 PROCEDURE — 700111 HCHG RX REV CODE 636 W/ 250 OVERRIDE (IP): Performed by: PSYCHIATRY & NEUROLOGY

## 2019-01-01 PROCEDURE — 84100 ASSAY OF PHOSPHORUS: CPT

## 2019-01-01 PROCEDURE — 99233 SBSQ HOSP IP/OBS HIGH 50: CPT | Performed by: HOSPITALIST

## 2019-01-01 PROCEDURE — 009U3ZX DRAINAGE OF SPINAL CANAL, PERCUTANEOUS APPROACH, DIAGNOSTIC: ICD-10-PCS | Performed by: HOSPITALIST

## 2019-01-01 PROCEDURE — 96372 THER/PROPH/DIAG INJ SC/IM: CPT

## 2019-01-01 PROCEDURE — 80061 LIPID PANEL: CPT

## 2019-01-01 PROCEDURE — 70450 CT HEAD/BRAIN W/O DYE: CPT

## 2019-01-01 PROCEDURE — 86696 HERPES SIMPLEX TYPE 2 TEST: CPT

## 2019-01-01 PROCEDURE — 700111 HCHG RX REV CODE 636 W/ 250 OVERRIDE (IP)

## 2019-01-01 PROCEDURE — 99232 SBSQ HOSP IP/OBS MODERATE 35: CPT | Performed by: PSYCHIATRY & NEUROLOGY

## 2019-01-01 PROCEDURE — 99291 CRITICAL CARE FIRST HOUR: CPT | Performed by: INTERNAL MEDICINE

## 2019-01-01 PROCEDURE — 86431 RHEUMATOID FACTOR QUANT: CPT

## 2019-01-01 PROCEDURE — 85025 COMPLETE CBC W/AUTO DIFF WBC: CPT

## 2019-01-01 PROCEDURE — 95951 EEG: CPT | Mod: 26,52 | Performed by: PSYCHIATRY & NEUROLOGY

## 2019-01-01 PROCEDURE — 86235 NUCLEAR ANTIGEN ANTIBODY: CPT

## 2019-01-01 PROCEDURE — 93010 ELECTROCARDIOGRAM REPORT: CPT | Performed by: INTERNAL MEDICINE

## 2019-01-01 PROCEDURE — 86255 FLUORESCENT ANTIBODY SCREEN: CPT

## 2019-01-01 PROCEDURE — 87070 CULTURE OTHR SPECIMN AEROBIC: CPT

## 2019-01-01 PROCEDURE — 82803 BLOOD GASES ANY COMBINATION: CPT

## 2019-01-01 PROCEDURE — 87077 CULTURE AEROBIC IDENTIFY: CPT

## 2019-01-01 PROCEDURE — 83605 ASSAY OF LACTIC ACID: CPT

## 2019-01-01 PROCEDURE — 86225 DNA ANTIBODY NATIVE: CPT

## 2019-01-01 PROCEDURE — 99233 SBSQ HOSP IP/OBS HIGH 50: CPT | Performed by: INTERNAL MEDICINE

## 2019-01-01 PROCEDURE — 89051 BODY FLUID CELL COUNT: CPT

## 2019-01-01 PROCEDURE — 99233 SBSQ HOSP IP/OBS HIGH 50: CPT | Performed by: PSYCHIATRY & NEUROLOGY

## 2019-01-01 PROCEDURE — 97530 THERAPEUTIC ACTIVITIES: CPT

## 2019-01-01 PROCEDURE — 770022 HCHG ROOM/CARE - ICU (200)

## 2019-01-01 PROCEDURE — 62270 DX LMBR SPI PNXR: CPT | Mod: GC | Performed by: HOSPITALIST

## 2019-01-01 PROCEDURE — 80048 BASIC METABOLIC PNL TOTAL CA: CPT

## 2019-01-01 PROCEDURE — 84100 ASSAY OF PHOSPHORUS: CPT | Mod: 91

## 2019-01-01 PROCEDURE — 94640 AIRWAY INHALATION TREATMENT: CPT

## 2019-01-01 PROCEDURE — 700102 HCHG RX REV CODE 250 W/ 637 OVERRIDE(OP): Performed by: PSYCHIATRY & NEUROLOGY

## 2019-01-01 PROCEDURE — 4A10X4Z MONITORING OF CENTRAL NERVOUS ELECTRICAL ACTIVITY, EXTERNAL APPROACH: ICD-10-PCS | Performed by: PSYCHIATRY & NEUROLOGY

## 2019-01-01 PROCEDURE — 95951 EEG: CPT | Mod: 26 | Performed by: PSYCHIATRY & NEUROLOGY

## 2019-01-01 PROCEDURE — 71045 X-RAY EXAM CHEST 1 VIEW: CPT

## 2019-01-01 PROCEDURE — 86256 FLUORESCENT ANTIBODY TITER: CPT | Mod: 91

## 2019-01-01 PROCEDURE — 93005 ELECTROCARDIOGRAM TRACING: CPT | Performed by: HOSPITALIST

## 2019-01-01 PROCEDURE — 82962 GLUCOSE BLOOD TEST: CPT | Mod: 91

## 2019-01-01 PROCEDURE — 70496 CT ANGIOGRAPHY HEAD: CPT

## 2019-01-01 PROCEDURE — 83605 ASSAY OF LACTIC ACID: CPT | Mod: 91

## 2019-01-01 PROCEDURE — 89051 BODY FLUID CELL COUNT: CPT | Mod: 91

## 2019-01-01 PROCEDURE — 51701 INSERT BLADDER CATHETER: CPT

## 2019-01-01 PROCEDURE — 85610 PROTHROMBIN TIME: CPT | Mod: 91

## 2019-01-01 PROCEDURE — 99226 PR SUBSEQUENT OBSERVATION CARE,LEVEL III: CPT | Performed by: HOSPITALIST

## 2019-01-01 PROCEDURE — 86256 FLUORESCENT ANTIBODY TITER: CPT

## 2019-01-01 PROCEDURE — 95819 EEG AWAKE AND ASLEEP: CPT | Performed by: PSYCHIATRY & NEUROLOGY

## 2019-01-01 PROCEDURE — 86376 MICROSOMAL ANTIBODY EACH: CPT

## 2019-01-01 PROCEDURE — 700101 HCHG RX REV CODE 250: Performed by: INTERNAL MEDICINE

## 2019-01-01 PROCEDURE — 0035U NEURO CSF PRION PRTN QUAL: CPT

## 2019-01-01 PROCEDURE — 86788 WEST NILE VIRUS AB IGM: CPT

## 2019-01-01 PROCEDURE — 99497 ADVNCD CARE PLAN 30 MIN: CPT | Performed by: HOSPITALIST

## 2019-01-01 PROCEDURE — 700117 HCHG RX CONTRAST REV CODE 255: Performed by: HOSPITALIST

## 2019-01-01 PROCEDURE — 83036 HEMOGLOBIN GLYCOSYLATED A1C: CPT

## 2019-01-01 PROCEDURE — 700101 HCHG RX REV CODE 250: Performed by: HOSPITALIST

## 2019-01-01 PROCEDURE — 95816 EEG AWAKE AND DROWSY: CPT | Performed by: PSYCHIATRY & NEUROLOGY

## 2019-01-01 PROCEDURE — 700117 HCHG RX CONTRAST REV CODE 255: Performed by: INTERNAL MEDICINE

## 2019-01-01 PROCEDURE — 84145 PROCALCITONIN (PCT): CPT

## 2019-01-01 PROCEDURE — 36556 INSERT NON-TUNNEL CV CATH: CPT | Mod: LT | Performed by: INTERNAL MEDICINE

## 2019-01-01 PROCEDURE — 83520 IMMUNOASSAY QUANT NOS NONAB: CPT

## 2019-01-01 PROCEDURE — 62270 DX LMBR SPI PNXR: CPT | Performed by: INTERNAL MEDICINE

## 2019-01-01 PROCEDURE — C9254 INJECTION, LACOSAMIDE: HCPCS | Performed by: PSYCHIATRY & NEUROLOGY

## 2019-01-01 PROCEDURE — 95819 EEG AWAKE AND ASLEEP: CPT | Mod: 26 | Performed by: PSYCHIATRY & NEUROLOGY

## 2019-01-01 PROCEDURE — 99231 SBSQ HOSP IP/OBS SF/LOW 25: CPT | Performed by: PSYCHIATRY & NEUROLOGY

## 2019-01-01 PROCEDURE — 87798 DETECT AGENT NOS DNA AMP: CPT

## 2019-01-01 PROCEDURE — 93005 ELECTROCARDIOGRAM TRACING: CPT | Performed by: EMERGENCY MEDICINE

## 2019-01-01 PROCEDURE — 87045 FECES CULTURE AEROBIC BACT: CPT

## 2019-01-01 PROCEDURE — 86789 WEST NILE VIRUS ANTIBODY: CPT

## 2019-01-01 PROCEDURE — 82945 GLUCOSE OTHER FLUID: CPT

## 2019-01-01 PROCEDURE — 84157 ASSAY OF PROTEIN OTHER: CPT

## 2019-01-01 PROCEDURE — 87116 MYCOBACTERIA CULTURE: CPT

## 2019-01-01 PROCEDURE — 83735 ASSAY OF MAGNESIUM: CPT

## 2019-01-01 PROCEDURE — 96367 TX/PROPH/DG ADDL SEQ IV INF: CPT

## 2019-01-01 PROCEDURE — 86140 C-REACTIVE PROTEIN: CPT

## 2019-01-01 PROCEDURE — 93306 TTE W/DOPPLER COMPLETE: CPT

## 2019-01-01 PROCEDURE — G0378 HOSPITAL OBSERVATION PER HR: HCPCS

## 2019-01-01 PROCEDURE — 87205 SMEAR GRAM STAIN: CPT

## 2019-01-01 PROCEDURE — 86592 SYPHILIS TEST NON-TREP QUAL: CPT

## 2019-01-01 PROCEDURE — 84132 ASSAY OF SERUM POTASSIUM: CPT | Mod: 91

## 2019-01-01 PROCEDURE — 82607 VITAMIN B-12: CPT

## 2019-01-01 PROCEDURE — 86695 HERPES SIMPLEX TYPE 1 TEST: CPT

## 2019-01-01 PROCEDURE — 99233 SBSQ HOSP IP/OBS HIGH 50: CPT | Mod: 25 | Performed by: PSYCHIATRY & NEUROLOGY

## 2019-01-01 PROCEDURE — 92950 HEART/LUNG RESUSCITATION CPR: CPT

## 2019-01-01 PROCEDURE — 82040 ASSAY OF SERUM ALBUMIN: CPT

## 2019-01-01 PROCEDURE — 31500 INSERT EMERGENCY AIRWAY: CPT

## 2019-01-01 PROCEDURE — 84425 ASSAY OF VITAMIN B-1: CPT

## 2019-01-01 PROCEDURE — 95951 EEG: CPT | Mod: 52 | Performed by: PSYCHIATRY & NEUROLOGY

## 2019-01-01 PROCEDURE — 87046 STOOL CULTR AEROBIC BACT EA: CPT

## 2019-01-01 PROCEDURE — 81001 URINALYSIS AUTO W/SCOPE: CPT

## 2019-01-01 PROCEDURE — 700105 HCHG RX REV CODE 258

## 2019-01-01 PROCEDURE — 86651 ENCEPHALITIS CALIFORN ANTBDY: CPT

## 2019-01-01 PROCEDURE — 97162 PT EVAL MOD COMPLEX 30 MIN: CPT

## 2019-01-01 PROCEDURE — 5A1945Z RESPIRATORY VENTILATION, 24-96 CONSECUTIVE HOURS: ICD-10-PCS | Performed by: INTERNAL MEDICINE

## 2019-01-01 PROCEDURE — 99285 EMERGENCY DEPT VISIT HI MDM: CPT

## 2019-01-01 PROCEDURE — 87205 SMEAR GRAM STAIN: CPT | Mod: 91

## 2019-01-01 PROCEDURE — 96365 THER/PROPH/DIAG IV INF INIT: CPT

## 2019-01-01 PROCEDURE — 02H633Z INSERTION OF INFUSION DEVICE INTO RIGHT ATRIUM, PERCUTANEOUS APPROACH: ICD-10-PCS | Performed by: INTERNAL MEDICINE

## 2019-01-01 PROCEDURE — 86800 THYROGLOBULIN ANTIBODY: CPT

## 2019-01-01 PROCEDURE — 99221 1ST HOSP IP/OBS SF/LOW 40: CPT | Performed by: PSYCHIATRY & NEUROLOGY

## 2019-01-01 PROCEDURE — 83916 OLIGOCLONAL BANDS: CPT

## 2019-01-01 PROCEDURE — 92610 EVALUATE SWALLOWING FUNCTION: CPT

## 2019-01-01 PROCEDURE — 36556 INSERT NON-TUNNEL CV CATH: CPT

## 2019-01-01 PROCEDURE — C1751 CATH, INF, PER/CENT/MIDLINE: HCPCS

## 2019-01-01 PROCEDURE — 99232 SBSQ HOSP IP/OBS MODERATE 35: CPT | Performed by: INTERNAL MEDICINE

## 2019-01-01 PROCEDURE — 86654 ENCEPHALTIS WEST EQNE ANTBDY: CPT | Mod: 91

## 2019-01-01 PROCEDURE — 93306 TTE W/DOPPLER COMPLETE: CPT | Mod: 26 | Performed by: INTERNAL MEDICINE

## 2019-01-01 PROCEDURE — 31500 INSERT EMERGENCY AIRWAY: CPT | Performed by: INTERNAL MEDICINE

## 2019-01-01 PROCEDURE — 87483 CNS DNA AMP PROBE TYPE 12-25: CPT

## 2019-01-01 PROCEDURE — 70498 CT ANGIOGRAPHY NECK: CPT

## 2019-01-01 PROCEDURE — 70551 MRI BRAIN STEM W/O DYE: CPT

## 2019-01-01 PROCEDURE — 95951 EEG: CPT | Performed by: PSYCHIATRY & NEUROLOGY

## 2019-01-01 PROCEDURE — 93005 ELECTROCARDIOGRAM TRACING: CPT | Performed by: INTERNAL MEDICINE

## 2019-01-01 PROCEDURE — 95822 EEG COMA OR SLEEP ONLY: CPT | Mod: 26 | Performed by: PSYCHIATRY & NEUROLOGY

## 2019-01-01 PROCEDURE — 74177 CT ABD & PELVIS W/CONTRAST: CPT

## 2019-01-01 PROCEDURE — 85652 RBC SED RATE AUTOMATED: CPT

## 2019-01-01 PROCEDURE — 302154 K THERMIA BLANKET 24X60: Performed by: INTERNAL MEDICINE

## 2019-01-01 PROCEDURE — 36600 WITHDRAWAL OF ARTERIAL BLOOD: CPT

## 2019-01-01 PROCEDURE — 87206 SMEAR FLUORESCENT/ACID STAI: CPT

## 2019-01-01 PROCEDURE — 99291 CRITICAL CARE FIRST HOUR: CPT | Mod: 25 | Performed by: HOSPITALIST

## 2019-01-01 PROCEDURE — 99232 SBSQ HOSP IP/OBS MODERATE 35: CPT | Mod: 25 | Performed by: PSYCHIATRY & NEUROLOGY

## 2019-01-01 PROCEDURE — 86652 ENCEPHALTIS EAST EQNE ANBDY: CPT | Mod: 91

## 2019-01-01 PROCEDURE — 99292 CRITICAL CARE ADDL 30 MIN: CPT | Performed by: INTERNAL MEDICINE

## 2019-01-01 PROCEDURE — 84134 ASSAY OF PREALBUMIN: CPT

## 2019-01-01 PROCEDURE — 85730 THROMBOPLASTIN TIME PARTIAL: CPT

## 2019-01-01 PROCEDURE — 96375 TX/PRO/DX INJ NEW DRUG ADDON: CPT

## 2019-01-01 PROCEDURE — 99291 CRITICAL CARE FIRST HOUR: CPT | Performed by: HOSPITALIST

## 2019-01-01 PROCEDURE — 82042 OTHER SOURCE ALBUMIN QUAN EA: CPT

## 2019-01-01 PROCEDURE — 76700 US EXAM ABDOM COMPLETE: CPT

## 2019-01-01 PROCEDURE — 86653 ENCEPHALTIS ST LOUIS ANTBODY: CPT | Mod: 91

## 2019-01-01 PROCEDURE — 87493 C DIFF AMPLIFIED PROBE: CPT

## 2019-01-01 PROCEDURE — 80307 DRUG TEST PRSMV CHEM ANLYZR: CPT

## 2019-01-01 PROCEDURE — 93880 EXTRACRANIAL BILAT STUDY: CPT

## 2019-01-01 PROCEDURE — 62270 DX LMBR SPI PNXR: CPT

## 2019-01-01 PROCEDURE — 86255 FLUORESCENT ANTIBODY SCREEN: CPT | Mod: 91

## 2019-01-01 PROCEDURE — 82784 ASSAY IGA/IGD/IGG/IGM EACH: CPT

## 2019-01-01 PROCEDURE — 97166 OT EVAL MOD COMPLEX 45 MIN: CPT

## 2019-01-01 PROCEDURE — 87186 SC STD MICRODIL/AGAR DIL: CPT

## 2019-01-01 PROCEDURE — 83516 IMMUNOASSAY NONANTIBODY: CPT | Mod: 91

## 2019-01-01 PROCEDURE — 80164 ASSAY DIPROPYLACETIC ACD TOT: CPT

## 2019-01-01 PROCEDURE — 83519 RIA NONANTIBODY: CPT | Mod: 91

## 2019-01-01 PROCEDURE — 85610 PROTHROMBIN TIME: CPT

## 2019-01-01 PROCEDURE — 94760 N-INVAS EAR/PLS OXIMETRY 1: CPT

## 2019-01-01 PROCEDURE — 86039 ANTINUCLEAR ANTIBODIES (ANA): CPT

## 2019-01-01 PROCEDURE — 99220 PR INITIAL OBSERVATION CARE,LEVL III: CPT | Performed by: INTERNAL MEDICINE

## 2019-01-01 PROCEDURE — 302146: Performed by: HOSPITALIST

## 2019-01-01 PROCEDURE — 302136 NUTRITION PUMP: Performed by: INTERNAL MEDICINE

## 2019-01-01 PROCEDURE — 0BH18EZ INSERTION OF ENDOTRACHEAL AIRWAY INTO TRACHEA, VIA NATURAL OR ARTIFICIAL OPENING ENDOSCOPIC: ICD-10-PCS | Performed by: INTERNAL MEDICINE

## 2019-01-01 PROCEDURE — 99232 SBSQ HOSP IP/OBS MODERATE 35: CPT | Mod: 25 | Performed by: HOSPITALIST

## 2019-01-01 PROCEDURE — 84703 CHORIONIC GONADOTROPIN ASSAY: CPT

## 2019-01-01 PROCEDURE — 94002 VENT MGMT INPAT INIT DAY: CPT

## 2019-01-01 RX ORDER — IMMUNE GLOBULIN 50 MG/ML
5 SOLUTION INTRAVENOUS DAILY
Status: COMPLETED | OUTPATIENT
Start: 2019-01-01 | End: 2019-01-01

## 2019-01-01 RX ORDER — ACETAMINOPHEN 650 MG/1
650 SUPPOSITORY RECTAL EVERY 6 HOURS PRN
Status: DISCONTINUED | OUTPATIENT
Start: 2019-01-01 | End: 2019-01-01 | Stop reason: HOSPADM

## 2019-01-01 RX ORDER — MAGNESIUM SULFATE HEPTAHYDRATE 40 MG/ML
2 INJECTION, SOLUTION INTRAVENOUS ONCE
Status: COMPLETED | OUTPATIENT
Start: 2019-01-01 | End: 2019-01-01

## 2019-01-01 RX ORDER — AMOXICILLIN 250 MG
2 CAPSULE ORAL 2 TIMES DAILY
Status: DISCONTINUED | OUTPATIENT
Start: 2019-01-01 | End: 2019-01-01

## 2019-01-01 RX ORDER — GABAPENTIN 250 MG/5ML
200 SOLUTION ORAL 3 TIMES DAILY
Status: DISCONTINUED | OUTPATIENT
Start: 2019-01-01 | End: 2019-01-01

## 2019-01-01 RX ORDER — SODIUM CHLORIDE 9 MG/ML
INJECTION, SOLUTION INTRAVENOUS
Status: COMPLETED
Start: 2019-01-01 | End: 2019-01-01

## 2019-01-01 RX ORDER — ASPIRIN 325 MG
325 TABLET ORAL DAILY
Status: DISCONTINUED | OUTPATIENT
Start: 2019-01-01 | End: 2019-01-01

## 2019-01-01 RX ORDER — SODIUM CHLORIDE, SODIUM LACTATE, POTASSIUM CHLORIDE, CALCIUM CHLORIDE 600; 310; 30; 20 MG/100ML; MG/100ML; MG/100ML; MG/100ML
INJECTION, SOLUTION INTRAVENOUS
Status: ACTIVE
Start: 2019-01-01 | End: 2019-01-01

## 2019-01-01 RX ORDER — LISINOPRIL 20 MG/1
20 TABLET ORAL
Status: DISCONTINUED | OUTPATIENT
Start: 2019-01-01 | End: 2019-01-01

## 2019-01-01 RX ORDER — HEPARIN SODIUM 5000 [USP'U]/ML
5000 INJECTION, SOLUTION INTRAVENOUS; SUBCUTANEOUS EVERY 8 HOURS
Status: DISCONTINUED | OUTPATIENT
Start: 2019-01-01 | End: 2019-01-01

## 2019-01-01 RX ORDER — LACTULOSE 20 G/30ML
30 SOLUTION ORAL 3 TIMES DAILY
Status: DISCONTINUED | OUTPATIENT
Start: 2019-01-01 | End: 2019-01-01

## 2019-01-01 RX ORDER — POTASSIUM CHLORIDE 7.45 MG/ML
10 INJECTION INTRAVENOUS
Status: DISCONTINUED | OUTPATIENT
Start: 2019-01-01 | End: 2019-01-01

## 2019-01-01 RX ORDER — POTASSIUM CHLORIDE 1.5 G/1.58G
40 POWDER, FOR SOLUTION ORAL ONCE
Status: DISCONTINUED | OUTPATIENT
Start: 2019-01-01 | End: 2019-01-01

## 2019-01-01 RX ORDER — MODAFINIL 100 MG/1
100 TABLET ORAL EVERY MORNING
Status: DISCONTINUED | OUTPATIENT
Start: 2019-01-01 | End: 2019-01-01

## 2019-01-01 RX ORDER — FAMOTIDINE 20 MG/1
20 TABLET, FILM COATED ORAL EVERY 12 HOURS
Status: DISCONTINUED | OUTPATIENT
Start: 2019-01-01 | End: 2019-01-01

## 2019-01-01 RX ORDER — LISINOPRIL 20 MG/1
20 TABLET ORAL DAILY
COMMUNITY
Start: 2018-01-01

## 2019-01-01 RX ORDER — IMMUNE GLOBULIN 50 MG/ML
10 SOLUTION INTRAVENOUS DAILY
Status: COMPLETED | OUTPATIENT
Start: 2019-01-01 | End: 2019-01-01

## 2019-01-01 RX ORDER — LEVETIRACETAM 500 MG/1
500 TABLET ORAL 2 TIMES DAILY
Status: DISCONTINUED | OUTPATIENT
Start: 2019-01-01 | End: 2019-01-01

## 2019-01-01 RX ORDER — GABAPENTIN 100 MG/1
200 CAPSULE ORAL 3 TIMES DAILY
Status: DISCONTINUED | OUTPATIENT
Start: 2019-01-01 | End: 2019-01-01

## 2019-01-01 RX ORDER — POTASSIUM CHLORIDE 7.45 MG/ML
10 INJECTION INTRAVENOUS
Status: COMPLETED | OUTPATIENT
Start: 2019-01-01 | End: 2019-01-01

## 2019-01-01 RX ORDER — ASPIRIN 81 MG/1
324 TABLET, CHEWABLE ORAL DAILY
Status: DISCONTINUED | OUTPATIENT
Start: 2019-01-01 | End: 2019-01-01

## 2019-01-01 RX ORDER — THIAMINE MONONITRATE (VIT B1) 100 MG
100 TABLET ORAL 3 TIMES DAILY
Status: DISPENSED | OUTPATIENT
Start: 2019-01-01 | End: 2019-01-01

## 2019-01-01 RX ORDER — MAGNESIUM SULFATE HEPTAHYDRATE 40 MG/ML
4 INJECTION, SOLUTION INTRAVENOUS ONCE
Status: COMPLETED | OUTPATIENT
Start: 2019-01-01 | End: 2019-01-01

## 2019-01-01 RX ORDER — ROCURONIUM BROMIDE 10 MG/ML
50 INJECTION, SOLUTION INTRAVENOUS ONCE
Status: COMPLETED | OUTPATIENT
Start: 2019-01-01 | End: 2019-01-01

## 2019-01-01 RX ORDER — SODIUM CHLORIDE 9 MG/ML
250 INJECTION, SOLUTION INTRAVENOUS ONCE
Status: COMPLETED | OUTPATIENT
Start: 2019-01-01 | End: 2019-01-01

## 2019-01-01 RX ORDER — CYANOCOBALAMIN 1000 UG/ML
1000 INJECTION, SOLUTION INTRAMUSCULAR; SUBCUTANEOUS
Status: DISCONTINUED | OUTPATIENT
Start: 2019-01-01 | End: 2019-01-01

## 2019-01-01 RX ORDER — CYANOCOBALAMIN 1000 UG/ML
1000 INJECTION, SOLUTION INTRAMUSCULAR; SUBCUTANEOUS
Status: COMPLETED | OUTPATIENT
Start: 2019-01-01 | End: 2019-01-01

## 2019-01-01 RX ORDER — SODIUM CHLORIDE AND POTASSIUM CHLORIDE 300; 900 MG/100ML; MG/100ML
INJECTION, SOLUTION INTRAVENOUS CONTINUOUS
Status: DISCONTINUED | OUTPATIENT
Start: 2019-01-01 | End: 2019-01-01

## 2019-01-01 RX ORDER — SODIUM CHLORIDE 9 MG/ML
INJECTION, SOLUTION INTRAVENOUS CONTINUOUS
Status: DISCONTINUED | OUTPATIENT
Start: 2019-01-01 | End: 2019-01-01

## 2019-01-01 RX ORDER — ACETAMINOPHEN 325 MG/1
650 TABLET ORAL EVERY 6 HOURS PRN
Status: DISCONTINUED | OUTPATIENT
Start: 2019-01-01 | End: 2019-01-01

## 2019-01-01 RX ORDER — POLYVINYL ALCOHOL 14 MG/ML
2 SOLUTION/ DROPS OPHTHALMIC EVERY 6 HOURS PRN
Status: DISCONTINUED | OUTPATIENT
Start: 2019-01-01 | End: 2019-01-01 | Stop reason: HOSPADM

## 2019-01-01 RX ORDER — LACOSAMIDE 100 MG/1
200 TABLET ORAL 2 TIMES DAILY
Status: DISCONTINUED | OUTPATIENT
Start: 2019-01-01 | End: 2019-01-01

## 2019-01-01 RX ORDER — FUROSEMIDE 10 MG/ML
40 INJECTION INTRAMUSCULAR; INTRAVENOUS ONCE
Status: COMPLETED | OUTPATIENT
Start: 2019-01-01 | End: 2019-01-01

## 2019-01-01 RX ORDER — METFORMIN HYDROCHLORIDE 500 MG/1
500 TABLET, EXTENDED RELEASE ORAL 2 TIMES DAILY
Qty: 180 TAB | Refills: 3 | Status: SHIPPED | OUTPATIENT
Start: 2019-01-01

## 2019-01-01 RX ORDER — SODIUM CHLORIDE 9 MG/ML
INJECTION, SOLUTION INTRAVENOUS
Status: ACTIVE
Start: 2019-01-01 | End: 2019-01-01

## 2019-01-01 RX ORDER — DEXTROSE MONOHYDRATE 25 G/50ML
25 INJECTION, SOLUTION INTRAVENOUS
Status: DISCONTINUED | OUTPATIENT
Start: 2019-01-01 | End: 2019-01-01

## 2019-01-01 RX ORDER — POTASSIUM CHLORIDE 1.5 G/1.58G
40 POWDER, FOR SOLUTION ORAL DAILY
Status: DISCONTINUED | OUTPATIENT
Start: 2019-01-01 | End: 2019-01-01

## 2019-01-01 RX ORDER — LACTULOSE 20 G/30ML
30 SOLUTION ORAL 2 TIMES DAILY
Status: DISCONTINUED | OUTPATIENT
Start: 2019-01-01 | End: 2019-01-01

## 2019-01-01 RX ORDER — LORAZEPAM 2 MG/ML
1 INJECTION INTRAMUSCULAR
Status: DISCONTINUED | OUTPATIENT
Start: 2019-01-01 | End: 2019-01-01 | Stop reason: HOSPADM

## 2019-01-01 RX ORDER — ATORVASTATIN CALCIUM 40 MG/1
40 TABLET, FILM COATED ORAL DAILY
Status: DISCONTINUED | OUTPATIENT
Start: 2019-01-01 | End: 2019-01-01

## 2019-01-01 RX ORDER — POTASSIUM CHLORIDE 1.5 G/1.58G
40 POWDER, FOR SOLUTION ORAL
Status: DISPENSED | OUTPATIENT
Start: 2019-01-01 | End: 2019-01-01

## 2019-01-01 RX ORDER — ATROPINE SULFATE 10 MG/ML
2 SOLUTION/ DROPS OPHTHALMIC EVERY 4 HOURS PRN
Status: DISCONTINUED | OUTPATIENT
Start: 2019-01-01 | End: 2019-01-01 | Stop reason: HOSPADM

## 2019-01-01 RX ORDER — HYDRALAZINE HYDROCHLORIDE 20 MG/ML
10 INJECTION INTRAMUSCULAR; INTRAVENOUS EVERY 8 HOURS PRN
Status: DISCONTINUED | OUTPATIENT
Start: 2019-01-01 | End: 2019-01-01

## 2019-01-01 RX ORDER — IPRATROPIUM BROMIDE AND ALBUTEROL SULFATE 2.5; .5 MG/3ML; MG/3ML
3 SOLUTION RESPIRATORY (INHALATION)
Status: DISCONTINUED | OUTPATIENT
Start: 2019-01-01 | End: 2019-01-01

## 2019-01-01 RX ORDER — ETOMIDATE 2 MG/ML
20 INJECTION INTRAVENOUS ONCE
Status: COMPLETED | OUTPATIENT
Start: 2019-01-01 | End: 2019-01-01

## 2019-01-01 RX ORDER — DEXTROSE MONOHYDRATE 25 G/50ML
25 INJECTION, SOLUTION INTRAVENOUS
Status: DISCONTINUED | OUTPATIENT
Start: 2019-01-01 | End: 2019-01-01 | Stop reason: ALTCHOICE

## 2019-01-01 RX ORDER — POTASSIUM CHLORIDE 20 MEQ/1
40 TABLET, EXTENDED RELEASE ORAL DAILY
Status: DISCONTINUED | OUTPATIENT
Start: 2019-01-01 | End: 2019-01-01

## 2019-01-01 RX ORDER — POTASSIUM CHLORIDE 29.8 MG/ML
40 INJECTION INTRAVENOUS ONCE
Status: COMPLETED | OUTPATIENT
Start: 2019-01-01 | End: 2019-01-01

## 2019-01-01 RX ORDER — POTASSIUM CHLORIDE 1.5 G/1.58G
40 POWDER, FOR SOLUTION ORAL EVERY 6 HOURS
Status: DISCONTINUED | OUTPATIENT
Start: 2019-01-01 | End: 2019-01-01

## 2019-01-01 RX ORDER — MORPHINE SULFATE 4 MG/ML
4 INJECTION, SOLUTION INTRAMUSCULAR; INTRAVENOUS
Status: DISCONTINUED | OUTPATIENT
Start: 2019-01-01 | End: 2019-01-01 | Stop reason: HOSPADM

## 2019-01-01 RX ORDER — INSULIN GLARGINE 100 [IU]/ML
15 INJECTION, SOLUTION SUBCUTANEOUS EVERY EVENING
Status: DISCONTINUED | OUTPATIENT
Start: 2019-01-01 | End: 2019-01-01

## 2019-01-01 RX ORDER — GABAPENTIN 300 MG/1
300 CAPSULE ORAL 4 TIMES DAILY
Status: DISCONTINUED | OUTPATIENT
Start: 2019-01-01 | End: 2019-01-01

## 2019-01-01 RX ORDER — DIVALPROEX SODIUM 125 MG/1
500 CAPSULE, COATED PELLETS ORAL EVERY 12 HOURS
Status: DISCONTINUED | OUTPATIENT
Start: 2019-01-01 | End: 2019-01-01

## 2019-01-01 RX ORDER — CYANOCOBALAMIN 1000 UG/ML
1000 INJECTION, SOLUTION INTRAMUSCULAR; SUBCUTANEOUS ONCE
Status: COMPLETED | OUTPATIENT
Start: 2019-01-01 | End: 2019-01-01

## 2019-01-01 RX ORDER — BISACODYL 10 MG
10 SUPPOSITORY, RECTAL RECTAL
Status: DISCONTINUED | OUTPATIENT
Start: 2019-01-01 | End: 2019-01-01

## 2019-01-01 RX ORDER — SODIUM CHLORIDE 9 MG/ML
500 INJECTION, SOLUTION INTRAVENOUS ONCE
Status: COMPLETED | OUTPATIENT
Start: 2019-01-01 | End: 2019-01-01

## 2019-01-01 RX ORDER — THIAMINE MONONITRATE (VIT B1) 100 MG
100 TABLET ORAL DAILY
Status: DISCONTINUED | OUTPATIENT
Start: 2019-01-01 | End: 2019-01-01

## 2019-01-01 RX ORDER — LABETALOL HYDROCHLORIDE 5 MG/ML
INJECTION, SOLUTION INTRAVENOUS
Status: DISCONTINUED
Start: 2019-01-01 | End: 2019-01-01

## 2019-01-01 RX ORDER — SODIUM CHLORIDE, SODIUM LACTATE, POTASSIUM CHLORIDE, CALCIUM CHLORIDE 600; 310; 30; 20 MG/100ML; MG/100ML; MG/100ML; MG/100ML
INJECTION, SOLUTION INTRAVENOUS CONTINUOUS
Status: DISCONTINUED | OUTPATIENT
Start: 2019-01-01 | End: 2019-01-01

## 2019-01-01 RX ORDER — POLYETHYLENE GLYCOL 3350 17 G/17G
1 POWDER, FOR SOLUTION ORAL
Status: DISCONTINUED | OUTPATIENT
Start: 2019-01-01 | End: 2019-01-01

## 2019-01-01 RX ORDER — ASPIRIN 300 MG/1
300 SUPPOSITORY RECTAL DAILY
Status: DISCONTINUED | OUTPATIENT
Start: 2019-01-01 | End: 2019-01-01

## 2019-01-01 RX ORDER — MAGNESIUM SULFATE 1 G/100ML
1 INJECTION INTRAVENOUS ONCE
Status: COMPLETED | OUTPATIENT
Start: 2019-01-01 | End: 2019-01-01

## 2019-01-01 RX ORDER — CARVEDILOL 6.25 MG/1
3.12 TABLET ORAL 2 TIMES DAILY WITH MEALS
Status: DISCONTINUED | OUTPATIENT
Start: 2019-01-01 | End: 2019-01-01

## 2019-01-01 RX ORDER — LISINOPRIL 20 MG/1
20 TABLET ORAL DAILY
Status: DISCONTINUED | OUTPATIENT
Start: 2019-01-01 | End: 2019-01-01

## 2019-01-01 RX ORDER — ASPIRIN 81 MG/1
81 TABLET, CHEWABLE ORAL DAILY
Status: DISCONTINUED | OUTPATIENT
Start: 2019-01-01 | End: 2019-01-01

## 2019-01-01 RX ORDER — OXYBUTYNIN CHLORIDE 10 MG/1
10 TABLET, EXTENDED RELEASE ORAL DAILY
Qty: 90 TAB | Refills: 3
Start: 2019-01-01

## 2019-01-01 RX ORDER — OXYBUTYNIN CHLORIDE 5 MG/1
5 TABLET ORAL 2 TIMES DAILY
Status: DISCONTINUED | OUTPATIENT
Start: 2019-01-01 | End: 2019-01-01

## 2019-01-01 RX ORDER — LORAZEPAM 2 MG/ML
1 CONCENTRATE ORAL
Status: DISCONTINUED | OUTPATIENT
Start: 2019-01-01 | End: 2019-01-01 | Stop reason: HOSPADM

## 2019-01-01 RX ORDER — IMMUNE GLOBULIN 100 MG/ML
400 SOLUTION INTRAVENOUS DAILY
Status: DISCONTINUED | OUTPATIENT
Start: 2019-01-01 | End: 2019-01-01

## 2019-01-01 RX ORDER — CARVEDILOL 3.12 MG/1
3.12 TABLET ORAL 2 TIMES DAILY WITH MEALS
Status: DISCONTINUED | OUTPATIENT
Start: 2019-01-01 | End: 2019-01-01

## 2019-01-01 RX ORDER — POTASSIUM CHLORIDE 20 MEQ/1
40 TABLET, EXTENDED RELEASE ORAL ONCE
Status: COMPLETED | OUTPATIENT
Start: 2019-01-01 | End: 2019-01-01

## 2019-01-01 RX ORDER — METOCLOPRAMIDE HYDROCHLORIDE 5 MG/ML
10 INJECTION INTRAMUSCULAR; INTRAVENOUS EVERY 6 HOURS
Status: DISCONTINUED | OUTPATIENT
Start: 2019-01-01 | End: 2019-01-01

## 2019-01-01 RX ORDER — SODIUM CHLORIDE 9 MG/ML
500 INJECTION, SOLUTION INTRAVENOUS
Status: DISCONTINUED | OUTPATIENT
Start: 2019-01-01 | End: 2019-01-01

## 2019-01-01 RX ORDER — POTASSIUM CHLORIDE 14.9 MG/ML
20 INJECTION INTRAVENOUS ONCE
Status: COMPLETED | OUTPATIENT
Start: 2019-01-01 | End: 2019-01-01

## 2019-01-01 RX ORDER — INSULIN GLARGINE 100 [IU]/ML
20 INJECTION, SOLUTION SUBCUTANEOUS EVERY EVENING
Status: DISCONTINUED | OUTPATIENT
Start: 2019-01-01 | End: 2019-01-01

## 2019-01-01 RX ORDER — OXYBUTYNIN CHLORIDE 10 MG/1
10 TABLET, EXTENDED RELEASE ORAL DAILY
Status: DISCONTINUED | OUTPATIENT
Start: 2019-01-01 | End: 2019-01-01

## 2019-01-01 RX ORDER — FUROSEMIDE 10 MG/ML
60 INJECTION INTRAMUSCULAR; INTRAVENOUS ONCE
Status: COMPLETED | OUTPATIENT
Start: 2019-01-01 | End: 2019-01-01

## 2019-01-01 RX ORDER — PHENYLEPHRINE HCL IN 0.9% NACL 0.5 MG/5ML
SYRINGE (ML) INTRAVENOUS
Status: COMPLETED
Start: 2019-01-01 | End: 2019-01-01

## 2019-01-01 RX ORDER — NOREPINEPHRINE BITARTRATE 1 MG/ML
INJECTION, SOLUTION INTRAVENOUS
Status: COMPLETED
Start: 2019-01-01 | End: 2019-01-01

## 2019-01-01 RX ORDER — SODIUM CHLORIDE, SODIUM LACTATE, POTASSIUM CHLORIDE, AND CALCIUM CHLORIDE .6; .31; .03; .02 G/100ML; G/100ML; G/100ML; G/100ML
1000 INJECTION, SOLUTION INTRAVENOUS ONCE
Status: COMPLETED | OUTPATIENT
Start: 2019-01-01 | End: 2019-01-01

## 2019-01-01 RX ORDER — DIVALPROEX SODIUM 125 MG/1
750 CAPSULE, COATED PELLETS ORAL EVERY 12 HOURS
Status: DISCONTINUED | OUTPATIENT
Start: 2019-01-01 | End: 2019-01-01

## 2019-01-01 RX ORDER — NYSTATIN 100000 [USP'U]/G
POWDER TOPICAL 2 TIMES DAILY
Status: DISCONTINUED | OUTPATIENT
Start: 2019-01-01 | End: 2019-01-01

## 2019-01-01 RX ORDER — LORAZEPAM 2 MG/ML
1 INJECTION INTRAMUSCULAR ONCE
Status: COMPLETED | OUTPATIENT
Start: 2019-01-01 | End: 2019-01-01

## 2019-01-01 RX ADMIN — LORAZEPAM 1 MG: 2 INJECTION INTRAMUSCULAR; INTRAVENOUS at 21:02

## 2019-01-01 RX ADMIN — HEPARIN SODIUM 5000 UNITS: 5000 INJECTION, SOLUTION INTRAVENOUS; SUBCUTANEOUS at 05:15

## 2019-01-01 RX ADMIN — SODIUM CHLORIDE, POTASSIUM CHLORIDE, SODIUM LACTATE AND CALCIUM CHLORIDE: 600; 310; 30; 20 INJECTION, SOLUTION INTRAVENOUS at 15:05

## 2019-01-01 RX ADMIN — SODIUM CHLORIDE, POTASSIUM CHLORIDE, SODIUM LACTATE AND CALCIUM CHLORIDE: 600; 310; 30; 20 INJECTION, SOLUTION INTRAVENOUS at 10:29

## 2019-01-01 RX ADMIN — NYSTATIN: 100000 POWDER TOPICAL at 04:49

## 2019-01-01 RX ADMIN — POTASSIUM CHLORIDE 10 MEQ: 7.46 INJECTION, SOLUTION INTRAVENOUS at 05:23

## 2019-01-01 RX ADMIN — LACOSAMIDE 200 MG: 100 TABLET, FILM COATED ORAL at 18:08

## 2019-01-01 RX ADMIN — INSULIN HUMAN 3 UNITS: 100 INJECTION, SOLUTION PARENTERAL at 05:06

## 2019-01-01 RX ADMIN — SODIUM CHLORIDE: 9 INJECTION, SOLUTION INTRAVENOUS at 11:42

## 2019-01-01 RX ADMIN — SODIUM CHLORIDE 500 MG: 9 INJECTION, SOLUTION INTRAVENOUS at 05:17

## 2019-01-01 RX ADMIN — HEPARIN SODIUM 5000 UNITS: 5000 INJECTION, SOLUTION INTRAVENOUS; SUBCUTANEOUS at 15:00

## 2019-01-01 RX ADMIN — LACTULOSE 30 ML: 20 SOLUTION ORAL at 18:19

## 2019-01-01 RX ADMIN — NYSTATIN: 100000 POWDER TOPICAL at 04:40

## 2019-01-01 RX ADMIN — HEPARIN SODIUM 5000 UNITS: 5000 INJECTION, SOLUTION INTRAVENOUS; SUBCUTANEOUS at 13:50

## 2019-01-01 RX ADMIN — CARVEDILOL 3.12 MG: 3.12 TABLET, FILM COATED ORAL at 08:58

## 2019-01-01 RX ADMIN — HEPARIN SODIUM 5000 UNITS: 5000 INJECTION, SOLUTION INTRAVENOUS; SUBCUTANEOUS at 21:44

## 2019-01-01 RX ADMIN — VANCOMYCIN HYDROCHLORIDE 1800 MG: 100 INJECTION, POWDER, LYOPHILIZED, FOR SOLUTION INTRAVENOUS at 22:03

## 2019-01-01 RX ADMIN — INSULIN GLARGINE 15 UNITS: 100 INJECTION, SOLUTION SUBCUTANEOUS at 17:42

## 2019-01-01 RX ADMIN — FENTANYL CITRATE 100 MCG: 50 INJECTION INTRAMUSCULAR; INTRAVENOUS at 16:45

## 2019-01-01 RX ADMIN — SODIUM CHLORIDE 100 MG: 9 INJECTION, SOLUTION INTRAVENOUS at 05:54

## 2019-01-01 RX ADMIN — CARVEDILOL 3.12 MG: 3.12 TABLET, FILM COATED ORAL at 18:19

## 2019-01-01 RX ADMIN — CARVEDILOL 3.12 MG: 3.12 TABLET, FILM COATED ORAL at 18:47

## 2019-01-01 RX ADMIN — PIPERACILLIN AND TAZOBACTAM 3.38 G: 3; .375 INJECTION, POWDER, LYOPHILIZED, FOR SOLUTION INTRAVENOUS; PARENTERAL at 21:43

## 2019-01-01 RX ADMIN — VITAMIN D, TAB 1000IU (100/BT) 2000 UNITS: 25 TAB at 04:58

## 2019-01-01 RX ADMIN — VITAMIN D, TAB 1000IU (100/BT) 2000 UNITS: 25 TAB at 04:51

## 2019-01-01 RX ADMIN — GABAPENTIN 200 MG: 100 CAPSULE ORAL at 05:08

## 2019-01-01 RX ADMIN — ATORVASTATIN CALCIUM 40 MG: 40 TABLET, FILM COATED ORAL at 04:40

## 2019-01-01 RX ADMIN — HEPARIN SODIUM 5000 UNITS: 5000 INJECTION, SOLUTION INTRAVENOUS; SUBCUTANEOUS at 13:35

## 2019-01-01 RX ADMIN — ATORVASTATIN CALCIUM 40 MG: 40 TABLET, FILM COATED ORAL at 04:37

## 2019-01-01 RX ADMIN — LACTULOSE 30 ML: 20 SOLUTION ORAL at 04:58

## 2019-01-01 RX ADMIN — IMMUNE GLOBULIN 5 G: 50 SOLUTION INTRAVENOUS at 20:18

## 2019-01-01 RX ADMIN — INSULIN HUMAN 3 UNITS: 100 INJECTION, SOLUTION PARENTERAL at 11:00

## 2019-01-01 RX ADMIN — HYDRALAZINE HYDROCHLORIDE 10 MG: 20 INJECTION INTRAMUSCULAR; INTRAVENOUS at 20:19

## 2019-01-01 RX ADMIN — GABAPENTIN 200 MG: 100 CAPSULE ORAL at 18:47

## 2019-01-01 RX ADMIN — LACTULOSE 30 ML: 20 SOLUTION ORAL at 04:50

## 2019-01-01 RX ADMIN — ASPIRIN 81 MG 81 MG: 81 TABLET ORAL at 06:00

## 2019-01-01 RX ADMIN — NYSTATIN: 100000 POWDER TOPICAL at 05:24

## 2019-01-01 RX ADMIN — ASPIRIN 81 MG 81 MG: 81 TABLET ORAL at 05:26

## 2019-01-01 RX ADMIN — POTASSIUM CHLORIDE 10 MEQ: 7.46 INJECTION, SOLUTION INTRAVENOUS at 17:48

## 2019-01-01 RX ADMIN — POTASSIUM CHLORIDE 10 MEQ: 7.46 INJECTION, SOLUTION INTRAVENOUS at 12:01

## 2019-01-01 RX ADMIN — IMMUNE GLOBULIN 10 G: 50 SOLUTION INTRAVENOUS at 16:48

## 2019-01-01 RX ADMIN — ASPIRIN 300 MG: 300 SUPPOSITORY RECTAL at 23:40

## 2019-01-01 RX ADMIN — HEPARIN SODIUM 5000 UNITS: 5000 INJECTION, SOLUTION INTRAVENOUS; SUBCUTANEOUS at 21:35

## 2019-01-01 RX ADMIN — LISINOPRIL 20 MG: 20 TABLET ORAL at 04:37

## 2019-01-01 RX ADMIN — ACETAMINOPHEN 650 MG: 325 TABLET, FILM COATED ORAL at 05:33

## 2019-01-01 RX ADMIN — HEPARIN SODIUM 5000 UNITS: 5000 INJECTION, SOLUTION INTRAVENOUS; SUBCUTANEOUS at 15:25

## 2019-01-01 RX ADMIN — DIVALPROEX SODIUM 500 MG: 125 CAPSULE, COATED PELLETS ORAL at 04:45

## 2019-01-01 RX ADMIN — SODIUM CHLORIDE 1000 MG: 9 INJECTION, SOLUTION INTRAVENOUS at 04:49

## 2019-01-01 RX ADMIN — POTASSIUM CHLORIDE 40 MEQ: 1500 TABLET, EXTENDED RELEASE ORAL at 13:56

## 2019-01-01 RX ADMIN — GABAPENTIN 200 MG: 100 CAPSULE ORAL at 04:51

## 2019-01-01 RX ADMIN — SENNOSIDES, DOCUSATE SODIUM 2 TABLET: 50; 8.6 TABLET, FILM COATED ORAL at 19:01

## 2019-01-01 RX ADMIN — ATROPINE SULFATE 2 DROP: 10 SOLUTION OPHTHALMIC at 01:00

## 2019-01-01 RX ADMIN — SODIUM CHLORIDE, POTASSIUM CHLORIDE, SODIUM LACTATE AND CALCIUM CHLORIDE: 600; 310; 30; 20 INJECTION, SOLUTION INTRAVENOUS at 06:00

## 2019-01-01 RX ADMIN — MAGNESIUM SULFATE IN WATER 4 G: 40 INJECTION, SOLUTION INTRAVENOUS at 09:41

## 2019-01-01 RX ADMIN — CARVEDILOL 3.12 MG: 3.12 TABLET, FILM COATED ORAL at 18:41

## 2019-01-01 RX ADMIN — OXYBUTYNIN CHLORIDE 5 MG: 5 TABLET ORAL at 18:19

## 2019-01-01 RX ADMIN — OXYBUTYNIN CHLORIDE 5 MG: 5 TABLET ORAL at 04:59

## 2019-01-01 RX ADMIN — NYSTATIN: 100000 POWDER TOPICAL at 18:33

## 2019-01-01 RX ADMIN — LACOSAMIDE 200 MG: 100 TABLET, FILM COATED ORAL at 16:36

## 2019-01-01 RX ADMIN — CEFTRIAXONE SODIUM 2 G: 2 INJECTION, POWDER, FOR SOLUTION INTRAMUSCULAR; INTRAVENOUS at 05:05

## 2019-01-01 RX ADMIN — HEPARIN SODIUM 5000 UNITS: 5000 INJECTION, SOLUTION INTRAVENOUS; SUBCUTANEOUS at 14:43

## 2019-01-01 RX ADMIN — POTASSIUM CHLORIDE 10 MEQ: 7.46 INJECTION, SOLUTION INTRAVENOUS at 03:13

## 2019-01-01 RX ADMIN — GABAPENTIN 200 MG: 100 CAPSULE ORAL at 09:19

## 2019-01-01 RX ADMIN — THIAMINE HYDROCHLORIDE 100 MG: 100 INJECTION, SOLUTION INTRAMUSCULAR; INTRAVENOUS at 06:10

## 2019-01-01 RX ADMIN — ATORVASTATIN CALCIUM 40 MG: 40 TABLET, FILM COATED ORAL at 05:26

## 2019-01-01 RX ADMIN — HEPARIN SODIUM 5000 UNITS: 5000 INJECTION, SOLUTION INTRAVENOUS; SUBCUTANEOUS at 21:36

## 2019-01-01 RX ADMIN — SODIUM CHLORIDE: 9 INJECTION, SOLUTION INTRAVENOUS at 03:25

## 2019-01-01 RX ADMIN — INSULIN HUMAN 2 UNITS: 100 INJECTION, SOLUTION PARENTERAL at 19:10

## 2019-01-01 RX ADMIN — VALPROATE SODIUM 1000 MG: 100 INJECTION, SOLUTION INTRAVENOUS at 12:31

## 2019-01-01 RX ADMIN — LISINOPRIL 20 MG: 20 TABLET ORAL at 04:49

## 2019-01-01 RX ADMIN — HEPARIN SODIUM 5000 UNITS: 5000 INJECTION, SOLUTION INTRAVENOUS; SUBCUTANEOUS at 05:25

## 2019-01-01 RX ADMIN — INSULIN HUMAN 2 UNITS: 100 INJECTION, SOLUTION PARENTERAL at 14:49

## 2019-01-01 RX ADMIN — LACTULOSE 30 ML: 20 SOLUTION ORAL at 05:25

## 2019-01-01 RX ADMIN — GABAPENTIN 200 MG: 100 CAPSULE ORAL at 07:58

## 2019-01-01 RX ADMIN — HEPARIN SODIUM 5000 UNITS: 5000 INJECTION, SOLUTION INTRAVENOUS; SUBCUTANEOUS at 20:21

## 2019-01-01 RX ADMIN — HEPARIN SODIUM 5000 UNITS: 5000 INJECTION, SOLUTION INTRAVENOUS; SUBCUTANEOUS at 13:48

## 2019-01-01 RX ADMIN — HEPARIN SODIUM 5000 UNITS: 5000 INJECTION, SOLUTION INTRAVENOUS; SUBCUTANEOUS at 14:55

## 2019-01-01 RX ADMIN — MAGNESIUM SULFATE IN DEXTROSE 1 G: 10 INJECTION, SOLUTION INTRAVENOUS at 14:07

## 2019-01-01 RX ADMIN — IMMUNE GLOBULIN 10 G: 50 SOLUTION INTRAVENOUS at 16:50

## 2019-01-01 RX ADMIN — SODIUM CHLORIDE 500 MG: 9 INJECTION, SOLUTION INTRAVENOUS at 18:44

## 2019-01-01 RX ADMIN — VALPROATE SODIUM 2580 MG: 100 INJECTION, SOLUTION INTRAVENOUS at 17:49

## 2019-01-01 RX ADMIN — HEPARIN SODIUM 5000 UNITS: 5000 INJECTION, SOLUTION INTRAVENOUS; SUBCUTANEOUS at 04:45

## 2019-01-01 RX ADMIN — CARVEDILOL 3.12 MG: 3.12 TABLET, FILM COATED ORAL at 17:25

## 2019-01-01 RX ADMIN — CARVEDILOL 3.12 MG: 3.12 TABLET, FILM COATED ORAL at 09:13

## 2019-01-01 RX ADMIN — OXYBUTYNIN CHLORIDE 5 MG: 5 TABLET ORAL at 09:19

## 2019-01-01 RX ADMIN — VITAMIN D, TAB 1000IU (100/BT) 2000 UNITS: 25 TAB at 04:40

## 2019-01-01 RX ADMIN — ASPIRIN 81 MG 81 MG: 81 TABLET ORAL at 04:40

## 2019-01-01 RX ADMIN — IMMUNE GLOBULIN 10 G: 50 SOLUTION INTRAVENOUS at 15:49

## 2019-01-01 RX ADMIN — POTASSIUM CHLORIDE 10 MEQ: 7.46 INJECTION, SOLUTION INTRAVENOUS at 17:01

## 2019-01-01 RX ADMIN — AMPICILLIN SODIUM 2 G: 2 INJECTION, POWDER, FOR SOLUTION INTRAMUSCULAR; INTRAVENOUS at 21:54

## 2019-01-01 RX ADMIN — SODIUM CHLORIDE: 9 INJECTION, SOLUTION INTRAVENOUS at 10:30

## 2019-01-01 RX ADMIN — SODIUM CHLORIDE: 9 INJECTION, SOLUTION INTRAVENOUS at 23:41

## 2019-01-01 RX ADMIN — LACOSAMIDE 200 MG: 100 TABLET, FILM COATED ORAL at 04:56

## 2019-01-01 RX ADMIN — GABAPENTIN 300 MG: 300 CAPSULE ORAL at 04:15

## 2019-01-01 RX ADMIN — ASPIRIN 81 MG 81 MG: 81 TABLET ORAL at 04:48

## 2019-01-01 RX ADMIN — ATORVASTATIN CALCIUM 40 MG: 40 TABLET, FILM COATED ORAL at 04:45

## 2019-01-01 RX ADMIN — SODIUM CHLORIDE: 9 INJECTION, SOLUTION INTRAVENOUS at 02:24

## 2019-01-01 RX ADMIN — POTASSIUM CHLORIDE 10 MEQ: 7.46 INJECTION, SOLUTION INTRAVENOUS at 15:17

## 2019-01-01 RX ADMIN — SODIUM CHLORIDE 150 MG: 9 INJECTION, SOLUTION INTRAVENOUS at 04:51

## 2019-01-01 RX ADMIN — HEPARIN SODIUM 5000 UNITS: 5000 INJECTION, SOLUTION INTRAVENOUS; SUBCUTANEOUS at 04:57

## 2019-01-01 RX ADMIN — ATROPINE SULFATE 2 DROP: 10 SOLUTION OPHTHALMIC at 16:13

## 2019-01-01 RX ADMIN — ASPIRIN 81 MG 81 MG: 81 TABLET ORAL at 04:37

## 2019-01-01 RX ADMIN — INSULIN GLARGINE 20 UNITS: 100 INJECTION, SOLUTION SUBCUTANEOUS at 16:45

## 2019-01-01 RX ADMIN — AMPICILLIN SODIUM 2 G: 2 INJECTION, POWDER, FOR SOLUTION INTRAMUSCULAR; INTRAVENOUS at 18:00

## 2019-01-01 RX ADMIN — CYANOCOBALAMIN 1000 MCG: 1000 INJECTION, SOLUTION INTRAMUSCULAR; SUBCUTANEOUS at 08:53

## 2019-01-01 RX ADMIN — SODIUM CHLORIDE 150 MG: 9 INJECTION, SOLUTION INTRAVENOUS at 20:02

## 2019-01-01 RX ADMIN — GABAPENTIN 200 MG: 100 CAPSULE ORAL at 21:10

## 2019-01-01 RX ADMIN — IMMUNE GLOBULIN 10 G: 50 SOLUTION INTRAVENOUS at 18:34

## 2019-01-01 RX ADMIN — Medication 100 MG: at 11:18

## 2019-01-01 RX ADMIN — SODIUM CHLORIDE 150 MG: 9 INJECTION, SOLUTION INTRAVENOUS at 05:50

## 2019-01-01 RX ADMIN — NOREPINEPHRINE BITARTRATE 8 MG: 1 INJECTION INTRAVENOUS at 17:11

## 2019-01-01 RX ADMIN — ACETAMINOPHEN 650 MG: 325 TABLET, FILM COATED ORAL at 18:40

## 2019-01-01 RX ADMIN — GABAPENTIN 200 MG: 100 CAPSULE ORAL at 17:18

## 2019-01-01 RX ADMIN — SODIUM CHLORIDE 200 MG: 9 INJECTION, SOLUTION INTRAVENOUS at 15:20

## 2019-01-01 RX ADMIN — HEPARIN SODIUM 5000 UNITS: 5000 INJECTION, SOLUTION INTRAVENOUS; SUBCUTANEOUS at 05:09

## 2019-01-01 RX ADMIN — LISINOPRIL 20 MG: 20 TABLET ORAL at 04:51

## 2019-01-01 RX ADMIN — AMPICILLIN SODIUM 2 G: 2 INJECTION, POWDER, FOR SOLUTION INTRAMUSCULAR; INTRAVENOUS at 11:58

## 2019-01-01 RX ADMIN — ATORVASTATIN CALCIUM 40 MG: 40 TABLET, FILM COATED ORAL at 05:33

## 2019-01-01 RX ADMIN — FENTANYL CITRATE 100 MCG: 50 INJECTION, SOLUTION INTRAMUSCULAR; INTRAVENOUS at 03:02

## 2019-01-01 RX ADMIN — SODIUM CHLORIDE 100 MG: 9 INJECTION, SOLUTION INTRAVENOUS at 21:52

## 2019-01-01 RX ADMIN — LACOSAMIDE 200 MG: 100 TABLET, FILM COATED ORAL at 05:32

## 2019-01-01 RX ADMIN — FENTANYL CITRATE: 50 INJECTION, SOLUTION INTRAMUSCULAR; INTRAVENOUS at 16:30

## 2019-01-01 RX ADMIN — SODIUM CHLORIDE 500 MG: 9 INJECTION, SOLUTION INTRAVENOUS at 18:24

## 2019-01-01 RX ADMIN — GABAPENTIN 200 MG: 100 CAPSULE ORAL at 13:52

## 2019-01-01 RX ADMIN — ACYCLOVIR SODIUM 616 MG: 500 INJECTION, SOLUTION INTRAVENOUS at 18:24

## 2019-01-01 RX ADMIN — LACOSAMIDE 200 MG: 100 TABLET, FILM COATED ORAL at 04:40

## 2019-01-01 RX ADMIN — HEPARIN SODIUM 5000 UNITS: 5000 INJECTION, SOLUTION INTRAVENOUS; SUBCUTANEOUS at 15:01

## 2019-01-01 RX ADMIN — AMPICILLIN SODIUM 2 G: 2 INJECTION, POWDER, FOR SOLUTION INTRAMUSCULAR; INTRAVENOUS at 15:07

## 2019-01-01 RX ADMIN — HEPARIN SODIUM 5000 UNITS: 5000 INJECTION, SOLUTION INTRAVENOUS; SUBCUTANEOUS at 21:01

## 2019-01-01 RX ADMIN — NYSTATIN: 100000 POWDER TOPICAL at 17:47

## 2019-01-01 RX ADMIN — LACOSAMIDE 200 MG: 100 TABLET, FILM COATED ORAL at 18:04

## 2019-01-01 RX ADMIN — POTASSIUM CHLORIDE 10 MEQ: 7.46 INJECTION, SOLUTION INTRAVENOUS at 14:48

## 2019-01-01 RX ADMIN — SODIUM CHLORIDE 150 MG: 9 INJECTION, SOLUTION INTRAVENOUS at 20:18

## 2019-01-01 RX ADMIN — HEPARIN SODIUM 5000 UNITS: 5000 INJECTION, SOLUTION INTRAVENOUS; SUBCUTANEOUS at 21:54

## 2019-01-01 RX ADMIN — LACOSAMIDE 200 MG: 100 TABLET, FILM COATED ORAL at 05:08

## 2019-01-01 RX ADMIN — Medication 100 MG: at 04:47

## 2019-01-01 RX ADMIN — INSULIN GLARGINE 20 UNITS: 100 INJECTION, SOLUTION SUBCUTANEOUS at 17:40

## 2019-01-01 RX ADMIN — PIPERACILLIN AND TAZOBACTAM 3.38 G: 3; .375 INJECTION, POWDER, LYOPHILIZED, FOR SOLUTION INTRAVENOUS; PARENTERAL at 23:04

## 2019-01-01 RX ADMIN — PIPERACILLIN AND TAZOBACTAM 3.38 G: 3; .375 INJECTION, POWDER, LYOPHILIZED, FOR SOLUTION INTRAVENOUS; PARENTERAL at 12:18

## 2019-01-01 RX ADMIN — PIPERACILLIN AND TAZOBACTAM 3.38 G: 3; .375 INJECTION, POWDER, LYOPHILIZED, FOR SOLUTION INTRAVENOUS; PARENTERAL at 07:36

## 2019-01-01 RX ADMIN — ATORVASTATIN CALCIUM 40 MG: 40 TABLET, FILM COATED ORAL at 04:48

## 2019-01-01 RX ADMIN — IMMUNE GLOBULIN 10 G: 50 SOLUTION INTRAVENOUS at 16:16

## 2019-01-01 RX ADMIN — HEPARIN SODIUM 5000 UNITS: 5000 INJECTION, SOLUTION INTRAVENOUS; SUBCUTANEOUS at 05:56

## 2019-01-01 RX ADMIN — OXYBUTYNIN CHLORIDE 5 MG: 5 TABLET ORAL at 04:37

## 2019-01-01 RX ADMIN — CARVEDILOL 3.12 MG: 3.12 TABLET, FILM COATED ORAL at 08:39

## 2019-01-01 RX ADMIN — NYSTATIN: 100000 POWDER TOPICAL at 05:00

## 2019-01-01 RX ADMIN — GABAPENTIN 200 MG: 100 CAPSULE ORAL at 04:39

## 2019-01-01 RX ADMIN — Medication: at 16:30

## 2019-01-01 RX ADMIN — FAMOTIDINE 20 MG: 10 INJECTION, SOLUTION INTRAVENOUS at 05:06

## 2019-01-01 RX ADMIN — THIAMINE HYDROCHLORIDE 500 MG: 100 INJECTION, SOLUTION INTRAMUSCULAR; INTRAVENOUS at 04:54

## 2019-01-01 RX ADMIN — VALPROATE SODIUM 1000 MG: 100 INJECTION, SOLUTION INTRAVENOUS at 02:00

## 2019-01-01 RX ADMIN — LACOSAMIDE 200 MG: 100 TABLET, FILM COATED ORAL at 05:39

## 2019-01-01 RX ADMIN — GABAPENTIN 200 MG: 100 CAPSULE ORAL at 04:59

## 2019-01-01 RX ADMIN — LACOSAMIDE 200 MG: 100 TABLET, FILM COATED ORAL at 16:56

## 2019-01-01 RX ADMIN — ACETAMINOPHEN 650 MG: 650 SUPPOSITORY RECTAL at 21:13

## 2019-01-01 RX ADMIN — VALPROATE SODIUM 1000 MG: 100 INJECTION, SOLUTION INTRAVENOUS at 01:57

## 2019-01-01 RX ADMIN — NYSTATIN: 100000 POWDER TOPICAL at 18:00

## 2019-01-01 RX ADMIN — NYSTATIN: 100000 POWDER TOPICAL at 04:51

## 2019-01-01 RX ADMIN — NYSTATIN: 100000 POWDER TOPICAL at 19:01

## 2019-01-01 RX ADMIN — NYSTATIN: 100000 POWDER TOPICAL at 18:06

## 2019-01-01 RX ADMIN — NYSTATIN: 100000 POWDER TOPICAL at 05:14

## 2019-01-01 RX ADMIN — GABAPENTIN 200 MG: 100 CAPSULE ORAL at 16:56

## 2019-01-01 RX ADMIN — CARVEDILOL 3.12 MG: 3.12 TABLET, FILM COATED ORAL at 17:41

## 2019-01-01 RX ADMIN — SENNOSIDES, DOCUSATE SODIUM 2 TABLET: 50; 8.6 TABLET, FILM COATED ORAL at 05:00

## 2019-01-01 RX ADMIN — GABAPENTIN 200 MG: 100 CAPSULE ORAL at 11:18

## 2019-01-01 RX ADMIN — ATORVASTATIN CALCIUM 40 MG: 40 TABLET, FILM COATED ORAL at 04:59

## 2019-01-01 RX ADMIN — NYSTATIN: 100000 POWDER TOPICAL at 18:19

## 2019-01-01 RX ADMIN — Medication 100 MG: at 16:54

## 2019-01-01 RX ADMIN — HEPARIN SODIUM 5000 UNITS: 5000 INJECTION, SOLUTION INTRAVENOUS; SUBCUTANEOUS at 14:00

## 2019-01-01 RX ADMIN — ASPIRIN 81 MG 81 MG: 81 TABLET ORAL at 04:39

## 2019-01-01 RX ADMIN — FUROSEMIDE 60 MG: 10 INJECTION, SOLUTION INTRAMUSCULAR; INTRAVENOUS at 09:17

## 2019-01-01 RX ADMIN — VITAMIN D, TAB 1000IU (100/BT) 2000 UNITS: 25 TAB at 04:37

## 2019-01-01 RX ADMIN — ACYCLOVIR SODIUM 616 MG: 500 INJECTION, SOLUTION INTRAVENOUS at 18:44

## 2019-01-01 RX ADMIN — LACOSAMIDE 200 MG: 100 TABLET, FILM COATED ORAL at 04:44

## 2019-01-01 RX ADMIN — VALPROATE SODIUM 1000 MG: 100 INJECTION, SOLUTION INTRAVENOUS at 13:52

## 2019-01-01 RX ADMIN — ASPIRIN 81 MG 81 MG: 81 TABLET ORAL at 05:24

## 2019-01-01 RX ADMIN — PIPERACILLIN AND TAZOBACTAM 3.38 G: 3; .375 INJECTION, POWDER, LYOPHILIZED, FOR SOLUTION INTRAVENOUS; PARENTERAL at 05:57

## 2019-01-01 RX ADMIN — VITAMIN D, TAB 1000IU (100/BT) 2000 UNITS: 25 TAB at 04:48

## 2019-01-01 RX ADMIN — IMMUNE GLOBULIN 5 G: 50 SOLUTION INTRAVENOUS at 16:16

## 2019-01-01 RX ADMIN — HEPARIN SODIUM 5000 UNITS: 5000 INJECTION, SOLUTION INTRAVENOUS; SUBCUTANEOUS at 21:45

## 2019-01-01 RX ADMIN — SENNOSIDES, DOCUSATE SODIUM 2 TABLET: 50; 8.6 TABLET, FILM COATED ORAL at 04:52

## 2019-01-01 RX ADMIN — POTASSIUM PHOSPHATE, MONOBASIC AND POTASSIUM PHOSPHATE, DIBASIC 30 MMOL: 224; 236 INJECTION, SOLUTION INTRAVENOUS at 10:28

## 2019-01-01 RX ADMIN — LACOSAMIDE 200 MG: 100 TABLET, FILM COATED ORAL at 17:18

## 2019-01-01 RX ADMIN — VITAMIN D, TAB 1000IU (100/BT) 2000 UNITS: 25 TAB at 04:47

## 2019-01-01 RX ADMIN — GABAPENTIN 200 MG: 100 CAPSULE ORAL at 11:38

## 2019-01-01 RX ADMIN — CARVEDILOL 3.12 MG: 3.12 TABLET, FILM COATED ORAL at 09:01

## 2019-01-01 RX ADMIN — VITAMIN D, TAB 1000IU (100/BT) 2000 UNITS: 25 TAB at 09:18

## 2019-01-01 RX ADMIN — OXYBUTYNIN CHLORIDE 5 MG: 5 TABLET ORAL at 18:47

## 2019-01-01 RX ADMIN — ACYCLOVIR SODIUM 616 MG: 500 INJECTION, SOLUTION INTRAVENOUS at 02:21

## 2019-01-01 RX ADMIN — DIVALPROEX SODIUM 500 MG: 125 CAPSULE, COATED PELLETS ORAL at 17:18

## 2019-01-01 RX ADMIN — LACTULOSE 30 ML: 20 SOLUTION ORAL at 12:18

## 2019-01-01 RX ADMIN — FENTANYL CITRATE 100 MCG: 50 INJECTION, SOLUTION INTRAMUSCULAR; INTRAVENOUS at 10:53

## 2019-01-01 RX ADMIN — HEPARIN SODIUM 5000 UNITS: 5000 INJECTION, SOLUTION INTRAVENOUS; SUBCUTANEOUS at 22:00

## 2019-01-01 RX ADMIN — LACTULOSE 30 ML: 20 SOLUTION ORAL at 18:39

## 2019-01-01 RX ADMIN — POTASSIUM CHLORIDE 10 MEQ: 7.46 INJECTION, SOLUTION INTRAVENOUS at 09:00

## 2019-01-01 RX ADMIN — POTASSIUM CHLORIDE 10 MEQ: 7.46 INJECTION, SOLUTION INTRAVENOUS at 12:15

## 2019-01-01 RX ADMIN — LACTULOSE 30 ML: 20 SOLUTION ORAL at 04:45

## 2019-01-01 RX ADMIN — HEPARIN SODIUM 5000 UNITS: 5000 INJECTION, SOLUTION INTRAVENOUS; SUBCUTANEOUS at 05:34

## 2019-01-01 RX ADMIN — NYSTATIN: 100000 POWDER TOPICAL at 21:37

## 2019-01-01 RX ADMIN — HEPARIN SODIUM 5000 UNITS: 5000 INJECTION, SOLUTION INTRAVENOUS; SUBCUTANEOUS at 22:36

## 2019-01-01 RX ADMIN — HEPARIN SODIUM 5000 UNITS: 5000 INJECTION, SOLUTION INTRAVENOUS; SUBCUTANEOUS at 14:15

## 2019-01-01 RX ADMIN — CARVEDILOL 3.12 MG: 3.12 TABLET, FILM COATED ORAL at 18:08

## 2019-01-01 RX ADMIN — ATORVASTATIN CALCIUM 40 MG: 40 TABLET, FILM COATED ORAL at 04:56

## 2019-01-01 RX ADMIN — OXYBUTYNIN CHLORIDE 5 MG: 5 TABLET ORAL at 18:05

## 2019-01-01 RX ADMIN — SODIUM CHLORIDE 200 MG: 9 INJECTION, SOLUTION INTRAVENOUS at 18:05

## 2019-01-01 RX ADMIN — IOHEXOL 100 ML: 350 INJECTION, SOLUTION INTRAVENOUS at 22:56

## 2019-01-01 RX ADMIN — IOHEXOL 100 ML: 350 INJECTION, SOLUTION INTRAVENOUS at 11:23

## 2019-01-01 RX ADMIN — HEPARIN SODIUM 5000 UNITS: 5000 INJECTION, SOLUTION INTRAVENOUS; SUBCUTANEOUS at 13:13

## 2019-01-01 RX ADMIN — HEPARIN SODIUM 5000 UNITS: 5000 INJECTION, SOLUTION INTRAVENOUS; SUBCUTANEOUS at 04:51

## 2019-01-01 RX ADMIN — GABAPENTIN 200 MG: 100 CAPSULE ORAL at 17:41

## 2019-01-01 RX ADMIN — THIAMINE HYDROCHLORIDE 500 MG: 100 INJECTION, SOLUTION INTRAMUSCULAR; INTRAVENOUS at 13:44

## 2019-01-01 RX ADMIN — GABAPENTIN 200 MG: 100 CAPSULE ORAL at 11:29

## 2019-01-01 RX ADMIN — SODIUM CHLORIDE 500 ML: 9 INJECTION, SOLUTION INTRAVENOUS at 05:36

## 2019-01-01 RX ADMIN — CARVEDILOL 3.12 MG: 3.12 TABLET, FILM COATED ORAL at 18:05

## 2019-01-01 RX ADMIN — AMPICILLIN SODIUM AND SULBACTAM SODIUM 3 G: 2; 1 INJECTION, POWDER, FOR SOLUTION INTRAMUSCULAR; INTRAVENOUS at 11:17

## 2019-01-01 RX ADMIN — CARVEDILOL 3.12 MG: 3.12 TABLET, FILM COATED ORAL at 08:50

## 2019-01-01 RX ADMIN — OXYBUTYNIN CHLORIDE 5 MG: 5 TABLET ORAL at 04:54

## 2019-01-01 RX ADMIN — VITAMIN D, TAB 1000IU (100/BT) 2000 UNITS: 25 TAB at 05:25

## 2019-01-01 RX ADMIN — POTASSIUM CHLORIDE 40 MEQ: 1.5 POWDER, FOR SOLUTION ORAL at 16:54

## 2019-01-01 RX ADMIN — SODIUM CHLORIDE 500 MG: 9 INJECTION, SOLUTION INTRAVENOUS at 05:06

## 2019-01-01 RX ADMIN — FAMOTIDINE 20 MG: 20 TABLET ORAL at 04:51

## 2019-01-01 RX ADMIN — HEPARIN SODIUM 5000 UNITS: 5000 INJECTION, SOLUTION INTRAVENOUS; SUBCUTANEOUS at 13:52

## 2019-01-01 RX ADMIN — IMMUNE GLOBULIN 5 G: 50 SOLUTION INTRAVENOUS at 15:00

## 2019-01-01 RX ADMIN — MODAFINIL 100 MG: 100 TABLET ORAL at 05:32

## 2019-01-01 RX ADMIN — MAGNESIUM SULFATE IN WATER 2 G: 40 INJECTION, SOLUTION INTRAVENOUS at 08:26

## 2019-01-01 RX ADMIN — ASPIRIN 81 MG 81 MG: 81 TABLET ORAL at 04:51

## 2019-01-01 RX ADMIN — ACYCLOVIR SODIUM 616 MG: 500 INJECTION, SOLUTION INTRAVENOUS at 10:28

## 2019-01-01 RX ADMIN — HEPARIN SODIUM 5000 UNITS: 5000 INJECTION, SOLUTION INTRAVENOUS; SUBCUTANEOUS at 04:49

## 2019-01-01 RX ADMIN — HEPARIN SODIUM 5000 UNITS: 5000 INJECTION, SOLUTION INTRAVENOUS; SUBCUTANEOUS at 21:48

## 2019-01-01 RX ADMIN — SODIUM CHLORIDE 500 MG: 9 INJECTION, SOLUTION INTRAVENOUS at 18:14

## 2019-01-01 RX ADMIN — THIAMINE HYDROCHLORIDE 500 MG: 100 INJECTION, SOLUTION INTRAMUSCULAR; INTRAVENOUS at 12:03

## 2019-01-01 RX ADMIN — SODIUM CHLORIDE: 9 INJECTION, SOLUTION INTRAVENOUS at 22:52

## 2019-01-01 RX ADMIN — LACTULOSE 30 ML: 20 SOLUTION ORAL at 18:47

## 2019-01-01 RX ADMIN — LACTULOSE 30 ML: 20 SOLUTION ORAL at 04:49

## 2019-01-01 RX ADMIN — SODIUM CHLORIDE 500 MG: 9 INJECTION, SOLUTION INTRAVENOUS at 05:20

## 2019-01-01 RX ADMIN — POTASSIUM CHLORIDE 10 MEQ: 7.46 INJECTION, SOLUTION INTRAVENOUS at 10:48

## 2019-01-01 RX ADMIN — CARVEDILOL 3.12 MG: 3.12 TABLET, FILM COATED ORAL at 09:19

## 2019-01-01 RX ADMIN — POTASSIUM CHLORIDE 10 MEQ: 7.46 INJECTION, SOLUTION INTRAVENOUS at 16:46

## 2019-01-01 RX ADMIN — POTASSIUM CHLORIDE 10 MEQ: 7.46 INJECTION, SOLUTION INTRAVENOUS at 07:04

## 2019-01-01 RX ADMIN — GABAPENTIN 200 MG: 100 CAPSULE ORAL at 18:04

## 2019-01-01 RX ADMIN — CEFTRIAXONE SODIUM 2 G: 2 INJECTION, POWDER, FOR SOLUTION INTRAMUSCULAR; INTRAVENOUS at 10:28

## 2019-01-01 RX ADMIN — NYSTATIN: 100000 POWDER TOPICAL at 04:39

## 2019-01-01 RX ADMIN — Medication 100 MG: at 18:41

## 2019-01-01 RX ADMIN — PIPERACILLIN AND TAZOBACTAM 3.38 G: 3; .375 INJECTION, POWDER, LYOPHILIZED, FOR SOLUTION INTRAVENOUS; PARENTERAL at 04:50

## 2019-01-01 RX ADMIN — HEPARIN SODIUM 5000 UNITS: 5000 INJECTION, SOLUTION INTRAVENOUS; SUBCUTANEOUS at 20:19

## 2019-01-01 RX ADMIN — FENTANYL CITRATE 100 MCG/HR: 50 INJECTION, SOLUTION INTRAMUSCULAR; INTRAVENOUS at 00:49

## 2019-01-01 RX ADMIN — ACYCLOVIR SODIUM 616 MG: 500 INJECTION, SOLUTION INTRAVENOUS at 12:20

## 2019-01-01 RX ADMIN — HEPARIN SODIUM 5000 UNITS: 5000 INJECTION, SOLUTION INTRAVENOUS; SUBCUTANEOUS at 22:29

## 2019-01-01 RX ADMIN — NYSTATIN: 100000 POWDER TOPICAL at 06:00

## 2019-01-01 RX ADMIN — SODIUM CHLORIDE 500 MG: 9 INJECTION, SOLUTION INTRAVENOUS at 05:57

## 2019-01-01 RX ADMIN — CARVEDILOL 3.12 MG: 3.12 TABLET, FILM COATED ORAL at 17:17

## 2019-01-01 RX ADMIN — LISINOPRIL 20 MG: 20 TABLET ORAL at 05:00

## 2019-01-01 RX ADMIN — SODIUM CHLORIDE 200 MG: 9 INJECTION, SOLUTION INTRAVENOUS at 05:15

## 2019-01-01 RX ADMIN — POTASSIUM CHLORIDE 10 MEQ: 7.46 INJECTION, SOLUTION INTRAVENOUS at 13:39

## 2019-01-01 RX ADMIN — LISINOPRIL 20 MG: 20 TABLET ORAL at 05:09

## 2019-01-01 RX ADMIN — HEPARIN SODIUM 5000 UNITS: 5000 INJECTION, SOLUTION INTRAVENOUS; SUBCUTANEOUS at 14:35

## 2019-01-01 RX ADMIN — THIAMINE HYDROCHLORIDE 500 MG: 100 INJECTION, SOLUTION INTRAMUSCULAR; INTRAVENOUS at 18:27

## 2019-01-01 RX ADMIN — LACOSAMIDE 200 MG: 100 TABLET, FILM COATED ORAL at 17:08

## 2019-01-01 RX ADMIN — LACOSAMIDE 200 MG: 100 TABLET, FILM COATED ORAL at 17:24

## 2019-01-01 RX ADMIN — POTASSIUM CHLORIDE 10 MEQ: 7.46 INJECTION, SOLUTION INTRAVENOUS at 18:06

## 2019-01-01 RX ADMIN — MODAFINIL 100 MG: 100 TABLET ORAL at 04:40

## 2019-01-01 RX ADMIN — POTASSIUM CHLORIDE 10 MEQ: 7.46 INJECTION, SOLUTION INTRAVENOUS at 12:55

## 2019-01-01 RX ADMIN — HEPARIN SODIUM 5000 UNITS: 5000 INJECTION, SOLUTION INTRAVENOUS; SUBCUTANEOUS at 12:18

## 2019-01-01 RX ADMIN — NYSTATIN: 100000 POWDER TOPICAL at 05:25

## 2019-01-01 RX ADMIN — Medication 100 MG: at 09:20

## 2019-01-01 RX ADMIN — CARVEDILOL 3.12 MG: 3.12 TABLET, FILM COATED ORAL at 17:16

## 2019-01-01 RX ADMIN — SODIUM CHLORIDE, POTASSIUM CHLORIDE, SODIUM LACTATE AND CALCIUM CHLORIDE: 600; 310; 30; 20 INJECTION, SOLUTION INTRAVENOUS at 08:11

## 2019-01-01 RX ADMIN — IMMUNE GLOBULIN 10 G: 50 SOLUTION INTRAVENOUS at 14:36

## 2019-01-01 RX ADMIN — LACOSAMIDE 200 MG: 100 TABLET, FILM COATED ORAL at 04:37

## 2019-01-01 RX ADMIN — LISINOPRIL 20 MG: 20 TABLET ORAL at 04:47

## 2019-01-01 RX ADMIN — NYSTATIN: 100000 POWDER TOPICAL at 05:33

## 2019-01-01 RX ADMIN — HEPARIN SODIUM 5000 UNITS: 5000 INJECTION, SOLUTION INTRAVENOUS; SUBCUTANEOUS at 04:59

## 2019-01-01 RX ADMIN — SODIUM CHLORIDE, POTASSIUM CHLORIDE, SODIUM LACTATE AND CALCIUM CHLORIDE: 600; 310; 30; 20 INJECTION, SOLUTION INTRAVENOUS at 09:49

## 2019-01-01 RX ADMIN — VITAMIN D, TAB 1000IU (100/BT) 2000 UNITS: 25 TAB at 05:24

## 2019-01-01 RX ADMIN — INSULIN HUMAN 2 UNITS: 100 INJECTION, SOLUTION PARENTERAL at 05:52

## 2019-01-01 RX ADMIN — THIAMINE HYDROCHLORIDE 100 MG: 100 INJECTION, SOLUTION INTRAMUSCULAR; INTRAVENOUS at 06:38

## 2019-01-01 RX ADMIN — ACYCLOVIR SODIUM 616 MG: 500 INJECTION, SOLUTION INTRAVENOUS at 18:15

## 2019-01-01 RX ADMIN — CARVEDILOL 3.12 MG: 3.12 TABLET, FILM COATED ORAL at 07:33

## 2019-01-01 RX ADMIN — ACYCLOVIR SODIUM 616 MG: 500 INJECTION, SOLUTION INTRAVENOUS at 10:22

## 2019-01-01 RX ADMIN — OXYBUTYNIN CHLORIDE 5 MG: 5 TABLET ORAL at 04:46

## 2019-01-01 RX ADMIN — LISINOPRIL 20 MG: 20 TABLET ORAL at 04:57

## 2019-01-01 RX ADMIN — GABAPENTIN 200 MG: 100 CAPSULE ORAL at 05:25

## 2019-01-01 RX ADMIN — GABAPENTIN 200 MG: 100 CAPSULE ORAL at 13:50

## 2019-01-01 RX ADMIN — VITAMIN D, TAB 1000IU (100/BT) 2000 UNITS: 25 TAB at 04:39

## 2019-01-01 RX ADMIN — SODIUM CHLORIDE 1000 MG: 9 INJECTION, SOLUTION INTRAVENOUS at 07:56

## 2019-01-01 RX ADMIN — NYSTATIN: 100000 POWDER TOPICAL at 05:15

## 2019-01-01 RX ADMIN — LACTULOSE 30 ML: 20 SOLUTION ORAL at 18:06

## 2019-01-01 RX ADMIN — NYSTATIN: 100000 POWDER TOPICAL at 17:35

## 2019-01-01 RX ADMIN — LACOSAMIDE 200 MG: 100 TABLET, FILM COATED ORAL at 18:40

## 2019-01-01 RX ADMIN — SODIUM CHLORIDE, POTASSIUM CHLORIDE, SODIUM LACTATE AND CALCIUM CHLORIDE 1000 ML: 600; 310; 30; 20 INJECTION, SOLUTION INTRAVENOUS at 09:51

## 2019-01-01 RX ADMIN — GABAPENTIN 200 MG: 100 CAPSULE ORAL at 04:57

## 2019-01-01 RX ADMIN — Medication 100 MG: at 17:18

## 2019-01-01 RX ADMIN — GABAPENTIN 200 MG: 100 CAPSULE ORAL at 04:37

## 2019-01-01 RX ADMIN — GABAPENTIN 200 MG: 100 CAPSULE ORAL at 18:05

## 2019-01-01 RX ADMIN — LACOSAMIDE 200 MG: 100 TABLET, FILM COATED ORAL at 04:48

## 2019-01-01 RX ADMIN — POTASSIUM CHLORIDE 10 MEQ: 7.46 INJECTION, SOLUTION INTRAVENOUS at 04:19

## 2019-01-01 RX ADMIN — LISINOPRIL 20 MG: 20 TABLET ORAL at 05:24

## 2019-01-01 RX ADMIN — LACOSAMIDE 200 MG: 100 TABLET, FILM COATED ORAL at 18:20

## 2019-01-01 RX ADMIN — ETOMIDATE 20 MG: 2 INJECTION INTRAVENOUS at 17:11

## 2019-01-01 RX ADMIN — SODIUM CHLORIDE, POTASSIUM CHLORIDE, SODIUM LACTATE AND CALCIUM CHLORIDE 1000 ML: 600; 310; 30; 20 INJECTION, SOLUTION INTRAVENOUS at 08:15

## 2019-01-01 RX ADMIN — NOREPINEPHRINE BITARTRATE 5 MCG/MIN: 1 INJECTION INTRAVENOUS at 18:00

## 2019-01-01 RX ADMIN — CARVEDILOL 3.12 MG: 3.12 TABLET, FILM COATED ORAL at 18:03

## 2019-01-01 RX ADMIN — POTASSIUM CHLORIDE 10 MEQ: 7.46 INJECTION, SOLUTION INTRAVENOUS at 13:33

## 2019-01-01 RX ADMIN — IPRATROPIUM BROMIDE AND ALBUTEROL SULFATE 3 ML: .5; 3 SOLUTION RESPIRATORY (INHALATION) at 08:32

## 2019-01-01 RX ADMIN — SODIUM CHLORIDE 200 MG: 9 INJECTION, SOLUTION INTRAVENOUS at 05:45

## 2019-01-01 RX ADMIN — INSULIN HUMAN 2 UNITS: 100 INJECTION, SOLUTION PARENTERAL at 06:23

## 2019-01-01 RX ADMIN — OXYBUTYNIN CHLORIDE 5 MG: 5 TABLET ORAL at 04:49

## 2019-01-01 RX ADMIN — NYSTATIN: 100000 POWDER TOPICAL at 05:16

## 2019-01-01 RX ADMIN — HEPARIN SODIUM 5000 UNITS: 5000 INJECTION, SOLUTION INTRAVENOUS; SUBCUTANEOUS at 06:09

## 2019-01-01 RX ADMIN — CARVEDILOL 3.12 MG: 3.12 TABLET, FILM COATED ORAL at 16:54

## 2019-01-01 RX ADMIN — GABAPENTIN 200 MG: 100 CAPSULE ORAL at 12:18

## 2019-01-01 RX ADMIN — SODIUM CHLORIDE 1000 MG: 9 INJECTION, SOLUTION INTRAVENOUS at 05:18

## 2019-01-01 RX ADMIN — SODIUM CHLORIDE 200 MG: 9 INJECTION, SOLUTION INTRAVENOUS at 17:13

## 2019-01-01 RX ADMIN — DIVALPROEX SODIUM 500 MG: 125 CAPSULE, COATED PELLETS ORAL at 04:49

## 2019-01-01 RX ADMIN — CEFTRIAXONE SODIUM 2 G: 2 INJECTION, POWDER, FOR SOLUTION INTRAMUSCULAR; INTRAVENOUS at 18:30

## 2019-01-01 RX ADMIN — POTASSIUM CHLORIDE 10 MEQ: 7.46 INJECTION, SOLUTION INTRAVENOUS at 15:12

## 2019-01-01 RX ADMIN — INSULIN GLARGINE 20 UNITS: 100 INJECTION, SOLUTION SUBCUTANEOUS at 18:18

## 2019-01-01 RX ADMIN — Medication 100 MG: at 13:57

## 2019-01-01 RX ADMIN — POTASSIUM CHLORIDE 10 MEQ: 7.46 INJECTION, SOLUTION INTRAVENOUS at 12:20

## 2019-01-01 RX ADMIN — VITAMIN D, TAB 1000IU (100/BT) 2000 UNITS: 25 TAB at 04:56

## 2019-01-01 RX ADMIN — HEPARIN SODIUM 5000 UNITS: 5000 INJECTION, SOLUTION INTRAVENOUS; SUBCUTANEOUS at 04:37

## 2019-01-01 RX ADMIN — CARVEDILOL 3.12 MG: 3.12 TABLET, FILM COATED ORAL at 08:03

## 2019-01-01 RX ADMIN — POTASSIUM CHLORIDE 40 MEQ: 1.5 POWDER, FOR SOLUTION ORAL at 12:18

## 2019-01-01 RX ADMIN — THIAMINE HYDROCHLORIDE 100 MG: 100 INJECTION, SOLUTION INTRAMUSCULAR; INTRAVENOUS at 08:20

## 2019-01-01 RX ADMIN — Medication 100 MG: at 18:05

## 2019-01-01 RX ADMIN — Medication 100 MG: at 04:38

## 2019-01-01 RX ADMIN — HEPARIN SODIUM 5000 UNITS: 5000 INJECTION, SOLUTION INTRAVENOUS; SUBCUTANEOUS at 23:04

## 2019-01-01 RX ADMIN — Medication 100 MG: at 05:09

## 2019-01-01 RX ADMIN — SODIUM CHLORIDE, POTASSIUM CHLORIDE, SODIUM LACTATE AND CALCIUM CHLORIDE: 600; 310; 30; 20 INJECTION, SOLUTION INTRAVENOUS at 21:09

## 2019-01-01 RX ADMIN — CARVEDILOL 3.12 MG: 3.12 TABLET, FILM COATED ORAL at 16:36

## 2019-01-01 RX ADMIN — OXYBUTYNIN CHLORIDE 5 MG: 5 TABLET ORAL at 05:08

## 2019-01-01 RX ADMIN — POTASSIUM CHLORIDE 10 MEQ: 7.46 INJECTION, SOLUTION INTRAVENOUS at 14:07

## 2019-01-01 RX ADMIN — ASPIRIN 300 MG: 300 SUPPOSITORY RECTAL at 05:19

## 2019-01-01 RX ADMIN — POTASSIUM CHLORIDE 10 MEQ: 7.46 INJECTION, SOLUTION INTRAVENOUS at 19:16

## 2019-01-01 RX ADMIN — GABAPENTIN 200 MG: 100 CAPSULE ORAL at 12:10

## 2019-01-01 RX ADMIN — HEPARIN SODIUM 5000 UNITS: 5000 INJECTION, SOLUTION INTRAVENOUS; SUBCUTANEOUS at 14:59

## 2019-01-01 RX ADMIN — NYSTATIN: 100000 POWDER TOPICAL at 05:03

## 2019-01-01 RX ADMIN — VITAMIN D, TAB 1000IU (100/BT) 2000 UNITS: 25 TAB at 05:08

## 2019-01-01 RX ADMIN — CARVEDILOL 3.12 MG: 3.12 TABLET, FILM COATED ORAL at 08:30

## 2019-01-01 RX ADMIN — POTASSIUM CHLORIDE 10 MEQ: 7.46 INJECTION, SOLUTION INTRAVENOUS at 17:56

## 2019-01-01 RX ADMIN — OXYBUTYNIN CHLORIDE 5 MG: 5 TABLET ORAL at 18:07

## 2019-01-01 RX ADMIN — LISINOPRIL 20 MG: 20 TABLET ORAL at 09:20

## 2019-01-01 RX ADMIN — NYSTATIN: 100000 POWDER TOPICAL at 17:18

## 2019-01-01 RX ADMIN — HEPARIN SODIUM 5000 UNITS: 5000 INJECTION, SOLUTION INTRAVENOUS; SUBCUTANEOUS at 05:06

## 2019-01-01 RX ADMIN — SODIUM CHLORIDE 250 MG: 9 INJECTION, SOLUTION INTRAVENOUS at 10:58

## 2019-01-01 RX ADMIN — Medication 100 MG: at 05:26

## 2019-01-01 RX ADMIN — NYSTATIN: 100000 POWDER TOPICAL at 18:12

## 2019-01-01 RX ADMIN — POTASSIUM CHLORIDE 10 MEQ: 7.46 INJECTION, SOLUTION INTRAVENOUS at 22:00

## 2019-01-01 RX ADMIN — POTASSIUM CHLORIDE AND SODIUM CHLORIDE: 900; 300 INJECTION, SOLUTION INTRAVENOUS at 14:50

## 2019-01-01 RX ADMIN — ACYCLOVIR SODIUM 616 MG: 500 INJECTION, SOLUTION INTRAVENOUS at 02:52

## 2019-01-01 RX ADMIN — SODIUM CHLORIDE: 9 INJECTION, SOLUTION INTRAVENOUS at 18:33

## 2019-01-01 RX ADMIN — SODIUM CHLORIDE 2000 MG: 9 INJECTION, SOLUTION INTRAVENOUS at 01:31

## 2019-01-01 RX ADMIN — CARVEDILOL 3.12 MG: 3.12 TABLET, FILM COATED ORAL at 18:21

## 2019-01-01 RX ADMIN — SODIUM CHLORIDE 500 MG: 9 INJECTION, SOLUTION INTRAVENOUS at 21:10

## 2019-01-01 RX ADMIN — LISINOPRIL 20 MG: 20 TABLET ORAL at 04:52

## 2019-01-01 RX ADMIN — HEPARIN SODIUM 5000 UNITS: 5000 INJECTION, SOLUTION INTRAVENOUS; SUBCUTANEOUS at 16:09

## 2019-01-01 RX ADMIN — POTASSIUM CHLORIDE 10 MEQ: 7.46 INJECTION, SOLUTION INTRAVENOUS at 21:42

## 2019-01-01 RX ADMIN — HEPARIN SODIUM 5000 UNITS: 5000 INJECTION, SOLUTION INTRAVENOUS; SUBCUTANEOUS at 21:55

## 2019-01-01 RX ADMIN — ROCURONIUM BROMIDE 50 MG: 10 INJECTION, SOLUTION INTRAVENOUS at 17:11

## 2019-01-01 RX ADMIN — CEFTRIAXONE SODIUM 2 G: 2 INJECTION, POWDER, FOR SOLUTION INTRAMUSCULAR; INTRAVENOUS at 18:13

## 2019-01-01 RX ADMIN — ASPIRIN 81 MG 81 MG: 81 TABLET ORAL at 04:59

## 2019-01-01 RX ADMIN — ACETAMINOPHEN 650 MG: 325 TABLET, FILM COATED ORAL at 05:07

## 2019-01-01 RX ADMIN — CARVEDILOL 3.12 MG: 3.12 TABLET, FILM COATED ORAL at 07:53

## 2019-01-01 RX ADMIN — HYDRALAZINE HYDROCHLORIDE 10 MG: 20 INJECTION INTRAMUSCULAR; INTRAVENOUS at 09:42

## 2019-01-01 RX ADMIN — POTASSIUM PHOSPHATE, MONOBASIC AND POTASSIUM PHOSPHATE, DIBASIC 30 MMOL: 224; 236 INJECTION, SOLUTION INTRAVENOUS at 10:27

## 2019-01-01 RX ADMIN — NYSTATIN: 100000 POWDER TOPICAL at 17:13

## 2019-01-01 RX ADMIN — HEPARIN SODIUM 5000 UNITS: 5000 INJECTION, SOLUTION INTRAVENOUS; SUBCUTANEOUS at 21:56

## 2019-01-01 RX ADMIN — NYSTATIN: 100000 POWDER TOPICAL at 16:37

## 2019-01-01 RX ADMIN — ASPIRIN 81 MG 81 MG: 81 TABLET ORAL at 05:09

## 2019-01-01 RX ADMIN — FUROSEMIDE 40 MG: 10 INJECTION, SOLUTION INTRAMUSCULAR; INTRAVENOUS at 15:00

## 2019-01-01 RX ADMIN — PIPERACILLIN AND TAZOBACTAM 3.38 G: 3; .375 INJECTION, POWDER, LYOPHILIZED, FOR SOLUTION INTRAVENOUS; PARENTERAL at 14:59

## 2019-01-01 RX ADMIN — IMMUNE GLOBULIN 10 G: 50 SOLUTION INTRAVENOUS at 15:00

## 2019-01-01 RX ADMIN — CYANOCOBALAMIN 1000 MCG: 1000 INJECTION, SOLUTION INTRAMUSCULAR; SUBCUTANEOUS at 11:18

## 2019-01-01 RX ADMIN — ATORVASTATIN CALCIUM 40 MG: 40 TABLET, FILM COATED ORAL at 09:19

## 2019-01-01 RX ADMIN — CARVEDILOL 3.12 MG: 3.12 TABLET, FILM COATED ORAL at 07:42

## 2019-01-01 RX ADMIN — LISINOPRIL 20 MG: 20 TABLET ORAL at 04:40

## 2019-01-01 RX ADMIN — SENNOSIDES, DOCUSATE SODIUM 2 TABLET: 50; 8.6 TABLET, FILM COATED ORAL at 18:19

## 2019-01-01 RX ADMIN — Medication 100 MG: at 05:17

## 2019-01-01 RX ADMIN — ACETAMINOPHEN 650 MG: 650 SUPPOSITORY RECTAL at 05:19

## 2019-01-01 RX ADMIN — SODIUM CHLORIDE 1000 MG: 9 INJECTION, SOLUTION INTRAVENOUS at 04:52

## 2019-01-01 RX ADMIN — GABAPENTIN 200 MG: 100 CAPSULE ORAL at 11:58

## 2019-01-01 RX ADMIN — NYSTATIN: 100000 POWDER TOPICAL at 18:24

## 2019-01-01 RX ADMIN — ATORVASTATIN CALCIUM 40 MG: 40 TABLET, FILM COATED ORAL at 04:51

## 2019-01-01 RX ADMIN — NYSTATIN: 100000 POWDER TOPICAL at 05:07

## 2019-01-01 RX ADMIN — AMPICILLIN SODIUM 2 G: 2 INJECTION, POWDER, FOR SOLUTION INTRAMUSCULAR; INTRAVENOUS at 05:05

## 2019-01-01 RX ADMIN — POTASSIUM CHLORIDE 10 MEQ: 7.46 INJECTION, SOLUTION INTRAVENOUS at 05:17

## 2019-01-01 RX ADMIN — GABAPENTIN 200 MG: 100 CAPSULE ORAL at 18:07

## 2019-01-01 RX ADMIN — FAMOTIDINE 20 MG: 10 INJECTION, SOLUTION INTRAVENOUS at 05:57

## 2019-01-01 RX ADMIN — NYSTATIN: 100000 POWDER TOPICAL at 18:20

## 2019-01-01 RX ADMIN — POTASSIUM CHLORIDE 40 MEQ: 1.5 POWDER, FOR SOLUTION ORAL at 19:41

## 2019-01-01 RX ADMIN — NYSTATIN: 100000 POWDER TOPICAL at 05:06

## 2019-01-01 RX ADMIN — POTASSIUM CHLORIDE AND SODIUM CHLORIDE: 900; 300 INJECTION, SOLUTION INTRAVENOUS at 01:06

## 2019-01-01 RX ADMIN — SODIUM CHLORIDE: 9 INJECTION, SOLUTION INTRAVENOUS at 11:17

## 2019-01-01 RX ADMIN — HEPARIN SODIUM 5000 UNITS: 5000 INJECTION, SOLUTION INTRAVENOUS; SUBCUTANEOUS at 20:39

## 2019-01-01 RX ADMIN — GABAPENTIN 200 MG: 100 CAPSULE ORAL at 13:48

## 2019-01-01 RX ADMIN — HEPARIN SODIUM 5000 UNITS: 5000 INJECTION, SOLUTION INTRAVENOUS; SUBCUTANEOUS at 22:06

## 2019-01-01 RX ADMIN — ASPIRIN 81 MG 81 MG: 81 TABLET ORAL at 04:57

## 2019-01-01 RX ADMIN — HEPARIN SODIUM 5000 UNITS: 5000 INJECTION, SOLUTION INTRAVENOUS; SUBCUTANEOUS at 14:37

## 2019-01-01 RX ADMIN — POTASSIUM CHLORIDE 40 MEQ: 29.8 INJECTION, SOLUTION INTRAVENOUS at 08:26

## 2019-01-01 RX ADMIN — GABAPENTIN 200 MG: 100 CAPSULE ORAL at 16:54

## 2019-01-01 RX ADMIN — LISINOPRIL 20 MG: 20 TABLET ORAL at 04:39

## 2019-01-01 RX ADMIN — OXYBUTYNIN CHLORIDE 5 MG: 5 TABLET ORAL at 18:41

## 2019-01-01 RX ADMIN — LACTULOSE 30 ML: 20 SOLUTION ORAL at 17:18

## 2019-01-01 RX ADMIN — OXYBUTYNIN CHLORIDE 5 MG: 5 TABLET ORAL at 17:18

## 2019-01-01 RX ADMIN — Medication 100 MG: at 13:50

## 2019-01-01 RX ADMIN — ASPIRIN 81 MG 81 MG: 81 TABLET ORAL at 04:46

## 2019-01-01 RX ADMIN — SODIUM CHLORIDE: 9 INJECTION, SOLUTION INTRAVENOUS at 18:31

## 2019-01-01 RX ADMIN — VALPROATE SODIUM 1000 MG: 100 INJECTION, SOLUTION INTRAVENOUS at 01:44

## 2019-01-01 RX ADMIN — LACOSAMIDE 200 MG: 100 TABLET, FILM COATED ORAL at 17:41

## 2019-01-01 RX ADMIN — POTASSIUM CHLORIDE 10 MEQ: 7.46 INJECTION, SOLUTION INTRAVENOUS at 11:02

## 2019-01-01 RX ADMIN — THIAMINE HYDROCHLORIDE 500 MG: 100 INJECTION, SOLUTION INTRAMUSCULAR; INTRAVENOUS at 17:03

## 2019-01-01 RX ADMIN — NYSTATIN: 100000 POWDER TOPICAL at 05:18

## 2019-01-01 RX ADMIN — POTASSIUM CHLORIDE 10 MEQ: 7.46 INJECTION, SOLUTION INTRAVENOUS at 13:58

## 2019-01-01 RX ADMIN — SODIUM CHLORIDE 500 MG: 9 INJECTION, SOLUTION INTRAVENOUS at 21:13

## 2019-01-01 RX ADMIN — Medication 100 MG: at 06:00

## 2019-01-01 RX ADMIN — LACTULOSE 30 ML: 20 SOLUTION ORAL at 17:13

## 2019-01-01 RX ADMIN — IMMUNE GLOBULIN 10 G: 50 SOLUTION INTRAVENOUS at 12:41

## 2019-01-01 RX ADMIN — FAMOTIDINE 20 MG: 10 INJECTION, SOLUTION INTRAVENOUS at 18:13

## 2019-01-01 RX ADMIN — VITAMIN D, TAB 1000IU (100/BT) 2000 UNITS: 25 TAB at 05:33

## 2019-01-01 RX ADMIN — LISINOPRIL 20 MG: 20 TABLET ORAL at 05:27

## 2019-01-01 RX ADMIN — POTASSIUM CHLORIDE 10 MEQ: 7.46 INJECTION, SOLUTION INTRAVENOUS at 15:20

## 2019-01-01 RX ADMIN — SODIUM CHLORIDE 250 ML: 9 INJECTION, SOLUTION INTRAVENOUS at 12:26

## 2019-01-01 RX ADMIN — INSULIN HUMAN 2 UNITS: 100 INJECTION, SOLUTION PARENTERAL at 11:59

## 2019-01-01 RX ADMIN — OXYBUTYNIN CHLORIDE 5 MG: 5 TABLET ORAL at 05:26

## 2019-01-01 RX ADMIN — ACYCLOVIR SODIUM 616 MG: 500 INJECTION, SOLUTION INTRAVENOUS at 02:35

## 2019-01-01 RX ADMIN — CYANOCOBALAMIN 1000 MCG: 1000 INJECTION, SOLUTION INTRAMUSCULAR; SUBCUTANEOUS at 18:44

## 2019-01-01 RX ADMIN — POTASSIUM CHLORIDE 20 MEQ: 14.9 INJECTION, SOLUTION INTRAVENOUS at 12:00

## 2019-01-01 RX ADMIN — ATORVASTATIN CALCIUM 40 MG: 40 TABLET, FILM COATED ORAL at 05:08

## 2019-01-01 RX ADMIN — HEPARIN SODIUM 5000 UNITS: 5000 INJECTION, SOLUTION INTRAVENOUS; SUBCUTANEOUS at 21:05

## 2019-01-01 RX ADMIN — ASPIRIN 81 MG 81 MG: 81 TABLET ORAL at 09:19

## 2019-01-01 RX ADMIN — IMMUNE GLOBULIN 5 G: 50 SOLUTION INTRAVENOUS at 16:51

## 2019-01-01 RX ADMIN — HEPARIN SODIUM 5000 UNITS: 5000 INJECTION, SOLUTION INTRAVENOUS; SUBCUTANEOUS at 05:24

## 2019-01-01 RX ADMIN — LACOSAMIDE 200 MG: 100 TABLET, FILM COATED ORAL at 08:47

## 2019-01-01 RX ADMIN — CARVEDILOL 3.12 MG: 3.12 TABLET, FILM COATED ORAL at 17:08

## 2019-01-01 RX ADMIN — POTASSIUM CHLORIDE 10 MEQ: 7.46 INJECTION, SOLUTION INTRAVENOUS at 12:22

## 2019-01-01 RX ADMIN — LACOSAMIDE 200 MG: 100 TABLET, FILM COATED ORAL at 17:23

## 2019-01-01 RX ADMIN — NYSTATIN: 100000 POWDER TOPICAL at 05:57

## 2019-01-01 RX ADMIN — INSULIN HUMAN 4 UNITS: 100 INJECTION, SOLUTION PARENTERAL at 18:21

## 2019-01-01 RX ADMIN — ACYCLOVIR SODIUM 616 MG: 500 INJECTION, SOLUTION INTRAVENOUS at 01:11

## 2019-01-01 RX ADMIN — POTASSIUM CHLORIDE 10 MEQ: 7.46 INJECTION, SOLUTION INTRAVENOUS at 06:04

## 2019-01-01 RX ADMIN — NYSTATIN: 100000 POWDER TOPICAL at 18:34

## 2019-01-01 RX ADMIN — LACTULOSE 30 ML: 20 SOLUTION ORAL at 09:18

## 2019-01-01 RX ADMIN — NYSTATIN: 100000 POWDER TOPICAL at 05:34

## 2019-01-01 RX ADMIN — INSULIN HUMAN 3 UNITS: 100 INJECTION, SOLUTION PARENTERAL at 20:43

## 2019-01-01 RX ADMIN — Medication 100 MG: at 04:49

## 2019-01-01 RX ADMIN — IMMUNE GLOBULIN 10 G: 50 SOLUTION INTRAVENOUS at 13:45

## 2019-01-01 RX ADMIN — FAMOTIDINE 20 MG: 10 INJECTION, SOLUTION INTRAVENOUS at 16:42

## 2019-01-01 RX ADMIN — GABAPENTIN 200 MG: 100 CAPSULE ORAL at 04:46

## 2019-01-01 RX ADMIN — HEPARIN SODIUM 5000 UNITS: 5000 INJECTION, SOLUTION INTRAVENOUS; SUBCUTANEOUS at 05:04

## 2019-01-01 RX ADMIN — HEPARIN SODIUM 5000 UNITS: 5000 INJECTION, SOLUTION INTRAVENOUS; SUBCUTANEOUS at 21:46

## 2019-01-01 RX ADMIN — OXYBUTYNIN CHLORIDE 5 MG: 5 TABLET ORAL at 17:41

## 2019-01-01 RX ADMIN — IMMUNE GLOBULIN 5 G: 50 SOLUTION INTRAVENOUS at 14:01

## 2019-01-01 RX ADMIN — HEPARIN SODIUM 5000 UNITS: 5000 INJECTION, SOLUTION INTRAVENOUS; SUBCUTANEOUS at 04:40

## 2019-01-01 RX ADMIN — AMPICILLIN SODIUM 2 G: 2 INJECTION, POWDER, FOR SOLUTION INTRAMUSCULAR; INTRAVENOUS at 01:11

## 2019-01-01 RX ADMIN — NYSTATIN: 100000 POWDER TOPICAL at 17:07

## 2019-01-01 RX ADMIN — OXYBUTYNIN CHLORIDE 5 MG: 5 TABLET ORAL at 16:54

## 2019-01-01 RX ADMIN — SODIUM CHLORIDE 500 MG: 9 INJECTION, SOLUTION INTRAVENOUS at 12:15

## 2019-01-01 RX ADMIN — POTASSIUM PHOSPHATE, MONOBASIC AND POTASSIUM PHOSPHATE, DIBASIC 15 MMOL: 224; 236 INJECTION, SOLUTION INTRAVENOUS at 18:45

## 2019-01-01 RX ADMIN — VALPROATE SODIUM 1000 MG: 100 INJECTION, SOLUTION INTRAVENOUS at 02:03

## 2019-01-01 RX ADMIN — FAMOTIDINE 20 MG: 10 INJECTION, SOLUTION INTRAVENOUS at 18:00

## 2019-01-01 RX ADMIN — ATORVASTATIN CALCIUM 40 MG: 40 TABLET, FILM COATED ORAL at 05:24

## 2019-01-01 RX ADMIN — NYSTATIN: 100000 POWDER TOPICAL at 04:50

## 2019-01-01 RX ADMIN — DIVALPROEX SODIUM 750 MG: 125 CAPSULE, COATED PELLETS ORAL at 18:40

## 2019-01-01 RX ADMIN — CYANOCOBALAMIN 1000 MCG: 1000 INJECTION, SOLUTION INTRAMUSCULAR; SUBCUTANEOUS at 10:59

## 2019-01-01 RX ADMIN — GABAPENTIN 200 MG: 100 CAPSULE ORAL at 18:19

## 2019-01-01 RX ADMIN — NYSTATIN: 100000 POWDER TOPICAL at 11:42

## 2019-01-01 ASSESSMENT — COGNITIVE AND FUNCTIONAL STATUS - GENERAL
SUGGESTED CMS G CODE MODIFIER MOBILITY: CM
TURNING FROM BACK TO SIDE WHILE IN FLAT BAD: UNABLE
CLIMB 3 TO 5 STEPS WITH RAILING: TOTAL
TOILETING: TOTAL
DRESSING REGULAR LOWER BODY CLOTHING: TOTAL
DRESSING REGULAR UPPER BODY CLOTHING: TOTAL
PERSONAL GROOMING: TOTAL
TOILETING: TOTAL
MOVING TO AND FROM BED TO CHAIR: UNABLE
HELP NEEDED FOR BATHING: TOTAL
CLIMB 3 TO 5 STEPS WITH RAILING: TOTAL
TURNING FROM BACK TO SIDE WHILE IN FLAT BAD: UNABLE
WALKING IN HOSPITAL ROOM: TOTAL
PERSONAL GROOMING: TOTAL
MOBILITY SCORE: 6
TOILETING: TOTAL
PERSONAL GROOMING: TOTAL
TOILETING: TOTAL
SUGGESTED CMS G CODE MODIFIER DAILY ACTIVITY: CN
STANDING UP FROM CHAIR USING ARMS: TOTAL
MOBILITY SCORE: 6
TURNING FROM BACK TO SIDE WHILE IN FLAT BAD: A LOT
HELP NEEDED FOR BATHING: TOTAL
DAILY ACTIVITIY SCORE: 8
TURNING FROM BACK TO SIDE WHILE IN FLAT BAD: UNABLE
SUGGESTED CMS G CODE MODIFIER MOBILITY: CN
SUGGESTED CMS G CODE MODIFIER DAILY ACTIVITY: CN
DRESSING REGULAR UPPER BODY CLOTHING: TOTAL
HELP NEEDED FOR BATHING: TOTAL
WALKING IN HOSPITAL ROOM: TOTAL
MOBILITY SCORE: 6
SUGGESTED CMS G CODE MODIFIER DAILY ACTIVITY: CN
DAILY ACTIVITIY SCORE: 6
DRESSING REGULAR LOWER BODY CLOTHING: TOTAL
MOVING FROM LYING ON BACK TO SITTING ON SIDE OF FLAT BED: UNABLE
SUGGESTED CMS G CODE MODIFIER MOBILITY: CN
MOVING FROM LYING ON BACK TO SITTING ON SIDE OF FLAT BED: UNABLE
DAILY ACTIVITIY SCORE: 6
EATING MEALS: TOTAL
MOVING TO AND FROM BED TO CHAIR: UNABLE
EATING MEALS: TOTAL
EATING MEALS: TOTAL
WALKING IN HOSPITAL ROOM: TOTAL
TURNING FROM BACK TO SIDE WHILE IN FLAT BAD: UNABLE
SUGGESTED CMS G CODE MODIFIER MOBILITY: CN
TOILETING: TOTAL
DRESSING REGULAR UPPER BODY CLOTHING: TOTAL
PERSONAL GROOMING: TOTAL
DRESSING REGULAR LOWER BODY CLOTHING: TOTAL
STANDING UP FROM CHAIR USING ARMS: TOTAL
PERSONAL GROOMING: TOTAL
SUGGESTED CMS G CODE MODIFIER DAILY ACTIVITY: CN
DAILY ACTIVITIY SCORE: 6
MOVING FROM LYING ON BACK TO SITTING ON SIDE OF FLAT BED: UNABLE
SUGGESTED CMS G CODE MODIFIER MOBILITY: CN
DRESSING REGULAR UPPER BODY CLOTHING: TOTAL
MOVING TO AND FROM BED TO CHAIR: UNABLE
DRESSING REGULAR UPPER BODY CLOTHING: TOTAL
EATING MEALS: TOTAL
MOBILITY SCORE: 6
DRESSING REGULAR LOWER BODY CLOTHING: TOTAL
MOVING TO AND FROM BED TO CHAIR: UNABLE
PERSONAL GROOMING: A LOT
HELP NEEDED FOR BATHING: TOTAL
MOVING TO AND FROM BED TO CHAIR: UNABLE
MOVING FROM LYING ON BACK TO SITTING ON SIDE OF FLAT BED: UNABLE
EATING MEALS: TOTAL
CLIMB 3 TO 5 STEPS WITH RAILING: TOTAL
WALKING IN HOSPITAL ROOM: TOTAL
WALKING IN HOSPITAL ROOM: TOTAL
CLIMB 3 TO 5 STEPS WITH RAILING: TOTAL
DAILY ACTIVITIY SCORE: 6
STANDING UP FROM CHAIR USING ARMS: TOTAL
DAILY ACTIVITIY SCORE: 6
EATING MEALS: A LOT
CLIMB 3 TO 5 STEPS WITH RAILING: TOTAL
SUGGESTED CMS G CODE MODIFIER DAILY ACTIVITY: CN
DRESSING REGULAR LOWER BODY CLOTHING: TOTAL
TOILETING: TOTAL
HELP NEEDED FOR BATHING: TOTAL
MOVING FROM LYING ON BACK TO SITTING ON SIDE OF FLAT BED: UNABLE
SUGGESTED CMS G CODE MODIFIER DAILY ACTIVITY: CM
DRESSING REGULAR UPPER BODY CLOTHING: TOTAL
DRESSING REGULAR LOWER BODY CLOTHING: TOTAL
HELP NEEDED FOR BATHING: TOTAL
STANDING UP FROM CHAIR USING ARMS: TOTAL
MOBILITY SCORE: 7
STANDING UP FROM CHAIR USING ARMS: TOTAL

## 2019-01-01 ASSESSMENT — GAIT ASSESSMENTS
GAIT LEVEL OF ASSIST: UNABLE TO PARTICIPATE

## 2019-01-01 ASSESSMENT — PATIENT HEALTH QUESTIONNAIRE - PHQ9
2. FEELING DOWN, DEPRESSED, IRRITABLE, OR HOPELESS: NOT AT ALL
SUM OF ALL RESPONSES TO PHQ9 QUESTIONS 1 AND 2: 0
2. FEELING DOWN, DEPRESSED, IRRITABLE, OR HOPELESS: NOT AT ALL
2. FEELING DOWN, DEPRESSED, IRRITABLE, OR HOPELESS: NOT AT ALL
SUM OF ALL RESPONSES TO PHQ9 QUESTIONS 1 AND 2: 0
1. LITTLE INTEREST OR PLEASURE IN DOING THINGS: NOT AT ALL
1. LITTLE INTEREST OR PLEASURE IN DOING THINGS: NOT AT ALL
SUM OF ALL RESPONSES TO PHQ9 QUESTIONS 1 AND 2: 0
1. LITTLE INTEREST OR PLEASURE IN DOING THINGS: NOT AT ALL

## 2019-01-01 ASSESSMENT — PAIN SCALES - PAIN ASSESSMENT IN ADVANCED DEMENTIA (PAINAD)
TOTALSCORE: 0
BODYLANGUAGE: RELAXED
FACIALEXPRESSION: SMILING OR INEXPRESSIVE
BREATHING: NORMAL
BREATHING: NORMAL
TOTALSCORE: 0
CONSOLABILITY: NO NEED TO CONSOLE
CONSOLABILITY: NO NEED TO CONSOLE
BODYLANGUAGE: RELAXED
FACIALEXPRESSION: SMILING OR INEXPRESSIVE

## 2019-01-01 ASSESSMENT — PULMONARY FUNCTION TESTS: FVC: 1.7

## 2019-01-01 ASSESSMENT — ENCOUNTER SYMPTOMS
DIARRHEA: 1

## 2019-01-01 ASSESSMENT — LIFESTYLE VARIABLES: EVER_SMOKED: YES

## 2019-01-04 NOTE — PROGRESS NOTES
Transitional Care Navigator:    Situation    Pt has been receiving therapies at [facility] since 12/26/18 after having a hip revision. Currently [facility].   Background       Pt lives with her  and one son. Hx includes osteoporosis, CAd, COPD, HTN, DM2, chronic lbp.  Has been noted to have some cognitive delays, although it is not known if this is chronic or polypharmacy  related.   Pt has been non-compliant with WB status.   Assessment    Plan home with family when appropriate. Pt has BSC, 2 walkers, shower chair, and scooter at home.   Recommendation Pt is not an appropriate candidate for the [facility] program, but is agreeable to [facility]CN for 30 day post discharge f/u, as well as home health post SNF.     TCN will continue to follow.

## 2019-01-22 NOTE — TELEPHONE ENCOUNTER
Future Appointments       Provider Department Center    1/23/2019 3:45 PM Chito Woodall M.D. [facility]     1/23/2019 5:20 PM Kevin Rea M.D. [facility] [address]        ESTABLISHED PATIENT PRE-VISIT PLANNING     Patient was contacted to complete PVP.     Note: Patient will not be contacted if there is no indication to call.     1.  Reviewed notes from the last few office visits within the medical group: Yes    2.  If any orders were placed at last visit or intended to be done for this visit (i.e. 6 mos follow-up), do we have Results/Consult Notes?        •  Labs - Labs ordered, completed on 01/19/19 and results are in chart.       •  Imaging - Imaging ordered, completed and results are in chart.       •  Referrals - Referral ordered, patient has NOT been seen.    3. Is this appointment scheduled as a Hospital Follow-Up? No    4.  Immunizations were updated in Mobile Event Guide using WebIZ?: Yes       •  Web Iz Recommendations: TDAP and SHINGRIX (Shingles)    5.  Patient is due for the following Health Maintenance Topics:   Health Maintenance Due   Topic Date Due   • COLONOSCOPY  12/27/2001   • PFT SCREENING  10/01/2009   • URINE ACR / MICROALBUMIN  12/07/2018   • DIABETES MONOFILAMENT / LE EXAM  12/28/2018       6. Orders for overdue Health Maintenance topics pended in Pre-Charting? YES    7.  AHA (MDX) form printed for Provider? YES    8.  Patient was informed to arrive 15 min prior to their scheduled appointment and bring in their medication bottles.

## 2019-01-23 PROBLEM — R41.0 DISORIENTATION: Status: RESOLVED | Noted: 2018-01-01 | Resolved: 2019-01-01

## 2019-01-24 NOTE — PROGRESS NOTES
Annual Health Assessment Questions:    1.  Are you currently engaging in any exercise or physical activity? No    2.  How would you describe your mood or emotional well-being today? good    3.  Have you had any falls in the last year? No    4.  Have you noticed any problems with your balance or had difficulty walking? Yes    5.  In the last six months have you experienced any leakage of urine? Yes    6. DPA/Advanced Directive: Patient has Durable Power of  on file.     CC: Follow-up diabetes, COPD, overactive bladder.    HPI:   Alice presents today with the following.    1. Controlled type 2 diabetes mellitus with diabetic polyneuropathy, without long-term current use of insulin (HCC)  Presents after having blood work recently with an A1c value of 5.5 maintain on metformin 1000 twice daily.   who presents with her for the first time for a visit reports she does have intermittent diarrhea.  Significant family history of a severe diabetes but with her current problems she is doing much better.    2. OAB (overactive bladder)  She does suffer from overactive bladder she does have some cognitive issues post surgery for hip some concern with her current anticholinergics.    3. Chronic obstructive pulmonary disease, unspecified COPD type (HCC)  Breathing is at baseline she has been on inhalers in the past cannot afford to do so currently.  She also refuses oxygen.        Patient Active Problem List    Diagnosis Date Noted   • Debility 12/25/2018     Priority: Medium   • Chronic obstructive pulmonary disease (HCC) 06/08/2017     Priority: Medium   • Coronary artery disease involving native coronary artery of native heart without angina pectoris 06/08/2017     Priority: Medium   • Vitamin D deficiency 04/01/2017     Priority: Low   • Degenerative joint disease 04/01/2017     Priority: Low   • Controlled type 2 diabetes mellitus with diabetic polyneuropathy, without long-term current use of insulin (HCC)  "11/08/2016     Priority: Low   • Low back pain 09/17/2015     Priority: Low   • Dyslipidemia 08/26/2013     Priority: Low   • Risk for falls 07/18/2018   • Diastolic dysfunction 08/26/2013       Current Outpatient Prescriptions   Medication Sig Dispense Refill   • lisinopril (PRINIVIL) 20 MG Tab      • Mirabegron ER (MYRBETRIQ) 50 MG TABLET SR 24 HR Take 50 mg by mouth every day. 90 Tab 3   • metFORMIN ER (GLUCOPHAGE XR) 500 MG TABLET SR 24 HR Take 1 Tab by mouth 2 times a day. 180 Tab 3   • NON SPECIFIED Adult undergarments medium  1 every 6 hours 420 Each 6   • aspirin EC (ECOTRIN) 325 MG Tablet Delayed Response Take 1 Tab by mouth 2 Times a Day for 44 days. 88 Tab 0   • gabapentin (NEURONTIN) 300 MG Cap Take 300 mg by mouth 2 Times a Day.     • metformin (GLUCOPHAGE) 1000 MG tablet Take 1,000 mg by mouth every day.     • nystatin (MYCOSTATIN) powder Apply 1 g to affected area(s) 4 times a day. 15 g 6   • atorvastatin (LIPITOR) 40 MG Tab Take 1 Tab by mouth every day. 90 Tab 3   • carvedilol (COREG) 3.125 MG Tab Take 1 Tab by mouth 2 times a day, with meals. 180 Tab 3   • aspirin  MG Tablet Delayed Response Take 1 Tab by mouth 2 times a day for 44 days. (Patient not taking: Reported on 1/23/2019) 88 Tab 0     No current facility-administered medications for this visit.          Allergies as of 01/23/2019 - Reviewed 01/23/2019   Allergen Reaction Noted   • Codeine Vomiting 01/07/2015        ROS: Denies Chest pain, SOB, LE edema.    /62 (BP Location: Right arm, Patient Position: Sitting)   Pulse 79   Temp 36.1 °C (97 °F)   Ht 1.702 m (5' 7\")   Wt 73.9 kg (163 lb)   SpO2 91%   BMI 25.53 kg/m²     Physical Exam:  Gen:         Alert and oriented, No apparent distress.  Neck:        No Lymphadenopathy or Bruits.  Lungs:     Clear to auscultation bilaterally  CV:          Regular rate and rhythm. No murmurs, rubs or gallops.               Ext:          No clubbing, cyanosis, edema.      Assessment and " Plan.   68 y.o. female with the following issues.    1. Controlled type 2 diabetes mellitus with diabetic polyneuropathy, without long-term current use of insulin ([facility])  Diabetes well controlled decreasing metformin to 500 twice daily.  - lisinopril (PRINIVIL) 20 MG Tab;   - metFORMIN ER (GLUCOPHAGE XR) 500 MG TABLET SR 24 HR; Take 1 Tab by mouth 2 times a day.  Dispense: 180 Tab; Refill: 3    2. OAB (overactive bladder)  Have written a prescription for adult undergarments and switching medications something more friendly does not cross the blood-brain barrier.  - Mirabegron ER (MYRBETRIQ) 50 MG TABLET SR 24 HR; Take 50 mg by mouth every day.  Dispense: 90 Tab; Refill: 3  - NON SPECIFIED; Adult undergarments medium  1 every 6 hours  Dispense: 420 Each; Refill: 6    3. Chronic obstructive pulmonary disease, unspecified COPD type ([facility])  Discussion about inhalers patient declines.

## 2019-01-24 NOTE — PROGRESS NOTES
Transitional Care Navigator:    Situation    First post SNF follow up call.   Background       Pt discharged from [facility] 1/18/19, returning to her home.  She lives locally with her  and sons.   Assessment    Call placed to patient's listed number; call was picked up, and then ended without anyone speaking.     Recommendation TCN will continue to follow.

## 2019-02-04 NOTE — PROGRESS NOTES
Transitional Care Navigator:    Third attempt to contact patient for 30 day post SNF discharge follow up.    TCN will continue to follow.

## 2019-02-13 NOTE — PROGRESS NOTES
Transitional Care Navigator:    Fourth and final 30-day post SNF discharge call; no answer. Message left.    TCN will close.

## 2019-03-04 NOTE — DISCHARGE PLANNING
Received Choice form at [phone]  Agency/Facility Name: #[facility]2 [facility]3 [facility]  Referral sent per Choice form @ [phone]      [facility], thanks

## 2019-03-20 NOTE — TELEPHONE ENCOUNTER
1. Caller Name: Alice                                         Call Back Number: [phone]      Patient approves a detailed voicemail message: N\A    Patient was wondering when you wanted to see her back. Last office note did not say. Please advise.

## 2019-04-01 NOTE — TELEPHONE ENCOUNTER
1. Caller Name: Eric                                         Call Back Number: [phone]      Patient approves a detailed voicemail message: N\A    [facility] nurse is requesting an updated medication list. Patient's new pharmacy is requesting a list from the PCP. She would like the follow changes made before it is faxed. Psychiatric will not let me do it. She is now back on Oxybutynin ER 10mg instead of the Myrbetriq due to copay cost. She will also be having her take a ASA 81mg and would like that added as well. Please advise.    [facility] fax: [phone]

## 2019-04-04 NOTE — ED TRIAGE NOTES
Coming from home, altered mental status X3 days  per family, typically she is alert and oriented X 4, confused per EMS, low quadrant abdominal pain, smelly dark urine, history of UTI. 112/82 bp 92 pulse, 92 2 liters 149 Blood sugar.

## 2019-04-04 NOTE — ED PROVIDER NOTES
ED Provider Note    CHIEF COMPLAINT  Chief Complaint   Patient presents with   • ALOC   • UTI       HPI  Alice Yarbrough is a 68 y.o. female who presents to the emergency department via EMS with confusion and altered mental status was increasing severity over the last several days.  Per the , the patient's had a dramatic change in the last 3 days that she is more confused and not speaking as much.  She has had no fever, she is having vomiting, she does not drink alcohol, use drugs or smoke cigarettes.  I did call the patient's  who states that she has had declining mental status over the last several months and is concerned that she has dementia but has never been this severe.  REVIEW OF SYSTEMS  Unable to ascertain secondary to altered mental status  PAST MEDICAL HISTORY  Past Medical History:   Diagnosis Date   • Bowel habit changes      diarrhea   • CAD (coronary artery disease)     mi dec 30   • COPD    • Dental disorder     upper and lower, not wearing   • Hyperlipidemia    • Hypertension    • MI (myocardial infarction) ([facility]) Dec 30, 2007   • Type II or unspecified type diabetes mellitus without mention of complication, not stated as uncontrolled     oral meds       FAMILY HISTORY  Noncontributory    SOCIAL HISTORY  Social History     Social History   • Marital status:      Spouse name: N/A   • Number of children: N/A   • Years of education: N/A     Social History Main Topics   • Smoking status: Former Smoker     Packs/day: 4.00     Years: 41.00     Types: Cigarettes     Quit date: 6/3/2008   • Smokeless tobacco: Never Used      Comment: started at 17   • Alcohol use No   • Drug use: No   • Sexual activity: No     Other Topics Concern   • Not on file     Social History Narrative   • No narrative on file       SURGICAL HISTORY  Past Surgical History:   Procedure Laterality Date   • HIP ARTHROPLASTY TOTAL Left 12/21/2018    Procedure: LEFT HIP CONVERSION FROM HEMIARTHROPLASTY TO  "TOTAL HIP;  Surgeon: Shay Berry M.D.;  Location: SURGERY [facility];  Service: Orthopedics   • HIP HEMIARTHROPLASTY Left 3/31/2017    Procedure: HIP HEMIARTHROPLASTY;  Surgeon: Deon Howard M.D.;  Location: SURGERY [facility];  Service:    • ABDOMINAL HYSTERECTOMY TOTAL     • OTHER ABDOMINAL SURGERY  hysterectomy       CURRENT MEDICATIONS  Home Medications     Reviewed by Silvano Bass (Pharmacy Tech) on 04/04/19 at 1704  Med List Status: Complete   Medication Last Dose Status   atorvastatin (LIPITOR) 40 MG Tab UNK Active   carvedilol (COREG) 3.125 MG Tab UNK Active   gabapentin (NEURONTIN) 300 MG Cap UNK Active   lisinopril (PRINIVIL) 20 MG Tab UNK Active   metFORMIN ER (GLUCOPHAGE XR) 500 MG TABLET SR 24 HR UNK Active   oxybutynin SR (DITROPAN-XL) 10 MG CR tablet UNK Active                ALLERGIES  Allergies   Allergen Reactions   • Codeine Vomiting       PHYSICAL EXAM  VITAL SIGNS: /63   Pulse 96   Temp 37.3 °C (99.1 °F) (Temporal)   Resp 14   Ht 1.702 m (5' 7\")   Wt 79 kg (174 lb 2.6 oz)   SpO2 91%   BMI 27.28 kg/m²      Constitutional: Well developed, Well nourished, No acute distress, Non-toxic appearance.   Eyes: PERRLA, EOMI, Conjunctiva normal, No discharge.   Cardiovascular: Normal heart rate, Normal rhythm, No murmurs, No rubs, No gallops, and intact distal pulses.   Thorax & Lungs:  No respiratory distress, no rales, no rhonchi, No wheezing, No chest wall tenderness.   Abdomen: Bowel sounds normal, Soft, No tenderness, No guarding, No rebound, No pulsatile masses.   Skin: Warm, skin excoriation and erythema to the area below the right breast as well as in the groin region.  No petechia, no purpura, no abscess.  Extremities: Full range of motion, no deformity, no edema.  Neurologic: Alert & oriented x 0, she is able to state her name but she is attempting to and states that she does not know her name, she is on a team of the date or where she is located or " currently.  She does follow commands by lifting her hands as well as legs bilaterally.  Psychiatric: Unable to assess      RADIOLOGY/PROCEDURES  CT-HEAD W/O   Final Result         1. No acute intracranial abnormality. No evidence of acute intracranial hemorrhage or mass lesion.               MR-BRAIN-W/O    (Results Pending)   [id number] VIEW    (Results Pending)   DX-CHEST-PORTABLE (1 VIEW)    (Results Pending)     Results for orders placed or performed during the hospital encounter of 04/04/19   CBC WITH DIFFERENTIAL   Result Value Ref Range    WBC 9.7 4.8 - 10.8 K/uL    RBC 4.97 4.20 - 5.40 M/uL    Hemoglobin 13.7 12.0 - 16.0 g/dL    Hematocrit 43.2 37.0 - 47.0 %    MCV 86.9 81.4 - 97.8 fL    MCH 27.6 27.0 - 33.0 pg    MCHC 31.7 (L) 33.6 - 35.0 g/dL    RDW 45.3 35.9 - 50.0 fL    Platelet Count 333 164 - 446 K/uL    MPV 10.8 9.0 - 12.9 fL    Neutrophils-Polys 85.40 (H) 44.00 - 72.00 %    Lymphocytes 7.60 (L) 22.00 - 41.00 %    Monocytes 5.50 0.00 - 13.40 %    Eosinophils 0.60 0.00 - 6.90 %    Basophils 0.60 0.00 - 1.80 %    Immature Granulocytes 0.30 0.00 - 0.90 %    Nucleated RBC 0.00 /100 WBC    Neutrophils (Absolute) 8.24 (H) 2.00 - 7.15 K/uL    Lymphs (Absolute) 0.73 (L) 1.00 - 4.80 K/uL    Monos (Absolute) 0.53 0.00 - 0.85 K/uL    Eos (Absolute) 0.06 0.00 - 0.51 K/uL    Baso (Absolute) 0.06 0.00 - 0.12 K/uL    Immature Granulocytes (abs) 0.03 0.00 - 0.11 K/uL    NRBC (Absolute) 0.00 K/uL   COMP METABOLIC PANEL   Result Value Ref Range    Sodium 139 135 - 145 mmol/L    Potassium 4.0 3.6 - 5.5 mmol/L    Chloride 103 96 - 112 mmol/L    Co2 27 20 - 33 mmol/L    Anion Gap 9.0 0.0 - 11.9    Glucose 123 (H) 65 - 99 mg/dL    Bun 17 8 - 22 mg/dL    Creatinine 0.66 0.50 - 1.40 mg/dL    Calcium 9.5 8.5 - 10.5 mg/dL    AST(SGOT) 15 12 - 45 U/L    ALT(SGPT) 12 2 - 50 U/L    Alkaline Phosphatase 90 30 - 99 U/L    Total Bilirubin 0.6 0.1 - 1.5 mg/dL    Albumin 4.1 3.2 - 4.9 g/dL    Total Protein 7.6 6.0 - 8.2 g/dL     Globulin 3.5 1.9 - 3.5 g/dL    A-G Ratio 1.2 g/dL   HCG QUAL SERUM   Result Value Ref Range    Beta-Hcg Qualitative Serum Negative Negative   DIAGNOSTIC ALCOHOL   Result Value Ref Range    Diagnostic Alcohol 0.00 0.00 g/dL   URINE DRUG SCREEN   Result Value Ref Range    Amphetamines Urine Negative Negative    Barbiturates Negative Negative    Benzodiazepines Negative Negative    Cocaine Metabolite Negative Negative    Methadone Negative Negative    Opiates Negative Negative    Oxycodone Negative Negative    Phencyclidine -Pcp Negative Negative    Propoxyphene Negative Negative    Cannabinoid Metab Negative Negative   ESTIMATED GFR   Result Value Ref Range    GFR If African American >60 >60 mL/min/1.73 m 2    GFR If Non African American >60 >60 mL/min/1.73 m 2   URINALYSIS,CULTURE IF INDICATED   Result Value Ref Range    Color Yellow     Character Clear     Specific Gravity 1.018 <1.035    Ph 6.5 5.0 - 8.0    Glucose Negative Negative mg/dL    Ketones Trace (A) Negative mg/dL    Protein Negative Negative mg/dL    Bilirubin Negative Negative    Urobilinogen, Urine 0.2 Negative    Nitrite Negative Negative    Leukocyte Esterase Negative Negative    Occult Blood Moderate (A) Negative    Micro Urine Req Microscopic    URINE MICROSCOPIC (W/UA)   Result Value Ref Range    WBC 0-2 /hpf    RBC 2-5 (A) /hpf    Bacteria Negative None /hpf    Epithelial Cells Negative /hpf    Hyaline Cast 0-2 /lpf   Hemoglobin A1C   Result Value Ref Range    Glycohemoglobin 6.6 (H) 0.0 - 5.6 %    Est Avg Glucose 143 mg/dL   ACCU-CHEK GLUCOSE   Result Value Ref Range    Glucose - Accu-Ck 113 (H) 65 - 99 mg/dL   EKG   Result Value Ref Range    Report       [facility] Emergency Dept.    Test Date:  2019  Pt Name:    JANE GARCIA           Department: ER  MRN:        [id number]                      Room:       [facility]  Gender:     Female                       Technician: [id number]  :        1950                    Requested By:TUSHAR ALMENDAREZ  Order #:    [id number]                    Reading MD: TUSHAR ALMENDAREZ DO    Measurements  Intervals                                Axis  Rate:       92                           P:          63  MD:         132                          QRS:        40  QRSD:       110                          T:          256  QT:         392  QTc:        485    Interpretive Statements  SINUS RHYTHM  NONSPECIFIC INTRAVENTRICULAR CONDUCTION DELAY  BORDERLINE INFERIOR Q WAVES  Compared to ECG 12/25/2018 14:31:41  Intraventricular conduction delay now present  Sinus tachycardia no longer present  Myocardial infarct finding no longer present  T-wave abnormality no longer present     Electronically Signed On 4-4-2019 22:27:00 PDT by TUSHAR ALMENDAREZ DO           COURSE & MEDICAL DECISION MAKING  Pertinent Labs & Imaging studies reviewed. (See chart for details)  This is a pleasant 68-year-old female presents with altered mental status.  CT scan of the brain was completed was negative for intracranial hemorrhage, hydrocephalus, mass-effect.  In addition, the patient has no significant electrolyte abnormality, she has a normal white blood cell count she is afebrile notes a urinary tract infection.  The patient symptomatology has been declining over several days an acute change and she is not in alteplase candidate secondary to this fact.  Patient may be experiencing expressive aphasia will probably need an MRI as an inpatient and further evaluation and management.  She has no evidence of overlying infection currently.  I discussed the patient with Dr. Gastelum for further evaluation and management.  As for the yeast infection, I will refer to Dr. Gastelum for further evaluation and manage  New Prescriptions    No medications on file       FINAL IMPRESSION  Altered mental status  Yeast infection below the right breast           Electronically signed by: Tushar Almendarez, 4/4/2019 4:28 PM

## 2019-04-05 PROBLEM — R41.82 ALTERED MENTAL STATUS: Status: ACTIVE | Noted: 2019-01-01

## 2019-04-05 PROBLEM — I50.42 SYSTOLIC AND DIASTOLIC CHF, CHRONIC (HCC): Status: ACTIVE | Noted: 2019-01-01

## 2019-04-05 NOTE — PROGRESS NOTES
· 2 RN skin check complete with AGNIESZKA Gomez.  · Devices in place tele monitor, IV, pulse ox, SCDs.  · Skin assessed under devices intact.  · Confirmed pressure ulcers found on (N/A).  · New potential pressure ulcers noted on (N/A). Wound consult placed and wound reported.  · The following interventions in place q2h turning schedule, waffle mattress cover, nystatin powder, barrier cream.      -B/l elbows pink, blanching  -small scab on left ankle open to air  - purple scar on left hip from arthroplasty  -large area of redness, moisture associated skin damage under right breast, left breast intact. Nystatin powder ordered.  - sacrum is pink, slow to brayan. Appears to be a small moisture fissure above intergluteal cleft. Mepilex contraindicated due to incontinence.   - b/l heels pink, blanching

## 2019-04-05 NOTE — ASSESSMENT & PLAN NOTE
glycohemoglobin 6.6  She was on metformin outpatient.  BS remain uncontrolled    Pt now comfort care. D/c ISS and accuchecks

## 2019-04-05 NOTE — ED NOTES
Gave bedside report to Jerome  Rn's. Pt transferred to Tele on monitors. All pt care responsibilities relinquished.

## 2019-04-05 NOTE — PROGRESS NOTES
PT arrived to unit via gurney escorted by RN. VSS Pt is in sinus rhythm. PT nonverbal, unable to assess LOC. Monitor leads in place. Monitor room notified. PT is orientated to the unit, call light, phone system, fall precautions in place, appropriate signs in place, bed in low and locked positions, bed alarm on. Pt educated regarded fall precautions and importance of calling staff for assistance. Will continue to monitor.

## 2019-04-05 NOTE — ED NOTES
Received report from Ian AARON. Pt care responsibilities assumed. Pt resting in bed, Monitors in place, VSS, will continue to monitor.

## 2019-04-05 NOTE — ED NOTES
Bo [facility] completed per [facility]  Allergies reviewed  No ORAL antibiotics in last 30 days    Called S/O and left message for medications, called pharmacy and they gave a medication list, unknown last doses taken

## 2019-04-05 NOTE — H&P
Hospital Medicine History & Physical Note    Date of Service  4/4/2019    Primary Care Physician  Kevin Rea M.D.    Consultants  None    Code Status  Full code    Chief Complaint  Altered mental status    History of Presenting Illness  68 y.o. female who presented 4/4/2019 with altered mental status.  History is limited as the patient is currently confused and nonverbal.  Apparently the patient has been noted to be confused by the family for the past 3 days.  At baseline she is alert and oriented x4.  She has noted to be declining over the past 3 days.  There is no reported fever, chills or headache.  During my exam the patient is nonverbal.  She opens her eyes spontaneously and is able to track me through the room however she does not follow commands and has no verbal response.  She is able to move all 4 extremities purposefully.    EKG interpreted by me reveals sinus rhythm with nonspecific intraventricular conduction delay and Q waves in inferior leads.  Chest x-ray interpreted by me reveals no acute cardiopulmonary process    Review of Systems  Review of Systems   Unable to perform ROS: Mental status change       Past Medical History   has a past medical history of Bowel habit changes; CAD (coronary artery disease); COPD; Dental disorder; Hyperlipidemia; Hypertension; MI (myocardial infarction) ([facility]) (Dec 30, 2007); and Type II or unspecified type diabetes mellitus without mention of complication, not stated as uncontrolled.    Surgical History   has a past surgical history that includes other abdominal surgery (hysterectomy); abdominal hysterectomy total; hip hemiarthroplasty (Left, 3/31/2017); and hip arthroplasty total (Left, 12/21/2018).     Family History  family history includes Diabetes in her mother; Hypertension in her father; Psychiatry in her brother.     Social History   reports that she quit smoking about 10 years ago. Her smoking use included Cigarettes. She has a 164.00 pack-year smoking  history. She has never used smokeless tobacco. She reports that she does not drink alcohol or use drugs.    Allergies  Allergies   Allergen Reactions   • Codeine Vomiting       Medications  Prior to Admission Medications   Prescriptions Last Dose Informant Patient Reported? Taking?   atorvastatin (LIPITOR) 40 MG Tab UNK at [facility] Patient's Home Pharmacy No No   Sig: Take 1 Tab by mouth every day.   carvedilol (COREG) 3.125 MG Tab UNK at [facility] Patient's Home Pharmacy No No   Sig: Take 1 Tab by mouth 2 times a day, with meals.   gabapentin (NEURONTIN) 300 MG Cap UNK at [facility] Patient's Home Pharmacy Yes No   Sig: Take 300 mg by mouth 4 times a day.   lisinopril (PRINIVIL) 20 MG Tab UNK at [facility] Patient's Home Pharmacy Yes No   Sig: Take 20 mg by mouth every day.   metFORMIN ER (GLUCOPHAGE XR) 500 MG TABLET SR 24 HR UNK at [facility] Patient's Home Pharmacy No No   Sig: Take 1 Tab by mouth 2 times a day.   oxybutynin SR (DITROPAN-XL) 10 MG CR tablet UNK at [facility] Patient's Home Pharmacy No No   Sig: Take 1 Tab by mouth every day.      Facility-Administered Medications: None       Physical Exam  Temp:  [37.3 °C (99.1 °F)] 37.3 °C (99.1 °F)  Pulse:  [94-98] 96  Resp:  [14] 14  BP: (118)/(63) 118/63  SpO2:  [89 %-96 %] 91 %    Physical Exam   Constitutional: She appears well-developed and well-nourished. No distress.   HENT:   Head: Normocephalic and atraumatic.   Mouth/Throat: Oropharynx is clear and moist.   Eyes: Pupils are equal, round, and reactive to light. Conjunctivae are normal.   Neck: Neck supple. No thyromegaly present.   Cardiovascular: Normal rate, regular rhythm and normal heart sounds.    Pulmonary/Chest: Effort normal and breath sounds normal. No respiratory distress. She has no wheezes. She has no rales.   Abdominal: Soft. Bowel sounds are normal. She exhibits no distension. There is no tenderness. There is no rebound.   Musculoskeletal: Normal range of motion. She exhibits no edema or tenderness.   Neurological: She is  alert.   Moves all 4 extremities purposefully   Skin: Skin is warm and dry.   Psychiatric: Her mood appears anxious. She is noncommunicative.   Nursing note and vitals reviewed.      Laboratory:  Recent Labs      04/04/19   1644   WBC  9.7   RBC  4.97   HEMOGLOBIN  13.7   HEMATOCRIT  43.2   MCV  86.9   MCH  27.6   MCHC  31.7*   RDW  45.3   PLATELETCT  333   MPV  10.8     Recent Labs      04/04/19   1644   SODIUM  139   POTASSIUM  4.0   CHLORIDE  103   CO2  27   GLUCOSE  123*   BUN  17   CREATININE  0.66   CALCIUM  9.5     Recent Labs      04/04/19   1644   ALTSGPT  12   ASTSGOT  15   ALKPHOSPHAT  90   TBILIRUBIN  0.6   GLUCOSE  123*                 No results for input(s): TROPONINI in the last 72 hours.    Urinalysis:    Recent Labs      04/04/19   1753   SPECGRAVITY  1.018   GLUCOSEUR  Negative   KETONES  Trace*   NITRITE  Negative   LEUKESTERAS  Negative   WBCURINE  0-2   RBCURINE  2-5*   BACTERIA  Negative   EPITHELCELL  Negative        Imaging:  [id number] VIEW   Final Result         1.  Moderate stool in the colon suggests changes of constipation, otherwise nonspecific bowel gas pattern      DX-CHEST-PORTABLE (1 VIEW)   Final Result         1.  No acute cardiopulmonary disease.   2.  Atherosclerosis   3.  No radiodense foreign body or medical device identified which would exclude MRI.      CT-HEAD W/O   Final Result         1. No acute intracranial abnormality. No evidence of acute intracranial hemorrhage or mass lesion.               MR-BRAIN-W/O    (Results Pending)         Assessment/Plan:  I anticipate this patient is appropriate for observation status at this time.    Altered mental status- (present on admission)   Assessment & Plan    Unclear etiology  Infection less likely given no fever or leukocytosis. UA and chest x-ray negative.  Blood work unremarkable  CT head without contrast reveals no acute intracranial process  Check MRI brain to rule out stroke  If MRI unrevealing consider psychiatry  evaluation       Coronary artery disease involving native coronary artery of native heart without angina pectoris- (present on admission)   Assessment & Plan    Continue aspirin and Lipitor     Chronic obstructive pulmonary disease (HCC)- (present on admission)   Assessment & Plan    No evidence of acute exacerbation at this time  DuoNeb as needed  RT protocol     Controlled type 2 diabetes mellitus without complication, without long-term current use of insulin (HCC)- (present on admission)   Assessment & Plan    Start on insulin sliding scale with serial Accu-Checks  Hypoglycemic protocol in place         Dyslipidemia- (present on admission)   Assessment & Plan    Continue Lipitor         VTE prophylaxis: heparin

## 2019-04-05 NOTE — ASSESSMENT & PLAN NOTE
Persistent; concern for meningoencephalitis vs seziures  Now afebrile, Resolved leukocytosis, Off pressors  LP-slight elevation protein, normal glucose, only 1 WBC  Meningitis panel-negative  HSV PCR-negative  DC'd vancomycin/amp/ceftraixone/acyclovir as CSF not c/w bacterial or viral meningitis  Non specific findings on EEG x 4, per neuro, seizures unlikely the cause but remains on vimpat  Immunologic workup: RF negative, CRP elevated, CALEB negative, ds DNA positive but negative antibody titer  Blood cxs neg  CT scan did show small aneurysm  MRI did not reveal any stroke or evidence of vasculitis  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 CSF fluid with more protein s/p steroids and IVIG. No further recommendations.   Pt now comfort care. Pending hospice eval

## 2019-04-05 NOTE — CARE PLAN
Problem: Communication  Goal: The ability to communicate needs accurately and effectively will improve  Outcome: PROGRESSING SLOWER THAN EXPECTED  Pt nonverbal and ALOC. Pt educated on importance of call light, reorientation, and POC. Reinforcement needed. Hourly rounding in place.     Problem: Safety  Goal: Will remain free from falls  Outcome: PROGRESSING AS EXPECTED  Pt mobility assessed at beginning of shift. Pt is x2 assist. Fall precautions in place. Non-slip socks on. Bed in lowest locked position. Bed alarm on. Call light within reach. Pt educated to call for assistance. Hourly rounding in place.

## 2019-04-05 NOTE — ED NOTES
Spoke with Swapna from MRI. Due to Pt being altered and unable to complete screening sheet. In order to do MRI pt must have CT of head, xray of chest and ABD. Pt has already completed CT. Hospitalist notified for additional screening. Will continue to monitor.

## 2019-04-05 NOTE — PROGRESS NOTES
Pt resting in bed at this time. Even visible chest rise. No signs of distress. Bed alarm on. Call light in place. Q2 turn. Bed in low and locked position.

## 2019-04-05 NOTE — WOUND TEAM
Wound consult received for right breast. Patient seen in T707-1. Right breast lifted, skin cleansed w/ warm wash cloth. Area appears to be moist and red. No open wounds noted. MD already ordered nystatin powder to be applied. Spoke w/ Lindsay bedside RN that Inter Drys can also be used underneath breast to wick away moisture. Skin care orders placed. No advanced wound care needs at this time.

## 2019-04-05 NOTE — ED NOTES
Called Tele 7 Bed is ready. Pt is aware of POC. Pt belongings are on bed. Pt is ready for transport.

## 2019-04-05 NOTE — ED NOTES
During report pt was found to be incontinent of urine. Assisted day RN with changing clothes. Pt was noted to have a wet, red, errythemic maceration under Right breast and in groin. ERP notifed. Pt cleaned and brief applied. Pt did not verbally respond to questions or assist in care. Pt to CT.

## 2019-04-05 NOTE — THERAPY
"  Speech Language Therapy Clinical Swallow Evaluation completed.  Functional Status: Pt pending MRI later today. Pt does not have a source of nutrition. SLP collaborated with MD and Nurs prior to swallow eval. Possible MCA and aphasia per MD. Pt with eyes closed when not stimulated. When her name is called, pt with head up and stating \"yes.\" This may be an automatic type verbal response. Pt with no responses to simple automatic speech with SLP modeling counting and days of the week. Pt does not state her name or month of birthday with simple questions. Pt with continuous and unusual puffing of her cheeks with air on exhalation and with pursed lips. She does not respond to oral motor directives. Inconsistent oral cavity opening when spoon presented with 1/4 tsp amount of thickened water. Pt with 2 swallows triggered out of 3 trials. One swallow was within 1 second with approx 3 seconds for the other swallow. Pt does not follow directive to squeeze SLP's hands with model provided. Overall, pt with low level and inconsistent responses. She is not at the level for safe intake-MD and nurs aware. Consider Cortrack placement. Whitish coating on pt's tongue with nurs aware of possible thrush.   Recommendations - Diet:  NPO and NG                          Strategies: to be determined                          Medication Administration:  Non oral.   Plan of Care: Will benefit from Speech Therapy 3 times per week  Post-Acute Therapy: dysphagia. ThanksAdrián    See \"Rehab Therapy-Acute\" Patient Summary Report for complete documentation.   "

## 2019-04-05 NOTE — THERAPY
"Physical Therapy Evaluation completed.   Bed Mobility:  Supine to Sit: Minimal Assist (min to total A dependent on pt initation/participation/cog deficits)  Transfers: Sit to Stand: Total Assist X 2  Gait: Level Of Assist: Unable to Participate        Plan of Care: Will benefit from Physical Therapy 2 times per week; see below  Discharge Recommendations: Equipment: Will Continue to Assess for Equipment Needs. See below    Pt presents to PT with impaired motor control, balance, and general locomotion. Her current cognitive deficits are exacerbating her functional impairements. Noted pt does appear functionally fair to good strength when able to mobilize. HOwever she is unable to follow through with functional commands and is unable to follow 1 step commands (and noted follows very infrequently/inconsistently with functional commands). She required min to total A to come to EOB dependent on pt cognitive awareness of participation. She was unable to come to standing without total A x2. She requires intermittent physical assist to maintain balance though does appear to have delayed righting reactions to perturbations and is at times able to return to midline with own strength. She will be limited with progression with PT given current cognition and inability to follow for skilled PT, though antiipate as cognition clears, she will progress well with activity. SHe would require continued skilled PT/placement prior to medical dc to home given current objective findings, age, co-morbidites, and current level of assist needed.     See \"Rehab Therapy-Acute\" Patient Summary Report for complete documentation.     "

## 2019-04-05 NOTE — PROGRESS NOTES
"Assumed care of patient at this time. Bedside shift report received from Rani. Patient resting comfortably in bed, does not follow commands, will only respond with \"yeah\" to all questions, including her name. NIH stroke scale completed. Fall precautions in place, call bell within reach. Will continue to monitor.  "

## 2019-04-05 NOTE — RESPIRATORY CARE
COPD EDUCATION by COPD CLINICAL EDUCATOR  4/5/2019 at 6:18 AM by Brielle Arnold     Patient reviewed by COPD education team. Patient does not qualify for COPD program.

## 2019-04-05 NOTE — ASSESSMENT & PLAN NOTE
History of; she does not have evidence of acute exacerbation   DuoNeb as needed  RT protocol  See above  following

## 2019-04-05 NOTE — PROGRESS NOTES
"Spoke to  katie about patient baseline,  was jumping from different topics when asked when patient being non verbal started, he then started saying that this has been going on for months where she does not really speak much and when he would speak to patient about her taking her blood sugar, that patient would always say \"leave me alone\" and sometimes would not listen or comprehend when he would speak to her. When asked what prompted them to seek medical attention or what changed he said he found her in the bathroom with soiled clothes and when he would tell her to sit on the toilet she would not follow and would not speak to him at all. He also stated that patient holding her breath repeatedly has been going on for months.  Updated  that we are ruling out a stroke and pt has MRI ordered. He stated he is not sure if he will make it until 3pm, he stated he has copd and uses a walker and has difficulty walking a lot.   "

## 2019-04-05 NOTE — DISCHARGE PLANNING
PMR referral from Dr. Gastelum.       Stroke protocol ongoing work up. No physiatry consult ordered at this time. Will follow.

## 2019-04-05 NOTE — PROGRESS NOTES
2 RN skin check  Bilateral ears redness blanching  Under right and left breast redness and excoriation   Bilateral knees pink but blanching   Heels pink but blanching  Bottom red but blanching.  Ear foam, barrier cream, waffle cushion in place.

## 2019-04-05 NOTE — THERAPY
"Occupational Therapy Evaluation completed.   Functional Status: Pt is a 67 y/o female admitted with AMS found to have UTI. She is mostly nonverbal but is able to respond to her name and say \"yes/yeah\". She was unable to follow 1 step directions. Attempts to initiate but unable to follow through. She required Hira for sup>eob. MaxA washing face EOB. TotalA LB cares. TotalA sit>supine due to impaired cog. Unable to formally test but BUE AROM/strength appears to be WFL. She is mostly limited by impaired cognition which impacts independence in self care.  Plan of Care: Will benefit from Occupational Therapy 2 times per week, will increase as able.  Discharge Recommendations:  Equipment: Will Continue to Assess for Equipment Needs.  Recommend inpatient transitional care services for continued occupational therapy services.       See \"Rehab Therapy-Acute\" Patient Summary Report for complete documentation.    "

## 2019-04-06 PROBLEM — R13.14 PHARYNGOESOPHAGEAL DYSPHAGIA: Status: ACTIVE | Noted: 2019-01-01

## 2019-04-06 PROBLEM — A41.9 SEPSIS (HCC): Status: ACTIVE | Noted: 2019-01-01

## 2019-04-06 NOTE — PROGRESS NOTES
Assumed care of patient at this time. Bedside shift report received. Patient appears to be more obtunded. NIH performed with AGNIESZKA Michaud score 31.

## 2019-04-06 NOTE — PROGRESS NOTES
Dr. Gastelum paged regarding pt's decreased LOC, will not open eyes, only responds to painful stimuli. Obtunded, NIH 31, GCS 8. Blood sugar 154. Dr. Gastelum came to bedside, 1L LR bolus ordered. Pt is already on Vancomycin and ceftriaxone. Will monitor.

## 2019-04-06 NOTE — PROCEDURES
Intubation  Date/Time: 4/6/2019 4:40 PM  Performed by: JESSIKA BAJWA  Authorized by: JESSIKA BAJWA     Consent:     Consent obtained:  Verbal    Consent given by:  Spouse    Risks discussed:  Aspiration, brain injury, dental trauma, bleeding, death, hypoxia and pneumothorax    Alternatives discussed:  Delayed treatment  Pre-procedure details:     Patient status:  Altered mental status    Paralytics:  Rocuronium  Procedure details:     Preoxygenation:  Bag valve mask    CPR in progress: no      Intubation method:  Oral    Laryngoscope type:  GlideScope    Laryngoscope blade:  Mac 3    Tube size (mm):  7.5    Tube type:  Cuffed    Number of attempts:  1    Cricoid pressure: no      Tube visualized through cords: yes    Placement assessment:     ETT to lip:  24    ETT to teeth:  23    Tube secured with:  ETT cody    Breath sounds:  Absent over the epigastrium and equal    Placement verification: chest rise, condensation, direct visualization, equal breath sounds, ETCO2 detector and tube exhalation    Post-procedure details:     Patient tolerance of procedure:  Tolerated well, no immediate complications

## 2019-04-06 NOTE — CARE PLAN
Problem: Safety  Goal: Will remain free from injury  Outcome: PROGRESSING AS EXPECTED    Intervention: Collaborate with Interdisciplinary Team for safe transfer and mobilization techniques  Two person/ max assist techniques used when moving and positioning pt      Problem: Urinary Elimination:  Goal: Ability to reestablish a normal urinary elimination pattern will improve  Outcome: PROGRESSING SLOWER THAN EXPECTED    Intervention: Assess and monitor for signs and symptoms of urinary retention  Pt incontinent and likely not retaining

## 2019-04-06 NOTE — PROGRESS NOTES
Hospital Medicine Daily Progress Note    Date of Service  4/6/2019    Chief Complaint  68 y.o. female admitted 4/4/2019 with past medical history of MI in in 2008, systolic and diastolic heart failure, left hip surgery in December, diabetes non-insulin-dependent presents with altered mental status.  Her last known well was 2:30 PM the day before arriving to the hospital.  She is having expressive aphasia.    Hospital Course    Upon arriving to the hospital where she had a temperature of 99, heart rate of 98 blood pressure 118/63.  CT scan of the head found no evidence of acute process.  Chest x-ray and UA were negative for infection.  She has been allowed permissive hypertension for stroke.      Interval Problem Update  4/4: Patient evaluated by me today.  She was having expressive aphasia and was confused.  She was not following commands.  She would only say 1 word answers.  MRI of the brain not completed as of yet.  Spoke to neurology who determined the encephalitis and meningitis has been ruled out and start prophylactic antibiotics and acyclovir.  Also given 1 time dose of Keppra and get an EEG to rule out seizures.  4/5: Patient got progressively worse in terms of mental status.  She is becoming progressively more lethargic.  She spiked a temperature 102, became hypotensive, became tachycardic.  Sepsis protocol was initiated.  Infectious disease was consulted.  Infectious disease discontinue vancomycin considering may be low probability that the resistant strep infection.  We will continue ceftriaxone and ampicillin for now.  LP has been ordered with cell count and cultures.  MRI of the brain and CT of the head did not find any evidence of stroke.    Consultants/Specialty  Neurology    Code Status  Full    Disposition  TBD    Review of Systems  Review of Systems   Unable to perform ROS: Mental acuity        Physical Exam  Temp:  [36.9 °C (98.4 °F)-39.2 °C (102.6 °F)] 38.7 °C (101.7 °F)  Pulse:  []  122  Resp:  [16-20] 20  BP: ()/() 125/77  SpO2:  [93 %-95 %] 95 %    Physical Exam   Constitutional: She appears well-developed and well-nourished.   Expressive aphasia   Lethargic  Withdraws to pain   HENT:   Head: Normocephalic and atraumatic.   Mouth/Throat: No oropharyngeal exudate.   Eyes: Pupils are equal, round, and reactive to light. EOM are normal. No scleral icterus.   Pupils equal and reactive   Neck: Normal range of motion. Neck supple. No JVD present. No tracheal deviation present. No thyromegaly present.   Cardiovascular: Normal rate, regular rhythm and intact distal pulses.  Exam reveals no gallop and no friction rub.    No murmur heard.  Pulmonary/Chest: Effort normal and breath sounds normal. No stridor. No respiratory distress. She has no wheezes. She has no rales. She exhibits no tenderness.   Abdominal: Soft. Bowel sounds are normal. She exhibits no distension and no mass. There is no tenderness. There is no rebound and no guarding.   Musculoskeletal: Normal range of motion. She exhibits no edema.   Lymphadenopathy:     She has no cervical adenopathy.   Neurological: She has normal reflexes. No cranial nerve deficit.   Skin: No rash noted. No erythema. No pallor.   Nursing note and vitals reviewed.      Fluids  No intake or output data in the 24 hours ending 04/06/19 1542    Laboratory  Recent Labs      04/04/19   1644  04/06/19   0335   WBC  9.7  13.5*   RBC  4.97  5.48*   HEMOGLOBIN  13.7  15.2   HEMATOCRIT  43.2  46.8   MCV  86.9  85.4   MCH  27.6  27.7   MCHC  31.7*  32.5*   RDW  45.3  43.9   PLATELETCT  333  298   MPV  10.8  10.1     Recent Labs      04/04/19   1644  04/06/19   0334   SODIUM  139  135   POTASSIUM  4.0  3.8   CHLORIDE  103  102   CO2  27  22   GLUCOSE  123*  173*   BUN  17  10   CREATININE  0.66  0.69   CALCIUM  9.5  9.0     Recent Labs      04/06/19   1134   INR  1.18*         Recent Labs      04/05/19   0417   TRIGLYCERIDE  89   HDL  34*   LDL  61        Imaging  EC-ECHOCARDIOGRAM COMPLETE W/O CONT         MR-BRAIN-W/O   Final Result      No evidence of intracranial hemorrhage, acute ischemia or mass on this severely motion degraded study      DX-CHEST-LIMITED (1 VIEW)   Final Result         1.  No acute cardiopulmonary disease.   2.  Atherosclerosis   3.  No radiodense foreign body or medical device identified which would exclude MRI.      CT-CTA NECK WITH & W/O-POST PROCESSING   Final Result         1.  Occlusion of the left subclavian artery to the level of the vertebral artery with reconstitution, appearance concerning for subclavian steal.   2.  Intimal thickening versus soft plaque and scattered atherosclerotic calcification of the left carotid arterial tree with less than 50% stenosis.   3.  Small caliber right internal carotid artery   4.  Extensive emphysema      These findings were discussed with the patient's clinician, Dr. Mcallister, on 4/6/2019 4:31 AM.      CT-CTA HEAD WITH & W/O-POST PROCESS   Final Result         1.  Hypoplastic right internal carotid artery, there is a segment of occlusion seen in the distal right intracranial internal carotid artery near the petrous apex. The intracranial internal carotid artery reconstitutes distal to this segment of    occlusion.   2.  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   3.  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   4.  Changes of atrophy with nonspecific white matter changes, most commonly associated with small vessel ischemia.      These findings were discussed with the patient's clinician, Dr. Mcallister, on 4/6/2019 4:31 AM.      DX-ABDOMEN FOR TUBE PLACEMENT   Final Result         1.  Nonspecific bowel gas pattern.   2.  Dobbhoff tube tip overlying the expected location of the pylorus or first duodenal segment.      US-CAROTID DOPPLER BILAT   Final Result      [id number] VIEW   Final Result         1.   Moderate stool in the colon suggests changes of constipation, otherwise nonspecific bowel gas pattern      DX-CHEST-PORTABLE (1 VIEW)   Final Result         1.  No acute cardiopulmonary disease.   2.  Atherosclerosis   3.  No radiodense foreign body or medical device identified which would exclude MRI.      CT-HEAD W/O   Final Result         1. No acute intracranial abnormality. No evidence of acute intracranial hemorrhage or mass lesion.                    Assessment/Plan  Altered mental status- (present on admission)   Assessment & Plan    Unclear etiology- last known well was 2:30pm the day before she arrived to the hospital  Infection less likely given no fever or leukocytosis. UA and chest x-ray negative.  Blood work unremarkable  CT head without contrast reveals no acute intracranial process  Allow permissive HTN- can be dced  Check MRI brain found no evidence of storke  Ordered a CTA of the head and neck found a occlusion of the right ICA likely chronic  Ordered EEG to rule out seizures or temporal spikes from herpes  Give one time dose of keppra 2g  Started thiamine IV  LP with cell count has been ordered   Will have to rule out meningitis and encephalitis-start ceftriaxone, vancomycin, acyclovir   Transfer to the ICU due to GCS 8- will need to be intubated       Coronary artery disease involving native coronary artery of native heart without angina pectoris- (present on admission)   Assessment & Plan    Continue aspirin and Lipitor     Chronic obstructive pulmonary disease (HCC)- (present on admission)   Assessment & Plan    No evidence of acute exacerbation at this time  DuoNeb as needed  RT protocol     Controlled type 2 diabetes mellitus without complication, without long-term current use of insulin ([facility])- (present on admission)   Assessment & Plan    Start on insulin sliding scale with serial Accu-Checks  Hypoglycemic protocol in place         Sepsis ([facility])   Assessment & Plan    This is severe sepsis  with the following associated acute organ dysfunction(s): acute kidney failure, acute respiratory failure, metabolic/septic encephalopathy.   Possible source is meningitis   Follow cultures including   Sepsis protocol has been ordered  LA is trending      Systolic and diastolic CHF, chronic (HCC)   Assessment & Plan    Currently holding OMT including carvedilol and lisinopril     Dyslipidemia- (present on admission)   Assessment & Plan    Continue Lipitor          VTE prophylaxis: heparin    Patient is critically ill.    The patient continues to have: sepsis, possible source meningitis, encephalitis   The vital organ system that is affected is the: cardiovascular, renal  If untreated there is a high chance of deterioration into: worsening renal failure, worsening septic shock, cardiac arrest and eventually death.    The critical care that I am providing today is: Her GCS is 8 and will need to be intubated, provided sepsis protocol, IV antibiotics for meningitis  The critical that has been undertaken is medically complex.    There has been no overlap in critical care time.    Critical Care Time not including procedures: 45 minutes.

## 2019-04-06 NOTE — CONSULTS
Phone consult    >3d of confusion, unable to meaningfully participate with PT  No sig focality or meningitic signs reported on exam, mostly non verbal inconsistent command follow  CTB wnl    Encephalopathy NOS, subacute    proph IV ABX/AVT  proph keppra 2gm IV load  MRB  EEG  Will see in AM    Referring Provider-  Sandy Gastelum M.D.      Sebastián Herman M.D.  , Neurohospitalist & Stroke  Clinical Professor, [employer]  Diplomate, Neurology & Neuroimaging    ------------------  TLKW 1430 yesterday after further hx, sudden onset symptoms no prodrome  With this new information, concern for LMCA STROKE is raised  Not IVT or ROSIE candidate now >24hrs post ictus  Non TPA stroke protocol if stroke found, and transfer to Neuro ICU  NS @ 50cc/h x 24h  CTA/P now

## 2019-04-06 NOTE — ASSESSMENT & PLAN NOTE
This is severe sepsis with the following associated acute organ dysfunction(s): acute kidney failure, acute respiratory failure, metabolic/septic encephalopathy.   Source of infection is unclear  LP neg for infection  Off abx  Sepsis appears to be resolved

## 2019-04-06 NOTE — PROCEDURES
Procedure Lumbar Puncture  Date/Time: 4/6/2019 12:57 PM  Performed by: JUDE WATERS  Authorized by: JUDE WATERS     Consent:     Consent obtained:  Verbal (Over the phone  )    Consent given by:  Spouse    Risks discussed:  Bleeding, infection, pain, repeat procedure, nerve damage and headache    Alternatives discussed:  Delayed treatment, no treatment, alternative treatment and observation  Pre-procedure details:     Procedure purpose:  Diagnostic    Preparation: Patient was prepped and draped in usual sterile fashion    Sedation:     Sedation type:  None  Anesthesia:     Anesthesia method:  Local infiltration    Local anesthetic:  Lidocaine 1% w/o epi  Procedure details:     Lumbar space:  L4-L5 interspace    Patient position:  L lateral decubitus    Needle type:  Spinal needle - Quincke tip    Ultrasound guidance: no      Number of attempts:  1    Fluid appearance:  Clear    Tubes of fluid:  4    Total volume (ml):  14  Post-procedure:     Puncture site:  Adhesive bandage applied    Patient tolerance of procedure:  Tolerated well, no immediate complications

## 2019-04-06 NOTE — CONSULTS
CC:  AMS    Date of Admission: 4/4/2019    Today's Date: 04/06/19    Consulting Physician: Sandy Gastelum M.D.      HPI:    Alice Yarbrough is a 68 y.o. female with day or more of confusion, mute, poor coop- differing accounts on admission. Continues today lethargic moderate.  No associated symptoms.  No exacerbating or alleviating factors. .  No other complaints. Fever in night low grade.      ROS:   PERTINENT POSITIVES IN HPI  All other systems were reviewed and were negative.       Past Medical History:   Past Medical History:   Diagnosis Date   • MI (myocardial infarction) (HCC) Dec 30, 2007   • Bowel habit changes      diarrhea   • CAD (coronary artery disease)     mi dec 30   • COPD    • Dental disorder     upper and lower, not wearing   • Hyperlipidemia    • Hypertension    • Type II or unspecified type diabetes mellitus without mention of complication, not stated as uncontrolled     oral meds       Past Surgical History:   Past Surgical History:   Procedure Laterality Date   • HIP ARTHROPLASTY TOTAL Left 12/21/2018    Procedure: LEFT HIP CONVERSION FROM HEMIARTHROPLASTY TO TOTAL HIP;  Surgeon: Shay Berry M.D.;  Location: SURGERY [facility];  Service: Orthopedics   • HIP HEMIARTHROPLASTY Left 3/31/2017    Procedure: HIP HEMIARTHROPLASTY;  Surgeon: Deon Howard M.D.;  Location: SURGERY [facility];  Service:    • ABDOMINAL HYSTERECTOMY TOTAL     • OTHER ABDOMINAL SURGERY  hysterectomy       Social History:   Social History     Social History   • Marital status:      Spouse name: N/A   • Number of children: N/A   • Years of education: N/A     Occupational History   • Not on file.     Social History Main Topics   • Smoking status: Former Smoker     Packs/day: 4.00     Years: 41.00     Types: Cigarettes     Quit date: 6/3/2008   • Smokeless tobacco: Never Used      Comment: started at 17   • Alcohol use No   • Drug use: No   • Sexual activity: No     Other Topics Concern   • Not  on file     Social History Narrative   • No narrative on file       Family History:   Family History   Problem Relation Age of Onset   • Diabetes Mother    • Hypertension Father    • Psychiatry Brother        HISTORIES AS ABOVE ARE INCOMPLETE IF PATIENT IS INTUBATED, OR UNABLE TO COMMUNICATE OR ELUCIDATE.    Allergies:   Allergies   Allergen Reactions   • Codeine Vomiting         Current Facility-Administered Medications:   •  potassium phosphates 30 mmol in D5W 500 mL ivpb, 30 mmol, Intravenous, Once, Sandy Gastelum M.D.  •  lactated ringers infusion, , Intravenous, Continuous, Sandy Gastelum M.D.  •  cefTRIAXone (ROCEPHIN) 2 g in  mL IVPB, 2 g, Intravenous, Q12HRS, Sandy Bustillo M.D.  •  NS (BOLUS) infusion 500 mL, 500 mL, Intravenous, Once PRN, Sandy Gastelum M.D.  •  ampicillin (OMNIPEN) 1 g in  mL IVPB, 1,000 mg, Intravenous, Q6HRS, Sandy Gastelum M.D.  •  lactated ringer BOLUS infusion, 1,000 mL, Intravenous, Once, Sandy Gastelum M.D.  •  oxybutynin SR (DITROPAN-XL) tablet 10 mg, 10 mg, Oral, DAILY, Sandy Gastelum M.D., Stopped at 04/05/19 0600  •  ipratropium-albuterol (DUONEB) nebulizer solution, 3 mL, Nebulization, Q4H PRN (RT), Raymond Gastelum M.D.  •  insulin regular (HUMULIN R) injection 2-9 Units, 2-9 Units, Subcutaneous, 4X/DAY ACHS, Stopped at 04/05/19 1100 **AND** Accu-Chek ACHS, , , Q AC AND BEDTIME(S) **AND** NOTIFY MD and PharmD, , , Once **AND** glucose 4 g chewable tablet 16 g, 16 g, Oral, Q15 MIN PRN **AND** dextrose 50% (D50W) injection 25 mL, 25 mL, Intravenous, Q15 MIN PRN, Sandy Gastelum M.D.  •  nystatin (MYCOSTATIN) powder, , Topical, BID, Sandy Gastelum M.D.  •  thiamine (B-1) 100 mg in D5W 100 mL IVPB, 100 mg, Intravenous, DAILY, Sandy Gastelum M.D., Last Rate: 200 mL/hr at 04/06/19 0820, 100 mg at 04/06/19 0820  •  acyclovir (ZOVIRAX) 616 mg in  mL IVPB, 10 mg/kg (Ideal), Intravenous, Q8HRS, Sandy Gastelum M.D., Stopped at 04/06/19 0321  •  Pharmacy Consult: Enteral tube  insertion - review meds/change route/product selection, 1 Each, Other, PHARMACY TO DOSE, Sandy Gastelum M.D.  •  acetaminophen (TYLENOL) tablet 650 mg, 650 mg, Enteral Tube, Q6HRS PRN, Sandy Gastelum M.D.  •  aspirin (ASA) tablet 325 mg, 325 mg, Enteral Tube, DAILY **OR** aspirin (ASA) chewable tab 324 mg, 324 mg, Enteral Tube, DAILY **OR** aspirin (ASA) suppository 300 mg, 300 mg, Rectal, DAILY, Sandy Gastelum M.D.  •  atorvastatin (LIPITOR) tablet 40 mg, 40 mg, Enteral Tube, DAILY, Sandy Gastelum M.D.  •  senna-docusate (PERICOLACE or SENOKOT S) 8.6-50 MG per tablet 2 Tab, 2 Tab, Oral, BID **AND** polyethylene glycol/lytes (MIRALAX) PACKET 1 Packet, 1 Packet, Enteral Tube, QDAY PRN **AND** magnesium hydroxide (MILK OF MAGNESIA) suspension 30 mL, 30 mL, Enteral Tube, QDAY PRN **AND** bisacodyl (DULCOLAX) suppository 10 mg, 10 mg, Rectal, QDAY PRN, Sandy Gastelum M.D.  •  Pharmacy consult request - Allow for permissive hypertension: SBP up to 220 mmHg/DBP up to 120 mmHg x 48 hours, , Other, PHARMACY TO DOSE, Raymond Gastelum M.D.  •  Respiratory Care per Protocol, , Nebulization, Continuous RT, Raymond Gastelum M.D.      PHYSICAL EXAM    Vitals:    04/06/19 0624 04/06/19 0625 04/06/19 0800 04/06/19 0830   BP:   (!) 84/58    Pulse:   (!) 110    Resp:       Temp: (!) 38.6 °C (101.4 °F) 37.3 °C (99.2 °F) (!) 38.3 °C (100.9 °F) 37.8 °C (100.1 °F)   TempSrc: Axillary Temporal Temporal Oral   SpO2:   93%    Weight:       Height:           Head/Neck: NCAT. no meningismus neg kernig neg brudzinski. No obvious mass or heard bruit. No tender arteries or lost pulses. No rash of head or neck. NO JVD.    Skin: Warm, dry, intact. No rashes observed head/neck or body    Eyes/Funduscopic:  unable    Mental Status: lethargic sleepy, alerts to voice, nonverbal. Name/repeat/fluent/command follow not intact. No neglect/extinction. Attention and concentration, recent & remote memory, Fund of Knowledge compromised.     Cranial Nerves: grossly CN  II-XII intact. PERRL 3mm.   No afferent pupillary defect. EOM full. VF full. No nystagmus.       Motor:  Strength maex4 intact, no abn mvmts.  bulk & tone wnl.      Sensory: symmetric to pain    Coordination: dysmetria absent    DTR's: intact/sym. no clonus. Toes mute.    Gait/Station: n/a fall concern           Labs:  Recent Labs      04/04/19 1644 04/06/19   0335   WBC  9.7  13.5*   RBC  4.97  5.48*   HEMOGLOBIN  13.7  15.2   HEMATOCRIT  43.2  46.8   MCV  86.9  85.4   MCH  27.6  27.7   MCHC  31.7*  32.5*   RDW  45.3  43.9   PLATELETCT  333  298   MPV  10.8  10.1     Recent Labs      04/04/19 1644 04/06/19   0334   SODIUM  139  135   POTASSIUM  4.0  3.8   CHLORIDE  103  102   CO2  27  22   GLUCOSE  123*  173*   BUN  17  10   CREATININE  0.66  0.69   CALCIUM  9.5  9.0                 Recent Labs      04/05/19   0417   TRIGLYCERIDE  89   HDL  34*   LDL  61     Recent Labs      04/04/19 1644 04/06/19   0334   SODIUM  139  135   POTASSIUM  4.0  3.8   CHLORIDE  103  102   CO2  27  22   GLUCOSE  123*  173*   BUN  17  10     Recent Labs      04/04/19 1644 04/06/19   0334   SODIUM  139  135   POTASSIUM  4.0  3.8   CHLORIDE  103  102   CO2  27  22   BUN  17  10   CREATININE  0.66  0.69   MAGNESIUM   --   1.6   PHOSPHORUS   --   2.2*   CALCIUM  9.5  9.0         No results found for this or any previous visit.           Imaging: neuroimaging reviewed and directly visualized by me  MR-BRAIN-W/O   Final Result      No evidence of intracranial hemorrhage, acute ischemia or mass on this severely motion degraded study      DX-CHEST-LIMITED (1 VIEW)   Final Result         1.  No acute cardiopulmonary disease.   2.  Atherosclerosis   3.  No radiodense foreign body or medical device identified which would exclude MRI.      CT-CTA NECK WITH & W/O-POST PROCESSING   Final Result         1.  Occlusion of the left subclavian artery to the level of the vertebral artery with reconstitution, appearance concerning for subclavian  steal.   2.  Intimal thickening versus soft plaque and scattered atherosclerotic calcification of the left carotid arterial tree with less than 50% stenosis.   3.  Small caliber right internal carotid artery   4.  Extensive emphysema      These findings were discussed with the patient's clinician, Dr. Mcallister, on 4/6/2019 4:31 AM.      CT-CTA HEAD WITH & W/O-POST PROCESS   Final Result         1.  Hypoplastic right internal carotid artery, there is a segment of occlusion seen in the distal right intracranial internal carotid artery near the petrous apex. The intracranial internal carotid artery reconstitutes distal to this segment of    occlusion.   2.  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   3.  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   4.  Changes of atrophy with nonspecific white matter changes, most commonly associated with small vessel ischemia.      These findings were discussed with the patient's clinician, Dr. Mcallister, on 4/6/2019 4:31 AM.      DX-ABDOMEN FOR TUBE PLACEMENT   Final Result         1.  Nonspecific bowel gas pattern.   2.  Dobbhoff tube tip overlying the expected location of the pylorus or first duodenal segment.      US-CAROTID DOPPLER BILAT   Final Result      UM-[id number]-3 VIEW   Final Result         1.  Moderate stool in the colon suggests changes of constipation, otherwise nonspecific bowel gas pattern      DX-CHEST-PORTABLE (1 VIEW)   Final Result         1.  No acute cardiopulmonary disease.   2.  Atherosclerosis   3.  No radiodense foreign body or medical device identified which would exclude MRI.      CT-HEAD W/O   Final Result         1. No acute intracranial abnormality. No evidence of acute intracranial hemorrhage or mass lesion.               EC-ECHOCARDIOGRAM COMPLETE W/ CONT    (Results Pending)       Assessment/Plan:    ENCEPHALOPATHY, SUSPECT TOXIC METABOLIC. LOW B12, SEVERE.  FEVER, LOW GRADE  JEANNA OCCLUSION, CHRONIC, GOOD DISTAL RECONSTITUTION OF FLOW  INCIDENTAL 1.9MM LAURENT ANEURSYM      Replete B12, could potentially be etiology of AMS  Avoid hypotension/hypovolemia with chronic JEANNA occlusion  F/u ROSIE evaluation of asymptomatic aneurysm can be done as otpt  proph ABX/AVT, ID opinion for same & LP for ID labs if no source found and AMS continues  EEG, on LEV proph will DC if wnl or AMS resolves    Consulting Physician: Sandy Gastelum M.D.     Sebastián Herman M.D.  , Neurohospitalist & Stroke  Clinical Professor, [employer]  Diplomate, Neurology & Neuroimaging

## 2019-04-06 NOTE — PROGRESS NOTES
Pt vomited after medications given per cortrak. Chest sounds are not wet. MD called, orders received. Xray called. Pt sating well, 95% on 1 L.

## 2019-04-06 NOTE — PROGRESS NOTES
Dr. Gastelum paged regarding pt's vitals: temp 100.9, , BP 84/58 (manual). Bolus ordered, stat labs, blood cultures. Will monitor.

## 2019-04-06 NOTE — PROGRESS NOTES
Rapid response nurse at bedside. Pt still tachycardic, febrile, GCS 7. Dr. Nirav waters. Dr. Hernandez at bedside

## 2019-04-06 NOTE — PROGRESS NOTES
Cortrak Placement    Tube Team verified patient name and medical record number prior to tube placement.  Cortrak tube (43 inches, 10 Moroccan) placed at 70 cm in left nare.  Per Cortrak picture, tube appears to be in the stomach.  Nursing Instructions: Awaiting KUB to confirm placement before use for medications or feeding. Once placement confirmed, flush tube with 30 ml of water, and then remove and save stylet, in patient medication drawer.

## 2019-04-06 NOTE — PROGRESS NOTES
[facility] nurse Tonya came to bedside. Bedside shift report given. PT transferred to [id number] with chart, meds, and belongings.

## 2019-04-06 NOTE — PROGRESS NOTES
"Pharmacy Kinetics 68 y.o. female on vancomycin new start 2019    Currently on Vancomycin Loading Dose 1800 mg iv    Indication for Treatment: R/O CNS infection    Pertinent history per medical record: Admitted on 2019 for AMS, notably nonverbal.  Neurology consulted and concerned for stroke vs CNS infection.  No fever, headache or chills on admit.  Empiric antibiotics initiated, CT/MRI ordered.    Other antibiotics: Ceftriaxone 2g q24hr; Acyclovir 10mg/kg TID    Allergies: Codeine     List concerns for renal function: limited concern for renal dysfunction    Pertinent cultures to date:   None ordered    MRSA nares swab if pneumonia is a concern (ordered/positive/negative/n-a): N/A    Recent Labs      19   1644   WBC  9.7   NEUTSPOLYS  85.40*     Recent Labs      19   1644   BUN  17   CREATININE  0.66   ALBUMIN  4.1     No results for input(s): VANCOTROUGH, VANCOPEAK, VANCORANDOM in the last 72 hours.  Intake/Output Summary (Last 24 hours) at 19 1904  Last data filed at 19 0600   Gross per 24 hour   Intake              800 ml   Output                0 ml   Net              800 ml      /57   Pulse 95   Temp 37 °C (98.6 °F) (Temporal)   Resp 17   Ht 1.702 m (5' 7\")   Wt 72.5 kg (159 lb 13.3 oz)   SpO2 94%  Temp (24hrs), Av.9 °C (98.4 °F), Min:36.3 °C (97.4 °F), Max:37.2 °C (98.9 °F)      A/P   1. Vancomycin dose change: begin 1100mg (15mg/kg) q12hr (0900, 2100)  2. Next vancomycin level: prior to 3rd or 4th dose; not ordered  3. Goal trough: 18-22 mcg/mL  4. Comments: R/O CNS infection. WBC WNL and afebrile.  Limited concern for renal dysfunction.  Monitor imaging studies.     Trenton Arvizu, PharmD, BCPS        "

## 2019-04-06 NOTE — PROGRESS NOTES
Spoke with Dr. Herman regarding patient condition, decrease LOC. Ordered Vitamin B-12 injection, more orders to follow. Will continue to monitor.

## 2019-04-06 NOTE — DISCHARGE PLANNING
Follow up for rehab MRI pending. Therapy evaluation reviewed indicating limited new learning, limited tolerance to and participation with therapy intervention. Will follow, no physiatry consult ordered at this time.

## 2019-04-06 NOTE — PROGRESS NOTES
· 2 RN skin check complete with AGNIESZKA buckley.  · Devices in place IV, pulse ox scds.  · Skin assessed under devices intact.  · Confirmed pressure ulcers found on None.  · New potential pressure ulcers noted on None.   · The following interventions in place Q2 turns, Waffle matress , nystatin, barrier cream     - Moisture r/t demratitis under R Breast. interdry and nystatin in place.   sacrum pink and blanching. Pt incontinent. barrier cream applied     geo heels pink boggy, floated

## 2019-04-06 NOTE — PROGRESS NOTES
Bedside report received from nurse. Assumed care of pt. Pt resting comfortably in bed. NIH done with outgoing RN, VSS, [id number] All needs met. POC reviewed and white board updated. Tele box on. Call light in reach. Bed locked in lowest position with 2 upper bed rails up.

## 2019-04-06 NOTE — PROGRESS NOTES
Hospital Medicine Daily Progress Note    Date of Service  4/5/2019    Chief Complaint  68 y.o. female admitted 4/4/2019 with past medical history of MI in in 2008, systolic and diastolic heart failure, left hip surgery in December, diabetes non-insulin-dependent presents with altered mental status.  Her last known well was 2:30 PM the day before arriving to the hospital.  She is having expressive aphasia.    Hospital Course    Upon arriving to the hospital where she had a temperature of 99, heart rate of 98 blood pressure 118/63.  CT scan of the head found no evidence of acute process.  Chest x-ray and UA were negative for infection.  She has been allowed permissive hypertension for stroke.      Interval Problem Update  4/4: Patient evaluated by me today.  She was having expressive aphasia and was confused.  She was not following commands.  She would only say 1 word answers.  MRI of the brain not completed as of yet.  Spoke to neurology who determined the encephalitis and meningitis has been ruled out and start prophylactic antibiotics and acyclovir.  Also given 1 time dose of Keppra and get an EEG to rule out seizures.    Consultants/Specialty  Neurology    Code Status  Full    Disposition  TBD    Review of Systems  Review of Systems   Unable to perform ROS: Mental acuity        Physical Exam  Temp:  [36.3 °C (97.4 °F)-37.4 °C (99.3 °F)] 37.4 °C (99.3 °F)  Pulse:  [] 107  Resp:  [16-18] 17  BP: (114-145)/(57-83) 122/83  SpO2:  [90 %-97 %] 93 %    Physical Exam   Constitutional: She appears well-developed and well-nourished.   Expressive aphasia    HENT:   Head: Normocephalic and atraumatic.   Mouth/Throat: No oropharyngeal exudate.   Eyes: Pupils are equal, round, and reactive to light. EOM are normal. No scleral icterus.   Neck: Normal range of motion. Neck supple. No JVD present. No tracheal deviation present. No thyromegaly present.   Cardiovascular: Normal rate, regular rhythm and intact distal pulses.   Exam reveals no gallop and no friction rub.    No murmur heard.  Pulmonary/Chest: Effort normal and breath sounds normal. No stridor. No respiratory distress. She has no wheezes. She has no rales. She exhibits no tenderness.   Abdominal: Soft. Bowel sounds are normal. She exhibits no distension and no mass. There is no tenderness. There is no rebound and no guarding.   Musculoskeletal: Normal range of motion. She exhibits no edema.   Lymphadenopathy:     She has no cervical adenopathy.   Neurological: She has normal reflexes. No cranial nerve deficit.   Skin: No rash noted. No erythema. No pallor.   Psychiatric:   confused   Nursing note and vitals reviewed.      Fluids    Intake/Output Summary (Last 24 hours) at 04/05/19 1923  Last data filed at 04/05/19 0600   Gross per 24 hour   Intake              800 ml   Output                0 ml   Net              800 ml       Laboratory  Recent Labs      04/04/19   1644   WBC  9.7   RBC  4.97   HEMOGLOBIN  13.7   HEMATOCRIT  43.2   MCV  86.9   MCH  27.6   MCHC  31.7*   RDW  45.3   PLATELETCT  333   MPV  10.8     Recent Labs      04/04/19   1644   SODIUM  139   POTASSIUM  4.0   CHLORIDE  103   CO2  27   GLUCOSE  123*   BUN  17   CREATININE  0.66   CALCIUM  9.5             Recent Labs      04/05/19   0417   TRIGLYCERIDE  89   HDL  34*   LDL  61       Imaging  [id number] [id number]   Final Result         1.  Moderate stool in the colon suggests changes of constipation, otherwise nonspecific bowel gas pattern      DX-CHEST-PORTABLE (1 VIEW)   Final Result         1.  No acute cardiopulmonary disease.   2.  Atherosclerosis   3.  No radiodense foreign body or medical device identified which would exclude MRI.      CT-HEAD W/O   Final Result         1. No acute intracranial abnormality. No evidence of acute intracranial hemorrhage or mass lesion.               MR-BRAIN-W/O    (Results Pending)   EC-ECHOCARDIOGRAM COMPLETE W/ CONT    (Results Pending)   US-CAROTID DOPPLER BILAT     (Results Pending)   CT-CTA NECK WITH & W/O-POST PROCESSING    (Results Pending)   CT-CTA HEAD WITH & W/O-POST PROCESS    (Results Pending)        Assessment/Plan  Altered mental status- (present on admission)   Assessment & Plan    Unclear etiology- last known well was 2:30pm the day before she arrived to the hospital  Infection less likely given no fever or leukocytosis. UA and chest x-ray negative.  Blood work unremarkable  CT head without contrast reveals no acute intracranial process  Allow permissive HTN  Check MRI brain to rule out stroke, ordered a cardiac echo and carotid doppler  Ordered a CTA of the head and neck to rule out stroke  Ordered EEG to rule out seizures or temporal spikes from herpes  Give one time dose of keppra 2g  Started thiamine IV  Order TSH, B12, ammonia  Will have to rule out meningitis and encephalitis-start ceftriaxone, vancomycin, acyclovir        Coronary artery disease involving native coronary artery of native heart without angina pectoris- (present on admission)   Assessment & Plan    Continue aspirin and Lipitor     Chronic obstructive pulmonary disease (HCC)- (present on admission)   Assessment & Plan    No evidence of acute exacerbation at this time  DuoNeb as needed  RT protocol     Controlled type 2 diabetes mellitus without complication, without long-term current use of insulin (HCC)- (present on admission)   Assessment & Plan    Start on insulin sliding scale with serial Accu-Checks  Hypoglycemic protocol in place         Systolic and diastolic CHF, chronic (HCC)   Assessment & Plan    Currently holding OMT including carvedilol and lisinopril- allowing permissive HTN     Dyslipidemia- (present on admission)   Assessment & Plan    Continue Lipitor          VTE prophylaxis: scd rule out stroke

## 2019-04-06 NOTE — DIETARY
"Nutrition Services:  Nutrition Support Assessment   Day 0 of admit.  Alice Yarbrough is a 68 y.o. female with admitting DX of Altered mental status.     Current problem list:  1. Altered mental status  2. Chronic obstructive pulmonary disease  3. Coronary artery disease involving native coronary artery of native heart without angina pectoris  4. Controlled type 2 diabetes mellitus without complication, without long term current use of insulin  5. Sepsis  6. Systolic and diastolic CHF, chronic  7. Dyslipidemia     Assessment:  Estimated Nutritional Needs: based on:   Height: 172.7 cm (5' 8\") (Per 's license in chart)  Weight: 73.1 kg (161 lb 2.5 oz)  Weight to Use in Calculations: 73.1 kg (161 lb 2.5 oz)  Ideal Body Weight: 63.5 kg (140 lb)  Percent Ideal Body Weight: 115.1  Body mass index is 24.5 kg/m².    Calculation/Equation: MSJ x 1.2 = 1579 kcals/day  Total Calories / day: 1579 - 1828 (Calories / k - 25)  Total Grams Protein / day: 84 - 102 (Grams Protein / k.2 - 1.4)     Evaluation:   1. Pt admitted with altered mental status x 3 days; oriented to self only upon admission.  2. Swallow evaluation by SLP 4/5; recommended NPO with cortrak for nutrition.  3. Cortrak placed and verified.  4. Poor PO noted on admit screen. No family at bedside to obtain PO hx.  5. Glucose 173, Phos 2.2  6. Pertinent meds: Ampicillin, Aspirin, Rocephin, SSI, Pericolace, Thiamine  7. Per RN, tube feeding held d/t emesis.  8. Specialized tube feeding formula indicated in pt with hx of T2DM to meet pt's estimated protein and kcal needs.     Malnutrition Risk: Unable to identify at this time.     Recommendations/Plan:  1. Start Diabetisource AC @ 25 mL/hr and advance per protocol to goal rate of 60 mL/hr, providing 1728 kcals, 86 grams of protein and 1181 mL of free water per day.  2. Fluids per MD.  3. RD to monitor wt and lab trends.  4. PO diet when safe/appropriate per SLP/MD and pt can adequately " consume.    RD following.

## 2019-04-07 PROBLEM — J96.90 RESPIRATORY FAILURE (HCC): Status: ACTIVE | Noted: 2019-01-01

## 2019-04-07 NOTE — PROCEDURES
Central Line Insertion  Date/Time: 4/6/2019 5:06 PM  Performed by: JESSIKA BAJWA  Authorized by: JESSIKA BAJWA     Consent:     Consent obtained:  Verbal    Consent given by:  Spouse    Risks discussed:  Arterial puncture, incorrect placement, nerve damage, bleeding, infection and pneumothorax    Alternatives discussed:  Delayed treatment  Pre-procedure details:     Hand hygiene: Hand hygiene performed prior to insertion      Sterile barrier technique: All elements of maximal sterile technique followed      Skin preparation:  ChloraPrep    Skin preparation agent: Skin preparation agent completely dried prior to procedure    Sedation:     Sedation type:  None  Anesthesia:     Anesthesia method:  None  Procedure details:     Location:  L subclavian    Patient position:  Flat    Procedural supplies:  Triple lumen    Catheter size:  7.5 Fr    Landmarks identified: yes      Ultrasound guidance: no      Number of attempts:  2  Post-procedure details:     Post-procedure:  Dressing applied and line sutured    Assessment:  Blood return through all ports and free fluid flow    Patient tolerance of procedure:  Tolerated well, no immediate complications  Comments:      For pressors, CXR pending

## 2019-04-07 NOTE — DIETARY
Nutrition Support: Brief Tube Feed Adjustment Update  Since time of initial nutrition assessment yesterday, pt has been intubated and is now on pressor support, therefore TF adjustment for pressor support is indicated. Nutrition needs assessment remains appropriate. No propofol per MAR.     While on pressor support, pt is in need of specialized fiber-free formula to promote tolerance.     Plan/Recommendations:   -With Pressors: Start Impact Peptide 1.5 @ 25 mL/hr and advance as tolerated to goal of 45 ml/hr, to provide 1620 kcals, 102 gm protein, and 832 mL free water  -Without Pressors: Diabetisource AC @ 60 mL/hr, providing 1728 kcals, 86 grams of protein and 1181 mL of free water per day  -Fluids per MD    RD following

## 2019-04-07 NOTE — ASSESSMENT & PLAN NOTE
Required mechanical ventilation  She was successfully extubated on 4/8  Completed a 5-day course of Zosyn for aspiration PNA  4/17 rapid response for worsening hypoxia. Had pulmonary edema, now resovled

## 2019-04-07 NOTE — PROGRESS NOTES
Date of Admission: 4/4/2019    reason for follow up: ams     24hr INTERVAL HX:  sedated.  No other complaints.     O-   Allergies:   Allergies   Allergen Reactions   • Codeine Vomiting         Current Facility-Administered Medications:   •  [COMPLETED] piperacillin-tazobactam (ZOSYN) 3.375 g in  mL IVPB, 3.375 g, Intravenous, Once, Stopped at 04/07/19 0806 **AND** piperacillin-tazobactam (ZOSYN) 3.375 g in  mL IVPB, 3.375 g, Intravenous, Q8HRS, Sandy Bustillo M.D., Last Rate: 25 mL/hr at 04/07/19 1218, 3.375 g at 04/07/19 1218  •  magnesium sulfate IVPB premix 4 g, 4 g, Intravenous, Once, Ian Prado M.D., Last Rate: 25 mL/hr at 04/07/19 0941, 4 g at 04/07/19 0941  •  potassium chloride (KLOR-CON) 20 MEQ packet 40 mEq, 40 mEq, Enteral Tube, Q6HR, Ian Prado M.D., 40 mEq at 04/07/19 1218  •  oxybutynin (DITROPAN) tablet 5 mg, 5 mg, Enteral Tube, BID, Ian Prado M.D., Stopped at 04/07/19 0800  •  [START ON 4/8/2019] aspirin (ASA) chewable tab 81 mg, 81 mg, Enteral Tube, DAILY, Osmin Glynn M.D.  •  lactulose 20 GM/30ML solution 30 mL, 30 mL, Enteral Tube, TID, Ian Prado M.D., 30 mL at 04/07/19 1218  •  lactated ringers infusion, , Intravenous, Continuous, Sandy Gastelum M.D., Last Rate: 100 mL/hr at 04/07/19 0949  •  NS (BOLUS) infusion 500 mL, 500 mL, Intravenous, Once PRN, Sandy Gastelum M.D.  •  levETIRAcetam (KEPPRA) 500 mg in  mL IVPB, 500 mg, Intravenous, Q12HRS, Sebastián Herman M.D., Stopped at 04/07/19 0521  •  senna-docusate (PERICOLACE or SENOKOT S) 8.6-50 MG per tablet 2 Tab, 2 Tab, Enteral Tube, BID, Stopped at 04/06/19 1800 **AND** polyethylene glycol/lytes (MIRALAX) PACKET 1 Packet, 1 Packet, Enteral Tube, QDAY PRN **AND** magnesium hydroxide (MILK OF MAGNESIA) suspension 30 mL, 30 mL, Enteral Tube, QDAY PRN **AND** bisacodyl (DULCOLAX) suppository 10 mg, 10 mg, Rectal, QDAY PRN, Sandy Gastelum M.D.  •  fentaNYL (SUBLIMAZE) injection 100 mcg, 100 mcg,  Intravenous, Q15 MIN PRN, 100 mcg at 04/07/19 1053 **AND** fentaNYL (SUBLIMAZE) injection 200 mcg, 200 mcg, Intravenous, Q15 MIN PRN **AND** fentaNYL (SUBLIMAZE) 50 mcg/mL in 50mL (Continuous Infusion), , Intravenous, Continuous, Stopped at 04/07/19 1220 **AND** propofol (DIPRIVAN) injection, 0-80 mcg/kg/min, Intravenous, Continuous, Stopped at 04/06/19 1700 **AND** Triglyceride, , , Every 3 Days (0300), Ian Prado M.D.  •  Respiratory Care per Protocol, , Nebulization, Continuous RT, Ian Prado M.D.  •  famotidine (PEPCID) tablet 20 mg, 20 mg, Enteral Tube, Q12HRS **OR** famotidine (PEPCID) injection 20 mg, 20 mg, Intravenous, Q12HRS, Ian Prado M.D., 20 mg at 04/07/19 0506  •  MD Alert...ICU Electrolyte Replacement per Pharmacy, , Other, PHARMACY TO DOSE, Ian Prado M.D.  •  heparin injection 5,000 Units, 5,000 Units, Subcutaneous, Q8HRS, Ian Prado M.D., 5,000 Units at 04/07/19 0506  •  norepinephrine (LEVOPHED) 8 mg in  mL Infusion, 0-30 mcg/min, Intravenous, Continuous, Ian Prado M.D., Last Rate: 18.8 mL/hr at 04/07/19 0948, 10 mcg/min at 04/07/19 0948  •  acetaminophen (TYLENOL) suppository 650 mg, 650 mg, Rectal, Q6HRS PRN, Morgan Davis M.D., 650 mg at 04/07/19 0519  •  ipratropium-albuterol (DUONEB) nebulizer solution, 3 mL, Nebulization, Q4H PRN (RT), Raymond Gastelum M.D.  •  insulin regular (HUMULIN R) injection 2-9 Units, 2-9 Units, Subcutaneous, 4X/DAY ACHS, 2 Units at 04/07/19 0552 **AND** Accu-Chek ACHS, , , Q AC AND BEDTIME(S) **AND** NOTIFY MD and PharmD, , , Once **AND** [DISCONTINUED] glucose 4 g chewable tablet 16 g, 16 g, Oral, Q15 MIN PRN **AND** dextrose 50% (D50W) injection 25 mL, 25 mL, Intravenous, Q15 MIN PRN, Sandy Gastelum M.D.  •  nystatin (MYCOSTATIN) powder, , Topical, BID, Sandy Gastelum M.D.  •  thiamine (B-1) 100 mg in D5W 100 mL IVPB, 100 mg, Intravenous, DAILY, Sandy Gastelum M.D., Last Rate: 200 mL/hr at 04/07/19 0638, 100 mg  at 04/07/19 0638  •  acyclovir (ZOVIRAX) 616 mg in  mL IVPB, 10 mg/kg (Ideal), Intravenous, Q8HRS, Sandy Gastelum M.D., Last Rate: 250 mL/hr at 04/07/19 1220, 616 mg at 04/07/19 1220  •  Pharmacy Consult: Enteral tube insertion - review meds/change route/product selection, 1 Each, Other, PHARMACY TO DOSE, Sandy Gastelum M.D.  •  acetaminophen (TYLENOL) tablet 650 mg, 650 mg, Enteral Tube, Q6HRS PRN, Sandy Gastelum M.D.  •  atorvastatin (LIPITOR) tablet 40 mg, 40 mg, Enteral Tube, DAILY, Sandy Gastelum M.D., Stopped at 04/07/19 0600      PHYSICAL EXAM    Vitals:    04/07/19 0600 04/07/19 0615 04/07/19 0619 04/07/19 1030   BP:       Pulse: 83 82     Resp: 18 18     Temp:       TempSrc:       SpO2: 99% 100% 100% 99%   Weight: 79.1 kg (174 lb 6.1 oz)      Height:           Head/Neck: NCAT. no meningismus neg kernig neg brudzinski. No obvious mass or heard bruit. No tender arteries or lost pulses. No rash of head or neck.    Skin: Warm, dry, intact. No rashes observed head/neck or body    Eyes/Funduscopic: unable    Mental Status: sedated, opens eyes spon then follows blink command not     Cranial Nerves:  PERRL 3mm sluggish. No ocular deviation.     Motor:   None. no abn mvmts.  bulk & tone wnl.      Sensory: none    Coordination: unable    DTR's: damp intact/sym. no clonus. Toes mute.    Gait/Station: n/a     Labs:  Recent Labs      04/04/19   1644  04/06/19   0335  04/07/19   0545   WBC  9.7  13.5*  5.6   RBC  4.97  5.48*  4.38   HEMOGLOBIN  13.7  15.2  12.3   HEMATOCRIT  43.2  46.8  37.0   MCV  86.9  85.4  82.9   MCH  27.6  27.7  27.6   MCHC  31.7*  32.5*  33.3*   RDW  45.3  43.9  43.8   PLATELETCT  333  298  244   MPV  10.8  10.1  9.9     Recent Labs      04/06/19   0334  04/06/19   1727  04/07/19   0545   SODIUM  135  132*  136   POTASSIUM  3.8  4.1  3.0*   CHLORIDE  102  101  105   CO2  22  23  21   GLUCOSE  173*  208*  169*   BUN  10  11  13   CREATININE  0.69  0.65  0.62   CALCIUM  9.0  8.1*  8.0*      Recent Labs      04/06/19   1134  04/06/19   1727   INR  1.18*  1.25*         Recent Labs      04/06/19   1727   TROPONINI  0.08*     Recent Labs      04/05/19   0417   TRIGLYCERIDE  89   HDL  34*   LDL  61     Recent Labs      04/06/19   0334  04/06/19   1727  04/07/19   0545   SODIUM  135  132*  136   POTASSIUM  3.8  4.1  3.0*   CHLORIDE  102  101  105   CO2  22  23  21   GLUCOSE  173*  208*  169*   BUN  10  11  13     Recent Labs      04/06/19   0334  04/06/19   1727  04/07/19   0545   SODIUM  135  132*  136   POTASSIUM  3.8  4.1  3.0*   CHLORIDE  102  101  105   CO2  22  23  21   BUN  10  11  13   CREATININE  0.69  0.65  0.62   MAGNESIUM  1.6  1.5   --    PHOSPHORUS  2.2*  3.9   --    CALCIUM  9.0  8.1*  8.0*     Recent Labs      04/06/19   1134  04/06/19   1727   INR  1.18*  1.25*     No results found for this or any previous visit.           Imaging: neuroimaging reviewed and directly visualized by me  [id number] VIEW   Final Result      1.  Feeding tube tip overlies the gastric antrum.      2.  Mild ileus.      DX-CHEST-PORTABLE (1 VIEW)   Final Result         1.  No acute cardiopulmonary disease.   2.  Atherosclerosis         EC-ECHOCARDIOGRAM COMPLETE W/O CONT   Final Result      DX-CHEST-PORTABLE (1 VIEW)   Final Result      1.  Supportive tubing as described above.   2.  No pneumothorax.   3.  Persistent hypoinflation.      MR-BRAIN-W/O   Final Result      No evidence of intracranial hemorrhage, acute ischemia or mass on this severely motion degraded study      DX-CHEST-LIMITED (1 VIEW)   Final Result         1.  No acute cardiopulmonary disease.   2.  Atherosclerosis   3.  No radiodense foreign body or medical device identified which would exclude MRI.      CT-CTA NECK WITH & W/O-POST PROCESSING   Final Result         1.  Occlusion of the left subclavian artery to the level of the vertebral artery with reconstitution, appearance concerning for subclavian steal.   2.  Intimal thickening versus soft  plaque and scattered atherosclerotic calcification of the left carotid arterial tree with less than 50% stenosis.   3.  Small caliber right internal carotid artery   4.  Extensive emphysema      These findings were discussed with the patient's clinician, Dr. Mcallister, on 4/6/2019 4:31 AM.      CT-CTA HEAD WITH & W/O-POST PROCESS   Final Result         1.  Hypoplastic right internal carotid artery, there is a segment of occlusion seen in the distal right intracranial internal carotid artery near the petrous apex. The intracranial internal carotid artery reconstitutes distal to this segment of    occlusion.   2.  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   3.  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   4.  Changes of atrophy with nonspecific white matter changes, most commonly associated with small vessel ischemia.      These findings were discussed with the patient's clinician, Dr. Mcallister, on 4/6/2019 4:31 AM.      DX-ABDOMEN FOR TUBE PLACEMENT   Final Result         1.  Nonspecific bowel gas pattern.   2.  Dobbhoff tube tip overlying the expected location of the pylorus or first duodenal segment.      US-CAROTID DOPPLER BILAT   Final Result      [id number] VIEW   Final Result         1.  Moderate stool in the colon suggests changes of constipation, otherwise nonspecific bowel gas pattern      DX-CHEST-PORTABLE (1 VIEW)   Final Result         1.  No acute cardiopulmonary disease.   2.  Atherosclerosis   3.  No radiodense foreign body or medical device identified which would exclude MRI.      CT-HEAD W/O   Final Result         1. No acute intracranial abnormality. No evidence of acute intracranial hemorrhage or mass lesion.               US-ABDOMEN COMPLETE SURVEY    (Results Pending)       Assessment/Plan:    ENCEPHALOPATHY, SUSPECT TOXIC METABOLIC. LOW B12, SEVERE. FEVER, LOW GRADE.    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  , Neurohospitalist & Stroke  Clinical Professor, [employer]  Diplomate, Neurology & Neuroimaging

## 2019-04-07 NOTE — CONSULTS
DATE OF SERVICE:  04/06/2019    INFECTIOUS DISEASE CONSULTATION    REASON FOR CONSULT:  Altered mental status, concern for meningitis   encephalitis.    CONSULTING PHYSICIAN:  Sandy Gastelum MD    HISTORY OF PRESENT ILLNESS:  Please note majority of the entire history had to   be obtained from the medical record and family members as patient is obtunded   and not giving any history.  This is a 68-year-old white female who was   admitted for 04/02/2019 due to altered mental status.  In the ED, she was   noted to be confused and nonverbal.  Initially, it was reported that her   symptoms started approximately 3 days prior to admission and at baseline, she   is alert and oriented x4.  However, the  told the nurses that she has   actually been declining over the previous month and has not been cooperative   with her care taking her medications, eating, etc.  There is no reported   fever, chills, nausea, vomiting, diarrhea, headache, visual changes, or   seizures.  She is not currently responsive except to noxious stimuli.  She   does withdraw bilaterally equally.  She does resist to eye opening, but is not   tracking.  She is gurgling on her own secretions.  She had been started   empirically on vancomycin, ceftriaxone, and acyclovir per neurology   recommendations.  It does not appear that she has had any improvement in her   mental status since admission and has deteriorated.  She is also being evaluated for B12 deficiency.  Infectious disease is consulted for antibiotic recommendations and   management.    REVIEW OF SYSTEMS:  Unable to obtain due to patient's mental status.    ALLERGIES:  SHE IS ALLERGIC TO CODEINE.    PAST MEDICAL HISTORY:  Coronary artery disease, COPD, dental disease,   hyperlipidemia, hypertension, diabetes.  She has had bilateral hip   arthroplasties.      FAMILY HISTORY:  Diabetes, hypertension, and psychiatric disorder in her   brother.    SOCIAL HISTORY:  She is a former smoker.  No  reported alcohol or illicit drug   use.  No reported recent travel or new animal exposures.      PHYSICAL EXAMINATION:    GENERAL:  She is an obese, disheveled female who looks older than her stated   age, initially afebrile until today when she developed a temperature of 102.3.  VITAL SIGNS:  Blood pressure 130/67, pulse of , respiratory rate 17 and   oxygen saturation 95% on 1 liter nasal cannula.  She weighs 73 kilos.  HEENT:  Normocephalic, atraumatic.  Pupils are equal, round, reactive to   light.  Unable to assess extraocular movements due to lack of patient   cooperation.  She has anicteric sclerae.  There is no conjunctivitis.    Oropharynx is filled with secretions.  NECK:  Supple.  There is no JVD or stridor.  There is no meningismus.  CARDIOVASCULAR:  Regular rate and rhythm, unable to auscultate any murmurs.  CHEST:  Clear to auscultation at the apices with scattered rhonchi, no   wheezing.  Respirations are unlabored.  There is no chest tenderness.  ABDOMEN:  Soft, nontender, nondistended.  There is no rebound or guarding.  No   organomegaly.  EXTREMITIES:  Show no cyanosis, clubbing, or edema.  No joint swelling  NEUROLOGICAL:  She is obtunded, lethargic, not following commands, nonverbal.  SKIN:  Revealed no rashes.    PSYCHIATRIC:  Unable to assess due to current medical condition.    SKIN:  Again reveals no rashes, skin is warm.      CURRENT LABORATORY DATA:  Show white blood cell count of 13.5, H and H 15.2   and 46.8, and platelets of 298.  Sodium 135, potassium is 3.8, chloride 102,   bicarbonate 22, glucose 173, BUN 10 and creatinine 0.69.  AST 12, ALT is 8,   albumin of 4.  Urine drugs of abuse was negative.  Lactic acid was normal at   1.2.  INR is mildly elevated at 1.18.  Urinalysis on admission showed 0-2   wbc's, moderate occult blood, trace ketones.  B12 level on 12/25/2018 was low   at 150, and 198 this admission.  RPR was negative on 12/25/2018.  Hep C was   negative on  01/19/2019.  Procalcitonin was normal this admission.  TSH was   normal this admission.  Blood cultures negative.  CTA of the head showed a   segment of occlusion in the right internal carotid artery, was reconstituted   distally, 1.9 mm aneurysm, atrophic changes.  CT scan of the head showed no   acute lesions.  MRI of the brain shows gliosis, no hemorrhage, no acute   ischemia or mass, but it was poor study due to motion degradation.      ASSESSMENT AND PLAN:  A 68-year-old white female admitted due to deterioration   in her mental status.  History is unclear for the exact duration of symptoms,   anywhere ranging from several days to over a month prior to admission.  By   review of the medical records, she was more alert on admission than she is   now.  She has since developed fever as well as leukocytosis is actually most   concerning for aspiration given her deterioration and inability to protect her   airway; however, do agree with assessing for meningoencephalitis. Recommend   LP-send for protein, glucose, cell count, meningitis panel.  She is currently receiving vancomycin and will increase ceftriaxone   dosing to q. 12 hours and add ampicillin to cover for listeria pending lumbar   puncture.  Her CT scan did show small aneurysm, but however, the MRI did not   reveal any stroke or evidence of vasculitis that would explain her current   medical condition.  She did have obstruction in one of her carotids; however,   there was flow in the left vertebral artery.  She has been seen and evaluated   by neurology.  Her B12 level was noted to be low and that is being remanded.    She does have a chronic right internal carotid artery occlusion, but as stated   above, there is flow.  She needs good volume of blood pressure control for   that.  Continue to follow up culture results, likely will be able to   discontinue vancomycin as the risk for resistant pneumococcal infection as   etiology of her current condition is  considered low.  Further recommendations   per culture results and clinical course.  Discussed with internal medicine.      Thank you and we will follow with you.       ____________________________________     MD MILADIS ROSENBAUM / ALKA    DD:  04/06/2019 14:34:23  DT:  04/06/2019 17:16:31    D#:  [id number]  Job#:  [id number]

## 2019-04-07 NOTE — CARE PLAN
Problem: Respiratory:  Goal: Respiratory status will improve    Intervention: Administer and titrate oxygen therapy  Oxygen perfusion adequate since intubation with original settings.   Intervention: Educate and encourage coughing and deep breathing  Unable to complete.  GCS 8  Intervention: Educate and encourage incentive spirometry usage  Pt intubated  Intervention: Collaborate with respiratory therapist and Interdisciplinary Team on treatment measures to improve respiratory function  VAP bundle.  q2h chg mouth swab and function.

## 2019-04-07 NOTE — CARE PLAN
Problem: Ventilation Defect:  Goal: Ability to achieve and maintain unassisted ventilation or tolerate decreased levels of ventilator support  Outcome: PROGRESSING AS EXPECTED  Vent day #2  ETT #7.5 secured @23 cm lips  Vent setting  RR 14 ,400,+8,50%  SBT trial X1 hour   Went apic after

## 2019-04-07 NOTE — CARE PLAN
Problem: Oxygenation:  Goal: Maintain adequate oxygenation dependent on patient condition  Outcome: PROGRESSING AS EXPECTED  Wean FIO2 as tolerated

## 2019-04-07 NOTE — PROGRESS NOTES
Received bedside report from AGNIESZKA Montoya on Tele 7. Patient higher level of care transfer brought down by RN and  rapid response RN on transport monitor.  updated on POC

## 2019-04-07 NOTE — PROGRESS NOTES
Hospital Medicine Daily Progress Note    Date of Service  4/7/2019    Chief Complaint  68 y.o. female admitted 4/4/2019 with past medical history of MI in in 2008, systolic and diastolic heart failure, left hip surgery in December, diabetes non-insulin-dependent presents with altered mental status.  Her last known well was 2:30 PM the day before arriving to the hospital.  She is having expressive aphasia.    Hospital Course    Upon arriving to the hospital where she had a temperature of 99, heart rate of 98 blood pressure 118/63.  CT scan of the head found no evidence of acute process.  Chest x-ray and UA were negative for infection.  She has been allowed permissive hypertension for stroke.      Interval Problem Update  Patient seen and examined today. ICU Care  Care and plan discussed in IDT/Hot rounds.  Lines and assistive devices reviewed.    Patient tolerating treatment and therapies.  All Data, Medication data reviewed.  Case discussed with nursing as available.  Plan of Care reviewed with patient and notified of changes.  4/7 the patient intubated overnight was severely lethargic, currently somewhat improved, continues to be febrile at 103.1, LP done and not impressive, MR of the brain without explanation, seen by infectious disease and neurology.  Rechecking ammonia, possibility of vasculitis versus other, possible aspiration with pulmonary infection.    Consultants/Specialty  Neurology  Infectious disease code Status  Full    Disposition  TBD    Review of Systems  Review of Systems   Unable to perform ROS: Intubated        Physical Exam  Temp:  [36.7 °C (98.1 °F)-39.5 °C (103.1 °F)] 38.6 °C (101.5 °F)  Pulse:  [] 82  Resp:  [16-24] 18  BP: ()/(58-82) 125/77  SpO2:  [85 %-100 %] 100 %    Physical Exam   Constitutional: She appears well-developed and well-nourished. She appears lethargic. She has a sickly appearance. She is intubated and restrained.   HENT:   Head: Normocephalic and atraumatic.    Mouth/Throat: No oropharyngeal exudate.   Eyes: Pupils are equal, round, and reactive to light. EOM are normal. No scleral icterus.   Pupils equal and reactive   Neck: Normal range of motion. Neck supple. No JVD present. No tracheal deviation present. No thyromegaly present.   Cardiovascular: Normal rate, regular rhythm and intact distal pulses.  Exam reveals no gallop and no friction rub.    No murmur heard.  Pulmonary/Chest: Effort normal and breath sounds normal. No stridor. She is intubated. No respiratory distress. She has no wheezes. She has no rales. She exhibits no tenderness.   Abdominal: Soft. Bowel sounds are normal. She exhibits no distension and no mass. There is no tenderness. There is no rebound and no guarding.   Musculoskeletal: Normal range of motion. She exhibits no edema.   Lymphadenopathy:     She has no cervical adenopathy.   Neurological: She has normal reflexes. She appears lethargic. No cranial nerve deficit.   Skin: No rash noted. No erythema. No pallor.   Nursing note and vitals reviewed.      Fluids    Intake/Output Summary (Last 24 hours) at 04/07/19 0742  Last data filed at 04/07/19 0600   Gross per 24 hour   Intake          4432.22 ml   Output             1010 ml   Net          3422.22 ml       Laboratory  Recent Labs      04/04/19   1644  04/06/19   0335  04/07/19   0545   WBC  9.7  13.5*  5.6   RBC  4.97  5.48*  4.38   HEMOGLOBIN  13.7  15.2  12.3   HEMATOCRIT  43.2  46.8  37.0   MCV  86.9  85.4  82.9   MCH  27.6  27.7  27.6   MCHC  31.7*  32.5*  33.3*   RDW  45.3  43.9  43.8   PLATELETCT  333  298  244   MPV  10.8  10.1  9.9     Recent Labs      04/06/19   0334  04/06/19   1727  04/07/19   0545   SODIUM  135  132*  136   POTASSIUM  3.8  4.1  3.0*   CHLORIDE  102  101  105   CO2  22  23  21   GLUCOSE  173*  208*  169*   BUN  10  11  13   CREATININE  0.69  0.65  0.62   CALCIUM  9.0  8.1*  8.0*     Recent Labs      04/06/19   1134  04/06/19   1727   INR  1.18*  1.25*         Recent  Labs      04/05/19   0417   TRIGLYCERIDE  89   HDL  34*   LDL  61       Imaging  DX-CHEST-PORTABLE (1 VIEW)   Final Result         1.  No acute cardiopulmonary disease.   2.  Atherosclerosis         EC-ECHOCARDIOGRAM COMPLETE W/O CONT   Final Result      DX-CHEST-PORTABLE (1 VIEW)   Final Result      1.  Supportive tubing as described above.   2.  No pneumothorax.   3.  Persistent hypoinflation.      MR-BRAIN-W/O   Final Result      No evidence of intracranial hemorrhage, acute ischemia or mass on this severely motion degraded study      DX-CHEST-LIMITED (1 VIEW)   Final Result         1.  No acute cardiopulmonary disease.   2.  Atherosclerosis   3.  No radiodense foreign body or medical device identified which would exclude MRI.      CT-CTA NECK WITH & W/O-POST PROCESSING   Final Result         1.  Occlusion of the left subclavian artery to the level of the vertebral artery with reconstitution, appearance concerning for subclavian steal.   2.  Intimal thickening versus soft plaque and scattered atherosclerotic calcification of the left carotid arterial tree with less than 50% stenosis.   3.  Small caliber right internal carotid artery   4.  Extensive emphysema      These findings were discussed with the patient's clinician, Dr. Mcallister, on 4/6/2019 4:31 AM.      CT-CTA HEAD WITH & W/O-POST PROCESS   Final Result         1.  Hypoplastic right internal carotid artery, there is a segment of occlusion seen in the distal right intracranial internal carotid artery near the petrous apex. The intracranial internal carotid artery reconstitutes distal to this segment of    occlusion.   2.  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   3.  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   4.  Changes of atrophy with nonspecific white matter changes, most commonly associated with small vessel ischemia.      These findings were discussed with  the patient's clinician, Dr. Mcallister, on 4/6/2019 4:31 AM.      DX-ABDOMEN FOR TUBE PLACEMENT   Final Result         1.  Nonspecific bowel gas pattern.   2.  Dobbhoff tube tip overlying the expected location of the pylorus or first duodenal segment.      US-CAROTID DOPPLER BILAT   Final Result      [id number] VIEW   Final Result         1.  Moderate stool in the colon suggests changes of constipation, otherwise nonspecific bowel gas pattern      DX-CHEST-PORTABLE (1 VIEW)   Final Result         1.  No acute cardiopulmonary disease.   2.  Atherosclerosis   3.  No radiodense foreign body or medical device identified which would exclude MRI.      CT-HEAD W/O   Final Result         1. No acute intracranial abnormality. No evidence of acute intracranial hemorrhage or mass lesion.                    Assessment/Plan  Altered mental status- (present on admission)   Assessment & Plan    Unclear etiology- last known well was 2:30pm the day before she arrived to the hospital  Infection less likely given no fever or leukocytosis. UA and chest x-ray negative.  Neuro imaging without explanation  Lumbar puncture without explanation  Neurology consulting  Infectious disease consulting  EEG, currently on a ED  Started thiamine IV, vitamin B12         Coronary artery disease involving native coronary artery of native heart without angina pectoris- (present on admission)   Assessment & Plan    Continue aspirin and Lipitor     Chronic obstructive pulmonary disease (HCC)- (present on admission)   Assessment & Plan    No evidence of acute exacerbation at this time  DuoNeb as needed  RT protocol     Controlled type 2 diabetes mellitus without complication, without long-term current use of insulin (HCC)- (present on admission)   Assessment & Plan    Start on insulin sliding scale with serial Accu-Checks  Hypoglycemic protocol in place         Respiratory failure (HCC)   Assessment & Plan    Continue with mechanical  ventilation  Pulmonary medicine assisting     Pharyngoesophageal dysphagia   Assessment & Plan    Core trak in place  Tube feeds held secondary to vomiting      Sepsis (HCC)   Assessment & Plan    This is severe sepsis with the following associated acute organ dysfunction(s): acute kidney failure, acute respiratory failure, metabolic/septic encephalopathy.   Possible source is meningitis   Follow cultures including   Sepsis protocol has been ordered       Systolic and diastolic CHF, chronic (HCC)   Assessment & Plan    Currently holding OMT including carvedilol and lisinopril     Dyslipidemia- (present on admission)   Assessment & Plan    Continue Lipitor     Plan  Continue with mechanical ventilation  Discussed with infectious disease  Neurology follow-up noted  So far neuroimaging unrevealing  Evaluate for possibility of vasculitis  Continue with Zovirax  Lactulose for possibility of hepatic involvement  Critically ill  Complex case  High risk  See orders  A.m. labs    VTE prophylaxis: heparin    I have performed a physical exam and reviewed and updated ROS and Plan today . In review of yesterday's note , there are no changes except as documented above.

## 2019-04-07 NOTE — CARE PLAN
Problem: Ventilation Defect:  Goal: Ability to achieve and maintain unassisted ventilation or tolerate decreased levels of ventilator support    Intervention: Support and monitor invasive and noninvasive mechanical ventilation  Adult Ventilation Update    Total Vent Days: 2    Patient Lines/Drains/Airways Status    Active Airway     Name: Placement date: Placement time: Site: Days:    Airway ETT Oral 7.5 04/06/19   1627   Oral   2                Cough: Productive (04/07/19 0241)  Sputum Amount: Scant (04/07/19 0241)  Sputum Color: Clear (04/07/19 0241)  Sputum Consistency: Thin (04/07/19 0241)    Mobility  Level of Mobility: Level I (04/06/19 2000)  Activity Performed: Unable to mobilize (04/06/19 2000)  Pt Calls for Assistance: No (04/06/19 2000)  Reason Not Mobilized: Other (comment) (recently intubated due to inability to protect airway and in) (04/06/19 2000)  Mobilization Comments:  (<24 hours after intubation) (04/06/19 1800)

## 2019-04-07 NOTE — PROGRESS NOTES
Infectious Disease Progress Note    Author: Sandy Bustillo M.D. Date & Time of service: 2019  12:25 PM    Chief Complaint:  FU AMS    Interval History:  68-year-old white female admitted 2019 due to deterioration in her mental status.   Temp 103.1 WBC 5.6 had resp failure-now intubated in the ICU on pressors FiO2 0.5 LP done    Labs Reviewed, Medications Reviewed and Radiology Reviewed.    Review of Systems:  Review of Systems   Unable to perform ROS: Intubated       Hemodynamics:  Temp (24hrs), Av.4 °C (101.2 °F), Min:36.7 °C (98.1 °F), Max:39.5 °C (103.1 °F)  Temperature: (!) 38.6 °C (101.5 °F), Monitored Temp: 38.6 °C (101.5 °F)  Pulse  Av.7  Min: 79  Max: 134Heart Rate (Monitored): 84  Blood Pressure : 125/77, NIBP: 119/57       Physical Exam:  Physical Exam   Constitutional: No distress.   HENT:   Mouth/Throat: No oropharyngeal exudate.   Eyes: Conjunctivae are normal. Right eye exhibits no discharge. Left eye exhibits no discharge. No scleral icterus.   Neck: Normal range of motion. Neck supple. No JVD present.   Cardiovascular:   tachycardic   Pulmonary/Chest: No stridor. No respiratory distress. She has no wheezes. She has rales.   Abdominal: Soft. She exhibits no distension. There is no tenderness. There is no rebound.   Musculoskeletal: She exhibits no edema.   Lymphadenopathy:     She has no cervical adenopathy.   Neurological:   Awakens to voice on vent-nods apprpopriately and following commands   Skin: Skin is warm. No rash noted. She is not diaphoretic.   Nursing note and vitals reviewed.      Meds:    Current Facility-Administered Medications:   •  [COMPLETED] piperacillin-tazobactam **AND** piperacillin-tazobactam  •  magnesium sulfate  •  potassium chloride  •  oxybutynin  •  [START ON 2019] aspirin  •  lactulose  •  LR  •  NS  •  levETIRAcetam (KEPPRA) IV  •  senna-docusate **AND** polyethylene glycol/lytes **AND** magnesium hydroxide **AND** bisacodyl  •  fentaNYL  **AND** fentaNYL **AND** fentaNYL **AND** propofol **AND** Triglyceride  •  Respiratory Care per Protocol  •  famotidine **OR** famotidine  •  MD Alert...Adult ICU Electrolyte Replacement per Pharmacy  •  heparin  •  NORepinephrine (LEVOPHED) infusion  •  acetaminophen  •  ipratropium-albuterol  •  insulin regular **AND** Accu-Chek ACHS **AND** NOTIFY MD and PharmD **AND** [DISCONTINUED] glucose **AND** dextrose 50%  •  nystatin  •  thiamine ivpb  •  acyclovir (ZOVIRAX) IV  •  Pharmacy  •  acetaminophen  •  atorvastatin    Labs:  Recent Labs      04/04/19   1644  04/06/19   0335  04/07/19   0545   WBC  9.7  13.5*  5.6   RBC  4.97  5.48*  4.38   HEMOGLOBIN  13.7  15.2  12.3   HEMATOCRIT  43.2  46.8  37.0   MCV  86.9  85.4  82.9   MCH  27.6  27.7  27.6   RDW  45.3  43.9  43.8   PLATELETCT  333  298  244   MPV  10.8  10.1  9.9   NEUTSPOLYS  85.40*  89.40*  79.80*   LYMPHOCYTES  7.60*  3.80*  11.40*   MONOCYTES  5.50  6.20  8.20   EOSINOPHILS  0.60  0.00  0.00   BASOPHILS  0.60  0.20  0.40     Recent Labs      04/06/19   0334  04/06/19   1727  04/07/19   0545   SODIUM  135  132*  136   POTASSIUM  3.8  4.1  3.0*   CHLORIDE  102  101  105   CO2  22  23  21   GLUCOSE  173*  208*  169*   BUN  10  11  13     Recent Labs      04/06/19   0334  04/06/19   1727  04/07/19   0545   ALBUMIN  4.0  3.3  3.0*   TBILIRUBIN  0.6  0.6  0.5   ALKPHOSPHAT  83  65  56   TOTPROTEIN  7.5  6.1  5.8*   ALTSGPT  8  8  10   ASTSGOT  12  16  16   CREATININE  0.69  0.65  0.62       Imaging:  Ct-cta Head With & W/o-post Process    Result Date: 4/6/2019 4/5/2019 10:45 PM HISTORY/REASON FOR EXAM:  Stroke, follow up; Neurological Deficit TECHNIQUE/EXAM DESCRIPTION: CT angiogram of the [place] without and with contrast.  Initial precontrast images were obtained of the head from the skull base through the vertex.  Postcontrast images were obtained of the [place] following the power injection of nonionic contrast at 5.0 mL/sec.  Thin-section helical images were obtained with overlapping reconstruction interval. Coronal and sagittal multiplanar volume reformats were generated.  3D angiographic images were reviewed on PACS.  Maximum intensity projection (MIP) images were generated and reviewed. 100 mL of Omnipaque 350 nonionic contrast was injected intravenously. Low dose optimization technique was utilized for this CT exam including automated exposure control and adjustment of the mA and/or kV according to patient size. COMPARISON:  None. FINDINGS: The posterior circulation shows the distal vertebral arteries to be patent. The vertebrobasilar confluence is intact. Fenestrated appearance of the proximal basilar artery is seen, the basilar artery is patent but appears hypoplastic. No aneurysm or occlusive lesion is evident. Lateral persistent trigeminal arteries are seen supplying the posterior cerebral arteries. There is hypoplastic appearing right internal carotid artery seen. Occlusion of the distal right intracranial internal carotid artery is seen near the petrous apex. Atherosclerotic calcification of the bilateral intracranial internal carotid arteries is seen, there is less than 50% stenosis on the left.  1.9 mm aneurysm projects from the superior aspect of the anterior communicating artery. Mild diffuse parenchymal volume loss is identified. Scattered periventricular and subcortical white matter low-attenuation changes are observed. 3D angiographic/MIP images of the vasculature confirm the vascular findings as described above.     1.  Hypoplastic right internal carotid artery, there is a segment of occlusion seen in the distal right intracranial internal carotid artery near the petrous apex. The intracranial internal carotid artery reconstitutes distal to this segment of occlusion. 2.  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 3.  Bilateral persistent trigeminal arteries contribute to the  posterior cerebral arteries. 4.  Changes of atrophy with nonspecific white matter changes, most commonly associated with small vessel ischemia. These findings were discussed with the patient's clinician, Dr. Mcallister, on 4/6/2019 4:31 AM.    Ct-cta Neck With & W/o-post Processing    Result Date: 4/6/2019 4/5/2019 10:45 PM HISTORY/REASON FOR EXAM:  Stroke, follow up; Neurological Deficit TECHNIQUE/EXAM DESCRIPTION:  CT angiogram of the neck with contrast. Postcontrast images were obtained of the neck from the great vessels through the skull base following the power injection of nonionic contrast at 5.0 mL/sec. Thin-section helical images were obtained with overlapping reconstruction interval. Coronal and oblique multiplanar volume reformats were generated. Cervical internal carotid artery percent stenosis is calculated using the standard method according to the NASCET criteria wherein a segment of uniform caliber mid or distal cervical internal carotid is used as the reference denominator. 3D angiographic images were reviewed on PACS.  Maximum intensity projection (MIP) images were generated and reviewed 100 mL of Omnipaque 350 nonionic contrast was injected intravenously. Low dose optimization technique was utilized for this CT exam including automated exposure control and adjustment of the mA and/or kV according to patient size. COMPARISON:  None. FINDINGS: The arch origins of the great vessels appear intact. The proximal left subclavian artery is occluded to the level of the vertebral artery. The right internal carotid artery is small in caliber along its visualized course, otherwise the right common carotid artery, cervical carotid bifurcation, and cervical internal carotid artery are within normal limits. There is no evidence of significant  stenosis, thrombus, or other filling defect or ulceration. There is no evidence of dissection or aneurysm. Intimal thickening versus soft plaque within the left common  carotid artery carotid bifurcation, and encroachment internal carotid artery is seen with scattered mild atherosclerosis with less than 50% stenosis. The cervical vertebral arteries are normal bilaterally. Extensive emphysematous changes are seen in the lung apices. 3D angiographic/MIP images of the vasculature confirm the vascular findings as described above.     1.  Occlusion of the left subclavian artery to the level of the vertebral artery with reconstitution, appearance concerning for subclavian steal. 2.  Intimal thickening versus soft plaque and scattered atherosclerotic calcification of the left carotid arterial tree with less than 50% stenosis. 3.  Small caliber right internal carotid artery 4.  Extensive emphysema These findings were discussed with the patient's clinician, Dr. Mcallister, on 4/6/2019 4:31 AM.    Ct-head W/o    Result Date: 4/4/2019 4/4/2019 7:22 PM HISTORY/REASON FOR EXAM:  Altered level of consciousness (LOC), unexplained; Altered Mental Status Confusion. TECHNIQUE/EXAM DESCRIPTION AND NUMBER OF VIEWS: CT of the head without contrast. The study was performed on a helical multidetector CT scanner. Contiguous 2.5 mm axial sections were obtained from the skull base through the vertex. Up to date radiation dose reduction adjustments have been utilized to meet ALARA standards for radiation dose reduction. COMPARISON:  12/25/2018 FINDINGS: There is no evidence of acute intracranial hemorrhage, mass, mass-effect or shift of midline structures. Mineralization of the right basal ganglia. Gray-white matter differentiation is grossly preserved. Ventricle size and brain parenchymal volume are appropriate for this patient's stated age. The basal cisterns are patent. There are no abnormal extra-axial fluid collections. No depressed or widely  calvarial fracture is seen. The visualized paranasal sinuses and temporal bone structures are aerated. ___________________________________     1. No  acute intracranial abnormality. No evidence of acute intracranial hemorrhage or mass lesion.     [id number] View    Result Date: 4/7/2019 4/7/2019 10:37 AM HISTORY/REASON FOR EXAM: Nausea. Feeding tube placement TECHNIQUE/EXAM DESCRIPTION AND NUMBER OF VIEWS: 1 supine views of the abdomen. COMPARISON: None FINDINGS: There are diffuse gas-filled loops of small large bowel consistent with ileus. There has been prior left hip arthroplasty. Feeding tube tip overlies the gastric antrum. No abnormal calcifications are seen.     1.  Feeding tube tip overlies the gastric antrum. 2.  Mild ileus.    Xe-piwqcyb-0 View    Result Date: 4/5/2019 4/4/2019 10:23 PM HISTORY/REASON FOR EXAM: MRI screening TECHNIQUE/EXAM DESCRIPTION:  Single AP view the abdomen. COMPARISON:  None FINDINGS: Limited views of the lung bases are clear. Moderate stool is seen throughout the colon, otherwise bowel gas pattern is nonspecific. Postsurgical changes of left total hip arthroplasty are seen. Otherwise the bony structures appear age-appropriate.     1.  Moderate stool in the colon suggests changes of constipation, otherwise nonspecific bowel gas pattern    Dx-chest-limited (1 View)    Result Date: 4/6/2019 4/6/2019 6:39 AM HISTORY/REASON FOR EXAM: Aspiration TECHNIQUE/EXAM DESCRIPTION:  Single AP view of the chest. COMPARISON: April 4, 2019 FINDINGS: Dobbhoff tube has been placed in the interim, terminating below the lower margin of the film within the abdomen. The cardiac silhouette appears within normal limits. Atherosclerotic calcification of the aorta is noted.  The central pulmonary vasculature appears normal. The lungs appear well expanded bilaterally.  Bilateral lungs are clear. No significant pleural effusions are identified. The bony structures appear age-appropriate.     1.  No acute cardiopulmonary disease. 2.  Atherosclerosis 3.  No radiodense foreign body or medical device identified which would exclude  MRI.    Dx-chest-portable (1 View)    Result Date: 4/7/2019 4/7/2019 1:37 AM HISTORY/REASON FOR EXAM: For indication of respiratory failure Aspiration TECHNIQUE/EXAM DESCRIPTION:  Single AP view of the chest. COMPARISON: Yesterday FINDINGS: Medical device position is stable. The cardiac silhouette appears within normal limits. Atherosclerotic calcification of the aorta is noted.  The central pulmonary vasculature appears normal. The lungs appear well expanded bilaterally.  Bilateral lungs are clear. No significant pleural effusions are identified. The bony structures appear age-appropriate.     1.  No acute cardiopulmonary disease. 2.  Atherosclerosis     Dx-chest-portable (1 View)    Result Date: 4/6/2019 4/6/2019 4:54 PM HISTORY/REASON FOR EXAM:  POST INTUBATION. Central line placement. TECHNIQUE/EXAM DESCRIPTION AND NUMBER OF VIEWS: Single portable view of the chest. COMPARISON: 4/6/2019 6:59 AM FINDINGS: Cardiac mediastinal contour is unchanged. Dystrophic calcification of thoracic aorta. Endotracheal tube in place with tip approximately 4 cm above the justin. Feeding tube in place however tip is off the image. LEFT subclavian central venous catheter tip at ther mid RIGHT atrium, approximately 3 cm below the cavoatrial junction level. No pleural fluid collection or pneumothorax. No major bony abnormality is seen.     1.  Supportive tubing as described above. 2.  No pneumothorax. 3.  Persistent hypoinflation.    Dx-chest-portable (1 View)    Result Date: 4/4/2019 4/4/2019 10:23 PM HISTORY/REASON FOR EXAM:  MRI screening TECHNIQUE/EXAM DESCRIPTION:  Single AP view of the chest. COMPARISON: December 11, 2018 FINDINGS: Overlying cardiac leads are present. The cardiac silhouette appears within normal limits. Atherosclerotic calcification of the aorta is noted.  The central pulmonary vasculature appears normal. The lungs appear well expanded bilaterally.  Bilateral lungs are clear. No significant pleural  effusions are identified. The bony structures appear age-appropriate.     1.  No acute cardiopulmonary disease. 2.  Atherosclerosis 3.  No radiodense foreign body or medical device identified which would exclude MRI.    Mr-brain-w/o    Result Date: 4/6/2019 4/5/2019 9:24 PM HISTORY/REASON FOR EXAM:  Confusion, acute, unexplained. Altered level of consciousness. TECHNIQUE/EXAM DESCRIPTION: MRI of the brain without contrast. T1 sagittal, T2 fast spin-echo axial, T1 coronal, FLAIR axial with propeller motion correction technique, Diffusion Weighted and Apparent Diffusion Coefficient (ADC map) axial images were obtained of the whole brain. The study was performed on a [employer] Signa 1.5 Erica MRI scanner. COMPARISON:  Head CT 4/4/2019; CTA 4/5/2019 FINDINGS: Motion artifact degrades the examination and limits evaluation. There is diffuse prominence of the CSF containing spaces. There are sparse scattered foci of T2 prolongation in the deep and periventricular white matter which are nonspecific but which most likely reflect areas of chronic microangiopathic ischemic change, though this can also be seen with gliosis and demyelinating processes. The calvariae are unremarkable.  There are no extra-axial fluid collections.  The ventricular system and basal cisterns are otherwise within normal limits.  There are no other areas of abnormal signal in the brain substance.  There are no mass effects or  shift of midline structures.  There are no hemorrhagic lesions.  The diffusion weighted axial images show no evidence of acute cerebral infarction. The brainstem and posterior fossa structures are unremarkable. Vascular flow voids in the vertebrobasilar and carotid arteries, [place], and dural venous sinuses are not well assessed due to motion. The paranasal sinuses and mastoids in the field of view are unremarkable.     No evidence of intracranial hemorrhage, acute ischemia or mass on this severely motion degraded  study    Us-carotid Doppler Bilat    Result Date: 2019   Carotid Duplex  Report  Vascular Laboratory  CONCLUSIONS  1) Atherosclerosis at the carotid bifurcations without hemodynamically  significant stenosis  2) To and fro flow within the left vertebral artery  JANE GARCIA  Exam Date:     2019 20:36  Room #:     Inpatient  Priority:     Routine  Ht (in):             Wt (lb):  Ordering Physician:        RICK DUNLAP  Referring Physician:       GERMÁN Chung  Sonographer:               Joseph Jeter RVT                             [facility]  Study Type:  Technical Quality:         Adequate  Age:    68    Gender:     F  MRN:    [id number]  :    1950      BSA:  Indications:     Cerebral infarction due to unspecified occlusion or stenosis                    of unspecified carotid arteries  CPT Codes:       40098  ICD Codes:       Q53956  History:         Stenosis and or Occlusion  Limitations:  Right Brachial BP:              /  Left Brachial BP:             /  RIGHT  Plaque          Plaque         Velocity (cm/s)  Characteristics Composition    Syst/Diast                                 27   / 14      Distal ICA                                 12   / 7       Mid ICA  Ulcerative                     31   / 11      Prox ICA  Smooth         Homogeneous     27   / 9       Distal CCA  Smooth         Homogeneous          /         Mid CCA  Smooth         Homogeneous     55   / 9       Prox CCA                                 134  / 25      ECA  Waveform:                                     SCA  LEFT  Plaque         Plaque          Velocity (cm/s)  CharacteristicsComposition     Syst/Diast                                 193  / 24      Distal ICA                                 104  / 15      Mid ICA  Irregular      Heterogeneo     153  / 15      Prox ICA                 us  Irregular      Heterogeneo     130  / 12      Distal CCA                 us  Smooth         Homogeneous          /         Mid CCA   Smooth         Homogeneous     250  / 14      Prox CCA                                 116  / 9       ECA  Waveform:                                     SCA          0.6          ICA/CCA       0.8        Antegrade      Vertebral   Bi-                                   directiona                                   l         46   / 5      cm/s        23    / 14  FINDINGS  Plaque of the bifurcation extending into the internal carotid. Velocities  are consistent with < 50% stenosis of the internal carotid artery.  Plaque is irregular on the surface and homogeneous with medium acoustic  density.  Antegrade right vertebral flow.  Retrograde left vertebral and subclavian  flow.  Collette Green MD  (Electronically Signed)  Final Date:      2019                   00:35    Ec-echocardiogram Complete W/o Cont    Result Date: 2019  Transthoracic Echo Report Echocardiography Laboratory CONCLUSIONS No prior study is available for comparison. Technically difficult study incomplete information is obtained. Inferior and inferiolateral wall  akinesis. Moderately reduced left ventricular systolic function. Left ventricular ejection fraction is visually estimated to be 30%. Inferior and inferiolateral wall  akinesis. Mild mitral regurgitation. Estimated right ventricular systolic pressure  is 25 mmHg. Results to ordering physician via [place] [employer] at 18:31 hrs. JANE GARCIA Exam Date:         2019                    10:02 Exam Location:     Inpatient Priority:          Routine Ordering Physician:        RICK DUNLAP Referring Physician: Sonographer:               Marielena Pond RDCS Age:    68     Gender:    F MRN:    [id number] :    1950 BSA:    1.83   Ht (in):    67     Wt (lb):    159 Exam Type:     Complete Indications:     Transient cerebral ischemic attack, unspecified ICD Codes:       G459 CPT Codes:       83664 BP:   114    /   57     HR:   115 Technical Quality:       Technically difficult  study                          incomplete information is                          obtained MEASUREMENTS  (Male / Female) Normal Values 2D ECHO LV Diastolic Diameter PLAX        5.6 cm                4.2 - 5.9 / 3.9 - 5.3 cm LV Systolic Diameter PLAX         5 cm                  2.1 - 4.0 cm IVS Diastolic Thickness           0.79 cm               LVPW Diastolic Thickness          0.91 cm               RV Diameter 4C                    2.7 cm                2.5 - 2.9 cm LVOT Diameter                     2.1 cm                RA Diameter                       2.1 cm                Estimated LV Ejection Fraction    30 %                  LV Ejection Fraction MOD BP       39.2 %                >= 55  % LV Ejection Fraction MOD 4C       30.9 %                LV Ejection Fraction MOD 2C       43.4 %                LA Volume Index                   20.8 cm³/m²           16 - 28 cm³/m² IVC Diameter                      1.2 cm                DOPPLER AV Peak Velocity                  1.4 m/s               AV Peak Gradient                  7.5 mmHg              AV Mean Gradient                  4.4 mmHg              LVOT Peak Velocity                0.89 m/s              AV Area Cont Eq vti               2.7 cm²               Mitral E Point Velocity           0.85 m/s              Mitral E to A Ratio               0.56                  MV Pressure Half Time             30.1 ms               MV Area PHT                       7.3 cm²               MV Deceleration Time              104 ms                TR Peak Velocity                  217 cm/s              PV Peak Velocity                  1.4 m/s               PV Peak Gradient                  7.8 mmHg              RVOT Peak Velocity                1.2 m/s               * Indicates values subject to auto-interpretation LV EF:  30    % FINDINGS Left Ventricle Left ventricle is mildly dilated. Normal left ventricular wall thickness. Moderately reduced left ventricular systolic  function. Left ventricular ejection fraction is visually estimated to be 30%. Basal  lateral wall akinesis. Inferior and inferiolateral wall  akinesis. Indeterminate diastolic function. Right Ventricle Normal right ventricular size and systolic function. Right Atrium Normal right atrial size. Normal inferior vena cava size and inspiratory collapse. Left Atrium Normal left atrial size. Mitral Valve Thickened mitral valve leaflets. Mild mitral regurgitation. No mitral stenosis. Aortic Valve Structurally normal aortic valve without significant stenosis or regurgitation. Tricuspid Valve Structurally normal tricuspid valve.  Trace tricuspid regurgitation. Right atrial pressure is estimated to be 3 mmHg. Estimated right ventricular systolic pressure  is 25 mmHg. Pulmonic Valve Structurally normal pulmonic valve without significant stenosis or regurgitation. Pericardium Normal pericardium without effusion. Aorta The aortic root is normal.  Ascending aorta diameter is 3.0 cm. Nura Tanner M.D. (Electronically Signed) Final Date:     06 April 2019                 18:33    Dx-abdomen For Tube Placement    Result Date: 4/6/2019 4/6/2019 12:01 AM HISTORY/REASON FOR EXAM: Dobbhoff tube placement TECHNIQUE/EXAM DESCRIPTION:  Single AP view the abdomen. COMPARISON:  April 4, 2019 FINDINGS: Limited views of the lung bases are clear. Dobbhoff tube is seen, the tip lies to the right of the lumbar spine.  The bowel gas pattern appears nonspecific. Residual contrast is seen within the bilateral kidneys from recent contrast administration. The bony structures appear age-appropriate.     1.  Nonspecific bowel gas pattern. 2.  Dobbhoff tube tip overlying the expected location of the pylorus or first duodenal segment.      Micro:  Results     Procedure Component Value Units Date/Time    BLOOD CULTURE [[id number]] Collected:  04/05/19 2120    Order Status:  Completed Specimen:  Blood from Peripheral Updated:  04/07/19 0802      "Significant Indicator NEG     Source BLD     Site PERIPHERAL     Blood Culture No Growth    Note: Blood cultures are incubated for 5 days and  are monitored continuously.Positive blood cultures  are called to the RN and reported as soon as  they are identified.      Narrative:       Per Hospital Policy: Only change Specimen Src: to \"Line\" if  specified by physician order.    Culture respiratory w/GRM STN [[id number]] Collected:  04/06/19 1645    Order Status:  Completed Specimen:  Respirate from Tracheal Aspirate Updated:  04/06/19 2134    Narrative:       Collected By:[id number] CAMILLA NEWMAN    URINALYSIS [[id number]]  (Abnormal) Collected:  04/06/19 1649    Order Status:  Completed Specimen:  Urine from Urine, [facility] Updated:  04/06/19 1814     Color Yellow     Character Clear     Specific Gravity 1.023     Ph 7.0     Glucose Negative mg/dL      Ketones Trace (A) mg/dL      Protein 300 (A) mg/dL      Bilirubin Negative     Urobilinogen, Urine 0.2     Nitrite Negative     Leukocyte Esterase Negative     Occult Blood Trace (A)     Micro Urine Req Microscopic    Narrative:       Collected By:[id number] CAMILLA NEWMAN  Indication for culture:->Temp Equal to,or Greater Than 101    URINE CULTURE(NEW) [[id number]] Collected:  04/06/19 1649    Order Status:  Completed Specimen:  Urine from Urine, [facility] Updated:  04/06/19 1747    Narrative:       Collected By:[id number] CAMILLA NEWMAN  Indication for culture:->Temp Equal to,or Greater Than 101    GRAM STAIN [[id number]] Collected:  04/06/19 1539    Order Status:  Completed Specimen:  CSF Updated:  04/06/19 1649     Significant Indicator .     Source CSF     Site TAP     Gram Stain Result No organisms seen.    Narrative:       Collected By:[id number] JT ROQUE    Rowan Ink [[id number]] Collected:  04/06/19 1257    Order Status:  Completed Specimen:  CSF from Other Body Fluid Updated:  04/06/19 1641     Significant Indicator NEG     Source CSF     Site Tap     Rowan Ink No " "encapsulated yeast seen    Narrative:       [id number] [facility]  CSF fluid    AFB Culture [[id number]] Collected:  04/06/19 1257    Order Status:  Completed Specimen:  Respirate from Spinal Fluid Updated:  04/06/19 1556    Narrative:       Collected By:[id number] JT ROQUE    CSF CULTURE [[id number]] Collected:  04/06/19 1539    Order Status:  Completed Specimen:  CSF from Tap Updated:  04/06/19 1541    Narrative:       Collected By:[id number] JT ROQUE    Cryptococcal Antigen [[id number]]     Order Status:  No result Specimen:  CSF     FLUID CULTURE [[id number]] Collected:  04/06/19 1257    Order Status:  Canceled Specimen:  Other from Other Body Fluid     Blood Culture [[id number]]     Order Status:  No result Specimen:  Blood from Peripheral     Blood Culture [[id number]]     Order Status:  No result Specimen:  Blood from Peripheral     BLOOD CULTURE [[id number]] Collected:  04/05/19 2120    Order Status:  Completed Specimen:  Blood from Peripheral Updated:  04/06/19 0755     Significant Indicator NEG     Source BLD     Site PERIPHERAL     Blood Culture No Growth    Note: Blood cultures are incubated for 5 days and  are monitored continuously.Positive blood cultures  are called to the RN and reported as soon as  they are identified.      Narrative:       Per Hospital Policy: Only change Specimen Src: to \"Line\" if  specified by physician order.    URINALYSIS,CULTURE IF INDICATED [[id number]]  (Abnormal) Collected:  04/04/19 1753    Order Status:  Completed Specimen:  Urine Updated:  04/04/19 1845     Color Yellow     Character Clear     Specific Gravity 1.018     Ph 6.5     Glucose Negative mg/dL      Ketones Trace (A) mg/dL      Protein Negative mg/dL      Bilirubin Negative     Urobilinogen, Urine 0.2     Nitrite Negative     Leukocyte Esterase Negative     Occult Blood Moderate (A)     Micro Urine Req Microscopic          Assessment:  Active Hospital Problems    Diagnosis   • Altered mental " status [R41.82]   • Controlled type 2 diabetes mellitus without complication, without long-term current use of insulin ([facility]) [E11.9]   • Sepsis ([facility]) [A41.9]   • Pharyngoesophageal dysphagia [R13.14]   • Systolic and diastolic CHF, chronic ([facility]) [I50.42]       Plan:  Altered mental status, concern for meningoencephalitis.  Continued fever  Resolved leukocytosis  On pressors  LP-slight elevation protein, normal glucose, only 1 WBC  Meningitis panel and HSV pending  Blood cxs neg  DC'd vancomycin/amp/ceftraixone as CSF not c/w bacterial meningitis  CT scan did show small aneurysm  MRI did not reveal any stroke or evidence of vasculitis  EEG ordered  Continue acyclovir for now-dc if HSV PCR neg    Resp failure failure/vent, new  CXR no lobar infiltrates by my read  Query aspiration-but neg CXR as above  Changed to Zosyn    Low B12 level   Being replaced per Neuro    Chronic right internal carotid artery occlusion   +flow  Continue volume of blood pressure control for     Discussed with internal medicine/Pulm.

## 2019-04-08 PROBLEM — E83.42 HYPOMAGNESEMIA: Status: ACTIVE | Noted: 2019-01-01

## 2019-04-08 PROBLEM — E87.6 HYPOKALEMIA: Status: ACTIVE | Noted: 2019-01-01

## 2019-04-08 PROBLEM — I67.1 CEREBRAL ANEURYSM WITHOUT RUPTURE: Status: ACTIVE | Noted: 2019-01-01

## 2019-04-08 NOTE — ASSESSMENT & PLAN NOTE
Intubated 4/6-4/8  Aspiration precautions  Encourage incentive spirometry of able to cooperate  RT/O2 protocols  Mobilization  Limit sedatives

## 2019-04-08 NOTE — CARE PLAN
Problem: Ventilation Defect:  Goal: Ability to achieve and maintain unassisted ventilation or tolerate decreased levels of ventilator support    Intervention: Support and monitor invasive and noninvasive mechanical ventilation  Adult Ventilation Update    Total Vent Days: 3    Patient Lines/Drains/Airways Status    Active Airway     Name: Placement date: Placement time: Site: Days:    Airway ETT Oral 7.5 04/06/19   1627   Oral   3              In the last 24 hours, the patient tolerated SBT for 1 hr.    Cough: Productive (04/08/19 0336)  Sputum Amount: Unable to Evaluate (04/08/19 0400)  Sputum Color: Clear (04/08/19 0400)  Sputum Consistency: Thin (04/08/19 0400)    Mobility  Level of Mobility: Level IV (04/07/19 2000)  Assistance / Tolerance: Assistance of Two or More;Tolerates Well (04/07/19 2000)  Pt Calls for Assistance: No (04/07/19 2000))  Gait: Unable to Ambulate (04/08/19 0400)      Events/Summary/Plan: decreased fio2 to 40% (04/08/19 0500)

## 2019-04-08 NOTE — PROGRESS NOTES
Spoke with son via telephone, discussed pt condition and plan of care. All questions answered at this time.

## 2019-04-08 NOTE — PROCEDURES
EEG 04/08/19 12:42 PM    VIDEO ELECTROENCEPHALOGRAM / EPILEPSY MONITORING UNIT REPORT      Referring provider: DR. DUNLAP    DOS:   4/8/2019      INDICATION:  Alice Yarbrough 68 y.o. female presenting with Altered mental status    CURRENT ANTIEPILEPTIC REGIMEN: Keppra + Gabapentin     DURATION: 28 minutes      TECHNIQUE: A 30-channel video electroencephalogram (VEEG) was performed in accordance with the international 10-20 system. This digital study was reviewed in bipolar and referential montages. The recording examined the patient during wakeful, drowsy and sleep states.         DESCRIPTION OF THE RECORD:  During the awake state, background shows symmetrical 7-8 Hz activity posteriorly with amplitude of 70 mV.   During drowsiness, high-amplitude delta frequencies were seen. There are bursts of frontal sharp delta, at times, triphasic at times, more persistent over right frontal ( I prefer to call this frontal dysfunction more to the right, since these frontal sharp are not symmetric and more to the right, also, variation in morphology is huge)    During the sleep state, background shows diffuse high-amplitude 4-5 Hz delta activity.  Symmetrical high-amplitude sleep spindles and vertex sharp activities were seen in the central leads.    ACTIVATION PROCEDURES:     Hyperventilation was not performed by the patient.   Intermittent Photic stimulation was performed in a stepwise fashion from 1 to 30 Hz and elicited a normal response (photic driving), most noticeable in the posterior leads.      ICTAL AND/OR INTERICTAL FINDINGS:   There are bursts of frontal sharp delta, at times, triphasic at times, more persistent over right frontal ( I prefer to call this frontal dysfunction more to the right, since these frontal sharp are not symmetric and more to the right, also, variation in morphology is huge). No seizures were recorded during the study.     EVENT(S):  NONE    EKG: sampling review of EKG recording  demonstrated regular rhythm      INTERPRETATION:       ________________________________________________________________________    This is abnormal routine video EEG recording in the awake and drowsy/sleep state(s).    This scalp EEG denotes      1. Diffuse nonspecific cortical dysfunction - mild to moderate degree     2. Possible focal cortical irritability over frontal R>L--this patten remains not specific but could be interictal - advise clinical correlation regarding further management. This does not necessary mean the patient needs seizure medication --  ( I prefer to call this frontal dysfunction more to the right, since these frontal sharp are not symmetric and more to the right, also, variation in morphology is pronounced)    This nonspecific abnormalities could be secondary to metabolic, toxic, polypharmacy or even post-ictal     If clinical suspicion of seizure remains high.  Prolonged EEG   monitoring may be of help.    EEG 04/08/19 12:51 PM    ________________________________________________________________________  ________________________________________________________________________      ________________________________________________________________________

## 2019-04-08 NOTE — PROGRESS NOTES
Updated Dr. Prado on patient's brief rhythm change to junctional rate 60s. No new orders received. B/P stable

## 2019-04-08 NOTE — RESPIRATORY CARE
Extubation      Events/Summary/Plan: cuff leak present, pt extubated per Dr. Gonda, placed pt on 2LNC and tolerating well at this time (04/08/19 2370)

## 2019-04-08 NOTE — CARE PLAN
Problem: Fluid Volume:  Goal: Will maintain balanced intake and output  Outcome: PROGRESSING AS EXPECTED  Monitor strict I&O    Problem: Pain Management  Goal: Pain level will decrease to patient's comfort goal  Outcome: PROGRESSING AS EXPECTED  Fentanyl pushes PRN for CPOT

## 2019-04-08 NOTE — PROGRESS NOTES
Hospital Neurology Progress Note:     Interval History: No acute events overnight.  No complaints today.  Unable to obtain review of systems due to intubation.    Objective:   Vitals:    04/08/19 0500 04/08/19 0600 04/08/19 0715 04/08/19 0800   BP:       Pulse: 89 95  (!) 105   Resp: 18 12  13   Temp:       TempSrc:       SpO2: 100% 99% 100% 100%   Weight:       Height:           Labs:     Lab Results   Component Value Date/Time    PROTHROMBTM 15.8 (H) 04/06/2019 05:27 PM    INR 1.25 (H) 04/06/2019 05:27 PM      Lab Results   Component Value Date/Time    WBC 4.8 04/08/2019 03:05 AM    RBC 4.19 (L) 04/08/2019 03:05 AM    HEMOGLOBIN 11.6 (L) 04/08/2019 03:05 AM    HEMATOCRIT 35.5 (L) 04/08/2019 03:05 AM    MCV 84.7 04/08/2019 03:05 AM    MCH 27.7 04/08/2019 03:05 AM    MCHC 32.7 (L) 04/08/2019 03:05 AM    MPV 10.7 04/08/2019 03:05 AM    NEUTSPOLYS 81.60 (H) 04/08/2019 03:05 AM    LYMPHOCYTES 11.20 (L) 04/08/2019 03:05 AM    MONOCYTES 6.20 04/08/2019 03:05 AM    EOSINOPHILS 0.40 04/08/2019 03:05 AM    BASOPHILS 0.40 04/08/2019 03:05 AM      Lab Results   Component Value Date/Time    SODIUM 138 04/08/2019 03:05 AM    POTASSIUM 2.9 (L) 04/08/2019 03:05 AM    CHLORIDE 109 04/08/2019 03:05 AM    CO2 21 04/08/2019 03:05 AM    GLUCOSE 134 (H) 04/08/2019 03:05 AM    BUN 11 04/08/2019 03:05 AM    CREATININE 0.53 04/08/2019 03:05 AM    CREATININE 1.1 04/30/2008 08:15 PM      Lab Results   Component Value Date/Time    CHOLSTRLTOT 113 04/05/2019 04:17 AM    LDL 61 04/05/2019 04:17 AM    HDL 34 (A) 04/05/2019 04:17 AM    TRIGLYCERIDE 89 04/05/2019 04:17 AM       Lab Results   Component Value Date/Time    ALKPHOSPHAT 54 04/08/2019 03:05 AM    ASTSGOT 17 04/08/2019 03:05 AM    ALTSGPT 11 04/08/2019 03:05 AM    TBILIRUBIN 0.3 04/08/2019 03:05 AM        Imaging/Testing:   MRI of the brain on 4/5/2019 was personally reviewed and was within normal limits.    CTA of the head and neck on 4/5/2019 reviewed in chart.    Physical Exam:      General: Well-appearing 68-year-old female intubated no acute distress  Cardio: Normal S1/S2. No peripheral edema.   Pulm: CTAX2. No respiratory distress.   Skin: Warm, dry, no rashes or lesions   Psychiatric: Unable to assess due to intubation  HEENT: Atraumatic head, normal sclera and conjunctiva, moist oral mucosa. No lid lag.  Abdomen: Soft, non tender. No masses or hepatosplenomegaly.    Neurologic:  Mental Status: GCS 11 T (T4, and 6, V1 T)  Cranial Nerves: Pupils equal round reactive to light.  Extraocular movements intact.  Face symmetric.  Motor: Normal muscle tone and bulk.  Able to follow commands.  Unable to test strength segmentally.  Reflexes: 2/4 throughout  Coordination: Unable to assess due to restraints  Sensation: Withdraws to noxious stimuli throughout on.   Gait/Station: Unable to assess    Assessment/Plan:    Alice Yarbrough is a 68 y.o. female with history of coronary artery disease, COPD, hypertension, hyperlipidemia, history of myocardial infarction, diabetes, history of back surgery with chronic pain presenting to the hospital for altered mental status and consulted for altered mental status.  The patient has been experiencing a sudden change of mental status at home and throughout her hospitalization she continued to deteriorate with subsequent intubation for airway protection.  Afterwards, she was found to be deficient of vitamin B12, and supplementation was performed.  Her mental status has continued to improve and currently she can follow commands however remains intubated.  It is unclear the etiology for mental status change.  The patient's  states that she has multiple medications at home for chronic pain which she may be taking appropriately.  It is also possible that vitamin B12 deficiency was a cause of encephalopathy.    Plan:  1.  Continue vitamin B12 supplementation  2.  EEG to be performed today  3.  Ventilator management per ICU  4.  We will plan to discuss  with patient once she is extubated and evaluate for need of further workup.  5.  Plan discussed with consulting physician and patient's nurse.  6.  Continue Keppra 500 mg twice a day    Alexandro Rowe M.D., Diplomat of the American Board of Psychiatry and Neurology  Diplomat of [employer] Epilepsy Subspecialty   Assistant Clinical Professor, [facility] Neurology Consultant

## 2019-04-08 NOTE — CONSULTS
Critical Care Consultation    Date of consult: 4/7/2019    Referring Physician  Kd Hernandez    Reason for Consultation  Poor mental status, lack of airway protection    History of Presenting Illness  68 y.o. female who presented 4/4/2019 with several days of rapidly deteriorating mental status.  She reportedly at baseline is alert and oriented x4 and able to do her ADLs.  At time of admission the hospital she would say 1 word yes and no did not consistently follow commands had symmetric findings.  She was transferred to the ICU from the [facility] as a rapid response yesterday afternoon.  I found her to have gurgling respirations a week  cough and gag.  I urgently intubated her and placed a central line.  Today her workup is been mostly unrevealing in terms of explaining her deterioration over the past 4 days and mental status that she has a negative head CT negative MRI negative metabolic panel and today a LP results that are unremarkable.   Vitamin deficiency such as B12 is a possibility.  Initial rheumatologic workup is pending.  She is on minimal sedation on the ventilator looks around has purposeful movement is still thought not follow any commands.  She is now vent day 2 on low-dose pressors for organ dysfunction.  On broad-spectrum antibiotics for possible sepsis and receiving thiamine and multivitamins folate and vitamin B12    Consultants  1. Neurology  2. Infectious disease    Code Status  Full Code    Review of Systems  Review of Systems   Unable to perform ROS: Intubated       Past Medical History   has a past medical history of Bowel habit changes; CAD (coronary artery disease); COPD; Dental disorder; Hyperlipidemia; Hypertension; MI (myocardial infarction) (HCC) (Dec 30, 2007); and Type II or unspecified type diabetes mellitus without mention of complication, not stated as uncontrolled.    Surgical History   has a past surgical history that includes other abdominal surgery (hysterectomy); abdominal  hysterectomy total; hip hemiarthroplasty (Left, 3/31/2017); and hip arthroplasty total (Left, 12/21/2018).    Family History  family history includes Diabetes in her mother; Hypertension in her father; Psychiatry in her brother.    Social History   reports that she quit smoking about 10 years ago. Her smoking use included Cigarettes. She has a 164.00 pack-year smoking history. She has never used smokeless tobacco. She reports that she does not drink alcohol or use drugs.    Medications  Home Medications     Reviewed by Silvano Bass (Pharmacy [facility]) on 04/04/19 at 1704  Med List Status: Complete   Medication Last Dose Status   atorvastatin (LIPITOR) 40 MG Tab UNK Active   carvedilol (COREG) 3.125 MG Tab UNK Active   gabapentin (NEURONTIN) 300 MG Cap UNK Active   lisinopril (PRINIVIL) 20 MG Tab UNK Active   metFORMIN ER (GLUCOPHAGE XR) 500 MG TABLET SR 24 HR UNK Active   oxybutynin SR (DITROPAN-XL) 10 MG CR tablet UNK Active              Current Facility-Administered Medications   Medication Dose Route Frequency Provider Last Rate Last Dose   • piperacillin-tazobactam (ZOSYN) 3.375 g in  mL IVPB  3.375 g Intravenous Q8HRS Sandy Bustillo M.D.   Stopped at 04/07/19 1618   • oxybutynin (DITROPAN) tablet 5 mg  5 mg Enteral Tube BID Ian Prado M.D.   Stopped at 04/07/19 0800   • [START ON 4/8/2019] aspirin (ASA) chewable tab 81 mg  81 mg Enteral Tube DAILY Osmin Glynn M.D.       • lactulose 20 GM/30ML solution 30 mL  30 mL Enteral Tube TID Ian Prado M.D.   Stopped at 04/07/19 1800   • potassium chloride (KLOR-CON) 20 MEQ packet 40 mEq  40 mEq Enteral Tube Once Ian Prado M.D.   Stopped at 04/07/19 1800   • cyanocobalamin (VITAMIN B-12) injection 1,000 mcg  1,000 mcg Intramuscular Q30 DAYS Osmin Glynn M.D.   1,000 mcg at 04/07/19 1844   • vitamin D (cholecalciferol) tablet 2,000 Units  2,000 Units Enteral Tube DAILY Osmin Glynn M.D.   Stopped at 04/07/19  1545   • gabapentin (NEURONTIN) capsule 200 mg  200 mg Enteral Tube TID Osmin Glynn M.D.   Stopped at 04/07/19 1800   • lactated ringers infusion   Intravenous Continuous Sandy Gastelum M.D. 100 mL/hr at 04/07/19 1505     • NS (BOLUS) infusion 500 mL  500 mL Intravenous Once PRN Sandy Gastelum M.D.       • levETIRAcetam (KEPPRA) 500 mg in  mL IVPB  500 mg Intravenous Q12HRS Sebastián Herman M.D. 400 mL/hr at 04/07/19 1844 500 mg at 04/07/19 1844   • senna-docusate (PERICOLACE or SENOKOT S) 8.6-50 MG per tablet 2 Tab  2 Tab Enteral Tube BID Sandy Gastelum M.D.   Stopped at 04/06/19 1800    And   • polyethylene glycol/lytes (MIRALAX) PACKET 1 Packet  1 Packet Enteral Tube QDAY PRN Sandy Gastelum M.D.        And   • magnesium hydroxide (MILK OF MAGNESIA) suspension 30 mL  30 mL Enteral Tube QDAY PRN Sandy Gastelum M.D.        And   • bisacodyl (DULCOLAX) suppository 10 mg  10 mg Rectal QDAY PRN Sandy Gastelum M.D.       • fentaNYL (SUBLIMAZE) injection 100 mcg  100 mcg Intravenous Q15 MIN PRN Ian Prado M.D.   100 mcg at 04/07/19 1053    And   • fentaNYL (SUBLIMAZE) injection 200 mcg  200 mcg Intravenous Q15 MIN PRN Ian Prado M.D.        And   • fentaNYL (SUBLIMAZE) 50 mcg/mL in 50mL (Continuous Infusion)   Intravenous Continuous Ian Prado M.D.   Stopped at 04/07/19 1220    And   • propofol (DIPRIVAN) injection  0-80 mcg/kg/min Intravenous Continuous Ian Prado M.D.   Stopped at 04/06/19 1700   • Respiratory Care per Protocol   Nebulization Continuous RT Ian Prado M.D.       • famotidine (PEPCID) tablet 20 mg  20 mg Enteral Tube Q12HRS Ian Prado M.D.        Or   • famotidine (PEPCID) injection 20 mg  20 mg Intravenous Q12HRS Ian Prado M.D.   20 mg at 04/07/19 1800   • MD Alert...ICU Electrolyte Replacement per Pharmacy   Other PHARMACY TO DOSE Ian Prado M.D.       • heparin injection 5,000 Units  5,000 Units Subcutaneous Q8HRS Ian Prado M.D.    5,000 Units at 04/07/19 1459   • norepinephrine (LEVOPHED) 8 mg in  mL Infusion  0-30 mcg/min Intravenous Continuous Ian Prado M.D.   Stopped at 04/07/19 1630   • acetaminophen (TYLENOL) suppository 650 mg  650 mg Rectal Q6HRS PRN Morgan Davis M.D.   650 mg at 04/07/19 0519   • ipratropium-albuterol (DUONEB) nebulizer solution  3 mL Nebulization Q4H PRN (RT) Raymond Gastelum M.D.       • insulin regular (HUMULIN R) injection 2-9 Units  2-9 Units Subcutaneous 4X/DAY DORIE Gastelum M.D.   Stopped at 04/07/19 1700    And   • dextrose 50% (D50W) injection 25 mL  25 mL Intravenous Q15 MIN PRN Sandy Gastelum M.D.       • nystatin (MYCOSTATIN) powder   Topical BID Sandy Gastelum M.D.       • thiamine (B-1) 100 mg in D5W 100 mL IVPB  100 mg Intravenous DAILY Sandy Gastelum M.D. 200 mL/hr at 04/07/19 0638 100 mg at 04/07/19 0638   • acyclovir (ZOVIRAX) 616 mg in  mL IVPB  10 mg/kg (Ideal) Intravenous Q8HRS Sandy Gastelum M.D. 250 mL/hr at 04/07/19 1844 616 mg at 04/07/19 1844   • Pharmacy Consult: Enteral tube insertion - review meds/change route/product selection  1 Each Other PHARMACY TO DOSE Sandy Gastelum M.D.       • acetaminophen (TYLENOL) tablet 650 mg  650 mg Enteral Tube Q6HRS PRN Sandy Gastelum M.D.       • atorvastatin (LIPITOR) tablet 40 mg  40 mg Enteral Tube DAILY Sandy Gastelum M.D.   Stopped at 04/07/19 0600       Allergies  Allergies   Allergen Reactions   • Codeine Vomiting       Vital Signs last 24 hours  Temp:  [36.7 °C (98.1 °F)-39.5 °C (103.1 °F)] 37.3 °C (99.1 °F)  Pulse:  [] 108  Resp:  [13-22] 14  SpO2:  [95 %-100 %] 100 %    Physical Exam  Physical Exam   Constitutional: She appears well-developed and well-nourished.   On small amounts of fentanyl wakes up tracks does not follow commands.  Has purposeful movement with all 4 extremities and strength and motor appears grossly symmetric left versus right side   HENT:   Head: Normocephalic.   Eyes: Conjunctivae are normal.    Cardiovascular: Normal rate and regular rhythm.    Pulmonary/Chest:   On mechanical ventilator   Abdominal: Soft. Bowel sounds are normal.   Neurological:   Alert tracks to voice but does not follow commands moves all 4 extremities extremities equally   Skin: Skin is warm and dry.       Fluids    Intake/Output Summary (Last 24 hours) at 04/07/19 2045  Last data filed at 04/07/19 1800   Gross per 24 hour   Intake          1185.77 ml   Output              830 ml   Net           355.77 ml       Laboratory  Recent Results (from the past 48 hour(s))   BLOOD CULTURE    Collection Time: 04/05/19  9:20 PM   Result Value Ref Range    Significant Indicator NEG     Source BLD     Site PERIPHERAL     Blood Culture       No Growth    Note: Blood cultures are incubated for 5 days and  are monitored continuously.Positive blood cultures  are called to the RN and reported as soon as  they are identified.     BLOOD CULTURE    Collection Time: 04/05/19  9:20 PM   Result Value Ref Range    Significant Indicator NEG     Source BLD     Site PERIPHERAL     Blood Culture       No Growth    Note: Blood cultures are incubated for 5 days and  are monitored continuously.Positive blood cultures  are called to the RN and reported as soon as  they are identified.     ACCU-CHEK GLUCOSE    Collection Time: 04/05/19 11:41 PM   Result Value Ref Range    Glucose - Accu-Ck 132 (H) 65 - 99 mg/dL   VITAMIN B12    Collection Time: 04/06/19  3:34 AM   Result Value Ref Range    Vitamin B12 -True Cobalamin 198 (L) 211 - 911 pg/mL   TSH WITH REFLEX TO FT4    Collection Time: 04/06/19  3:34 AM   Result Value Ref Range    TSH 1.670 0.380 - 5.330 uIU/mL   AMMONIA    Collection Time: 04/06/19  3:34 AM   Result Value Ref Range    Ammonia 32 11 - 45 umol/L   Comp Metabolic Panel    Collection Time: 04/06/19  3:34 AM   Result Value Ref Range    Sodium 135 135 - 145 mmol/L    Potassium 3.8 3.6 - 5.5 mmol/L    Chloride 102 96 - 112 mmol/L    Co2 22 20 - 33 mmol/L     Anion Gap 11.0 0.0 - 11.9    Glucose 173 (H) 65 - 99 mg/dL    Bun 10 8 - 22 mg/dL    Creatinine 0.69 0.50 - 1.40 mg/dL    Calcium 9.0 8.5 - 10.5 mg/dL    AST(SGOT) 12 12 - 45 U/L    ALT(SGPT) 8 2 - 50 U/L    Alkaline Phosphatase 83 30 - 99 U/L    Total Bilirubin 0.6 0.1 - 1.5 mg/dL    Albumin 4.0 3.2 - 4.9 g/dL    Total Protein 7.5 6.0 - 8.2 g/dL    Globulin 3.5 1.9 - 3.5 g/dL    A-G Ratio 1.1 g/dL   MAGNESIUM    Collection Time: 04/06/19  3:34 AM   Result Value Ref Range    Magnesium 1.6 1.5 - 2.5 mg/dL   PHOSPHORUS    Collection Time: 04/06/19  3:34 AM   Result Value Ref Range    Phosphorus 2.2 (L) 2.5 - 4.5 mg/dL   ESTIMATED GFR    Collection Time: 04/06/19  3:34 AM   Result Value Ref Range    GFR If African American >60 >60 mL/min/1.73 m 2    GFR If Non African American >60 >60 mL/min/1.73 m 2   CBC WITH DIFFERENTIAL    Collection Time: 04/06/19  3:35 AM   Result Value Ref Range    WBC 13.5 (H) 4.8 - 10.8 K/uL    RBC 5.48 (H) 4.20 - 5.40 M/uL    Hemoglobin 15.2 12.0 - 16.0 g/dL    Hematocrit 46.8 37.0 - 47.0 %    MCV 85.4 81.4 - 97.8 fL    MCH 27.7 27.0 - 33.0 pg    MCHC 32.5 (L) 33.6 - 35.0 g/dL    RDW 43.9 35.9 - 50.0 fL    Platelet Count 298 164 - 446 K/uL    MPV 10.1 9.0 - 12.9 fL    Neutrophils-Polys 89.40 (H) 44.00 - 72.00 %    Lymphocytes 3.80 (L) 22.00 - 41.00 %    Monocytes 6.20 0.00 - 13.40 %    Eosinophils 0.00 0.00 - 6.90 %    Basophils 0.20 0.00 - 1.80 %    Immature Granulocytes 0.40 0.00 - 0.90 %    Nucleated RBC 0.00 /100 WBC    Neutrophils (Absolute) 12.11 (H) 2.00 - 7.15 K/uL    Lymphs (Absolute) 0.51 (L) 1.00 - 4.80 K/uL    Monos (Absolute) 0.84 0.00 - 0.85 K/uL    Eos (Absolute) 0.00 0.00 - 0.51 K/uL    Baso (Absolute) 0.03 0.00 - 0.12 K/uL    Immature Granulocytes (abs) 0.05 0.00 - 0.11 K/uL    NRBC (Absolute) 0.00 K/uL   ACCU-CHEK GLUCOSE    Collection Time: 04/06/19  7:42 AM   Result Value Ref Range    Glucose - Accu-Ck 154 (H) 65 - 99 mg/dL   Lactic Acid -STAT Once    Collection Time:  04/06/19  8:32 AM   Result Value Ref Range    Lactic Acid 1.3 0.5 - 2.0 mmol/L   ABG - LAB    Collection Time: 04/06/19 10:17 AM   Result Value Ref Range    Ph 7.49 7.40 - 7.50    Pco2 27.8 26.0 - 37.0 mmHg    Po2 67.5 64.0 - 87.0 mmHg    O2 Saturation 93.3 93.0 - 99.0 %    Hco3 21 17 - 25 mmol/L    Base Excess -1 -4 - 3 mmol/L    Body Temp see below Centigrade   Prothrombin Time    Collection Time: 04/06/19 11:34 AM   Result Value Ref Range    PT 15.1 (H) 12.0 - 14.6 sec    INR 1.18 (H) 0.87 - 1.13   ACCU-CHEK GLUCOSE    Collection Time: 04/06/19 12:07 PM   Result Value Ref Range    Glucose - Accu-Ck 168 (H) 65 - 99 mg/dL   EC-ECHOCARDIOGRAM COMPLETE W/O CONT    Collection Time: 04/06/19 12:50 PM   Result Value Ref Range    Eject.Frac. MOD BP 39.19     Eject.Frac. MOD 4C 30.87     Eject.Frac. MOD 2C 43.39     Left Ventrical Ejection Fraction 30    CSF CELL COUNT    Collection Time: 04/06/19 12:57 PM   Result Value Ref Range    Number Of Tubes 4     Volume 12.5 mL    Color-Body Fluid Colorless     Character-Body Fluid Clear     Supernatant Appearance Colorless     Total RBC Count 13 cells/uL    Crenated RBC 0 %    Total WBC Count 1 0 - 10 cells/uL    Lymphs 93 %    Mononuclear Cells - CSF 7 %    CSF Tube Number 1    Rowan Ink    Collection Time: 04/06/19 12:57 PM   Result Value Ref Range    Significant Indicator NEG     Source CSF     Site Tap     Rowan Ink No encapsulated yeast seen    AFB Culture    Collection Time: 04/06/19 12:57 PM   Result Value Ref Range    Significant Indicator NEG     Source CSF     Site Tap     AFB Culture Culture in progress.     AFB Smear Results No acid fast bacilli seen.    CSF PROTEIN    Collection Time: 04/06/19 12:57 PM   Result Value Ref Range    Total Protein, CSF 51 (H) 15 - 45 mg/dL   CSF GLUCOSE    Collection Time: 04/06/19 12:57 PM   Result Value Ref Range    Glucose CSF 98 (H) 40 - 80 mg/dL   CSF CELL COUNT    Collection Time: 04/06/19 12:57 PM   Result Value Ref Range     Number Of Tubes 4     Volume 12.5 mL    Color-Body Fluid Colorless     Character-Body Fluid Clear     Supernatant Appearance Colorless     Total RBC Count 28 cells/uL    Crenated RBC 0 %    Total WBC Count 1 0 - 10 cells/uL    Lymphs 93 %    Mononuclear Cells - CSF 7 %    CSF Tube Number 4    Acid Fast Stain    Collection Time: 04/06/19 12:57 PM   Result Value Ref Range    Significant Indicator NEG     Source CSF     Site Tap     AFB Smear Results No acid fast bacilli seen.    CSF CULTURE    Collection Time: 04/06/19  3:39 PM   Result Value Ref Range    Significant Indicator NEG     Source CSF     Site TAP     CSF Culture No growth at 24 hours     Gram Stain Result No organisms seen.    GRAM STAIN    Collection Time: 04/06/19  3:39 PM   Result Value Ref Range    Significant Indicator .     Source CSF     Site TAP     Gram Stain Result No organisms seen.    Culture respiratory w/GRM STN    Collection Time: 04/06/19  4:45 PM   Result Value Ref Range    Significant Indicator NEG     Source RESP     Site TRACHEAL ASPIRATE     Respiratory Culture No growth at 24 hours     Gram Stain Result Rare WBCs.  No organisms seen.      GRAM STAIN    Collection Time: 04/06/19  4:45 PM   Result Value Ref Range    Significant Indicator .     Source RESP     Site TRACHEAL ASPIRATE     Gram Stain Result Rare WBCs.  No organisms seen.      URINALYSIS    Collection Time: 04/06/19  4:49 PM   Result Value Ref Range    Color Yellow     Character Clear     Specific Gravity 1.023 <1.035    Ph 7.0 5.0 - 8.0    Glucose Negative Negative mg/dL    Ketones Trace (A) Negative mg/dL    Protein 300 (A) Negative mg/dL    Bilirubin Negative Negative    Urobilinogen, Urine 0.2 Negative    Nitrite Negative Negative    Leukocyte Esterase Negative Negative    Occult Blood Trace (A) Negative    Micro Urine Req Microscopic    URINE CULTURE(NEW)    Collection Time: 04/06/19  4:49 PM   Result Value Ref Range    Significant Indicator NEG     Source UR     Site  URINE, JORDAN CATH     Urine Culture No growth at 24 hours    URINE MICROSCOPIC (W/UA)    Collection Time: 04/06/19  4:49 PM   Result Value Ref Range    WBC 0-2 /hpf    RBC 5-10 (A) /hpf    Bacteria Negative None /hpf    Epithelial Cells Negative /hpf    Hyaline Cast 0-2 /lpf   ISTAT ARTERIAL BLOOD GAS    Collection Time: 04/06/19  5:12 PM   Result Value Ref Range    Ph 7.443 7.400 - 7.500    Pco2 35.1 26.0 - 37.0 mmHg    Po2 287 (H) 64 - 87 mmHg    Tco2 25 20 - 33 mmol/L    S02 100 (H) 93 - 99 %    Hco3 24.0 17.0 - 25.0 mmol/L    BE 0 -4 - 3 mmol/L    Body Temp 99.0 F degrees    O2 Therapy 100 %    iPF Ratio 287     Ph Temp Yumiko 7.439 7.400 - 7.500    Pco2 Temp Co 35.5 26.0 - 37.0 mmHg    Po2 Temp Cor 288 (H) 64 - 87 mmHg    Specimen Arterial     Action Range Triggered NO     Inst. Qualified Patient YES    Comp Metabolic Panel    Collection Time: 04/06/19  5:27 PM   Result Value Ref Range    Sodium 132 (L) 135 - 145 mmol/L    Potassium 4.1 3.6 - 5.5 mmol/L    Chloride 101 96 - 112 mmol/L    Co2 23 20 - 33 mmol/L    Anion Gap 8.0 0.0 - 11.9    Glucose 208 (H) 65 - 99 mg/dL    Bun 11 8 - 22 mg/dL    Creatinine 0.65 0.50 - 1.40 mg/dL    Calcium 8.1 (L) 8.5 - 10.5 mg/dL    AST(SGOT) 16 12 - 45 U/L    ALT(SGPT) 8 2 - 50 U/L    Alkaline Phosphatase 65 30 - 99 U/L    Total Bilirubin 0.6 0.1 - 1.5 mg/dL    Albumin 3.3 3.2 - 4.9 g/dL    Total Protein 6.1 6.0 - 8.2 g/dL    Globulin 2.8 1.9 - 3.5 g/dL    A-G Ratio 1.2 g/dL   TROPONIN    Collection Time: 04/06/19  5:27 PM   Result Value Ref Range    Troponin I 0.08 (H) 0.00 - 0.04 ng/mL   MAGNESIUM    Collection Time: 04/06/19  5:27 PM   Result Value Ref Range    Magnesium 1.5 1.5 - 2.5 mg/dL   PHOSPHORUS    Collection Time: 04/06/19  5:27 PM   Result Value Ref Range    Phosphorus 3.9 2.5 - 4.5 mg/dL   Prothrombin Time    Collection Time: 04/06/19  5:27 PM   Result Value Ref Range    PT 15.8 (H) 12.0 - 14.6 sec    INR 1.25 (H) 0.87 - 1.13   ESTIMATED GFR    Collection Time:  04/06/19  5:27 PM   Result Value Ref Range    GFR If African American >60 >60 mL/min/1.73 m 2    GFR If Non African American >60 >60 mL/min/1.73 m 2   ACCU-CHEK GLUCOSE    Collection Time: 04/06/19  6:42 PM   Result Value Ref Range    Glucose - Accu-Ck 179 (H) 65 - 99 mg/dL   ACCU-CHEK GLUCOSE    Collection Time: 04/06/19  8:43 PM   Result Value Ref Range    Glucose - Accu-Ck 140 (H) 65 - 99 mg/dL   ACCU-CHEK GLUCOSE    Collection Time: 04/07/19  5:44 AM   Result Value Ref Range    Glucose - Accu-Ck 165 (H) 65 - 99 mg/dL   CBC WITH DIFFERENTIAL    Collection Time: 04/07/19  5:45 AM   Result Value Ref Range    WBC 5.6 4.8 - 10.8 K/uL    RBC 4.38 4.20 - 5.40 M/uL    Hemoglobin 12.3 12.0 - 16.0 g/dL    Hematocrit 37.0 37.0 - 47.0 %    MCV 82.9 81.4 - 97.8 fL    MCH 27.6 27.0 - 33.0 pg    MCHC 33.3 (L) 33.6 - 35.0 g/dL    RDW 43.8 35.9 - 50.0 fL    Platelet Count 244 164 - 446 K/uL    MPV 9.9 9.0 - 12.9 fL    Neutrophils-Polys 79.80 (H) 44.00 - 72.00 %    Lymphocytes 11.40 (L) 22.00 - 41.00 %    Monocytes 8.20 0.00 - 13.40 %    Eosinophils 0.00 0.00 - 6.90 %    Basophils 0.40 0.00 - 1.80 %    Immature Granulocytes 0.20 0.00 - 0.90 %    Nucleated RBC 0.00 /100 WBC    Neutrophils (Absolute) 4.46 2.00 - 7.15 K/uL    Lymphs (Absolute) 0.64 (L) 1.00 - 4.80 K/uL    Monos (Absolute) 0.46 0.00 - 0.85 K/uL    Eos (Absolute) 0.00 0.00 - 0.51 K/uL    Baso (Absolute) 0.02 0.00 - 0.12 K/uL    Immature Granulocytes (abs) 0.01 0.00 - 0.11 K/uL    NRBC (Absolute) 0.00 K/uL   Comp Metabolic Panel    Collection Time: 04/07/19  5:45 AM   Result Value Ref Range    Sodium 136 135 - 145 mmol/L    Potassium 3.0 (L) 3.6 - 5.5 mmol/L    Chloride 105 96 - 112 mmol/L    Co2 21 20 - 33 mmol/L    Anion Gap 10.0 0.0 - 11.9    Glucose 169 (H) 65 - 99 mg/dL    Bun 13 8 - 22 mg/dL    Creatinine 0.62 0.50 - 1.40 mg/dL    Calcium 8.0 (L) 8.5 - 10.5 mg/dL    AST(SGOT) 16 12 - 45 U/L    ALT(SGPT) 10 2 - 50 U/L    Alkaline Phosphatase 56 30 - 99 U/L     Total Bilirubin 0.5 0.1 - 1.5 mg/dL    Albumin 3.0 (L) 3.2 - 4.9 g/dL    Total Protein 5.8 (L) 6.0 - 8.2 g/dL    Globulin 2.8 1.9 - 3.5 g/dL    A-G Ratio 1.1 g/dL   ESTIMATED GFR    Collection Time: 19  5:45 AM   Result Value Ref Range    GFR If African American >60 >60 mL/min/1.73 m 2    GFR If Non African American >60 >60 mL/min/1.73 m 2   ISTAT ARTERIAL BLOOD GAS    Collection Time: 19  5:46 AM   Result Value Ref Range    Ph 7.474 7.400 - 7.500    Pco2 27.9 26.0 - 37.0 mmHg    Po2 167 (H) 64 - 87 mmHg    Tco2 21 20 - 33 mmol/L    S02 100 (H) 93 - 99 %    Hco3 20.5 17.0 - 25.0 mmol/L    BE -2 -4 - 3 mmol/L    Body Temp 38.6 C degrees    O2 Therapy 50 %    iPF Ratio 334     Ph Temp Yumiko 7.450 7.400 - 7.500    Pco2 Temp Co 30.0 26.0 - 37.0 mmHg    Po2 Temp Cor 177 (H) 64 - 87 mmHg    Specimen Arterial     Action Range Triggered NO     Inst. Qualified Patient YES    EKG    Collection Time: 19  9:45 AM   Result Value Ref Range    Report       [facility]    Test Date:  2019  Pt Name:    JANE GARCIA           Department: 161  MRN:        [id number]                      Room:       [facility]  Gender:     Female                       Technician: DORIAN  :        1950                   Requested By:JESSIKA BAJWA  Order #:    [id number]                    Reading MD: Nura Tanner MD    Measurements  Intervals                                Axis  Rate:       83                           P:          65  OR:         133                          QRS:        59  QRSD:       118                          T:          -23  QT:         439  QTc:        516    Interpretive Statements  SINUS RHYTHM  BORDERLINE LOW VOLTAGE, EXTREMITY LEADS  Compared to ECG 2019 16:49:09  ST (T wave) deviation has resolved    Electronically Signed On 2019 18:56:03 PDT by Nura Tanner MD     AMMONIA    Collection Time: 19 12:45 PM   Result Value Ref Range    Ammonia 30 11 - 45 umol/L   CRP  QUANTITIVE (NON-CARDIAC)    Collection Time: 04/07/19 12:45 PM   Result Value Ref Range    Stat C-Reactive Protein 12.11 (H) 0.00 - 0.75 mg/dL   ACCU-CHEK GLUCOSE    Collection Time: 04/07/19  1:15 PM   Result Value Ref Range    Glucose - Accu-Ck 164 (H) 65 - 99 mg/dL       Imaging        Assessment/Plan  Altered mental status- (present on admission)   Assessment & Plan    Her 5 days of poor mental status remain unexplained.  Workup to date is been largely unrevealing.  Is negative metabolic evaluation negative infectious disease workup to date, negative CSF.  Vitamin deficiency may be an etiology.  Allergies suspects vitamin B12 she is on replacement.  Given the negative LP and encephalitis also seems unlikely the best we can do at this point is to provide supportive care and minimal sedation and see how her normal grasses.  Send initial evaluation for rheumatologic workups vasculitis lupus cerebritis and so forth.     Chronic obstructive pulmonary disease (HCC)- (present on admission)   Assessment & Plan    Patient carries a history of COPD.  She is now on a mechanical ventilator.  However, indications for intubation were poor airway protection and not an acute exacerbation of COPD.  We will continue to treat with bronchodilators as needed. The rate limiting factor for extubation as her mental status not her pulmonary status this point     Controlled type 2 diabetes mellitus without complication, without long-term current use of insulin (HCC)- (present on admission)   Assessment & Plan    Insulin sliding scale     Respiratory failure (HCC)   Assessment & Plan    Acute respiratory failure secondary to aspiration of contents into the airway.  Did not have not have obstructive airway disease but she had poor airway reflexes poor airway protection.  Thus I would recommend holding off on extubation until her neurologic status improves or her family wishes to change goals of care.  She should undergo daily awakening  trials and spontaneous breathing trials meanwhile she should be mechanically ventilated using the ARDS network algorithm for lung protective strategies.     Sepsis ([facility])   Assessment & Plan    She has been treated as sepsis with antibiotics.  However, it is not clear that she has a major source has had a long for sepsis     Systolic and diastolic CHF, chronic (HCC)   Assessment & Plan    Patient carries a history of systolic and diastolic heart failure that is chronic.  That that problem does not seem to be an acute one on this admission to date         Discussed patient condition and risk of morbidity and/or mortality with Hospitalist, RN, RT, Therapies, Pharmacy and Charge nurse / hot rounds.    The patient remains critically ill.  Critical care time = 115 minutes in directly providing and coordinating critical care and extensive data review.  No time overlap and excludes procedures.  Today I titrated fluids pressors reevaluate antibiotics adjusted mechanical ventilation, did serial neurologic exams coordinated with the consultants this complicated critically ill patient

## 2019-04-08 NOTE — CARE PLAN
Problem: Skin Integrity  Goal: Risk for impaired skin integrity will decrease  Outcome: PROGRESSING AS EXPECTED  Nystatin applied to breast folds    Problem: Mobility  Goal: Risk for activity intolerance will decrease  Outcome: PROGRESSING AS EXPECTED  Pt tolerated 10 minutes of EOB well

## 2019-04-08 NOTE — ASSESSMENT & PLAN NOTE
Likely metabolic with possible early dementia vs polypharmacy effects  Neurology following  EEG slightly abnormal -continue Keppra  Limit sedatives, reorient, maintain sleep/wake cycle  Vitamin repletion

## 2019-04-08 NOTE — PROGRESS NOTES
12 hr cc    Monitor summary  nsr-st  90s-110s    .16/.10/.36    Off sedation. Moves feet to command. ROWE 4/5 equally. PRN fentanyl pushes for CPOT  VSS. Off pressor support. Afebrile  50/8 on vent  Strict NPO. Morning PO pills held. Cortrak in place. +BM  Adequate output from silver. Appox 30-50 cc/hr

## 2019-04-08 NOTE — ASSESSMENT & PLAN NOTE
Source -lungs and possible CNS - resolved  Completed Zosyn 4/9  Discontinue continue acyclovir for possible herpes encephalitis as PCR negative

## 2019-04-08 NOTE — CARE PLAN
Problem: Safety  Goal: Will remain free from injury  Outcome: PROGRESSING AS EXPECTED  Pt provided discontinuation criteria for soft wrist restraints, no evidence of learning, restraints in place. Pt provided education on safety,  and use of call light. Treaded socks in place, call light within reach, hourly rounding in effect.       Problem: Skin Integrity  Goal: Risk for impaired skin integrity will decrease  Outcome: PROGRESSING AS EXPECTED  Pt risk factors for impaired skin integrity assessed, Ousmane score documented in flowsheet. Mepilex and waffle cushion placed, heel float boots and pillows in use for support and positioning, Q 2 hr turning and hourly rounding in effect.

## 2019-04-08 NOTE — PROGRESS NOTES
Critical Care Progress Note    Date of admission  4/4/2019    Chief Complaint  68 y.o. female admitted 4/4/2019 with altered mental status    Hospital Course    68 y.o. female who presented 4/4/2019 with several days of rapidly deteriorating mental status.  She reportedly at baseline is alert and oriented x4 and able to do her ADLs.  At time of admission the hospital she would say 1 word yes and no did not consistently follow commands had symmetric findings.  She was transferred to the ICU from the [facility] as a rapid response yesterday afternoon.  I found her to have gurgling respirations a week  cough and gag.  I urgently intubated her and placed a central line.  Today her workup is been mostly unrevealing in terms of explaining her deterioration over the past 4 days and mental status that she has a negative head CT negative MRI negative metabolic panel and today a LP results that are unremarkable.   Vitamin deficiency such as B12 is a possibility.  Initial rheumatologic workup is pending.  She is on minimal sedation on the ventilator looks around has purposeful movement is still thought not follow any commands.  She is now vent day 2 on low-dose pressors for organ dysfunction.  On broad-spectrum antibiotics for possible sepsis and receiving thiamine and multivitamins folate and vitamin B12        Interval Problem Update  Reviewed last 24 hour events:   - opens eyes spontanesouly and follows intermittently   - AF   - holding TFs due to emesis, attempt trickle today   - good UOP   - off NE gtt   - day 3 VD: SBT with 1.5 hrs with RSB 36, not following for parameters --> forced pending   - ID following, zosyn for aspiration, acyclovir for herpes encephalitis   - low K and Mag    Review of Systems  Review of Systems   Unable to perform ROS: Intubated        Vital Signs for last 24 hours   Temp:  [37.3 °C (99.1 °F)-37.6 °C (99.7 °F)] 37.3 °C (99.1 °F)  Pulse:  [] 95  Resp:  [12-22] 12  SpO2:  [95 %-100 %] 100  %    Respiratory Information for the last 24 hours  Otto Vent Mode: APVCMV  Rate (breaths/min): 14  Vt Target (mL): 400  PEEP/CPAP: 8  FiO2: 30  P MEAN: 9.5  Length of Weaning Trial (Hours): 1.5  Control VTE (exp VT): 435    Physical Exam   Physical Exam   Constitutional: She appears well-developed and well-nourished. She is uncooperative.  Non-toxic appearance. She has a sickly appearance. She appears ill. She is sedated and intubated.   HENT:   Head: Normocephalic and atraumatic.   Nose: Nose normal.   Mouth/Throat: Oropharynx is clear and moist.   Endotracheal tube and gastric tube in position   Eyes: Pupils are equal, round, and reactive to light. Conjunctivae are normal. No scleral icterus.   Neck: Neck supple. No JVD present. No tracheal deviation present.   Cardiovascular: Regular rhythm and intact distal pulses.   Occasional extrasystoles are present. Tachycardia present.  PMI is not displaced.  Exam reveals no distant heart sounds and no decreased pulses.    No murmur heard.  Pulmonary/Chest: Effort normal and breath sounds normal. No accessory muscle usage. She is intubated. She has no decreased breath sounds. She has no wheezes. She has no rhonchi.   Abdominal: Soft. Bowel sounds are normal. She exhibits no distension. There is no tenderness. There is no rebound.   Genitourinary:   Genitourinary Comments: Greene catheter in place   Musculoskeletal: She exhibits edema (Trace lower extremity bilaterally). She exhibits no tenderness.   Neurological: She is alert.   Opens eyes and makes eye contact, nods appropriately at times but intermittent following in extremities   Skin: Skin is warm and dry. She is not diaphoretic. No pallor.   Psychiatric:   Unable to assess given current clinical condition   Nursing note and vitals reviewed.      Medications  Current Facility-Administered Medications   Medication Dose Route Frequency Provider Last Rate Last Dose   • potassium chloride in water (KCL) ivpb  **Administer in ICU only** 40 mEq  40 mEq Intravenous Once Jeremy M Gonda, M.D.        Followed by   • potassium chloride in water (KCL) ivpb **Administer in ICU only** 20 mEq  20 mEq Intravenous Once Jeremy M Gonda, M.D.       • magnesium sulfate IVPB premix 2 g  2 g Intravenous Once Jeremy M Gonda, M.D.       • lactulose 20 GM/30ML solution 30 mL  30 mL Enteral Tube BID Osmin Glynn M.D.       • piperacillin-tazobactam (ZOSYN) 3.375 g in  mL IVPB  3.375 g Intravenous Q8HRS Sandy Bustillo M.D. 25 mL/hr at 04/08/19 0557 3.375 g at 04/08/19 0557   • oxybutynin (DITROPAN) tablet 5 mg  5 mg Enteral Tube BID Ian Prado M.D.   Stopped at 04/07/19 0800   • aspirin (ASA) chewable tab 81 mg  81 mg Enteral Tube DAILY Osmin Glynn M.D.   Stopped at 04/08/19 0600   • cyanocobalamin (VITAMIN B-12) injection 1,000 mcg  1,000 mcg Intramuscular Q30 DAYS Osmin Glynn M.D.   1,000 mcg at 04/07/19 1844   • vitamin D (cholecalciferol) tablet 2,000 Units  2,000 Units Enteral Tube DAILY Osmin Glynn M.D.   Stopped at 04/07/19 1545   • gabapentin (NEURONTIN) capsule 200 mg  200 mg Enteral Tube TID Osmin Glynn M.D.   Stopped at 04/07/19 1800   • lactated ringers infusion   Intravenous Continuous Sandy Gastelum M.D. 100 mL/hr at 04/08/19 0600     • NS (BOLUS) infusion 500 mL  500 mL Intravenous Once PRN Sandy Gastelum M.D.       • levETIRAcetam (KEPPRA) 500 mg in  mL IVPB  500 mg Intravenous Q12HRS Sebastián Herman M.D.   Stopped at 04/08/19 0612   • senna-docusate (PERICOLACE or SENOKOT S) 8.6-50 MG per tablet 2 Tab  2 Tab Enteral Tube BID Sandy Gastelum M.D.   Stopped at 04/06/19 1800    And   • polyethylene glycol/lytes (MIRALAX) PACKET 1 Packet  1 Packet Enteral Tube QDAY PRN Sandy Gastelum M.D.        And   • magnesium hydroxide (MILK OF MAGNESIA) suspension 30 mL  30 mL Enteral Tube QDAY PRN Sandy Gastelum M.D.        And   • bisacodyl (DULCOLAX) suppository 10 mg  10 mg Rectal  QDAY PRN Sandy Gastelum M.D.       • fentaNYL (SUBLIMAZE) injection 100 mcg  100 mcg Intravenous Q15 MIN PRN Ian Prado M.D.   100 mcg at 04/08/19 0302    And   • fentaNYL (SUBLIMAZE) injection 200 mcg  200 mcg Intravenous Q15 MIN PRN Ian Prado M.D.        And   • fentaNYL (SUBLIMAZE) 50 mcg/mL in 50mL (Continuous Infusion)   Intravenous Continuous Ian Prado M.D.   Stopped at 04/07/19 1220    And   • propofol (DIPRIVAN) injection  0-80 mcg/kg/min Intravenous Continuous Ian Prado M.D.   Stopped at 04/06/19 1700   • Respiratory Care per Protocol   Nebulization Continuous RT Ian Prado M.D.       • famotidine (PEPCID) tablet 20 mg  20 mg Enteral Tube Q12HRS Ian Prado M.D.        Or   • famotidine (PEPCID) injection 20 mg  20 mg Intravenous Q12HRS Ian Prado M.D.   20 mg at 04/08/19 0557   • MD Alert...ICU Electrolyte Replacement per Pharmacy   Other PHARMACY TO DOSE Ian Prado M.D.       • heparin injection 5,000 Units  5,000 Units Subcutaneous Q8HRS Ian rPado M.D.   5,000 Units at 04/08/19 0556   • norepinephrine (LEVOPHED) 8 mg in  mL Infusion  0-30 mcg/min Intravenous Continuous Ian Prado M.D.   Stopped at 04/07/19 1630   • acetaminophen (TYLENOL) suppository 650 mg  650 mg Rectal Q6HRS PRN Morgan Davis M.D.   650 mg at 04/07/19 0519   • ipratropium-albuterol (DUONEB) nebulizer solution  3 mL Nebulization Q4H PRN (RT) Raymond Gastelum M.D.       • insulin regular (HUMULIN R) injection 2-9 Units  2-9 Units Subcutaneous 4X/DAY DORIE Gastelum M.D.   Stopped at 04/07/19 1700    And   • dextrose 50% (D50W) injection 25 mL  25 mL Intravenous Q15 MIN PRN Sandy Gastelum M.D.       • nystatin (MYCOSTATIN) powder   Topical BID Sandy Gastelum M.D.       • acyclovir (ZOVIRAX) 616 mg in  mL IVPB  10 mg/kg (Ideal) Intravenous Q8HRS Sandy Gastelum M.D.   Stopped at 04/08/19 0335   • Pharmacy Consult: Enteral tube insertion - review  meds/change route/product selection  1 Each Other PHARMACY TO DOSE Sandy Gastelum M.D.       • acetaminophen (TYLENOL) tablet 650 mg  650 mg Enteral Tube Q6HRS PRN Sandy Gastelum M.D.       • atorvastatin (LIPITOR) tablet 40 mg  40 mg Enteral Tube DAILY Sandy Gastelum M.D.   Stopped at 04/07/19 0600       Fluids    Intake/Output Summary (Last 24 hours) at 04/08/19 0818  Last data filed at 04/08/19 0600   Gross per 24 hour   Intake           961.79 ml   Output              935 ml   Net            26.79 ml       Laboratory  Recent Labs      04/06/19   1017  04/06/19   1712  04/07/19   0546   FMSXO37F  7.49   --    --    HAOBBB201I  27.8   --    --    [id number]  67.5   --    --    DUVR8CEV  93.3   --    --    ARTHCO3  21   --    --    ARTBE  -1   --    --    ISTATAPH   --   7.443  7.474   ISTATAPCO2   --   35.1  27.9   ISTATAPO2   --   287*  167*   ISTATATCO2   --   25  21   SMBYHAF2LQT   --   100*  100*   ISTATARTHCO3   --   24.0  20.5   ISTATARTBE   --   0  -2   ISTATTEMP   --   99.0 F  38.6 C   ISTATFIO2   --   100  50   ISTATSPEC   --   Arterial  Arterial   ISTATAPHTC   --   7.439  7.450   SFBOPUEO2JS   --   288*  177*     Recent Labs      04/06/19 1727   TROPONINI  0.08*     Recent Labs      04/06/19   0334  04/06/19   1727 04/07/19   0545  04/08/19   0305   SODIUM  135  132*  136  138   POTASSIUM  3.8  4.1  3.0*  2.9*   CHLORIDE  102  101  105  109   CO2  22  23  21  21   BUN  10  11  13  11   CREATININE  0.69  0.65  0.62  0.53   MAGNESIUM  1.6  1.5   --   1.9   PHOSPHORUS  2.2*  3.9   --   2.9   CALCIUM  9.0  8.1*  8.0*  7.8*     Recent Labs      04/06/19   1727 04/07/19   0545  04/08/19   0305   ALTSGPT  8  10  11   ASTSGOT  16  16  17   ALKPHOSPHAT  65  56  54   TBILIRUBIN  0.6  0.5  0.3   GLUCOSE  208*  169*  134*     Recent Labs      04/06/19   0335  04/06/19   1727  04/07/19   0545  04/08/19   0305   WBC  13.5*   --   5.6  4.8   NEUTSPOLYS  89.40*   --   79.80*  81.60*   LYMPHOCYTES  3.80*   --   11.40*   11.20*   MONOCYTES  6.20   --   8.20  6.20   EOSINOPHILS  0.00   --   0.00  0.40   BASOPHILS  0.20   --   0.40  0.40   ASTSGOT   --   16  16  17   ALTSGPT   --   8  10  11   ALKPHOSPHAT   --   65  56  54   TBILIRUBIN   --   0.6  0.5  0.3     Recent Labs      04/06/19   0335  04/06/19   1134  04/06/19   1727  04/07/19   0545  04/08/19   0305   RBC  5.48*   --    --   4.38  4.19*   HEMOGLOBIN  15.2   --    --   12.3  11.6*   HEMATOCRIT  46.8   --    --   37.0  35.5*   PLATELETCT  298   --    --   244  209   PROTHROMBTM   --   15.1*  15.8*   --    --    INR   --   1.18*  1.25*   --    --        Imaging  X-Ray:  I have personally reviewed the images and compared with prior images. and My impression is: No acute cardia pulmonary process, endotracheal tube/gastric tube/left subclavian central venous catheter in good position  CT:    Reviewed  Ultrasound:  Reviewed  MRI:   Reviewed     CT angio of the brain 4/6:  1.  Hypoplastic right internal carotid artery, there is a segment of occlusion seen in the distal right intracranial internal carotid artery near the petrous apex. The intracranial internal carotid artery reconstitutes distal to this segment of   occlusion.  2.  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  3.  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  4.  Changes of atrophy with nonspecific white matter changes, most commonly associated with small vessel ischemia.    Echocardiography 4/6:  CONCLUSIONS  No prior study is available for comparison.   Technically difficult study incomplete information is obtained.   Inferior and inferiolateral wall  akinesis.  Moderately reduced left ventricular systolic function.  Left ventricular ejection fraction is visually estimated to be 30%.  Inferior and inferiolateral wall  akinesis.  Mild mitral regurgitation.  Estimated right ventricular systolic pressure  is 25  mmHg.      Assessment/Plan  Respiratory failure ([facility])   Assessment & Plan    Intubated 4/6  Continue full mechanical ventilatory support, titrate FiO2  SBT with extubation trial if does well  Aspiration precautions  RT/O2 protocols  Mobilization     Sepsis ([facility])   Assessment & Plan    Source -lungs and possible CNS  Continue Zosyn, follow-up on culture results  Continue acyclovir for possible herpes encephalitis     Altered mental status- (present on admission)   Assessment & Plan    Likely metabolic with some improvement in last 24 hours  Neurology consultation  Limit sedatives, reorient, maintain sleep/wake cycle  EEG     Systolic and diastolic CHF, chronic ([facility])   Assessment & Plan    Currently controlled, ejection fraction 30%  Continue beta-blocker  Resume ACE inhibitor     Coronary artery disease involving native coronary artery of native heart without angina pectoris- (present on admission)   Assessment & Plan    Continue antiplatelets, statin  Beta-blocker     Chronic obstructive pulmonary disease ([facility])- (present on admission)   Assessment & Plan    Without exacerbation  RT protocols  PRN bronchodilators     Controlled type 2 diabetes mellitus without complication, without long-term current use of insulin ([facility])- (present on admission)   Assessment & Plan    Continue insulin sliding scale for now  DiaBeta source for tube feeds     Cerebral aneurysm without rupture   Assessment & Plan    Likely incidental finding at this time, 1.9 mm LAURENT without rupture  Strict blood pressure control  Eventual IR evaluation for possible empiric coiling     Hypomagnesemia   Assessment & Plan    Repletion and monitor closely     Hypokalemia   Assessment & Plan    Repletion and monitor closely     Pharyngoesophageal dysphagia   Assessment & Plan    Continue n.p.o. status  Enteral nutrition via cord track     Dyslipidemia- (present on admission)   Assessment & Plan    Statin     Full code    VTE:  Heparin  Ulcer: H2  Antagonist  Lines: Central Line  Ongoing indication addressed and Greene Catheter  Ongoing indication addressed    I have performed a physical exam and reviewed and updated ROS and Plan today (4/8/2019). In review of yesterday's note (4/7/2019), there are no changes except as documented above.     Discussed patient condition and risk of morbidity and/or mortality with Hospitalist, RN, RT, Pharmacy, Charge nurse / hot rounds, Patient and neurology  The patient remains critically ill.  Critical care time = 40 minutes in directly providing and coordinating critical care and extensive data review.  No time overlap and excludes procedures.

## 2019-04-08 NOTE — PROGRESS NOTES
Infectious Disease Progress Note    Author: Alicia Boyd M.D. Date & Time of service: 2019  9:41 AM    Chief Complaint:  Follow-up for acute encephalopathy    Interval History:  68-year-old white female admitted 2019 due to deterioration in her mental status.   Temp 103.1 WBC 5.6 had resp failure-now intubated in the ICU on pressors FiO2 0.5 LP done   T-max 99.7 WBC 4.8 patient is awake on the vent, she intermittently follows commands, spouse at bedside states her mental status is somewhat improved.  Meningitis panel pending, plan for possible extubation today per nursing staff    Labs Reviewed, Medications Reviewed and Radiology Reviewed.    Review of Systems:  Review of Systems   Unable to perform ROS: Intubated       Hemodynamics:  Temp (24hrs), Av.4 °C (99.4 °F), Min:37.3 °C (99.1 °F), Max:37.6 °C (99.7 °F)  Temperature: 37.3 °C (99.1 °F), Monitored Temp: 37.2 °C (99 °F)  Pulse  Av.1  Min: 57  Max: 134Heart Rate (Monitored): (!) 109  NIBP: 138/75       Physical Exam:  Physical Exam   Constitutional: She appears well-developed. No distress.   HENT:   Head: Normocephalic and atraumatic.   Mouth/Throat: No oropharyngeal exudate.   Endotracheal tube   Eyes: Conjunctivae are normal. Right eye exhibits no discharge. Left eye exhibits no discharge. No scleral icterus.   Neck: Normal range of motion. Neck supple. No JVD present.   Cardiovascular: Regular rhythm.    tachycardic   Pulmonary/Chest: No stridor. No respiratory distress. She has no wheezes. She has rales.   Left subclavian catheter   Abdominal: Soft. She exhibits no distension. There is no tenderness. There is no rebound.   Musculoskeletal: She exhibits no edema.   Lymphadenopathy:     She has no cervical adenopathy.   Neurological:   Awakens to voice on vent-nods apprpopriately and intermittently following commands   Skin: Skin is warm. No rash noted. She is not diaphoretic.   Nursing note and vitals  reviewed.      Meds:    Current Facility-Administered Medications:   •  potassium chloride (KCL-CENTRAL) IV *Administer in ICU only* **FOLLOWED BY** potassium chloride (KCL-CENTRAL) IV *Administer in ICU only*  •  magnesium sulfate  •  lactulose  •  [COMPLETED] piperacillin-tazobactam **AND** piperacillin-tazobactam  •  oxybutynin  •  aspirin  •  cyanocobalamin  •  vitamin D  •  gabapentin  •  NS  •  levETIRAcetam (KEPPRA) IV  •  senna-docusate **AND** polyethylene glycol/lytes **AND** magnesium hydroxide **AND** bisacodyl  •  fentaNYL **AND** fentaNYL **AND** fentaNYL **AND** propofol **AND** Triglyceride  •  Respiratory Care per Protocol  •  famotidine **OR** famotidine  •  MD Alert...Adult ICU Electrolyte Replacement per Pharmacy  •  heparin  •  acetaminophen  •  ipratropium-albuterol  •  insulin regular **AND** Accu-Chek ACHS **AND** NOTIFY MD and PharmD **AND** [DISCONTINUED] glucose **AND** dextrose 50%  •  nystatin  •  acyclovir (ZOVIRAX) IV  •  Pharmacy  •  acetaminophen  •  atorvastatin    Labs:  Recent Labs      04/06/19   0335  04/07/19   0545  04/08/19   0305   WBC  13.5*  5.6  4.8   RBC  5.48*  4.38  4.19*   HEMOGLOBIN  15.2  12.3  11.6*   HEMATOCRIT  46.8  37.0  35.5*   MCV  85.4  82.9  84.7   MCH  27.7  27.6  27.7   RDW  43.9  43.8  44.4   PLATELETCT  298  244  209   MPV  10.1  9.9  10.7   NEUTSPOLYS  89.40*  79.80*  81.60*   LYMPHOCYTES  3.80*  11.40*  11.20*   MONOCYTES  6.20  8.20  6.20   EOSINOPHILS  0.00  0.00  0.40   BASOPHILS  0.20  0.40  0.40     Recent Labs      04/06/19   1727  04/07/19   0545  04/08/19   0305   SODIUM  132*  136  138   POTASSIUM  4.1  3.0*  2.9*   CHLORIDE  101  105  109   CO2  23  21  21   GLUCOSE  208*  169*  134*   BUN  11  13  11     Recent Labs      04/06/19   1727  04/07/19   0545  04/08/19   0305   ALBUMIN  3.3  3.0*  2.8*   TBILIRUBIN  0.6  0.5  0.3   ALKPHOSPHAT  65  56  54   TOTPROTEIN  6.1  5.8*  5.3*   ALTSGPT  8  10  11   ASTSGOT  16  16  17   CREATININE  0.65   0.62  0.53       Imaging:  Ct-cta Head With & W/o-post Process    Result Date: 4/6/2019 4/5/2019 10:45 PM HISTORY/REASON FOR EXAM:  Stroke, follow up; Neurological Deficit TECHNIQUE/EXAM DESCRIPTION: CT angiogram of the [place] without and with contrast.  Initial precontrast images were obtained of the head from the skull base through the vertex.  Postcontrast images were obtained of the [place] following the power injection of nonionic contrast at 5.0 mL/sec. Thin-section helical images were obtained with overlapping reconstruction interval. Coronal and sagittal multiplanar volume reformats were generated.  3D angiographic images were reviewed on PACS.  Maximum intensity projection (MIP) images were generated and reviewed. 100 mL of Omnipaque 350 nonionic contrast was injected intravenously. Low dose optimization technique was utilized for this CT exam including automated exposure control and adjustment of the mA and/or kV according to patient size. COMPARISON:  None. FINDINGS: The posterior circulation shows the distal vertebral arteries to be patent. The vertebrobasilar confluence is intact. Fenestrated appearance of the proximal basilar artery is seen, the basilar artery is patent but appears hypoplastic. No aneurysm or occlusive lesion is evident. Lateral persistent trigeminal arteries are seen supplying the posterior cerebral arteries. There is hypoplastic appearing right internal carotid artery seen. Occlusion of the distal right intracranial internal carotid artery is seen near the petrous apex. Atherosclerotic calcification of the bilateral intracranial internal carotid arteries is seen, there is less than 50% stenosis on the left.  1.9 mm aneurysm projects from the superior aspect of the anterior communicating artery. Mild diffuse parenchymal volume loss is identified. Scattered periventricular and subcortical white matter low-attenuation changes are observed. 3D angiographic/MIP images  of the vasculature confirm the vascular findings as described above.     1.  Hypoplastic right internal carotid artery, there is a segment of occlusion seen in the distal right intracranial internal carotid artery near the petrous apex. The intracranial internal carotid artery reconstitutes distal to this segment of occlusion. 2.  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 3.  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 4.  Changes of atrophy with nonspecific white matter changes, most commonly associated with small vessel ischemia. These findings were discussed with the patient's clinician, Dr. Mcallister, on 4/6/2019 4:31 AM.    Ct-cta Neck With & W/o-post Processing    Result Date: 4/6/2019 4/5/2019 10:45 PM HISTORY/REASON FOR EXAM:  Stroke, follow up; Neurological Deficit TECHNIQUE/EXAM DESCRIPTION:  CT angiogram of the neck with contrast. Postcontrast images were obtained of the neck from the great vessels through the skull base following the power injection of nonionic contrast at 5.0 mL/sec. Thin-section helical images were obtained with overlapping reconstruction interval. Coronal and oblique multiplanar volume reformats were generated. Cervical internal carotid artery percent stenosis is calculated using the standard method according to the NASCET criteria wherein a segment of uniform caliber mid or distal cervical internal carotid is used as the reference denominator. 3D angiographic images were reviewed on PACS.  Maximum intensity projection (MIP) images were generated and reviewed 100 mL of Omnipaque 350 nonionic contrast was injected intravenously. Low dose optimization technique was utilized for this CT exam including automated exposure control and adjustment of the mA and/or kV according to patient size. COMPARISON:  None. FINDINGS: The arch origins of the great vessels appear intact. The proximal left subclavian  artery is occluded to the level of the vertebral artery. The right internal carotid artery is small in caliber along its visualized course, otherwise the right common carotid artery, cervical carotid bifurcation, and cervical internal carotid artery are within normal limits. There is no evidence of significant  stenosis, thrombus, or other filling defect or ulceration. There is no evidence of dissection or aneurysm. Intimal thickening versus soft plaque within the left common carotid artery carotid bifurcation, and encroachment internal carotid artery is seen with scattered mild atherosclerosis with less than 50% stenosis. The cervical vertebral arteries are normal bilaterally. Extensive emphysematous changes are seen in the lung apices. 3D angiographic/MIP images of the vasculature confirm the vascular findings as described above.     1.  Occlusion of the left subclavian artery to the level of the vertebral artery with reconstitution, appearance concerning for subclavian steal. 2.  Intimal thickening versus soft plaque and scattered atherosclerotic calcification of the left carotid arterial tree with less than 50% stenosis. 3.  Small caliber right internal carotid artery 4.  Extensive emphysema These findings were discussed with the patient's clinician, Dr. Mcallister, on 4/6/2019 4:31 AM.    Ct-head W/o    Result Date: 4/4/2019 4/4/2019 7:22 PM HISTORY/REASON FOR EXAM:  Altered level of consciousness (LOC), unexplained; Altered Mental Status Confusion. TECHNIQUE/EXAM DESCRIPTION AND NUMBER OF VIEWS: CT of the head without contrast. The study was performed on a helical multidetector CT scanner. Contiguous 2.5 mm axial sections were obtained from the skull base through the vertex. Up to date radiation dose reduction adjustments have been utilized to meet ALARA standards for radiation dose reduction. COMPARISON:  12/25/2018 FINDINGS: There is no evidence of acute intracranial hemorrhage, mass, mass-effect or shift of  midline structures. Mineralization of the right basal ganglia. Gray-white matter differentiation is grossly preserved. Ventricle size and brain parenchymal volume are appropriate for this patient's stated age. The basal cisterns are patent. There are no abnormal extra-axial fluid collections. No depressed or widely  calvarial fracture is seen. The visualized paranasal sinuses and temporal bone structures are aerated. ___________________________________     1. No acute intracranial abnormality. No evidence of acute intracranial hemorrhage or mass lesion.     [id number]-[id number]-7 View    Result Date: 4/7/2019 4/7/2019 10:37 AM HISTORY/REASON FOR EXAM: Nausea. Feeding tube placement TECHNIQUE/EXAM DESCRIPTION AND NUMBER OF VIEWS: 1 supine views of the abdomen. COMPARISON: None FINDINGS: There are diffuse gas-filled loops of small large bowel consistent with ileus. There has been prior left hip arthroplasty. Feeding tube tip overlies the gastric antrum. No abnormal calcifications are seen.     1.  Feeding tube tip overlies the gastric antrum. 2.  Mild ileus.    [id number] View    Result Date: 4/5/2019 4/4/2019 10:23 PM HISTORY/REASON FOR EXAM: MRI screening TECHNIQUE/EXAM DESCRIPTION:  Single AP view the abdomen. COMPARISON:  None FINDINGS: Limited views of the lung bases are clear. Moderate stool is seen throughout the colon, otherwise bowel gas pattern is nonspecific. Postsurgical changes of left total hip arthroplasty are seen. Otherwise the bony structures appear age-appropriate.     1.  Moderate stool in the colon suggests changes of constipation, otherwise nonspecific bowel gas pattern    Dx-chest-limited (1 View)    Result Date: 4/6/2019 4/6/2019 6:39 AM HISTORY/REASON FOR EXAM: Aspiration TECHNIQUE/EXAM DESCRIPTION:  Single AP view of the chest. COMPARISON: April 4, 2019 FINDINGS: Dobbhoff tube has been placed in the interim, terminating below the lower margin of the film within the abdomen. The cardiac  silhouette appears within normal limits. Atherosclerotic calcification of the aorta is noted.  The central pulmonary vasculature appears normal. The lungs appear well expanded bilaterally.  Bilateral lungs are clear. No significant pleural effusions are identified. The bony structures appear age-appropriate.     1.  No acute cardiopulmonary disease. 2.  Atherosclerosis 3.  No radiodense foreign body or medical device identified which would exclude MRI.    Dx-chest-portable (1 View)    Result Date: 4/7/2019 4/7/2019 1:37 AM HISTORY/REASON FOR EXAM: For indication of respiratory failure Aspiration TECHNIQUE/EXAM DESCRIPTION:  Single AP view of the chest. COMPARISON: Yesterday FINDINGS: Medical device position is stable. The cardiac silhouette appears within normal limits. Atherosclerotic calcification of the aorta is noted.  The central pulmonary vasculature appears normal. The lungs appear well expanded bilaterally.  Bilateral lungs are clear. No significant pleural effusions are identified. The bony structures appear age-appropriate.     1.  No acute cardiopulmonary disease. 2.  Atherosclerosis     Dx-chest-portable (1 View)    Result Date: 4/6/2019 4/6/2019 4:54 PM HISTORY/REASON FOR EXAM:  POST INTUBATION. Central line placement. TECHNIQUE/EXAM DESCRIPTION AND NUMBER OF VIEWS: Single portable view of the chest. COMPARISON: 4/6/2019 6:59 AM FINDINGS: Cardiac mediastinal contour is unchanged. Dystrophic calcification of thoracic aorta. Endotracheal tube in place with tip approximately 4 cm above the justin. Feeding tube in place however tip is off the image. LEFT subclavian central venous catheter tip at ther mid RIGHT atrium, approximately 3 cm below the cavoatrial junction level. No pleural fluid collection or pneumothorax. No major bony abnormality is seen.     1.  Supportive tubing as described above. 2.  No pneumothorax. 3.  Persistent hypoinflation.    Dx-chest-portable (1 View)    Result Date: 4/4/2019     4/4/2019 10:23 PM HISTORY/REASON FOR EXAM:  MRI screening TECHNIQUE/EXAM DESCRIPTION:  Single AP view of the chest. COMPARISON: December 11, 2018 FINDINGS: Overlying cardiac leads are present. The cardiac silhouette appears within normal limits. Atherosclerotic calcification of the aorta is noted.  The central pulmonary vasculature appears normal. The lungs appear well expanded bilaterally.  Bilateral lungs are clear. No significant pleural effusions are identified. The bony structures appear age-appropriate.     1.  No acute cardiopulmonary disease. 2.  Atherosclerosis 3.  No radiodense foreign body or medical device identified which would exclude MRI.    Mr-brain-w/o    Result Date: 4/6/2019 4/5/2019 9:24 PM HISTORY/REASON FOR EXAM:  Confusion, acute, unexplained. Altered level of consciousness. TECHNIQUE/EXAM DESCRIPTION: MRI of the brain without contrast. T1 sagittal, T2 fast spin-echo axial, T1 coronal, FLAIR axial with propeller motion correction technique, Diffusion Weighted and Apparent Diffusion Coefficient (ADC map) axial images were obtained of the whole brain. The study was performed on a [employer] 1.5 Erica MRI scanner. COMPARISON:  Head CT 4/4/2019; CTA 4/5/2019 FINDINGS: Motion artifact degrades the examination and limits evaluation. There is diffuse prominence of the CSF containing spaces. There are sparse scattered foci of T2 prolongation in the deep and periventricular white matter which are nonspecific but which most likely reflect areas of chronic microangiopathic ischemic change, though this can also be seen with gliosis and demyelinating processes. The calvariae are unremarkable.  There are no extra-axial fluid collections.  The ventricular system and basal cisterns are otherwise within normal limits.  There are no other areas of abnormal signal in the brain substance.  There are no mass effects or  shift of midline structures.  There are no hemorrhagic lesions.  The diffusion weighted axial  images show no evidence of acute cerebral infarction. The brainstem and posterior fossa structures are unremarkable. Vascular flow voids in the vertebrobasilar and carotid arteries, [place], and dural venous sinuses are not well assessed due to motion. The paranasal sinuses and mastoids in the field of view are unremarkable.     No evidence of intracranial hemorrhage, acute ischemia or mass on this severely motion degraded study    Us-carotid Doppler Bilat    Result Date: 2019   Carotid Duplex  Report  Vascular Laboratory  CONCLUSIONS  1) Atherosclerosis at the carotid bifurcations without hemodynamically  significant stenosis  2) To and fro flow within the left vertebral artery  JANE GARCIA  Exam Date:     2019 20:36  Room #:     Inpatient  Priority:     Routine  Ht (in):             Wt (lb):  Ordering Physician:        RICK DUNLAP  Referring Physician:       GERMÁN Chung  Sonographer:               Joseph Jeter RVT RDCS  Study Type:  Technical Quality:         Adequate  Age:    68    Gender:     F  MRN:    [id number]  :    1950      BSA:  Indications:     Cerebral infarction due to unspecified occlusion or stenosis                    of unspecified carotid arteries  CPT Codes:       04642  ICD Codes:       [id number]  History:         Stenosis and or Occlusion  Limitations:  Right Brachial BP:              /  Left Brachial BP:             /  RIGHT  Plaque          Plaque         Velocity (cm/s)  Characteristics Composition    Syst/Diast                                 27   / 14      Distal ICA                                 12   / 7       Mid ICA  Ulcerative                     31   / 11      Prox ICA  Smooth         Homogeneous     27   / 9       Distal CCA  Smooth         Homogeneous          /         Mid CCA  Smooth         Homogeneous     55   / 9       Prox CCA                                 134  / 25      ECA  Waveform:                                      SCA  LEFT  Plaque         Plaque          Velocity (cm/s)  CharacteristicsComposition     Syst/Diast                                 193  / 24      Distal ICA                                 104  / 15      Mid ICA  Irregular      Heterogeneo     153  / 15      Prox ICA                 us  Irregular      Heterogeneo     130  / 12      Distal CCA                 us  Smooth         Homogeneous          /         Mid CCA  Smooth         Homogeneous     250  / 14      Prox CCA                                 116  / 9       ECA  Waveform:                                     SCA          0.6          ICA/CCA       0.8        Antegrade      Vertebral   Bi-                                   directiona                                   l         46   / 5      cm/s        23    / 14  FINDINGS  Plaque of the bifurcation extending into the internal carotid. Velocities  are consistent with < 50% stenosis of the internal carotid artery.  Plaque is irregular on the surface and homogeneous with medium acoustic  density.  Antegrade right vertebral flow.  Retrograde left vertebral and subclavian  flow.  Collette Green MD  (Electronically Signed)  Final Date:      06 April 2019                   00:35    Ec-echocardiogram Complete W/o Cont    Result Date: 4/6/2019  Transthoracic Echo Report Echocardiography Laboratory CONCLUSIONS No prior study is available for comparison. Technically difficult study incomplete information is obtained. Inferior and inferiolateral wall  akinesis. Moderately reduced left ventricular systolic function. Left ventricular ejection fraction is visually estimated to be 30%. Inferior and inferiolateral wall  akinesis. Mild mitral regurgitation. Estimated right ventricular systolic pressure  is 25 mmHg. Results to ordering physician via [employer] at 18:31 hrs. JANE GARCIA Exam Date:         04/06/2019                    10:02 Exam Location:     Inpatient Priority:          Routine  Ordering Physician:        RICK DUNLAP Referring Physician: Sonographer:               Marielena Pond RDCS Age:    68     Gender:    F MRN:    [id number] :    1950 BSA:    1.83   Ht (in):    67     Wt (lb):    159 Exam Type:     Complete Indications:     Transient cerebral ischemic attack, unspecified ICD Codes:       G459 CPT Codes:       31896 BP:   114    /   57     HR:   115 Technical Quality:       Technically difficult study                          incomplete information is                          obtained MEASUREMENTS  (Male / Female) Normal Values 2D ECHO LV Diastolic Diameter PLAX        5.6 cm                4.2 - 5.9 / 3.9 - 5.3 cm LV Systolic Diameter PLAX         5 cm                  2.1 - 4.0 cm IVS Diastolic Thickness           0.79 cm               LVPW Diastolic Thickness          0.91 cm               RV Diameter 4C                    2.7 cm                2.5 - 2.9 cm LVOT Diameter                     2.1 cm                RA Diameter                       2.1 cm                Estimated LV Ejection Fraction    30 %                  LV Ejection Fraction MOD BP       39.2 %                >= 55  % LV Ejection Fraction MOD 4C       30.9 %                LV Ejection Fraction MOD 2C       43.4 %                LA Volume Index                   20.8 cm³/m²           16 - 28 cm³/m² IVC Diameter                      1.2 cm                DOPPLER AV Peak Velocity                  1.4 m/s               AV Peak Gradient                  7.5 mmHg              AV Mean Gradient                  4.4 mmHg              LVOT Peak Velocity                0.89 m/s              AV Area Cont Eq vti               2.7 cm²               Mitral E Point Velocity           0.85 m/s              Mitral E to A Ratio               0.56                  MV Pressure Half Time             30.1 ms               MV Area PHT                       7.3 cm²               MV Deceleration Time              104 ms                 TR Peak Velocity                  217 cm/s              PV Peak Velocity                  1.4 m/s               PV Peak Gradient                  7.8 mmHg              RVOT Peak Velocity                1.2 m/s               * Indicates values subject to auto-interpretation LV EF:  30    % FINDINGS Left Ventricle Left ventricle is mildly dilated. Normal left ventricular wall thickness. Moderately reduced left ventricular systolic function. Left ventricular ejection fraction is visually estimated to be 30%. Basal  lateral wall akinesis. Inferior and inferiolateral wall  akinesis. Indeterminate diastolic function. Right Ventricle Normal right ventricular size and systolic function. Right Atrium Normal right atrial size. Normal inferior vena cava size and inspiratory collapse. Left Atrium Normal left atrial size. Mitral Valve Thickened mitral valve leaflets. Mild mitral regurgitation. No mitral stenosis. Aortic Valve Structurally normal aortic valve without significant stenosis or regurgitation. Tricuspid Valve Structurally normal tricuspid valve.  Trace tricuspid regurgitation. Right atrial pressure is estimated to be 3 mmHg. Estimated right ventricular systolic pressure  is 25 mmHg. Pulmonic Valve Structurally normal pulmonic valve without significant stenosis or regurgitation. Pericardium Normal pericardium without effusion. Aorta The aortic root is normal.  Ascending aorta diameter is 3.0 cm. Nura Tanner M.D. (Electronically Signed) Final Date:     06 April 2019                 18:33    Dx-abdomen For Tube Placement    Result Date: 4/6/2019 4/6/2019 12:01 AM HISTORY/REASON FOR EXAM: Dobbhoff tube placement TECHNIQUE/EXAM DESCRIPTION:  Single AP view the abdomen. COMPARISON:  April 4, 2019 FINDINGS: Limited views of the lung bases are clear. Dobbhoff tube is seen, the tip lies to the right of the lumbar spine.  The bowel gas pattern appears nonspecific. Residual contrast is seen within the bilateral kidneys  from recent contrast administration. The bony structures appear age-appropriate.     1.  Nonspecific bowel gas pattern. 2.  Dobbhoff tube tip overlying the expected location of the pylorus or first duodenal segment.      Micro:  Results     Procedure Component Value Units Date/Time    URINE CULTURE(NEW) [[id number]] Collected:  04/06/19 1649    Order Status:  Completed Specimen:  Urine from Urine, Jordan Cath Updated:  04/08/19 0552     Significant Indicator NEG     Source UR     Site URINE, JORDAN CATH     Urine Culture No growth at 48 hours    Narrative:       Collected By:[id number] CAMILLA NEWMAN  Indication for culture:->Temp Equal to,or Greater Than 101    Acid Fast Stain [[id number]] Collected:  04/06/19 1257    Order Status:  Completed Specimen:  CSF Updated:  04/07/19 1839     Significant Indicator NEG     Source CSF     Site Tap     AFB Smear Results No acid fast bacilli seen.    Narrative:       Collected By:[id number] JT ROQUE    AFB Culture [[id number]] Collected:  04/06/19 1257    Order Status:  Completed Specimen:  CSF from Spinal Fluid Updated:  04/07/19 1839     Significant Indicator NEG     Source CSF     Site Tap     AFB Culture Culture in progress.     AFB Smear Results No acid fast bacilli seen.    Narrative:       Collected By:[id number] JTIQRA ROQUE    CSF CULTURE [[id number]] Collected:  04/06/19 1539    Order Status:  Completed Specimen:  CSF from Tap Updated:  04/07/19 1522     Significant Indicator NEG     Source CSF     Site TAP     CSF Culture No growth at 24 hours     Gram Stain Result No organisms seen.    Narrative:       Collected By:[id number] JT ROQUE    Culture respiratory w/GRM STN [[id number]] Collected:  04/06/19 1645    Order Status:  Completed Specimen:  Respirate from Tracheal Aspirate Updated:  04/07/19 1354     Significant Indicator NEG     Source RESP     Site TRACHEAL ASPIRATE     Respiratory Culture No growth at 24 hours     Gram Stain Result Rare WBCs.  No organisms seen.       "Narrative:       Collected By:[id number] CAMILLA NEWMAN    GRAM STAIN [[id number]] Collected:  04/06/19 1645    Order Status:  Completed Specimen:  Respirate Updated:  04/07/19 1320     Significant Indicator .     Source RESP     Site TRACHEAL ASPIRATE     Gram Stain Result Rare WBCs.  No organisms seen.      Narrative:       Collected By:[id number] ACMILLA NEWMAN    BLOOD CULTURE [[id number]] Collected:  04/05/19 2120    Order Status:  Completed Specimen:  Blood from Peripheral Updated:  04/07/19 0855     Significant Indicator NEG     Source BLD     Site PERIPHERAL     Blood Culture No Growth    Note: Blood cultures are incubated for 5 days and  are monitored continuously.Positive blood cultures  are called to the RN and reported as soon as  they are identified.      Narrative:       Per Hospital Policy: Only change Specimen Src: to \"Line\" if  specified by physician order.    URINALYSIS [[id number]]  (Abnormal) Collected:  04/06/19 1649    Order Status:  Completed Specimen:  Urine from Urine, [facility] Updated:  04/06/19 1814     Color Yellow     Character Clear     Specific Gravity 1.023     Ph 7.0     Glucose Negative mg/dL      Ketones Trace (A) mg/dL      Protein 300 (A) mg/dL      Bilirubin Negative     Urobilinogen, Urine 0.2     Nitrite Negative     Leukocyte Esterase Negative     Occult Blood Trace (A)     Micro Urine Req Microscopic    Narrative:       Collected By:[id number] CAMILLA NEWMAN  Indication for culture:->Temp Equal to,or Greater Than 101    GRAM STAIN [[id number]] Collected:  04/06/19 1539    Order Status:  Completed Specimen:  CSF Updated:  04/06/19 1649     Significant Indicator .     Source CSF     Site TAP     Gram Stain Result No organisms seen.    Narrative:       Collected By:[id number] JT ROQUE    Rowan Ink [[id number]] Collected:  04/06/19 1257    Order Status:  Completed Specimen:  CSF from Other Body Fluid Updated:  04/06/19 1641     Significant Indicator NEG     Source CSF     Site Tap     " "[facility]an Ink No encapsulated yeast seen    Narrative:       Collected By:[id number] [facility] ALEJANDRA THOMASON  CSF fluid    Cryptococcal Antigen [[id number]]     Order Status:  No result Specimen:  CSF     FLUID CULTURE [[id number]] Collected:  04/06/19 1257    Order Status:  Canceled Specimen:  Other from Other Body Fluid     Blood Culture [[id number]]     Order Status:  No result Specimen:  Blood from Peripheral     Blood Culture [[id number]]     Order Status:  No result Specimen:  Blood from Peripheral     BLOOD CULTURE [[id number]] Collected:  04/05/19 2120    Order Status:  Completed Specimen:  Blood from Peripheral Updated:  04/06/19 0755     Significant Indicator NEG     Source BLD     Site PERIPHERAL     Blood Culture No Growth    Note: Blood cultures are incubated for 5 days and  are monitored continuously.Positive blood cultures  are called to the RN and reported as soon as  they are identified.      Narrative:       Per Hospital Policy: Only change Specimen Src: to \"Line\" if  specified by physician order.    URINALYSIS,CULTURE IF INDICATED [[id number]]  (Abnormal) Collected:  04/04/19 1753    Order Status:  Completed Specimen:  Urine Updated:  04/04/19 1845     Color Yellow     Character Clear     Specific Gravity 1.018     Ph 6.5     Glucose Negative mg/dL      Ketones Trace (A) mg/dL      Protein Negative mg/dL      Bilirubin Negative     Urobilinogen, Urine 0.2     Nitrite Negative     Leukocyte Esterase Negative     Occult Blood Moderate (A)     Micro Urine Req Microscopic          Assessment:  Active Hospital Problems    Diagnosis   • Altered mental status [R41.82]   • Controlled type 2 diabetes mellitus without complication, without long-term current use of insulin ([facility]) [E11.9]   • Sepsis ([facility]) [A41.9]   • Pharyngoesophageal dysphagia [R13.14]   • Systolic and diastolic CHF, chronic ([facility]) [I50.42]       Plan:  Altered mental status, concern for meningoencephalitis.  Improving  Fever curve improving  Resolved " leukocytosis  Off pressors  LP-slight elevation protein, normal glucose, only 1 WBC  Meningitis panel and HSV pending  Arbovirus serology-pending  Blood cxs neg  DC'd vancomycin/amp/ceftraixone as CSF not c/w bacterial meningitis  CT scan did show small aneurysm  MRI did not reveal any stroke or evidence of vasculitis  EEG ordered  Continue acyclovir for now-dc if HSV PCR neg    Vent dependent respiratory failure  CXR unremarkable  Query aspiration-but neg CXR as above  Continue Zosyn  Plan for 5-day course total.  Last day of antibiotics today  Plan for possible extubation today    Chronic right internal carotid artery occlusion   +flow  Continue volume of blood pressure control for     Type 2 diabetes mellitus  Glycosylated hemoglobin 6.6  Maintain blood sugars under 150    Discussed with internal medicine RN and  at bedside

## 2019-04-08 NOTE — PROGRESS NOTES
Hospital Medicine Daily Progress Note    Date of Service  4/8/2019    Chief Complaint  68 y.o. female admitted 4/4/2019 with past medical history of MI in in 2008, systolic and diastolic heart failure, left hip surgery in December, diabetes non-insulin-dependent presents with altered mental status.  Her last known well was 2:30 PM the day before arriving to the hospital.  She is having expressive aphasia.    Hospital Course    Upon arriving to the hospital where she had a temperature of 99, heart rate of 98 blood pressure 118/63.  CT scan of the head found no evidence of acute process.  Chest x-ray and UA were negative for infection.  She has been allowed permissive hypertension for stroke.      Interval Problem Update  Patient seen and examined today. ICU Care  Care and plan discussed in IDT/Hot rounds.  Lines and assistive devices reviewed.    Patient tolerating treatment and therapies.  All Data, Medication data reviewed.  Case discussed with nursing as available.  Plan of Care reviewed with patient and notified of changes.  4/7 the patient intubated overnight was severely lethargic, currently somewhat improved, continues to be febrile at 103.1, LP done and not impressive, MR of the brain without explanation, seen by infectious disease and neurology.  Rechecking ammonia, possibility of vasculitis versus other, possible aspiration with pulmonary infection.  4/8 patient remains intubated, she opens her eyes and follows intermittently, afebrile, did have some emesis overnight with tube feeds on hold, off pressors, remains on empiric antibiotics and acyclovir for remote possibility of viral encephalitis, PCR pending, placing electrolytes.  EEG performed, continues on Keppra,  Consultants/Specialty  Neurology  Infectious disease     Code Status  Full    Disposition  TBD    Review of Systems  Review of Systems   Unable to perform ROS: Intubated        Physical Exam  Temp:  [37.3 °C (99.1 °F)-38.2 °C (100.8 °F)] 37.3 °C  (99.1 °F)  Pulse:  [] 95  Resp:  [12-22] 12  SpO2:  [95 %-100 %] 100 %    Physical Exam   Constitutional: She appears well-developed and well-nourished. She appears lethargic. She has a sickly appearance. She is intubated and restrained.   HENT:   Head: Normocephalic and atraumatic.   Mouth/Throat: No oropharyngeal exudate.   Eyes: Pupils are equal, round, and reactive to light. EOM are normal. No scleral icterus.   Pupils equal and reactive   Neck: Normal range of motion. Neck supple. No JVD present. No tracheal deviation present. No thyromegaly present.   Cardiovascular: Normal rate, regular rhythm and intact distal pulses.  Exam reveals no gallop and no friction rub.    No murmur heard.  Pulmonary/Chest: Effort normal and breath sounds normal. No stridor. She is intubated. No respiratory distress. She has no wheezes. She has no rales. She exhibits no tenderness.   Abdominal: Soft. Bowel sounds are normal. She exhibits no distension and no mass. There is no tenderness. There is no rebound and no guarding.   Musculoskeletal: Normal range of motion. She exhibits no edema.   Lymphadenopathy:     She has no cervical adenopathy.   Neurological: She has normal reflexes. She appears lethargic. No cranial nerve deficit.   Skin: No rash noted. No erythema. No pallor.   Nursing note and vitals reviewed.      Fluids    Intake/Output Summary (Last 24 hours) at 04/08/19 0739  Last data filed at 04/08/19 0600   Gross per 24 hour   Intake          1084.39 ml   Output              995 ml   Net            89.39 ml       Laboratory  Recent Labs      04/06/19   0335  04/07/19   0545  04/08/19   0305   WBC  13.5*  5.6  4.8   RBC  5.48*  4.38  4.19*   HEMOGLOBIN  15.2  12.3  11.6*   HEMATOCRIT  46.8  37.0  35.5*   MCV  85.4  82.9  84.7   MCH  27.7  27.6  27.7   MCHC  32.5*  33.3*  32.7*   RDW  43.9  43.8  44.4   PLATELETCT  298  244  209   MPV  10.1  9.9  10.7     Recent Labs      04/06/19   1727  04/07/19   0545  04/08/19    0305   SODIUM  132*  136  138   POTASSIUM  4.1  3.0*  2.9*   CHLORIDE  101  105  109   CO2  23  21  21   GLUCOSE  208*  169*  134*   BUN  11  13  11   CREATININE  0.65  0.62  0.53   CALCIUM  8.1*  8.0*  7.8*     Recent Labs      04/06/19   1134  04/06/19   1727   INR  1.18*  1.25*               Imaging  DX-CHEST-PORTABLE (1 VIEW)   Final Result         1. No significant interval change.      US-ABDOMEN COMPLETE SURVEY   Final Result      1.  Mildly distended gallbladder with sludge present.   2.  No other evidence for acute cholecystitis.   3.  No significant biliary dilation.   4.  Limited evaluation of the bladder.         UC-AXSCAXY-4 VIEW   Final Result      1.  Feeding tube tip overlies the gastric antrum.      2.  Mild ileus.      DX-CHEST-PORTABLE (1 VIEW)   Final Result         1.  No acute cardiopulmonary disease.   2.  Atherosclerosis         EC-ECHOCARDIOGRAM COMPLETE W/O CONT   Final Result      DX-CHEST-PORTABLE (1 VIEW)   Final Result      1.  Supportive tubing as described above.   2.  No pneumothorax.   3.  Persistent hypoinflation.      MR-BRAIN-W/O   Final Result      No evidence of intracranial hemorrhage, acute ischemia or mass on this severely motion degraded study      DX-CHEST-LIMITED (1 VIEW)   Final Result         1.  No acute cardiopulmonary disease.   2.  Atherosclerosis   3.  No radiodense foreign body or medical device identified which would exclude MRI.      CT-CTA NECK WITH & W/O-POST PROCESSING   Final Result         1.  Occlusion of the left subclavian artery to the level of the vertebral artery with reconstitution, appearance concerning for subclavian steal.   2.  Intimal thickening versus soft plaque and scattered atherosclerotic calcification of the left carotid arterial tree with less than 50% stenosis.   3.  Small caliber right internal carotid artery   4.  Extensive emphysema      These findings were discussed with the patient's clinician, Dr. Mcallister, on 4/6/2019 4:31 AM.       CT-CTA HEAD WITH & W/O-POST PROCESS   Final Result         1.  Hypoplastic right internal carotid artery, there is a segment of occlusion seen in the distal right intracranial internal carotid artery near the petrous apex. The intracranial internal carotid artery reconstitutes distal to this segment of    occlusion.   2.  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   3.  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   4.  Changes of atrophy with nonspecific white matter changes, most commonly associated with small vessel ischemia.      These findings were discussed with the patient's clinician, Dr. Mcallister, on 4/6/2019 4:31 AM.      DX-ABDOMEN FOR TUBE PLACEMENT   Final Result         1.  Nonspecific bowel gas pattern.   2.  Dobbhoff tube tip overlying the expected location of the pylorus or first duodenal segment.      US-CAROTID DOPPLER BILAT   Final Result      UD-INSMLDX-3 VIEW   Final Result         1.  Moderate stool in the colon suggests changes of constipation, otherwise nonspecific bowel gas pattern      DX-CHEST-PORTABLE (1 VIEW)   Final Result         1.  No acute cardiopulmonary disease.   2.  Atherosclerosis   3.  No radiodense foreign body or medical device identified which would exclude MRI.      CT-HEAD W/O   Final Result         1. No acute intracranial abnormality. No evidence of acute intracranial hemorrhage or mass lesion.                    Assessment/Plan  Altered mental status- (present on admission)   Assessment & Plan    Unclear etiology- last known well was 2:30pm the day before she arrived to the hospital  Infection less likely given no fever or leukocytosis. UA and chest x-ray negative.  Neuro imaging without explanation  Lumbar puncture without explanation  Neurology consulting  Infectious disease consulting  EEG, currently on AED  Started thiamine IV, vitamin B12         Coronary artery disease  involving native coronary artery of native heart without angina pectoris- (present on admission)   Assessment & Plan    Continue aspirin and Lipitor     Chronic obstructive pulmonary disease (HCC)- (present on admission)   Assessment & Plan    No evidence of acute exacerbation at this time  DuoNeb as needed  RT protocol     Controlled type 2 diabetes mellitus without complication, without long-term current use of insulin ([facility])- (present on admission)   Assessment & Plan    Start on insulin sliding scale with serial Accu-Checks  Hypoglycemic protocol in place         Respiratory failure ([facility])   Assessment & Plan    Continue with mechanical ventilation  Pulmonary medicine assisting     Pharyngoesophageal dysphagia   Assessment & Plan    Core trak in place  Tube feeds held secondary to vomiting      Sepsis ([facility])   Assessment & Plan    This is severe sepsis with the following associated acute organ dysfunction(s): acute kidney failure, acute respiratory failure, metabolic/septic encephalopathy.   Possible source is meningitis   Follow cultures including   Sepsis protocol has been ordered       Systolic and diastolic CHF, chronic (HCC)   Assessment & Plan    Currently holding OMT including carvedilol and lisinopril     Dyslipidemia- (present on admission)   Assessment & Plan    Continue Lipitor     Plan  Continue with mechanical ventilation, weaning as possible  Discussed with infectious disease, viral workup currently pending   neurology follow-up noted, await EEG results  So far neuroimaging unrevealing  Evaluate for possibility of vasculitis, labs pending, though less likely  Continue with Zovirax  Lactulose for possibility of hepatic involvement, reduce  Advance core Trak into small bowel and reattempt feeding  Critically ill  Complex case  High risk  See orders  A.m. labs    VTE prophylaxis: heparin    I have performed a physical exam and reviewed and updated ROS and Plan today . In review of yesterday's note ,  there are no changes except as documented above.

## 2019-04-09 PROBLEM — E83.39 HYPOPHOSPHATEMIA: Status: ACTIVE | Noted: 2019-01-01

## 2019-04-09 NOTE — PROGRESS NOTES
Hospital Medicine Daily Progress Note    Date of Service  4/9/2019    Chief Complaint  68 y.o. female admitted 4/4/2019 with altered mental status    Hospital Course    Ms. Yarbrough has a past medical history of diabetes, COPD, and coronary artery disease that presented to the emergency room with confusion and expressive aphasia.  He was found to have a temperature of 103 and required intubation due to airway protection.  She was admitted to the intensive care unit.      Interval Problem Update  4/7 the patient intubated overnight was severely lethargic, currently somewhat improved, continues to be febrile at 103.1, LP done and not impressive, MR of the brain without explanation, seen by infectious disease and neurology.  Rechecking ammonia, possibility of vasculitis versus other, possible aspiration with pulmonary infection.  4/8 patient remains intubated, she opens her eyes and follows intermittently, afebrile, did have some emesis overnight with tube feeds on hold, off pressors, remains on empiric antibiotics and acyclovir for remote possibility of viral encephalitis, PCR pending, placing electrolytes.  EEG performed, continues on [facility]  4/9: Patient seen and evaluated in the intensive care unit. She was extubated on 4/8. Her  visited this morning.  I met with her sister at bedside and we discussed that her mentation has been poor at home with episodes of confusion, slow mentation, and inability to communicate effectively.   Consultants/Specialty  Critical care. I discussed with Dr. Gonda on ICU Hot Rounds.  Neurology. I discussed with Dr. Valiente this morning about EEG findings.  Infectious disease.     Code Status  Code    Disposition  med    Review of Systems  Review of Systems   Unable to perform ROS: Mental acuity        Physical Exam  Temp:  [36.2 °C (97.2 °F)-37.1 °C (98.8 °F)] 37 °C (98.6 °F)  Pulse:  [] 102  Resp:  [13-25] 19  SpO2:  [88 %-100 %] 94 %    Physical Exam    Constitutional: She is oriented to person, place, and time. No distress.   HENT:   Dry mucous membranes  cortrak nares.    Eyes: Right eye exhibits no discharge. Left eye exhibits no discharge.   Neck:   IJ central line  Her neck is supple   Cardiovascular: Normal rate and regular rhythm.    No murmur heard.  Pulmonary/Chest: Effort normal. No respiratory distress. She has no wheezes.   Abdominal: Soft. She exhibits no distension.   Musculoskeletal: She exhibits no edema.   Soft wrist restraints  No edema   Neurological: She is alert and oriented to person, place, and time.   She has occasional speech  She is confused and does not follow commands.    Skin: Skin is warm and dry. No rash noted. She is not diaphoretic.   Nursing note and vitals reviewed.      Fluids    Intake/Output Summary (Last 24 hours) at 04/09/19 0659  Last data filed at 04/09/19 0600   Gross per 24 hour   Intake             2200 ml   Output             1860 ml   Net              340 ml       Laboratory  Recent Labs      04/07/19   0545  04/08/19   0305  04/09/19   0500   WBC  5.6  4.8  6.9   RBC  4.38  4.19*  4.24   HEMOGLOBIN  12.3  11.6*  11.5*   HEMATOCRIT  37.0  35.5*  35.8*   MCV  82.9  84.7  84.4   MCH  27.6  27.7  27.1   MCHC  33.3*  32.7*  32.1*   RDW  43.8  44.4  44.3   PLATELETCT  244  209  261   MPV  9.9  10.7  10.5     Recent Labs      04/07/19   0545  04/08/19   0305  04/08/19   1648  04/09/19   0500   SODIUM  136  138   --   139   POTASSIUM  3.0*  2.9*  3.4*  3.0*   CHLORIDE  105  109   --   110   CO2  21  21   --   23   GLUCOSE  169*  134*   --   186*   BUN  13  11   --   10   CREATININE  0.62  0.53   --   0.62   CALCIUM  8.0*  7.8*   --   8.2*     Recent Labs      04/06/19   1134  04/06/19   1727   INR  1.18*  1.25*               Imaging  DX-CHEST-PORTABLE (1 VIEW)   Final Result         1. Interval extubation. No other significant interval change.      DX-CHEST-PORTABLE (1 VIEW)   Final Result         1. No significant interval  change.      US-ABDOMEN COMPLETE SURVEY   Final Result      1.  Mildly distended gallbladder with sludge present.   2.  No other evidence for acute cholecystitis.   3.  No significant biliary dilation.   4.  Limited evaluation of the bladder.         LH-PFSKCAY-6 VIEW   Final Result      1.  Feeding tube tip overlies the gastric antrum.      2.  Mild ileus.      DX-CHEST-PORTABLE (1 VIEW)   Final Result         1.  No acute cardiopulmonary disease.   2.  Atherosclerosis         EC-ECHOCARDIOGRAM COMPLETE W/O CONT   Final Result      DX-CHEST-PORTABLE (1 VIEW)   Final Result      1.  Supportive tubing as described above.   2.  No pneumothorax.   3.  Persistent hypoinflation.      MR-BRAIN-W/O   Final Result      No evidence of intracranial hemorrhage, acute ischemia or mass on this severely motion degraded study      DX-CHEST-LIMITED (1 VIEW)   Final Result         1.  No acute cardiopulmonary disease.   2.  Atherosclerosis   3.  No radiodense foreign body or medical device identified which would exclude MRI.      CT-CTA NECK WITH & W/O-POST PROCESSING   Final Result         1.  Occlusion of the left subclavian artery to the level of the vertebral artery with reconstitution, appearance concerning for subclavian steal.   2.  Intimal thickening versus soft plaque and scattered atherosclerotic calcification of the left carotid arterial tree with less than 50% stenosis.   3.  Small caliber right internal carotid artery   4.  Extensive emphysema      These findings were discussed with the patient's clinician, Dr. Mcallister, on 4/6/2019 4:31 AM.      CT-CTA HEAD WITH & W/O-POST PROCESS   Final Result         1.  Hypoplastic right internal carotid artery, there is a segment of occlusion seen in the distal right intracranial internal carotid artery near the petrous apex. The intracranial internal carotid artery reconstitutes distal to this segment of    occlusion.   2.  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   3.  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   4.  Changes of atrophy with nonspecific white matter changes, most commonly associated with small vessel ischemia.      These findings were discussed with the patient's clinician, Dr. Mcallister, on 4/6/2019 4:31 AM.      DX-ABDOMEN FOR TUBE PLACEMENT   Final Result         1.  Nonspecific bowel gas pattern.   2.  Dobbhoff tube tip overlying the expected location of the pylorus or first duodenal segment.      US-CAROTID DOPPLER BILAT   Final Result      RC-UCLGUPD-2 VIEW   Final Result         1.  Moderate stool in the colon suggests changes of constipation, otherwise nonspecific bowel gas pattern      DX-CHEST-PORTABLE (1 VIEW)   Final Result         1.  No acute cardiopulmonary disease.   2.  Atherosclerosis   3.  No radiodense foreign body or medical device identified which would exclude MRI.      CT-HEAD W/O   Final Result         1. No acute intracranial abnormality. No evidence of acute intracranial hemorrhage or mass lesion.                    Assessment/Plan  Respiratory failure ([facility])   Assessment & Plan    Requiring mechanical ventilation  She was successfully extubated on 4/8     Sepsis ([facility])   Assessment & Plan    This is severe sepsis with the following associated acute organ dysfunction(s): acute kidney failure, acute respiratory failure, metabolic/septic encephalopathy.   Possible source may be CNS thus she has been treated with empiric antibiotics  She had a fever of 103       Altered mental status- (present on admission)   Assessment & Plan    Severe, acute encephalopathy of unclear source requiring intubation for airway protection.  Infection less likely given no fever or leukocytosis. UA and chest x-ray negative.  MRI brain negative  Lumbar puncture negative for infection  Neurology consulted.   Infectious disease consulting  Started thiamine IV, vitamin B12  EEG on 4/8:This  is abnormal routine video EEG recording in the awake and drowsy/sleep state(s).     This scalp EEG denotes                  1. Diffuse nonspecific cortical dysfunction - mild to moderate degree                 2. Possible focal cortical irritability over frontal R>L--this patten remains not specific but could be interictal - advise clinical correlation regarding further management. This does not necessary mean the patient needs seizure medication --  ( I prefer to call this frontal dysfunction more to the right, since these frontal sharp are not symmetric and more to the right, also, variation in morphology is pronounced)     This nonspecific abnormalities could be secondary to metabolic, toxic, polypharmacy or even post-ictal     If clinical suspicion of seizure remains high.  Prolonged EEG   monitoring may be of help.         Systolic and diastolic CHF, chronic (HCC)   Assessment & Plan    Currently holding OMT including carvedilol and lisinopril     Coronary artery disease involving native coronary artery of native heart without angina pectoris- (present on admission)   Assessment & Plan    Continue aspirin and Lipitor     Chronic obstructive pulmonary disease (HCC)- (present on admission)   Assessment & Plan    History of; she does not have evidence of acute exacerbation   DuoNeb as needed  RT protocol     Controlled type 2 diabetes mellitus without complication, without long-term current use of insulin (HCC)- (present on admission)   Assessment & Plan    With hyperglycemia of 123 present upon admit.  Insulin sliding scale with serial Accu-Checks  Hypoglycemic protocol in place  Glycohemoglobin 6.6  She was on metformin outpatient.       Pharyngoesophageal dysphagia   Assessment & Plan    Cortrak feeding     Dyslipidemia- (present on admission)   Assessment & Plan    Continue Lipitor        Labs ordered for the morning. Her potassium is low at 3 and will be replaced. Check a BMP in the morning to follow  K.    VTE prophylaxis: heparin

## 2019-04-09 NOTE — DISCHARGE PLANNING
Care Transition Team Discharge Planning     Anticipated Discharge Disposition: SNF     Action: This RN CM met with pt and  at bedside to obtain SNF choice. This RN CM faxed choice form to [facility].     Barriers to Discharge: SNF acceptance.     Plan: Follow up with [facility] and treatment team.

## 2019-04-09 NOTE — ASSESSMENT & PLAN NOTE
Likely incidental finding at this time, 1.9 mm LAURENT without rupture  Strict blood pressure control  Eventual IR evaluation for possible empiric coiling

## 2019-04-09 NOTE — DISCHARGE PLANNING
Care Transition Team Assessment    In the case of an emergency, pt's NOK is Richard Yarbrough () [phone].     This RNCM spoke with pt and  at bedside and obtained the information used in this assessment. Patient is disoriented at this time.  verified accuracy of facesheet. Pt lives with her  and two adult children in a 1 story house that they rent. Pt uses the [facility] pharmacy on [address]. Prior to current hospitalization, pt was completely dependent with ADLS/IADLS. Pt's  drives her to and from her doctors appoitnments. Pt and  collect social security totaling $1800/month. Pt denies h/o SA and MH. Pt has filled out an advanced directive in the past. Pt is going to be discharge to SNF when medically cleared.      Information Source  Orientation : Disoriented to Event, Disoriented to Place, Disoriented to Time  Information Given By: Spouse  Informant's Name: Richard  Who is responsible for making decisions for patient? : Spouse  Name(s) of Primary Decision Maker: Richard, [phone]    Readmission Evaluation  Is this a readmission?: No    Elopement Risk  Legal Hold: No  Ambulatory or Self Mobile in Wheelchair: No-Not an Elopement Risk  Elopement Risk: Not at Risk for Elopement    Interdisciplinary Discharge Planning  Primary Care Physician: Álvaro  Lives with - Patient's Self Care Capacity: Spouse, Adult Children  Support Systems: Spouse / Significant Other, Children  Housing / Facility: 1 Story House  Do You Take your Prescribed Medications Regularly: Yes  Able to Return to Previous ADL's: Future Time w/Therapy  Mobility Issues: Yes  Prior Services: Unable To Determine At This Time  Patient Expects to be Discharged to:: SNF  Assistance Needed: Yes    Discharge Preparedness  What is your plan after discharge?: Skilled nursing facility  What are your discharge supports?: Spouse, Child  Prior Functional Level: Needs Assist with Activities of Daily Living, Needs Assist  with Medication Management, Total Assist, Uses Wheelchair  Difficulity with ADLs: Bathing, Dressing, Toileting, Walking  Difficulity with IADLs: Cooking, Driving, Keeping track of finances, Laundry, Managing medication, Shopping    Functional Assesment  Prior Functional Level: Needs Assist with Activities of Daily Living, Needs Assist with Medication Management, Total Assist, Uses Wheelchair    Finances  Financial Barriers to Discharge: No  Prescription Coverage: Yes              Advance Directive  Advance Directive?:  (advanced dirrectives.)         Psychological Assessment  History of Substance Abuse: None  History of Psychiatric Problems: No  Non-compliant with Treatment: No  Newly Diagnosed Illness: No    Discharge Risks or Barriers  Discharge risks or barriers?: No    Anticipated Discharge Information  Anticipated discharge disposition: SNF

## 2019-04-09 NOTE — PROGRESS NOTES
Hospital Neurology Progress Note:     Interval History: No acute events overnight.  No complaints today.  Appears somewhat encephalopathic however is occasionally awake and alert.    Objective:   Vitals:    04/09/19 0600 04/09/19 0700 04/09/19 0800 04/09/19 0900   BP:       Pulse: (!) 102 99 88 92   Resp: 19 20 15 19   Temp: 37 °C (98.6 °F)  36.5 °C (97.7 °F)    TempSrc: Temporal  Temporal    SpO2: 94% 93% 91% 94%   Weight:       Height:           Labs:     Lab Results   Component Value Date/Time    PROTHROMBTM 15.8 (H) 04/06/2019 05:27 PM    INR 1.25 (H) 04/06/2019 05:27 PM      Lab Results   Component Value Date/Time    WBC 6.9 04/09/2019 05:00 AM    RBC 4.24 04/09/2019 05:00 AM    HEMOGLOBIN 11.5 (L) 04/09/2019 05:00 AM    HEMATOCRIT 35.8 (L) 04/09/2019 05:00 AM    MCV 84.4 04/09/2019 05:00 AM    MCH 27.1 04/09/2019 05:00 AM    MCHC 32.1 (L) 04/09/2019 05:00 AM    MPV 10.5 04/09/2019 05:00 AM    NEUTSPOLYS 76.70 (H) 04/09/2019 05:00 AM    LYMPHOCYTES 13.20 (L) 04/09/2019 05:00 AM    MONOCYTES 6.70 04/09/2019 05:00 AM    EOSINOPHILS 1.50 04/09/2019 05:00 AM    BASOPHILS 0.40 04/09/2019 05:00 AM      Lab Results   Component Value Date/Time    SODIUM 139 04/09/2019 05:00 AM    POTASSIUM 3.0 (L) 04/09/2019 05:00 AM    CHLORIDE 110 04/09/2019 05:00 AM    CO2 23 04/09/2019 05:00 AM    GLUCOSE 186 (H) 04/09/2019 05:00 AM    BUN 10 04/09/2019 05:00 AM    CREATININE 0.62 04/09/2019 05:00 AM    CREATININE 1.1 04/30/2008 08:15 PM      Lab Results   Component Value Date/Time    CHOLSTRLTOT 113 04/05/2019 04:17 AM    LDL 61 04/05/2019 04:17 AM    HDL 34 (A) 04/05/2019 04:17 AM    TRIGLYCERIDE 89 04/05/2019 04:17 AM       Lab Results   Component Value Date/Time    ALKPHOSPHAT 69 04/09/2019 05:00 AM    ASTSGOT 20 04/09/2019 05:00 AM    ALTSGPT 14 04/09/2019 05:00 AM    TBILIRUBIN 0.4 04/09/2019 05:00 AM        Imaging/Testing:   EEG performed on 4/8/2019 was personally reviewed which showed evidence of frequent triphasic waves  which were frontally predominant with an anterior to posterior or posterior to anterior lag.  There is also generalized theta delta slowing consistent with a moderate encephalopathy of nonspecific etiology.    Chest x-ray on 4/19/2019 reviewed in chart    Physical Exam:     General: Well-appearing 68-year-old female intubated no acute distress  Cardio: Normal S1/S2. No peripheral edema.   Pulm: CTAX2. No respiratory distress.   Skin: Warm, dry, no rashes or lesions   Psychiatric: Unable to assess due to encephalopathy  HEENT: Atraumatic head, normal sclera and conjunctiva, moist oral mucosa. No lid lag.  Abdomen: Soft, non tender. No masses or hepatosplenomegaly.    Neurologic:  Mental Status: GCS 13 (E3, V4, M6)  Cranial Nerves: Pupils equal round reactive to light.  Extraocular movements intact.  Face symmetric.  Motor: Normal muscle tone and bulk.  Able to follow commands.  Unable to test strength segmentally.  Reflexes: 2/4 throughout  Coordination: Unable to assess due to restraints  Sensation: Withdraws to noxious stimuli throughout on.   Gait/Station: Unable to assess    Assessment/Plan:    Alice Yarbrough is a 68 y.o. female with history of coronary artery disease, COPD, hypertension, hyperlipidemia, history of myocardial infarction, diabetes, history of back surgery with chronic pain presenting to the hospital for altered mental status and consulted for altered mental status.  The patient has been experiencing a sudden change of mental status at home and throughout her hospitalization she continued to deteriorate with subsequent intubation for airway protection.  Afterwards, she was found to be deficient of vitamin B12, and supplementation was performed.  Her mental status has continued to improved.  She has been extubated and is currently able to answer questions appropriately however she gives short answers, is somnolent and easily distractible.  In this regard, this appears to be consistent with  an encephalopathy.  She does however according to her  appear to be improving.  Etiology is at this time is unknown.  The patient's  suspects that she may have been taking extra medication however vitamin B12 supplementation also has appeared to improve mentation.    Plan:  1.  Continue vitamin B12 supplementation  2.  EEG not consistent with ongoing seizure activity  3.  Delirium precautions  4.  Plan discussed with consulting physician and patient's nurse.  5.  Continue Keppra 500 mg twice a day    Alexandro Rowe M.D., Diplomat of the American Board of Psychiatry and Neurology  Diplomat of [employer] Epilepsy Subspecialty   Assistant Clinical Professor, [facility] Neurology Consultant

## 2019-04-09 NOTE — CARE PLAN
Problem: Nutritional:  Goal: Nutrition support tolerated and meeting greater than 85% of estimated needs  Outcome: PROGRESSING AS EXPECTED  TF @ 50 ml/hr with goal rate of 60 ml/hr.

## 2019-04-09 NOTE — DISCHARGE PLANNING
Received Choice form at [id number]  Agency/Facility Name: 1. [facility], 2. [facility], 3. [facility].  Referral sent per Choice form @ [phone]

## 2019-04-09 NOTE — PROGRESS NOTES
Critical Care Progress Note    Date of admission  4/4/2019    Chief Complaint  68 y.o. female admitted 4/4/2019 with altered mental status    Hospital Course    68 y.o. female who presented 4/4/2019 with several days of rapidly deteriorating mental status.  She reportedly at baseline is alert and oriented x4 and able to do her ADLs.  At time of admission the hospital she would say 1 word yes and no did not consistently follow commands had symmetric findings.  She was transferred to the ICU from the [facility] as a rapid response yesterday afternoon.  I found her to have gurgling respirations a week  cough and gag.  I urgently intubated her and placed a central line.  Today her workup is been mostly unrevealing in terms of explaining her deterioration over the past 4 days and mental status that she has a negative head CT negative MRI negative metabolic panel and today a LP results that are unremarkable.   Vitamin deficiency such as B12 is a possibility.  Initial rheumatologic workup is pending.  She is on minimal sedation on the ventilator looks around has purposeful movement is still thought not follow any commands.  She is now vent day 2 on low-dose pressors for organ dysfunction.  On broad-spectrum antibiotics for possible sepsis and receiving thiamine and multivitamins folate and vitamin B12        Interval Problem Update  Reviewed last 24 hour events:   - extubated without complications   - AF, nl WBC   - AAOx1 (name only)   - EEG with focal irritability, on keppra   - low K/phos   - NSR, SBP in goal   - acyclovir and zosyn per ID (completes today)    Review of Systems  Review of Systems   Unable to perform ROS: Mental status change        Vital Signs for last 24 hours   Temp:  [36.2 °C (97.2 °F)-37.1 °C (98.8 °F)] 37 °C (98.6 °F)  Pulse:  [] 99  Resp:  [15-25] 20  SpO2:  [88 %-98 %] 93 %    Respiratory Information for the last 24 hours  Otto Vent Mode: Spont  Rate (breaths/min): 14  Vt Target (mL):  400  PEEP/CPAP: 8  FiO2: 30  P Support: 5  P MEAN: 9.4  Control VTE (exp VT): 405 --> extubated 4/8 to Room air    Physical Exam   Physical Exam   Constitutional: She appears well-developed and well-nourished. She is cooperative.  Non-toxic appearance. She does not have a sickly appearance. She appears ill. No distress.   HENT:   Head: Normocephalic and atraumatic.   Right Ear: External ear normal.   Left Ear: External ear normal.   gastric tube in position   Eyes: Pupils are equal, round, and reactive to light. Conjunctivae are normal. Right eye exhibits no discharge. Left eye exhibits no discharge.   Neck: No tracheal deviation present.   Cardiovascular: Regular rhythm, normal heart sounds and intact distal pulses.   Occasional extrasystoles are present. Tachycardia present.  PMI is not displaced.  Exam reveals no gallop, no distant heart sounds and no decreased pulses.    Pulmonary/Chest: Effort normal. No accessory muscle usage or stridor. No tachypnea. No respiratory distress. She has no decreased breath sounds. She has no wheezes. She has rhonchi in the left lower field. She has no rales.   Abdominal: Soft. Bowel sounds are normal. There is no tenderness. There is no rebound and no guarding.   Genitourinary:   Genitourinary Comments: Greene catheter in place   Musculoskeletal: She exhibits edema (Trace lower extremity bilaterally). She exhibits no deformity.   Neurological: She is alert.   Awake but minimally verbal, knows her name is otherwise confused, follows commands intermittently with generalized weakness but no focal deficits   Skin: Skin is warm and dry. No erythema.   Psychiatric:   Unable to assess given current clinical condition   Nursing note and vitals reviewed.      Medications  Current Facility-Administered Medications   Medication Dose Route Frequency Provider Last Rate Last Dose   • potassium phosphates 30 mmol in  mL ivpb  30 mmol Intravenous Once Jeremy M Gonda, M.D.        Followed by    • potassium phosphates 15 mmol in  mL ivpb  15 mmol Intravenous Once Jeremy M Gonda, M.D.       • lactulose 20 GM/30ML solution 30 mL  30 mL Enteral Tube BID Osmin Glynn M.D.   30 mL at 04/09/19 0450   • carvedilol (COREG) tablet 3.125 mg  3.125 mg Enteral Tube BID WITH MEALS Jeremy M Gonda, M.D.   3.125 mg at 04/08/19 1819   • lisinopril (PRINIVIL) tablet 20 mg  20 mg Oral Q DAY Jeremy M Gonda, M.D.   20 mg at 04/09/19 0451   • piperacillin-tazobactam (ZOSYN) 3.375 g in  mL IVPB  3.375 g Intravenous Q8HRS Alicia Boyd M.D. 25 mL/hr at 04/09/19 0450 3.375 g at 04/09/19 0450   • oxybutynin (DITROPAN) tablet 5 mg  5 mg Enteral Tube BID Ian Prado M.D.   5 mg at 04/09/19 0454   • aspirin (ASA) chewable tab 81 mg  81 mg Enteral Tube DAILY Osmin Glynn M.D.   81 mg at 04/09/19 0451   • cyanocobalamin (VITAMIN B-12) injection 1,000 mcg  1,000 mcg Intramuscular Q30 DAYS Osmin Glynn M.D.   1,000 mcg at 04/07/19 1844   • vitamin D (cholecalciferol) tablet 2,000 Units  2,000 Units Enteral Tube DAILY Osmin Glynn M.D.   2,000 Units at 04/09/19 0451   • gabapentin (NEURONTIN) capsule 200 mg  200 mg Enteral Tube TID Osmin Glynn M.D.   200 mg at 04/09/19 0451   • NS (BOLUS) infusion 500 mL  500 mL Intravenous Once PRN Sandy Gastelum M.D.       • levETIRAcetam (KEPPRA) 500 mg in  mL IVPB  500 mg Intravenous Q12HRS Sebastián Herman M.D.   Stopped at 04/09/19 0532   • senna-docusate (PERICOLACE or SENOKOT S) 8.6-50 MG per tablet 2 Tab  2 Tab Enteral Tube BID Sandy Gastelum M.D.   2 Tab at 04/09/19 0452    And   • polyethylene glycol/lytes (MIRALAX) PACKET 1 Packet  1 Packet Enteral Tube QDAY PRN Sandy Gastelum M.D.        And   • magnesium hydroxide (MILK OF MAGNESIA) suspension 30 mL  30 mL Enteral Tube QDAY PRN Sandy Gastelum M.D.        And   • bisacodyl (DULCOLAX) suppository 10 mg  10 mg Rectal QDAY PRN Sandy Gastelum M.D.       • fentaNYL (SUBLIMAZE) injection 100  mcg  100 mcg Intravenous Q15 MIN PRN Ian Prado M.D.   100 mcg at 04/08/19 0302    And   • fentaNYL (SUBLIMAZE) injection 200 mcg  200 mcg Intravenous Q15 MIN PRN Ian Prado M.D.       • Respiratory Care per Protocol   Nebulization Continuous RT Ian Prado M.D.       • famotidine (PEPCID) tablet 20 mg  20 mg Enteral Tube Q12HRS Ian Prado M.D.   20 mg at 04/09/19 0451    Or   • famotidine (PEPCID) injection 20 mg  20 mg Intravenous Q12HRS Ian Prado M.D.   20 mg at 04/08/19 1642   • MD Alert...ICU Electrolyte Replacement per Pharmacy   Other PHARMACY TO DOSE Ian Prado M.D.       • heparin injection 5,000 Units  5,000 Units Subcutaneous Q8HRS Ian Prado M.D.   5,000 Units at 04/09/19 0451   • acetaminophen (TYLENOL) suppository 650 mg  650 mg Rectal Q6HRS PRN Morgan Davis M.D.   650 mg at 04/07/19 0519   • ipratropium-albuterol (DUONEB) nebulizer solution  3 mL Nebulization Q4H PRN (RT) Raymond Gastelum M.D.       • insulin regular (HUMULIN R) injection 2-9 Units  2-9 Units Subcutaneous 4X/DAY DORIE Gastelum M.D.   2 Units at 04/09/19 0623    And   • dextrose 50% (D50W) injection 25 mL  25 mL Intravenous Q15 MIN PRN Sandy Gastelum M.D.       • nystatin (MYCOSTATIN) powder   Topical BID Sandy Gastelum M.D.       • acyclovir (ZOVIRAX) 616 mg in  mL IVPB  10 mg/kg (Ideal) Intravenous Q8HRS Sandy Gastelum M.D.   Stopped at 04/09/19 0352   • Pharmacy Consult: Enteral tube insertion - review meds/change route/product selection  1 Each Other PHARMACY TO DOSE Sandy Gastelum M.D.       • acetaminophen (TYLENOL) tablet 650 mg  650 mg Enteral Tube Q6HRS PRN Sandy Gastelum M.D.       • atorvastatin (LIPITOR) tablet 40 mg  40 mg Enteral Tube DAILY Sandy Gastelum M.D.   40 mg at 04/09/19 0451       Fluids    Intake/Output Summary (Last 24 hours) at 04/09/19 0833  Last data filed at 04/09/19 0600   Gross per 24 hour   Intake             1900 ml   Output             1560 ml    Net              340 ml       Laboratory  Recent Labs      04/06/19   1017  04/06/19   1712  04/07/19   0546  04/08/19   0502   WGFCI42U  7.49   --    --    --    [id number]  27.8   --    --    --    OUEOP668I  67.5   --    --    --    GJOY7DCE  93.3   --    --    --    ARTHCO3  21   --    --    --    ARTBE  -1   --    --    --    ISTATAPH   --   7.443  7.474  7.388*   ISTATAPCO2   --   35.1  27.9  33.5   ISTATAPO2   --   287*  167*  152*   ISTATATCO2   --   25  21  21   [id number]   --   100*  100*  99   ISTATARTHCO3   --   24.0  20.5  20.2   ISTATARTBE   --   0  -2  -4   ISTATTEMP   --   99.0 F  38.6 C  37.0 C   ISTATFIO2   --   100  50  50   ISTATSPEC   --   Arterial  Arterial  Arterial   ISTATAPHTC   --   7.439  7.450  7.388*   [id number]   --   288*  177*  152*     Recent Labs      04/06/19   1727   TROPONINI  0.08*     Recent Labs      04/06/19   1727  04/07/19   0545  04/08/19   0305  04/08/19   1648  04/09/19   0500   SODIUM  132*  136  138   --   139   POTASSIUM  4.1  3.0*  2.9*  3.4*  3.0*   CHLORIDE  101  105  109   --   110   CO2  23  21  21   --   23   BUN  11  13  11   --   10   CREATININE  0.65  0.62  0.53   --   0.62   MAGNESIUM  1.5   --   1.9   --   2.1   PHOSPHORUS  3.9   --   2.9   --   1.5*   CALCIUM  8.1*  8.0*  7.8*   --   8.2*     Recent Labs      04/07/19   0545  04/08/19   0305  04/09/19   0500   ALTSGPT  10  11  14   ASTSGOT  16  17  20   ALKPHOSPHAT  56  54  69   TBILIRUBIN  0.5  0.3  0.4   GLUCOSE  169*  134*  186*     Recent Labs      04/07/19   0545  04/08/19   0305  04/09/19   0500   WBC  5.6  4.8  6.9   NEUTSPOLYS  79.80*  81.60*  76.70*   LYMPHOCYTES  11.40*  11.20*  13.20*   MONOCYTES  8.20  6.20  6.70   EOSINOPHILS  0.00  0.40  1.50   BASOPHILS  0.40  0.40  0.40   ASTSGOT  16  17  20   ALTSGPT  10  11  14   ALKPHOSPHAT  56  54  69   TBILIRUBIN  0.5  0.3  0.4     Recent Labs      04/06/19   1134  04/06/19   1727  04/07/19   0545  04/08/19   0305  04/09/19   0500   RBC   --     --   4.38  4.19*  4.24   HEMOGLOBIN   --    --   12.3  11.6*  11.5*   HEMATOCRIT   --    --   37.0  35.5*  35.8*   PLATELETCT   --    --   244  209  261   PROTHROMBTM  15.1*  15.8*   --    --    --    INR  1.18*  1.25*   --    --    --        Imaging  X-Ray:  I have personally reviewed the images and compared with prior images. and My impression is: No acute cardia pulmonary process with enlarged cardiac silhouette, gastric tube and left subclavian central venous catheter in good position     Assessment/Plan  Respiratory failure (HCC)   Assessment & Plan    Intubated 4/6-4/8  Aspiration precautions  Encourage incentive spirometry of able to cooperate  RT/O2 protocols  Mobilization  Limit sedatives     Sepsis ([facility])   Assessment & Plan    Source -lungs and possible CNS - resolved  Completed Zosyn 4/9  Discontinue continue acyclovir for possible herpes encephalitis as PCR negative     Altered mental status- (present on admission)   Assessment & Plan    Likely metabolic with possible early dementia vs polypharmacy effects  Neurology following  EEG slightly abnormal -continue Keppra  Limit sedatives, reorient, maintain sleep/wake cycle  Vitamin repletion     Systolic and diastolic CHF, chronic ([facility])   Assessment & Plan    Currently controlled, ejection fraction 30%  Continue beta-blocker, ACE inhibitor     Coronary artery disease involving native coronary artery of native heart without angina pectoris- (present on admission)   Assessment & Plan    Continue antiplatelets, statin  Beta-blocker     Chronic obstructive pulmonary disease ([facility])- (present on admission)   Assessment & Plan    Without exacerbation  RT protocols  PRN bronchodilators     Controlled type 2 diabetes mellitus without complication, without long-term current use of insulin ([facility])- (present on admission)   Assessment & Plan    Continue insulin sliding scale for now  DiaBeta source for tube feeds     Hypophosphatemia   Assessment & Plan    Repletion and  monitor closely     Cerebral aneurysm without rupture   Assessment & Plan    Likely incidental finding at this time, 1.9 mm LAURENT without rupture  Strict blood pressure control  Eventual IR evaluation for possible empiric coiling     Hypomagnesemia   Assessment & Plan    Repleted     Hypokalemia   Assessment & Plan    Replete again today monitor     Pharyngoesophageal dysphagia   Assessment & Plan    Continue n.p.o. status  Enteral nutrition via cor track  SLP evaluation     Dyslipidemia- (present on admission)   Assessment & Plan    Statin     Full code    VTE:  Heparin  Ulcer: Not Indicated  Lines: Central Line  To be removed today and Greene Catheter  To be removed today    I have performed a physical exam and reviewed and updated ROS and Plan today (4/9/2019). In review of yesterday's note (4/8/2019), there are no changes except as documented above.     Discussed patient condition and risk of morbidity and/or mortality with Hospitalist, Family, RN, RT, Therapies, Pharmacy, , Charge nurse / hot rounds and Patient

## 2019-04-09 NOTE — DISCHARGE PLANNING
Agency/Facility Name: [facility]  Spoke To: Negin  Outcome: Pt referral not received.  [facility] Manually printed and faxed to [phone]    Agency/Facility Name: [facility]  Spoke To: admissions  Outcome: Fax Failed.  Pt referral not received.  [facility] Manually printed and faxed to [phone]    Agency/Facility Name: [facility]  Spoke To: admissions  Outcome: Fax Failed.  Pt referral not received.  [facility] Manually printed and faxed to [phone]    Mindy ([email or web address]) advised via sticky notes.

## 2019-04-09 NOTE — PROGRESS NOTES
Infectious Disease Progress Note    Author: Alicia Boyd M.D. Date & Time of service: 2019  11:01 AM    Chief Complaint:  Follow-up for acute encephalopathy    Interval History:  68-year-old white female admitted 2019 due to deterioration in her mental status.   Temp 103.1 WBC 5.6 had resp failure-now intubated in the ICU on pressors FiO2 0.5 LP done   T-max 99.7 WBC 4.8 patient is awake on the vent, she intermittently follows commands, spouse at bedside states her mental status is somewhat improved.  Meningitis panel pending, plan for possible extubation today per nursing staff   afebrile WBC 6.9 status post extubation yesterday, patient is only oriented to self and does not answer questions appropriately.  She does not follow commands    Labs Reviewed, Medications Reviewed and Radiology Reviewed.    Review of Systems:  Review of Systems   Unable to perform ROS: Critical illness   Patient is only oriented to self and not answering questions appropriately    Hemodynamics:  Temp (24hrs), Av.5 °C (97.7 °F), Min:36.2 °C (97.2 °F), Max:37 °C (98.6 °F)  Temperature: 36.5 °C (97.7 °F)  Pulse  Av.5  Min: 57  Max: 134Heart Rate (Monitored): 90  NIBP: 105/51       Physical Exam:  Physical Exam   Constitutional: She appears well-developed. No distress.   Ill-appearing   HENT:   Head: Normocephalic and atraumatic.   Mouth/Throat: No oropharyngeal exudate.   Cor tract   Eyes: Conjunctivae are normal. Right eye exhibits no discharge. Left eye exhibits no discharge. No scleral icterus.   Neck: Normal range of motion. Neck supple. No JVD present.   Cardiovascular: Regular rhythm.    tachycardic   Pulmonary/Chest: No stridor. No respiratory distress. She has no wheezes. She has rales.   Left subclavian catheter   Abdominal: Soft. She exhibits no distension. There is no tenderness. There is no rebound.   Musculoskeletal: She exhibits no edema.   Lymphadenopathy:     She has no cervical adenopathy.    Neurological:   Oriented to self only  Does not answer questions   Skin: Skin is warm. No rash noted. She is not diaphoretic.   Nursing note and vitals reviewed.      Meds:    Current Facility-Administered Medications:   •  potassium phosphate ivpb **FOLLOWED BY** potassium phosphate ivpb  •  lactulose  •  carvedilol  •  lisinopril  •  oxybutynin  •  aspirin  •  cyanocobalamin  •  vitamin D  •  gabapentin  •  levETIRAcetam (KEPPRA) IV  •  senna-docusate **AND** polyethylene glycol/lytes **AND** magnesium hydroxide **AND** bisacodyl  •  Respiratory Care per Protocol  •  famotidine **OR** [DISCONTINUED] famotidine  •  MD Alert...Adult ICU Electrolyte Replacement per Pharmacy  •  heparin  •  acetaminophen  •  ipratropium-albuterol  •  insulin regular **AND** Accu-Chek ACHS **AND** NOTIFY MD and PharmD **AND** [DISCONTINUED] glucose **AND** dextrose 50%  •  nystatin  •  Pharmacy  •  acetaminophen  •  atorvastatin    Labs:  Recent Labs      04/07/19 0545 04/08/19 0305 04/09/19   0500   WBC  5.6  4.8  6.9   RBC  4.38  4.19*  4.24   HEMOGLOBIN  12.3  11.6*  11.5*   HEMATOCRIT  37.0  35.5*  35.8*   MCV  82.9  84.7  84.4   MCH  27.6  27.7  27.1   RDW  43.8  44.4  44.3   PLATELETCT  244  209  261   MPV  9.9  10.7  10.5   NEUTSPOLYS  79.80*  81.60*  76.70*   LYMPHOCYTES  11.40*  11.20*  13.20*   MONOCYTES  8.20  6.20  6.70   EOSINOPHILS  0.00  0.40  1.50   BASOPHILS  0.40  0.40  0.40     Recent Labs      04/07/19   0545  04/08/19 0305 04/08/19   1648  04/09/19   0500   SODIUM  136  138   --   139   POTASSIUM  3.0*  2.9*  3.4*  3.0*   CHLORIDE  105  109   --   110   CO2  21  21   --   23   GLUCOSE  169*  134*   --   186*   BUN  13  11   --   10     Recent Labs      04/07/19 0545  04/08/19 0305 04/09/19   0500   ALBUMIN  3.0*  2.8*  3.0*   TBILIRUBIN  0.5  0.3  0.4   ALKPHOSPHAT  56  54  69   TOTPROTEIN  5.8*  5.3*  5.6*   ALTSGPT  10  11  14   ASTSGOT  16  17  20   CREATININE  0.62  0.53  0.62        Imaging:  Ct-cta Head With & W/o-post Process    Result Date: 4/6/2019 4/5/2019 10:45 PM HISTORY/REASON FOR EXAM:  Stroke, follow up; Neurological Deficit TECHNIQUE/EXAM DESCRIPTION: CT angiogram of the [place] without and with contrast.  Initial precontrast images were obtained of the head from the skull base through the vertex.  Postcontrast images were obtained of the [place] following the power injection of nonionic contrast at 5.0 mL/sec. Thin-section helical images were obtained with overlapping reconstruction interval. Coronal and sagittal multiplanar volume reformats were generated.  3D angiographic images were reviewed on PACS.  Maximum intensity projection (MIP) images were generated and reviewed. 100 mL of Omnipaque 350 nonionic contrast was injected intravenously. Low dose optimization technique was utilized for this CT exam including automated exposure control and adjustment of the mA and/or kV according to patient size. COMPARISON:  None. FINDINGS: The posterior circulation shows the distal vertebral arteries to be patent. The vertebrobasilar confluence is intact. Fenestrated appearance of the proximal basilar artery is seen, the basilar artery is patent but appears hypoplastic. No aneurysm or occlusive lesion is evident. Lateral persistent trigeminal arteries are seen supplying the posterior cerebral arteries. There is hypoplastic appearing right internal carotid artery seen. Occlusion of the distal right intracranial internal carotid artery is seen near the petrous apex. Atherosclerotic calcification of the bilateral intracranial internal carotid arteries is seen, there is less than 50% stenosis on the left.  1.9 mm aneurysm projects from the superior aspect of the anterior communicating artery. Mild diffuse parenchymal volume loss is identified. Scattered periventricular and subcortical white matter low-attenuation changes are observed. 3D angiographic/MIP images of the  vasculature confirm the vascular findings as described above.     1.  Hypoplastic right internal carotid artery, there is a segment of occlusion seen in the distal right intracranial internal carotid artery near the petrous apex. The intracranial internal carotid artery reconstitutes distal to this segment of occlusion. 2.  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 3.  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 4.  Changes of atrophy with nonspecific white matter changes, most commonly associated with small vessel ischemia. These findings were discussed with the patient's clinician, Dr. Mcallister, on 4/6/2019 4:31 AM.    Ct-cta Neck With & W/o-post Processing    Result Date: 4/6/2019 4/5/2019 10:45 PM HISTORY/REASON FOR EXAM:  Stroke, follow up; Neurological Deficit TECHNIQUE/EXAM DESCRIPTION:  CT angiogram of the neck with contrast. Postcontrast images were obtained of the neck from the great vessels through the skull base following the power injection of nonionic contrast at 5.0 mL/sec. Thin-section helical images were obtained with overlapping reconstruction interval. Coronal and oblique multiplanar volume reformats were generated. Cervical internal carotid artery percent stenosis is calculated using the standard method according to the NASCET criteria wherein a segment of uniform caliber mid or distal cervical internal carotid is used as the reference denominator. 3D angiographic images were reviewed on PACS.  Maximum intensity projection (MIP) images were generated and reviewed 100 mL of Omnipaque 350 nonionic contrast was injected intravenously. Low dose optimization technique was utilized for this CT exam including automated exposure control and adjustment of the mA and/or kV according to patient size. COMPARISON:  None. FINDINGS: The arch origins of the great vessels appear intact. The proximal left subclavian artery is  occluded to the level of the vertebral artery. The right internal carotid artery is small in caliber along its visualized course, otherwise the right common carotid artery, cervical carotid bifurcation, and cervical internal carotid artery are within normal limits. There is no evidence of significant  stenosis, thrombus, or other filling defect or ulceration. There is no evidence of dissection or aneurysm. Intimal thickening versus soft plaque within the left common carotid artery carotid bifurcation, and encroachment internal carotid artery is seen with scattered mild atherosclerosis with less than 50% stenosis. The cervical vertebral arteries are normal bilaterally. Extensive emphysematous changes are seen in the lung apices. 3D angiographic/MIP images of the vasculature confirm the vascular findings as described above.     1.  Occlusion of the left subclavian artery to the level of the vertebral artery with reconstitution, appearance concerning for subclavian steal. 2.  Intimal thickening versus soft plaque and scattered atherosclerotic calcification of the left carotid arterial tree with less than 50% stenosis. 3.  Small caliber right internal carotid artery 4.  Extensive emphysema These findings were discussed with the patient's clinician, Dr. Mcallister, on 4/6/2019 4:31 AM.    Ct-head W/o    Result Date: 4/4/2019 4/4/2019 7:22 PM HISTORY/REASON FOR EXAM:  Altered level of consciousness (LOC), unexplained; Altered Mental Status Confusion. TECHNIQUE/EXAM DESCRIPTION AND NUMBER OF VIEWS: CT of the head without contrast. The study was performed on a helical multidetector CT scanner. Contiguous 2.5 mm axial sections were obtained from the skull base through the vertex. Up to date radiation dose reduction adjustments have been utilized to meet ALARA standards for radiation dose reduction. COMPARISON:  12/25/2018 FINDINGS: There is no evidence of acute intracranial hemorrhage, mass, mass-effect or shift of midline  structures. Mineralization of the right basal ganglia. Gray-white matter differentiation is grossly preserved. Ventricle size and brain parenchymal volume are appropriate for this patient's stated age. The basal cisterns are patent. There are no abnormal extra-axial fluid collections. No depressed or widely  calvarial fracture is seen. The visualized paranasal sinuses and temporal bone structures are aerated. ___________________________________     1. No acute intracranial abnormality. No evidence of acute intracranial hemorrhage or mass lesion.     [id number] View    Result Date: 4/7/2019 4/7/2019 10:37 AM HISTORY/REASON FOR EXAM: Nausea. Feeding tube placement TECHNIQUE/EXAM DESCRIPTION AND NUMBER OF VIEWS: 1 supine views of the abdomen. COMPARISON: None FINDINGS: There are diffuse gas-filled loops of small large bowel consistent with ileus. There has been prior left hip arthroplasty. Feeding tube tip overlies the gastric antrum. No abnormal calcifications are seen.     1.  Feeding tube tip overlies the gastric antrum. 2.  Mild ileus.    Xm-theznef-0 View    Result Date: 4/5/2019 4/4/2019 10:23 PM HISTORY/REASON FOR EXAM: MRI screening TECHNIQUE/EXAM DESCRIPTION:  Single AP view the abdomen. COMPARISON:  None FINDINGS: Limited views of the lung bases are clear. Moderate stool is seen throughout the colon, otherwise bowel gas pattern is nonspecific. Postsurgical changes of left total hip arthroplasty are seen. Otherwise the bony structures appear age-appropriate.     1.  Moderate stool in the colon suggests changes of constipation, otherwise nonspecific bowel gas pattern    Dx-chest-limited (1 View)    Result Date: 4/6/2019 4/6/2019 6:39 AM HISTORY/REASON FOR EXAM: Aspiration TECHNIQUE/EXAM DESCRIPTION:  Single AP view of the chest. COMPARISON: April 4, 2019 FINDINGS: Dobbhoff tube has been placed in the interim, terminating below the lower margin of the film within the abdomen. The cardiac  silhouette appears within normal limits. Atherosclerotic calcification of the aorta is noted.  The central pulmonary vasculature appears normal. The lungs appear well expanded bilaterally.  Bilateral lungs are clear. No significant pleural effusions are identified. The bony structures appear age-appropriate.     1.  No acute cardiopulmonary disease. 2.  Atherosclerosis 3.  No radiodense foreign body or medical device identified which would exclude MRI.    Dx-chest-portable (1 View)    Result Date: 4/7/2019 4/7/2019 1:37 AM HISTORY/REASON FOR EXAM: For indication of respiratory failure Aspiration TECHNIQUE/EXAM DESCRIPTION:  Single AP view of the chest. COMPARISON: Yesterday FINDINGS: Medical device position is stable. The cardiac silhouette appears within normal limits. Atherosclerotic calcification of the aorta is noted.  The central pulmonary vasculature appears normal. The lungs appear well expanded bilaterally.  Bilateral lungs are clear. No significant pleural effusions are identified. The bony structures appear age-appropriate.     1.  No acute cardiopulmonary disease. 2.  Atherosclerosis     Dx-chest-portable (1 View)    Result Date: 4/6/2019 4/6/2019 4:54 PM HISTORY/REASON FOR EXAM:  POST INTUBATION. Central line placement. TECHNIQUE/EXAM DESCRIPTION AND NUMBER OF VIEWS: Single portable view of the chest. COMPARISON: 4/6/2019 6:59 AM FINDINGS: Cardiac mediastinal contour is unchanged. Dystrophic calcification of thoracic aorta. Endotracheal tube in place with tip approximately 4 cm above the justin. Feeding tube in place however tip is off the image. LEFT subclavian central venous catheter tip at ther mid RIGHT atrium, approximately 3 cm below the cavoatrial junction level. No pleural fluid collection or pneumothorax. No major bony abnormality is seen.     1.  Supportive tubing as described above. 2.  No pneumothorax. 3.  Persistent hypoinflation.    Dx-chest-portable (1 View)    Result Date: 4/4/2019     4/4/2019 10:23 PM HISTORY/REASON FOR EXAM:  MRI screening TECHNIQUE/EXAM DESCRIPTION:  Single AP view of the chest. COMPARISON: December 11, 2018 FINDINGS: Overlying cardiac leads are present. The cardiac silhouette appears within normal limits. Atherosclerotic calcification of the aorta is noted.  The central pulmonary vasculature appears normal. The lungs appear well expanded bilaterally.  Bilateral lungs are clear. No significant pleural effusions are identified. The bony structures appear age-appropriate.     1.  No acute cardiopulmonary disease. 2.  Atherosclerosis 3.  No radiodense foreign body or medical device identified which would exclude MRI.    Mr-brain-w/o    Result Date: 4/6/2019 4/5/2019 9:24 PM HISTORY/REASON FOR EXAM:  Confusion, acute, unexplained. Altered level of consciousness. TECHNIQUE/EXAM DESCRIPTION: MRI of the brain without contrast. T1 sagittal, T2 fast spin-echo axial, T1 coronal, FLAIR axial with propeller motion correction technique, Diffusion Weighted and Apparent Diffusion Coefficient (ADC map) axial images were obtained of the whole brain. The study was performed on a Animating Touch Signa 1.5 Erica MRI scanner. COMPARISON:  Head CT 4/4/2019; CTA 4/5/2019 FINDINGS: Motion artifact degrades the examination and limits evaluation. There is diffuse prominence of the CSF containing spaces. There are sparse scattered foci of T2 prolongation in the deep and periventricular white matter which are nonspecific but which most likely reflect areas of chronic microangiopathic ischemic change, though this can also be seen with gliosis and demyelinating processes. The calvariae are unremarkable.  There are no extra-axial fluid collections.  The ventricular system and basal cisterns are otherwise within normal limits.  There are no other areas of abnormal signal in the brain substance.  There are no mass effects or  shift of midline structures.  There are no hemorrhagic lesions.  The diffusion weighted axial  images show no evidence of acute cerebral infarction. The brainstem and posterior fossa structures are unremarkable. Vascular flow voids in the vertebrobasilar and carotid arteries, [place], and dural venous sinuses are not well assessed due to motion. The paranasal sinuses and mastoids in the field of view are unremarkable.     No evidence of intracranial hemorrhage, acute ischemia or mass on this severely motion degraded study    Us-carotid Doppler Bilat    Result Date: 2019   Carotid Duplex  Report  Vascular Laboratory  CONCLUSIONS  1) Atherosclerosis at the carotid bifurcations without hemodynamically  significant stenosis  2) To and fro flow within the left vertebral artery  JANE GARCIA  Exam Date:     2019 20:36  Room #:     Inpatient  Priority:     Routine  Ht (in):             Wt (lb):  Ordering Physician:        RICK DUNLAP  Referring Physician:       GERMÁN Chung  Sonographer:               Joseph Jeter RVT RDCS  Study Type:  Technical Quality:         Adequate  Age:    68    Gender:     F  MRN:    [id number]  :    1950      BSA:  Indications:     Cerebral infarction due to unspecified occlusion or stenosis                    of unspecified carotid arteries  CPT Codes:       10349  ICD Codes:       F27461  History:         Stenosis and or Occlusion  Limitations:  Right Brachial BP:              /  Left Brachial BP:             /  RIGHT  Plaque          Plaque         Velocity (cm/s)  Characteristics Composition    Syst/Diast                                 27   / 14      Distal ICA                                 12   / 7       Mid ICA  Ulcerative                     31   / 11      Prox ICA  Smooth         Homogeneous     27   / 9       Distal CCA  Smooth         Homogeneous          /         Mid CCA  Smooth         Homogeneous     55   / 9       Prox CCA                                 134  / 25      ECA  Waveform:                                      SCA  LEFT  Plaque         Plaque          Velocity (cm/s)  CharacteristicsComposition     Syst/Diast                                 193  / 24      Distal ICA                                 104  / 15      Mid ICA  Irregular      Heterogeneo     153  / 15      Prox ICA                 us  Irregular      Heterogeneo     130  / 12      Distal CCA                 us  Smooth         Homogeneous          /         Mid CCA  Smooth         Homogeneous     250  / 14      Prox CCA                                 116  / 9       ECA  Waveform:                                     SCA          0.6          ICA/CCA       0.8        Antegrade      Vertebral   Bi-                                   directiona                                   l         46   / 5      cm/s        23    / 14  FINDINGS  Plaque of the bifurcation extending into the internal carotid. Velocities  are consistent with < 50% stenosis of the internal carotid artery.  Plaque is irregular on the surface and homogeneous with medium acoustic  density.  Antegrade right vertebral flow.  Retrograde left vertebral and subclavian  flow.  Collette Green MD  (Electronically Signed)  Final Date:      06 April 2019                   00:35    Ec-echocardiogram Complete W/o Cont    Result Date: 4/6/2019  Transthoracic Echo Report Echocardiography Laboratory CONCLUSIONS No prior study is available for comparison. Technically difficult study incomplete information is obtained. Inferior and inferiolateral wall  akinesis. Moderately reduced left ventricular systolic function. Left ventricular ejection fraction is visually estimated to be 30%. Inferior and inferiolateral wall  akinesis. Mild mitral regurgitation. Estimated right ventricular systolic pressure  is 25 mmHg. Results to ordering physician via [facility] [employer] at 18:31 hrs. JANE GARCIA Exam Date:         04/06/2019                    10:02 Exam Location:     Inpatient Priority:          Routine  Ordering Physician:        RICK DUNLAP Referring Physician: Sonographer:               Marielena Pond RDCS Age:    68     Gender:    F MRN:    [id number] :    1950 BSA:    1.83   Ht (in):    67     Wt (lb):    159 Exam Type:     Complete Indications:     Transient cerebral ischemic attack, unspecified ICD Codes:       G459 CPT Codes:       88565 BP:   114    /   57     HR:   115 Technical Quality:       Technically difficult study                          incomplete information is                          obtained MEASUREMENTS  (Male / Female) Normal Values 2D ECHO LV Diastolic Diameter PLAX        5.6 cm                4.2 - 5.9 / 3.9 - 5.3 cm LV Systolic Diameter PLAX         5 cm                  2.1 - 4.0 cm IVS Diastolic Thickness           0.79 cm               LVPW Diastolic Thickness          0.91 cm               RV Diameter 4C                    2.7 cm                2.5 - 2.9 cm LVOT Diameter                     2.1 cm                RA Diameter                       2.1 cm                Estimated LV Ejection Fraction    30 %                  LV Ejection Fraction MOD BP       39.2 %                >= 55  % LV Ejection Fraction MOD 4C       30.9 %                LV Ejection Fraction MOD 2C       43.4 %                LA Volume Index                   20.8 cm³/m²           16 - 28 cm³/m² IVC Diameter                      1.2 cm                DOPPLER AV Peak Velocity                  1.4 m/s               AV Peak Gradient                  7.5 mmHg              AV Mean Gradient                  4.4 mmHg              LVOT Peak Velocity                0.89 m/s              AV Area Cont Eq vti               2.7 cm²               Mitral E Point Velocity           0.85 m/s              Mitral E to A Ratio               0.56                  MV Pressure Half Time             30.1 ms               MV Area PHT                       7.3 cm²               MV Deceleration Time              104 ms                 TR Peak Velocity                  217 cm/s              PV Peak Velocity                  1.4 m/s               PV Peak Gradient                  7.8 mmHg              RVOT Peak Velocity                1.2 m/s               * Indicates values subject to auto-interpretation LV EF:  30    % FINDINGS Left Ventricle Left ventricle is mildly dilated. Normal left ventricular wall thickness. Moderately reduced left ventricular systolic function. Left ventricular ejection fraction is visually estimated to be 30%. Basal  lateral wall akinesis. Inferior and inferiolateral wall  akinesis. Indeterminate diastolic function. Right Ventricle Normal right ventricular size and systolic function. Right Atrium Normal right atrial size. Normal inferior vena cava size and inspiratory collapse. Left Atrium Normal left atrial size. Mitral Valve Thickened mitral valve leaflets. Mild mitral regurgitation. No mitral stenosis. Aortic Valve Structurally normal aortic valve without significant stenosis or regurgitation. Tricuspid Valve Structurally normal tricuspid valve.  Trace tricuspid regurgitation. Right atrial pressure is estimated to be 3 mmHg. Estimated right ventricular systolic pressure  is 25 mmHg. Pulmonic Valve Structurally normal pulmonic valve without significant stenosis or regurgitation. Pericardium Normal pericardium without effusion. Aorta The aortic root is normal.  Ascending aorta diameter is 3.0 cm. Nura Tanner M.D. (Electronically Signed) Final Date:     06 April 2019                 18:33    Dx-abdomen For Tube Placement    Result Date: 4/6/2019 4/6/2019 12:01 AM HISTORY/REASON FOR EXAM: Dobbhoff tube placement TECHNIQUE/EXAM DESCRIPTION:  Single AP view the abdomen. COMPARISON:  April 4, 2019 FINDINGS: Limited views of the lung bases are clear. Dobbhoff tube is seen, the tip lies to the right of the lumbar spine.  The bowel gas pattern appears nonspecific. Residual contrast is seen within the bilateral kidneys  from recent contrast administration. The bony structures appear age-appropriate.     1.  Nonspecific bowel gas pattern. 2.  Dobbhoff tube tip overlying the expected location of the pylorus or first duodenal segment.      Micro:  Results     Procedure Component Value Units Date/Time    Culture respiratory w/GRM STN [[id number]] Collected:  04/06/19 1645    Order Status:  Completed Specimen:  Respirate from Tracheal Aspirate Updated:  04/08/19 1110     Significant Indicator NEG     Source RESP     Site TRACHEAL ASPIRATE     Respiratory Culture No growth at 48 hours     Gram Stain Result Rare WBCs.  No organisms seen.      Narrative:       Collected By:[id number] CAMILLA NEWMAN    CSF CULTURE [[id number]] Collected:  04/06/19 1539    Order Status:  Completed Specimen:  CSF from Tap Updated:  04/08/19 1032     Significant Indicator NEG     Source CSF     Site TAP     CSF Culture No growth at 48 hours     Gram Stain Result No organisms seen.    Narrative:       Collected By:[id number] JT ROQUE    URINE CULTURE(NEW) [[id number]] Collected:  04/06/19 1649    Order Status:  Completed Specimen:  Urine from Urine, Jordan Cath Updated:  04/08/19 0552     Significant Indicator NEG     Source UR     Site URINE, JORDAN CATH     Urine Culture No growth at 48 hours    Narrative:       Collected By:[id number] CAMILLA NEWMAN  Indication for culture:->Temp Equal to,or Greater Than 101    Acid Fast Stain [[id number]] Collected:  04/06/19 1257    Order Status:  Completed Specimen:  CSF Updated:  04/07/19 1839     Significant Indicator NEG     Source CSF     Site Tap     AFB Smear Results No acid fast bacilli seen.    Narrative:       Collected By:[id number] JT ROQUE    AFB Culture [[id number]] Collected:  04/06/19 1257    Order Status:  Completed Specimen:  CSF from Spinal Fluid Updated:  04/07/19 1839     Significant Indicator NEG     Source CSF     Site Tap     AFB Culture Culture in progress.     AFB Smear Results No acid fast bacilli seen.     "Narrative:       Collected By:[id number] JT ROQUE    GRAM STAIN [[id number]] Collected:  04/06/19 1645    Order Status:  Completed Specimen:  Respirate Updated:  04/07/19 1320     Significant Indicator .     Source RESP     Site TRACHEAL ASPIRATE     Gram Stain Result Rare WBCs.  No organisms seen.      Narrative:       Collected By:[id number] CAMILLA NEWMAN    BLOOD CULTURE [[id number]] Collected:  04/05/19 2120    Order Status:  Completed Specimen:  Blood from Peripheral Updated:  04/07/19 0855     Significant Indicator NEG     Source BLD     Site PERIPHERAL     Blood Culture No Growth    Note: Blood cultures are incubated for 5 days and  are monitored continuously.Positive blood cultures  are called to the RN and reported as soon as  they are identified.      Narrative:       Per Hospital Policy: Only change Specimen Src: to \"Line\" if  specified by physician order.    URINALYSIS [[id number]]  (Abnormal) Collected:  04/06/19 1649    Order Status:  Completed Specimen:  Urine from Urine, [facility] Cath Updated:  04/06/19 1814     Color Yellow     Character Clear     Specific Gravity 1.023     Ph 7.0     Glucose Negative mg/dL      Ketones Trace (A) mg/dL      Protein 300 (A) mg/dL      Bilirubin Negative     Urobilinogen, Urine 0.2     Nitrite Negative     Leukocyte Esterase Negative     Occult Blood Trace (A)     Micro Urine Req Microscopic    Narrative:       Collected By:[id number] CAMILLA NEWMAN  Indication for culture:->Temp Equal to,or Greater Than 101    GRAM STAIN [[id number]] Collected:  04/06/19 1539    Order Status:  Completed Specimen:  CSF Updated:  04/06/19 1649     Significant Indicator .     Source CSF     Site TAP     Gram Stain Result No organisms seen.    Narrative:       Collected By:[id number] JT ROQUE    Rowan Ink [[id number]] Collected:  04/06/19 1257    Order Status:  Completed Specimen:  CSF from Other Body Fluid Updated:  04/06/19 1641     Significant Indicator NEG     Source CSF     Site Tap     Rowan " "Ink No encapsulated yeast seen    Narrative:       Collected By:[id number] [facility] ALEJANDRA THOMASON  CSF fluid    Cryptococcal Antigen [[id number]]     Order Status:  No result Specimen:  CSF     FLUID CULTURE [[id number]] Collected:  04/06/19 1257    Order Status:  Canceled Specimen:  Other from Other Body Fluid     Blood Culture [[id number]]     Order Status:  Canceled Specimen:  Blood from Peripheral     Blood Culture [[id number]]     Order Status:  Canceled Specimen:  Blood from Peripheral     BLOOD CULTURE [[id number]] Collected:  04/05/19 2120    Order Status:  Completed Specimen:  Blood from Peripheral Updated:  04/06/19 0755     Significant Indicator NEG     Source BLD     Site PERIPHERAL     Blood Culture No Growth    Note: Blood cultures are incubated for 5 days and  are monitored continuously.Positive blood cultures  are called to the RN and reported as soon as  they are identified.      Narrative:       Per Hospital Policy: Only change Specimen Src: to \"Line\" if  specified by physician order.    URINALYSIS,CULTURE IF INDICATED [[id number]]  (Abnormal) Collected:  04/04/19 1753    Order Status:  Completed Specimen:  Urine Updated:  04/04/19 1845     Color Yellow     Character Clear     Specific Gravity 1.018     Ph 6.5     Glucose Negative mg/dL      Ketones Trace (A) mg/dL      Protein Negative mg/dL      Bilirubin Negative     Urobilinogen, Urine 0.2     Nitrite Negative     Leukocyte Esterase Negative     Occult Blood Moderate (A)     Micro Urine Req Microscopic          Assessment:  Active Hospital Problems    Diagnosis   • Altered mental status [R41.82]   • Controlled type 2 diabetes mellitus without complication, without long-term current use of insulin ([facility]) [E11.9]   • Sepsis ([facility]) [A41.9]   • Pharyngoesophageal dysphagia [R13.14]   • Systolic and diastolic CHF, chronic ([facility]) [I50.42]       Plan:  Altered mental status, concern for meningoencephalitis.    Now afebrile  Resolved leukocytosis  Off " pressors  LP-slight elevation protein, normal glucose, only 1 WBC  Meningitis panel-negative  HSV PCR-negative  Arbovirus serology-pending  Blood cxs neg  DC'd vancomycin/amp/ceftraixone as CSF not c/w bacterial meningitis  CT scan did show small aneurysm  MRI did not reveal any stroke or evidence of vasculitis  DC acyclovir  Patient only oriented to self, not following commands or answering questions appropriately    Vent dependent respiratory failure, improved  CXR unremarkable  Query aspiration-but neg CXR as above  Completed a 5-day course of Zosyn   Status post extubation on 4/8    Chronic right internal carotid artery occlusion   +flow  Continue volume of blood pressure control for     Type 2 diabetes mellitus  Glycosylated hemoglobin 6.6  Maintain blood sugars under 150    I have performed a physical exam and reviewed and updated ROS and plan today 4/9/2019.  In review of yesterday's note 4/8/2019, there are no changes except as documented above.      Discussed with internal medicine Dr. Cooper.  ID signing off.  Please reconsult if needed

## 2019-04-09 NOTE — CARE PLAN
"Problem: Skin Integrity  Goal: Risk for impaired skin integrity will decrease  Outcome: PROGRESSING AS EXPECTED  Turning patient Q2 hours. Heel float boots in use. Extra pillows in place. Mepilex in place.     Problem: Pain Management  Goal: Pain level will decrease to patient's comfort goal  Outcome: PROGRESSING AS EXPECTED  Patient mainly nonverbal and only answers yes/no questions. Pain assessed using nonverbal scale. When asked if pt is in pain, pt replies, \"no.\" Nonpharm measures provided for comfort.      "

## 2019-04-09 NOTE — CARE PLAN
Problem: Skin Integrity  Goal: Risk for impaired skin integrity will decrease  Outcome: PROGRESSING AS EXPECTED  Full waffle mattress in place. Performing q 2 hr turns    Problem: Pain Management  Goal: Pain level will decrease to patient's comfort goal  Outcome: PROGRESSING AS EXPECTED  No s/s of pain at this time

## 2019-04-10 PROBLEM — R14.0 ABDOMINAL DISTENTION: Status: ACTIVE | Noted: 2019-01-01

## 2019-04-10 NOTE — PROGRESS NOTES
Pt arrived to unit via transport. Bilateral wrist restraints in place per order. Pt alert to self only.Cortrak in place.

## 2019-04-10 NOTE — PROGRESS NOTES
· 2 RN skin check complete with AGNIESZKA Landa.  · Devices in place: cortrak, oxygen cannula, bilateral wrist restraints .  · Skin assessed under devices yes.  · Confirmed pressure ulcers found and charted: Redness under the R and L breast area. R upper arm has some scabbing. Some redness on back of pt. Ears pads placed.   · New potential pressure ulcers noted on none.    Sacral area is intact pink and slow to brayan, bilateral heels are pink and blanching but slow. R upper arm scabbing picture was obtained. Improved redness under the R breast compared to initial picture that was taken.  · The following interventions in place: waffle cushion placed under the pt. Float heel boots on, checked hourly for incontinence episode. Made sure Skin under restraints intact and restraints not to tight. Q2 turns in place.

## 2019-04-10 NOTE — PROGRESS NOTES
Tube feeds stopped by night shift RN due to increased abdominal distention. MD Anderson made aware of assessment, new orders received for cortrak placement verification.

## 2019-04-10 NOTE — PROGRESS NOTES
After results of abdominal x-ray for tube placement, tube feeds continued to be held, medications through tube feeding held per MD Anderson.

## 2019-04-10 NOTE — PROGRESS NOTES
Received pt. In bed asleep but wakes up on verbal command. Pt. Is alert and oriented to self only, unable to follow through with series of orientation questions. Noted bilateral wrist restraints in place and intact, CMS intact. IV on R AC intact. Pt. Is incontinent. Float heel boots on. Restarted feeding per order. Due meds given. Needs attended.

## 2019-04-10 NOTE — CARE PLAN
Problem: Venous Thromboembolism (VTW)/Deep Vein Thrombosis (DVT) Prevention:  Goal: Patient will participate in Venous Thrombosis (VTE)/Deep Vein Thrombosis (DVT)Prevention Measures  Outcome: PROGRESSING AS EXPECTED   04/09/19 2130   Mechanical/VTE Prophylaxis   Mechanical Prophylaxis  SCDs, Sequential Compression Device   SCDs, Sequential Compression Device On   OTHER   Risk Assessment Score 2   VTE RISK Moderate   Pharmacologic Prophylaxis Used Unfractionated Heparin       Problem: Urinary Elimination:  Goal: Ability to reestablish a normal urinary elimination pattern will improve  Outcome: PROGRESSING AS EXPECTED  Pt. Greene catheter was removed at 1700 on 4/9/19, pt. Established voiding around 2000. Incontinent.     Problem: Skin Integrity  Goal: Risk for impaired skin integrity will decrease  Outcome: PROGRESSING AS EXPECTED  Q2 turns in place, waffle mattress placed under the pt. Floated both heels. Nystatin powder present on assessment under pt. Breasts.    Problem: Safety - Medical Restraint  Goal: Remains free of injury from restraints (Restraint for Interference with Medical Device)  INTERVENTIONS:  1. Determine that other, less restrictive measures have been tried or would not be effective before applying the restraint  2. Evaluate the patient's condition at the time of restraint application  3. Inform patient/family regarding the reason for restraint  4. Q2H: Monitor safety, psychosocial status, comfort, nutrition and hydration   Outcome: PROGRESSING AS EXPECTED   04/10/19 0407   Interventions Addressed    Addressed this shift: Remains free of injury from restraints (restraint for interference with medical device) Every 2 hours: Monitor safety, psychosocial status, comfort, nutrition and hydration

## 2019-04-10 NOTE — CONSULTS
Medical chart review completed.     Patient is a 68 y.o. female  with a past medical history of coronary artery disease, COPD, hypertension, diabetes, overactive bladder, dyslipidemia, admitted to [facility] on 4/4, with altered mental status and fever. Negative Head CT. CTA with distal right intracranial ICA occlusion, with reconstiution distally (chronic), and left subclavian artery occlusion with reconstitution, appearance concerning for subclavian steal. Negative MRI. Patient had respiratory failure requiring intubation. She had lumbar puncture which was negative so far, some tests pending. She had feeding tube placed, and now has a mild ileus. Abdominal US with mildly distended gallbladder. ECHO with EF 30%. EKG with Qtc prolongation, 516. She has wrist restraints currently, and has been extubated. Neurology was consulted. EEG with findings of a moderate encephalopathy, no active seizures, though she is on Keppra. She was found to have low B12, which is being repleted.     Patient with multiple co-morbidities(including but not limited to hypokalemia,coronary artery disease, COPD, hypertension, diabetes, overactive bladder, dysphagia); with cognitive deficits and functional deficits in mobility/self-cares/swallowing/speech, and Severe de-conditioning.     Pre-morbidly, this patient lived in a unknown level home with unknown steps to enter, with her spouse. The patient was evaluated by acute care Physical Therapy, Occupational Therapy and Speech Language Pathology; currently requiring total assistance for mobility and total assistance for ADLs, also with ongoing cognitive and swallowing deficits.     6 clicks score 6 mobility, 7 ADLs    The patient is not a good candidate for an acute inpatient rehabilitation program currently, as she is too low level, and wouldn't be able to participate in 3 hours of daily therapy.    In addition, there isn't a clear etiology of her altered mental status, so I'm not  sure that she will progress functionally without a diagnosis.     However, if she progresses functionally while waiting for medical clearance, then I'd be happy to re-assess.    Otherwise recommend discharge to SNF for a more prolonged course of rehab.    Thank you for allowing us to participate in her care.    Ni Conklin M.D.  Physical Medicine and Rehabilitation

## 2019-04-10 NOTE — PROGRESS NOTES
"Spoke to primary RN about this patient. She stated that she had some concern regarding her present condition, especially her disorientation, and was not sure if the patient was demonstrating confusion on the ICU. It appears that the patient has been AAO1 for awhile, further she was described by neurology as \"...is somnolent and easily distractible.\"  and while providers are hopeful she will recover more, it is possible that this is her new baseline. She has shown the most improvement with B-12 supplementation. She has required required restraints on the ICU and from our discussion still requires them.     I am happy to provide any assistance that may be needed with this or any patient. Please feel free to contact me at Ext [phone]  "

## 2019-04-10 NOTE — PROGRESS NOTES
Hospital Medicine Daily Progress Note    Date of Service  4/10/2019    Chief Complaint  68 y.o. female admitted 4/4/2019 with altered mental status    Hospital Course    Ms. Yarbrough has a past medical history of diabetes, COPD, and coronary artery disease that presented to the emergency room with confusion and expressive aphasia.  He was found to have a temperature of 103 and required intubation due to airway protection.  She was admitted to the intensive care unit.      Interval Problem Update  4/7 the patient intubated overnight was severely lethargic, currently somewhat improved, continues to be febrile at 103.1, LP done and not impressive, MR of the brain without explanation, seen by infectious disease and neurology.  Rechecking ammonia, possibility of vasculitis versus other, possible aspiration with pulmonary infection.  4/8 patient remains intubated, she opens her eyes and follows intermittently, afebrile, did have some emesis overnight with tube feeds on hold, off pressors, remains on empiric antibiotics and acyclovir for remote possibility of viral encephalitis, PCR pending, placing electrolytes.  EEG performed, continues on [facility]  4/9: Patient seen and evaluated in the intensive care unit. She was extubated on 4/8. Her  visited this morning.  I met with her sister at bedside and we discussed that her mentation has been poor at home with episodes of confusion, slow mentation, and inability to communicate effectively.       4/10: seen and examined this morning, arousable to verbal stimuli but closes her eyes right after, has wrist restraints. Only answered to her name. Otherwise not oriented to time , place.   VSS, on NC  CBC no white count  k persistently low  abd distention    Consultants/Specialty  Critical care. I discussed with Dr. Gonda on ICU Hot Rounds.  Neurology. I discussed with Dr. Valiente this morning about EEG findings.  Infectious disease.     Code  Status  Code    Disposition  med    Review of Systems  Review of Systems   Unable to perform ROS: Mental acuity        Physical Exam  Temp:  [36.7 °C (98 °F)-36.9 °C (98.4 °F)] 36.7 °C (98 °F)  Pulse:  [65-97] 65  Resp:  [14-19] 16  BP: (133-155)/(64-82) 133/82  SpO2:  [91 %-97 %] 91 %    Physical Exam   Constitutional: She is oriented to person, place, and time. No distress.   HENT:   Dry mucous membranes  cortrak nares.    Eyes: Right eye exhibits no discharge. Left eye exhibits no discharge.   Neck:   IJ central line  Her neck is supple   Cardiovascular: Normal rate and regular rhythm.    No murmur heard.  Pulmonary/Chest: Effort normal. No respiratory distress. She has no wheezes.   Abdominal: Soft. She exhibits distension. There is no tenderness.   Musculoskeletal: She exhibits no edema.   Soft wrist restraints  No edema   Neurological: She is alert and oriented to person, place, and time.   She has occasional speech  She is confused and does not follow commands.    Skin: Skin is warm and dry. No rash noted. She is not diaphoretic.   Nursing note and vitals reviewed.      Fluids    Intake/Output Summary (Last 24 hours) at 04/10/19 1157  Last data filed at 04/10/19 0600   Gross per 24 hour   Intake              300 ml   Output              500 ml   Net             -200 ml       Laboratory  Recent Labs      04/08/19   0305  04/09/19   0500  04/10/19   0335   WBC  4.8  6.9  7.5   RBC  4.19*  4.24  4.49   HEMOGLOBIN  11.6*  11.5*  12.2   HEMATOCRIT  35.5*  35.8*  37.3   MCV  84.7  84.4  83.1   MCH  27.7  27.1  27.2   MCHC  32.7*  32.1*  32.7*   RDW  44.4  44.3  43.3   PLATELETCT  209  261  275   MPV  10.7  10.5  10.1     Recent Labs      04/08/19   0305   04/09/19   0500  04/10/19   0335  04/10/19   0917   SODIUM  138   --   139  142   --    POTASSIUM  2.9*   < >  3.0*  2.9*  3.1*   CHLORIDE  109   --   110  109   --    CO2  21   --   23  21   --    GLUCOSE  134*   --   186*  152*   --    BUN  11   --   10  9   --     CREATININE  0.53   --   0.62  0.54   --    CALCIUM  7.8*   --   8.2*  8.3*   --     < > = values in this interval not displayed.                   Imaging  DX-ABDOMEN FOR TUBE PLACEMENT   Final Result      1.  Feeding tube tip overlies the region of the gastric antrum or 1st portion duodenum.   2.  There is a mild ileus.      DX-CHEST-PORTABLE (1 VIEW)   Final Result         1. Interval extubation. No other significant interval change.      DX-CHEST-PORTABLE (1 VIEW)   Final Result         1. No significant interval change.      US-ABDOMEN COMPLETE SURVEY   Final Result      1.  Mildly distended gallbladder with sludge present.   2.  No other evidence for acute cholecystitis.   3.  No significant biliary dilation.   4.  Limited evaluation of the bladder.         [id number] VIEW   Final Result      1.  Feeding tube tip overlies the gastric antrum.      2.  Mild ileus.      DX-CHEST-PORTABLE (1 VIEW)   Final Result         1.  No acute cardiopulmonary disease.   2.  Atherosclerosis         EC-ECHOCARDIOGRAM COMPLETE W/O CONT   Final Result      DX-CHEST-PORTABLE (1 VIEW)   Final Result      1.  Supportive tubing as described above.   2.  No pneumothorax.   3.  Persistent hypoinflation.      MR-BRAIN-W/O   Final Result      No evidence of intracranial hemorrhage, acute ischemia or mass on this severely motion degraded study      DX-CHEST-LIMITED (1 VIEW)   Final Result         1.  No acute cardiopulmonary disease.   2.  Atherosclerosis   3.  No radiodense foreign body or medical device identified which would exclude MRI.      CT-CTA NECK WITH & W/O-POST PROCESSING   Final Result         1.  Occlusion of the left subclavian artery to the level of the vertebral artery with reconstitution, appearance concerning for subclavian steal.   2.  Intimal thickening versus soft plaque and scattered atherosclerotic calcification of the left carotid arterial tree with less than 50% stenosis.   3.  Small caliber right internal  carotid artery   4.  Extensive emphysema      These findings were discussed with the patient's clinician, Dr. Mcallister, on 4/6/2019 4:31 AM.      CT-CTA HEAD WITH & W/O-POST PROCESS   Final Result         1.  Hypoplastic right internal carotid artery, there is a segment of occlusion seen in the distal right intracranial internal carotid artery near the petrous apex. The intracranial internal carotid artery reconstitutes distal to this segment of    occlusion.   2.  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   3.  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   4.  Changes of atrophy with nonspecific white matter changes, most commonly associated with small vessel ischemia.      These findings were discussed with the patient's clinician, Dr. Mcallister, on 4/6/2019 4:31 AM.      DX-ABDOMEN FOR TUBE PLACEMENT   Final Result         1.  Nonspecific bowel gas pattern.   2.  Dobbhoff tube tip overlying the expected location of the pylorus or first duodenal segment.      US-CAROTID DOPPLER BILAT   Final Result      [id number] VIEW   Final Result         1.  Moderate stool in the colon suggests changes of constipation, otherwise nonspecific bowel gas pattern      DX-CHEST-PORTABLE (1 VIEW)   Final Result         1.  No acute cardiopulmonary disease.   2.  Atherosclerosis   3.  No radiodense foreign body or medical device identified which would exclude MRI.      CT-HEAD W/O   Final Result         1. No acute intracranial abnormality. No evidence of acute intracranial hemorrhage or mass lesion.                    Assessment/Plan  * Altered mental status- (present on admission)   Assessment & Plan    Persistent; concern for meningoencephalitis.    Now afebrile  Resolved leukocytosis  Off pressors  LP-slight elevation protein, normal glucose, only 1 WBC  Meningitis panel-negative  HSV PCR-negative  Arbovirus serology-pending  Blood cxs  neg  DC'd vancomycin/amp/ceftraixone as CSF not c/w bacterial meningitis  CT scan did show small aneurysm  MRI did not reveal any stroke or evidence of vasculitis  DC acyclovir    Neurology consulted.   Infectious disease consulting  Started thiamine IV, vitamin B12    Continue to monitor         Respiratory failure ([facility])   Assessment & Plan    Requiring mechanical ventilation  She was successfully extubated on 4/8    Completed a 5-day course of Zosyn for aspiration PNA  Repeat CXR neg       Sepsis ([facility])   Assessment & Plan    This is severe sepsis with the following associated acute organ dysfunction(s): acute kidney failure, acute respiratory failure, metabolic/septic encephalopathy.   Possible source may be CNS thus she has been treated with empiric antibiotics  Blood cultures neg thus far  LP neg for infection  Off abx  ID on board         Systolic and diastolic CHF, chronic ([facility])   Assessment & Plan    Currently holding OMT including carvedilol and lisinopril     Coronary artery disease involving native coronary artery of native heart without angina pectoris- (present on admission)   Assessment & Plan    Continue aspirin and Lipitor     Chronic obstructive pulmonary disease ([facility])- (present on admission)   Assessment & Plan    History of; she does not have evidence of acute exacerbation   DuoNeb as needed  RT protocol     Controlled type 2 diabetes mellitus without complication, without long-term current use of insulin ([facility])- (present on admission)   Assessment & Plan    With hyperglycemia of 123 present upon admit.  Insulin sliding scale with serial Accu-Checks  Hypoglycemic protocol in place  Glycohemoglobin 6.6  She was on metformin outpatient.       Abdominal distention   Assessment & Plan    KUB showing mild ileus  Hold TF for now and repeat KUB in the morning to re-evaluate     Hypophosphatemia   Assessment & Plan    resolved     Hypomagnesemia   Assessment & Plan    Improving, monitor     Hypokalemia    Assessment & Plan    Persistently low, 2.9, replaced, recheck is slightly improved  Continue replacement as needed     Pharyngoesophageal dysphagia   Assessment & Plan    Cortrak feeding     Dyslipidemia- (present on admission)   Assessment & Plan    Continue Lipitor     .    VTE prophylaxis: heparin

## 2019-04-10 NOTE — PROGRESS NOTES
Spoke with MD Anderson regarding cortrak misplacement, per MD hold all tube feedings and medications through cortrak until tomorrow morning due to ileus.

## 2019-04-10 NOTE — THERAPY
"Occupational Therapy Treatment completed with focus on ADLs and ADL transfers.  Functional Status:  Max/Dependent with ADLs and txfs  Plan of Care: Will benefit from Occupational Therapy 2 times per week  Discharge Recommendations:  Equipment Will Continue to Assess for Equipment Needs. Post-acute therapy Discharge to a transitional care facility for continued therapy services.    See \"Rehab Therapy-Acute\" Patient Summary Report for complete documentation.   "

## 2019-04-10 NOTE — ASSESSMENT & PLAN NOTE
Repeat KUB: 1.  Increased dilatation of air-filled colonic loops is noted as described above compared to the prior radiograph. Findings could be due to a colonic ileus versus partial obstruction in the region of the splenic flexure.  CT of abdomen with contrast consistent with colitis  c diff negative on 4/12  Diarrhea improved with cessation of lactulose

## 2019-04-11 NOTE — PROCEDURES
EEG 04/11/19 12:25 PM    ELECTROENCEPHALOGRAM REPORT      Referring provider: Dr. Mondragon    DOS:   4/11/2019      INDICATION:  Alice Yarbrough 68 y.o. female presenting with change of mental status, questionable seizures    CURRENT ANTIEPILEPTIC REGIMEN: Neurontin     DURATION: 23 minutes       TECHNIQUE: A 30-channel electroencephalogram (EEG) was performed in accordance with the international 10-20 system. This digital study was reviewed in bipolar and referential montages. The recording examined the patient during wakeful, drowsy and sleep states.         DESCRIPTION OF THE RECORD:    During the awake state, background shows symmetrical 7-8 Hz activity posteriorly with amplitude of 70 mV.   During drowsiness, high-amplitude delta frequencies were seen. There are bursts of periodic frontal sharp delta, at times, triphasic at times, more persistent over right frontal ( I prefer to call this frontal dysfunction more to the right, since these frontal sharp are not symmetric and more to the right, also, variation in morphology is more than expected)     During the sleep state, background shows diffuse high-amplitude 4-5 Hz delta activity.  Symmetrical high-amplitude sleep spindles and vertex sharp activities were seen in the central leads.    ACTIVATION PROCEDURES:     Hyperventilation was not performed  Intermittent Photic stimulation was not performed      ICTAL AND/OR INTERICTAL FINDINGS:     There are bursts of periodic frontal sharp delta, at times, triphasic at times, more persistent over right frontal ( I prefer to call this frontal dysfunction more to the right, since these frontal sharp are not symmetric and more to the right, also, variation in morphology is more than expected)      EVENT(S):  NONE    EKG: sampling review of EKG recording demonstrated regular rhythm      INTERPRETATION:      ________________________________________________________________________     This is abnormal video EEG  recording in the awake and drowsy/sleep state(s).     This scalp EEG denotes                  1. Diffuse nonspecific cortical dysfunction - moderate degree                 2. Focal cortical irritability over frontal R>L--this patten could be interictal -nonconvulsive seizures are possible advise clinical correlation regarding further management.--  ( I prefer to call this frontal dysfunction more to the right, since these frontal sharp are not symmetric and more to the right, also, variation in morphology is pronounced)          ________________________________________________________________________  ________________________________________________________________________

## 2019-04-11 NOTE — CARE PLAN
Problem: Skin Integrity  Goal: Risk for impaired skin integrity will decrease  Outcome: PROGRESSING AS EXPECTED  Pt has redness under breast and applying cream. Buttock free from redness. Repositioned every 2 hours.

## 2019-04-11 NOTE — PROGRESS NOTES
Hospital Neurology Progress Note:     Interval History: No acute events overnight.  No complaints today.  Appears somewhat encephalopathic however is occasionally awake and alert.  Unable to obtain review of systems as the patient is not coherent.    Objective:   Vitals:    04/10/19 0750 04/10/19 0800 04/10/19 1600 04/10/19 1800   BP: 133/82  (!) 175/75 (!) 174/93   Pulse: 65  84 90   Resp: 16  16 14   Temp: 36.7 °C (98 °F)  36.8 °C (98.3 °F) 36.4 °C (97.6 °F)   TempSrc: Temporal  Temporal Temporal   SpO2: 91%  92% 91%   Weight:  86 kg (189 lb 9.5 oz)     Height:           Labs:     Lab Results   Component Value Date/Time    PROTHROMBTM 15.8 (H) 04/06/2019 05:27 PM    INR 1.25 (H) 04/06/2019 05:27 PM      Lab Results   Component Value Date/Time    WBC 7.5 04/10/2019 03:35 AM    RBC 4.49 04/10/2019 03:35 AM    HEMOGLOBIN 12.2 04/10/2019 03:35 AM    HEMATOCRIT 37.3 04/10/2019 03:35 AM    MCV 83.1 04/10/2019 03:35 AM    MCH 27.2 04/10/2019 03:35 AM    MCHC 32.7 (L) 04/10/2019 03:35 AM    MPV 10.1 04/10/2019 03:35 AM    NEUTSPOLYS 74.60 (H) 04/10/2019 03:35 AM    LYMPHOCYTES 14.70 (L) 04/10/2019 03:35 AM    MONOCYTES 7.30 04/10/2019 03:35 AM    EOSINOPHILS 2.40 04/10/2019 03:35 AM    BASOPHILS 0.50 04/10/2019 03:35 AM      Lab Results   Component Value Date/Time    SODIUM 142 04/10/2019 03:35 AM    POTASSIUM 3.1 (L) 04/10/2019 09:17 AM    CHLORIDE 109 04/10/2019 03:35 AM    CO2 21 04/10/2019 03:35 AM    GLUCOSE 152 (H) 04/10/2019 03:35 AM    BUN 9 04/10/2019 03:35 AM    CREATININE 0.54 04/10/2019 03:35 AM    CREATININE 1.1 04/30/2008 08:15 PM      Lab Results   Component Value Date/Time    CHOLSTRLTOT 113 04/05/2019 04:17 AM    LDL 61 04/05/2019 04:17 AM    HDL 34 (A) 04/05/2019 04:17 AM    TRIGLYCERIDE 89 04/05/2019 04:17 AM       Lab Results   Component Value Date/Time    ALKPHOSPHAT 69 04/10/2019 03:35 AM    ASTSGOT 19 04/10/2019 03:35 AM    ALTSGPT 15 04/10/2019 03:35 AM    TBILIRUBIN 0.5 04/10/2019 03:35 AM         Imaging/Testing:   No new neuro imaging to review    Physical Exam:     General: Well-appearing 68-year-old female in no acute distress  Cardio: Normal S1/S2. No peripheral edema.   Pulm: CTAX2. No respiratory distress.   Skin: Warm, dry, no rashes or lesions   Psychiatric: Unable to assess due to encephalopathy  HEENT: Atraumatic head, normal sclera and conjunctiva, moist oral mucosa. No lid lag.  Abdomen: Soft, non tender. No masses or hepatosplenomegaly.    Neurologic:  Mental Status: Awake and alert however oriented only to person.  Able to follow commands in a limited fashion.  Upon answering questions the patient is somewhat tangential.  She is able to answer first question in a topic however is unable to provide any further information.  Does not appear to have neglect.  Cranial Nerves: Pupils equal round reactive to light.  Extraocular movements intact.  Face symmetric.  Motor: Normal muscle tone and bulk.  Able to follow commands.  Unable to test strength segmentally.  Reflexes: 2/4 throughout  Coordination: Unable to assess due to restraints  Sensation: Withdraws to noxious stimuli throughout on.   Gait/Station: Unable to assess    Assessment/Plan:    Alice Yarbrough is a 68 y.o. female with history of coronary artery disease, COPD, hypertension, hyperlipidemia, history of myocardial infarction, diabetes, history of back surgery with chronic pain presenting to the hospital for altered mental status and consulted for altered mental status.  The patient has been experiencing a sudden change of mental status at home and throughout her hospitalization she continued to deteriorate with subsequent intubation for airway protection.  Afterwards, she was found to be deficient of vitamin B12, and supplementation was performed.  Her mental status has continued to improved.  She has been extubated and is currently able to answer questions appropriately however she gives short answers, is somnolent and  easily distractible.  In this regard, this appears to be consistent with an encephalopathy.  Etiology unknown however does not appear to be epileptic at this time.  Vitamin B12 appears to have helped however perhaps supplementation with thiamine may be helpful.  If the patient has not improved by 4/11/2019 we may opt for some autoimmune evaluation.    Plan:  1.  Continue vitamin B12 supplementation  2.  We will repeat EEG on 4/11/2019 if patient is still encephalopathic  3.  Delirium precautions  4.  Plan discussed with consulting physician and patient's nurse.  5.  Discontinued Keppra as it may be contributing to encephalopathy    Alexandro Rowe M.D., Diplomat of the American Board of Psychiatry and Neurology  Diplomat of [facility]   Assistant Clinical Professor, [facility] Neurology Consultant

## 2019-04-11 NOTE — PROGRESS NOTES
Lab called with critical result of Potassium at 2.6. Critical lab result read back to notifying lab personnel.   Dr. Kaur notified of critical lab result at 0300.  Critical lab result read back by Dr. Kaur.

## 2019-04-11 NOTE — PROGRESS NOTES
· 2 RN skin check complete with AGNIESZKA Lai.  · Devices in place: nasal cannula, wrist restraints.  · Skin assessed under devices intact.  · Confirmed pressure ulcers found on n/a.  · New potential pressure ulcers noted on n/a. Wound consult placed and wound reported.  · The following interventions in place: waffle mattress, heel float boots, repositioned Q2 hours, frequent checks for incontinence.     Sacrum light red and blanching, scabbing to left upper arm, redness under right breast, pink under left breast.

## 2019-04-11 NOTE — CARE PLAN
Problem: Venous Thromboembolism (VTW)/Deep Vein Thrombosis (DVT) Prevention:  Goal: Patient will participate in Venous Thrombosis (VTE)/Deep Vein Thrombosis (DVT)Prevention Measures    Intervention: Ensure patient wears graduated elastic stockings (OSVALDO hose) and/or SCDs, if ordered, when in bed or chair (Remove at least once per shift for skin check)  Pt wearing SCD's and devices were removed once on shift for 10 minutes while 2 RN skin was performed.  SCD's placed back on after whole body skin check.      Problem: Skin Integrity  Goal: Risk for impaired skin integrity will decrease    Intervention: Assess risk factors for impaired skin integrity and/or pressure ulcers  Monitor for possible skin issues related to soft wrist restraints.  Restraints removed every 2 hours for ROM and assessment.

## 2019-04-11 NOTE — PROGRESS NOTES
[facility] Neurology Progress Note:     Interval History: No acute events overnight.  No complaints today.  Unable to obtain review of systems as the patient is somnolent and not coherent.    Objective:   Vitals:    04/10/19 1800 04/10/19 1905 04/11/19 0335 04/11/19 0756   BP: (!) 174/93 151/80 155/70 150/76   Pulse: 90 92 96 100   Resp: 14 17 16 18   Temp: 36.4 °C (97.6 °F) 36.4 °C (97.6 °F) 36.5 °C (97.7 °F) 36.6 °C (97.8 °F)   TempSrc: Temporal Temporal Temporal Temporal   SpO2: 91% 91% 92% 95%   Weight:       Height:           Labs:     Lab Results   Component Value Date/Time    PROTHROMBTM 15.8 (H) 04/06/2019 05:27 PM    INR 1.25 (H) 04/06/2019 05:27 PM      Lab Results   Component Value Date/Time    WBC 7.5 04/10/2019 03:35 AM    RBC 4.49 04/10/2019 03:35 AM    HEMOGLOBIN 12.2 04/10/2019 03:35 AM    HEMATOCRIT 37.3 04/10/2019 03:35 AM    MCV 83.1 04/10/2019 03:35 AM    MCH 27.2 04/10/2019 03:35 AM    MCHC 32.7 (L) 04/10/2019 03:35 AM    MPV 10.1 04/10/2019 03:35 AM    NEUTSPOLYS 74.60 (H) 04/10/2019 03:35 AM    LYMPHOCYTES 14.70 (L) 04/10/2019 03:35 AM    MONOCYTES 7.30 04/10/2019 03:35 AM    EOSINOPHILS 2.40 04/10/2019 03:35 AM    BASOPHILS 0.50 04/10/2019 03:35 AM      Lab Results   Component Value Date/Time    SODIUM 140 04/11/2019 12:42 AM    POTASSIUM 3.0 (L) 04/11/2019 11:46 AM    CHLORIDE 106 04/11/2019 12:42 AM    CO2 22 04/11/2019 12:42 AM    GLUCOSE 128 (H) 04/11/2019 12:42 AM    BUN 7 (L) 04/11/2019 12:42 AM    CREATININE 0.42 (L) 04/11/2019 12:42 AM    CREATININE 1.1 04/30/2008 08:15 PM      Lab Results   Component Value Date/Time    CHOLSTRLTOT 113 04/05/2019 04:17 AM    LDL 61 04/05/2019 04:17 AM    HDL 34 (A) 04/05/2019 04:17 AM    TRIGLYCERIDE 89 04/05/2019 04:17 AM       Lab Results   Component Value Date/Time    ALKPHOSPHAT 64 04/11/2019 12:42 AM    ASTSGOT 17 04/11/2019 12:42 AM    ALTSGPT 14 04/11/2019 12:42 AM    TBILIRUBIN 0.5 04/11/2019 12:42 AM        Imaging/Testing:   EEG on 4/11/2019  was personally reviewed which showed very frequent triphasic waves with sharp contour in a anterior to posterior and posterior to anterior lag which at times become briefly periodic along with generalized slowing with 5-6 Hz theta in 2-3 Hz delta overall consistent with a moderate encephalopathy of nonspecific etiology.    Physical Exam:     General: Well-appearing 68-year-old female in no acute distress  Cardio: Normal S1/S2. No peripheral edema.   Pulm: CTAX2. No respiratory distress.   Skin: Warm, dry, no rashes or lesions   Psychiatric: Unable to assess due to encephalopathy  HEENT: Atraumatic head, normal sclera and conjunctiva, moist oral mucosa. No lid lag.  Abdomen: Soft, non tender. No masses or hepatosplenomegaly.    Neurologic:  Mental Status: Somnolent but arousable.  Able to answer simple questions however unable to maintain a coherent conversation.  Cranial Nerves: Pupils equal round reactive to light.  Extraocular movements intact.  Face symmetric.  Motor: Normal muscle tone and bulk.  Able to follow commands.  Unable to test strength segmentally.  Reflexes: 2/4 throughout  Coordination: Unable to assess due to restraints  Sensation: Withdraws to noxious stimuli throughout on.   Gait/Station: Unable to assess    Assessment/Plan:    Alice Yarbrough is a 68 y.o. female with history of coronary artery disease, COPD, hypertension, hyperlipidemia, history of myocardial infarction, diabetes, history of back surgery with chronic pain presenting to the hospital for altered mental status and consulted for altered mental status.  The patient has been experiencing a sudden change of mental status at home and throughout her hospitalization she continued to deteriorate with subsequent intubation for airway protection.  Afterwards, she was found to be deficient of vitamin B12, and supplementation was performed.  Her mental status has continued to improved.  She has been extubated and is currently able to  answer questions appropriately however she gives short answers, is somnolent and easily distractible.  In this regard, this appears to be consistent with an encephalopathy.  Etiology of encephalopathy is unknown however today's EEG raises question for a moderate encephalopathy with perhaps some component of epileptiform discharges.  In this regard, since Keppra was not effective, we will switch to Vimpat.  In addition, I have ordered an autoimmune workup.    Plan:  1.  EEG today consistent with moderate encephalopathy however sharply contoured waves raise the possibility of an epileptic component  2.  Vimpat 200 mg IV now.  Maintenance dose of 100 mg twice daily  3.  Ordered ESR CRP CALEB RAND dsDNA and rheumatoid factor  4.  Delirium precautions  5.  Plan discussed with consulting physician and patient's nurse.  6.  Discontinued Keppra as it may be contributing to encephalopathy    Alexandro Rowe M.D., Diplomat of the American Board of Psychiatry and Neurology  Diplomat of [facility]   Assistant Clinical Professor, [facility] Neurology Consultant

## 2019-04-11 NOTE — PROGRESS NOTES
· 2 RN skin check complete with AGNIESZKA Robertson.  · Devices in place: SCD's, wrist restraints, PIV and finger pulse ox.  · Skin assessed under devices: Yes.  · Confirmed pressure ulcers found on: N/A.  · New potential pressure ulcers noted on: None.   · The following interventions in place: waffle mattress, pillows to float boots, barrier cream, barrier wipes, nystatin powder, repositioning every 2 hours, frequent checks for incontinence, linens kept clean and dry.      Sacrum light red and blanching and intact; barrier wipes and barrier cream being utilized.  Circular scab to LUE with area around scab red and blanching.  LUE bruising.  RUE bruising to hand.  Redness under bilateral breast folds; nystatin powder bid.

## 2019-04-11 NOTE — PROGRESS NOTES
· 2 RN skin check complete with AGNIESZKA Beaulieu.  · Devices in place SCD, bilateral wrist restraints, PIV.  · Skin assessed under devices intact.  · Confirmed pressure ulcers found on n/a.  · New potential pressure ulcers noted on right heel. Wound consult placed and wound reported.  · The following interventions in place air mattress, every 2 hour turns, removal of wrist restraints every 2 hours, float heels with pillows, cream under breasts.    Redness under breasts, bruise to left hand, scab to right upper arm, dressing to left neck from previous central line, right heel with non-blanchable redness.

## 2019-04-11 NOTE — PROGRESS NOTES
Hospital Medicine Daily Progress Note    Date of Service  4/11/2019    Chief Complaint  68 y.o. female admitted 4/4/2019 with altered mental status    Hospital Course    Ms. Yarbrough has a past medical history of diabetes, COPD, and coronary artery disease that presented to the emergency room with confusion and expressive aphasia.  He was found to have a temperature of 103 and required intubation due to airway protection.  She was admitted to the intensive care unit.      Interval Problem Update  4/7 the patient intubated overnight was severely lethargic, currently somewhat improved, continues to be febrile at 103.1, LP done and not impressive, MR of the brain without explanation, seen by infectious disease and neurology.  Rechecking ammonia, possibility of vasculitis versus other, possible aspiration with pulmonary infection.  4/8 patient remains intubated, she opens her eyes and follows intermittently, afebrile, did have some emesis overnight with tube feeds on hold, off pressors, remains on empiric antibiotics and acyclovir for remote possibility of viral encephalitis, PCR pending, placing electrolytes.  EEG performed, continues on [facility]  4/9: Patient seen and evaluated in the intensive care unit. She was extubated on 4/8. Her  visited this morning.  I met with her sister at bedside and we discussed that her mentation has been poor at home with episodes of confusion, slow mentation, and inability to communicate effectively.       4/10: seen and examined this morning, arousable to verbal stimuli but closes her eyes right after, has wrist restraints. Only answered to her name. Otherwise not oriented to time , place.   VSS, on NC  CBC no white count  k persistently low  abd distention    4/11:  continues to be encephalopathic however is occasionally awake and alert.NAD  No events overnight  k persistently low, will replace and reorder  Neuro recs to repeat EEG tomorrow      Consultants/Specialty  Critical  care.  Neurology.    Code Status  Code    Disposition  Pending medical improvement    Review of Systems  Review of Systems   Unable to perform ROS: Mental acuity        Physical Exam  Temp:  [36.4 °C (97.6 °F)-36.8 °C (98.3 °F)] 36.6 °C (97.8 °F)  Pulse:  [] 100  Resp:  [14-18] 18  BP: (150-175)/(70-93) 150/76  SpO2:  [91 %-95 %] 95 %    Physical Exam   Constitutional: No distress.   Not oriented to person, time or place, confused, minimal conversation, only saying yah   HENT:   Dry mucous membranes  cortrak nares.    Eyes: Right eye exhibits no discharge. Left eye exhibits no discharge.   Neck:   IJ central line  Her neck is supple   Cardiovascular: Normal rate and regular rhythm.    No murmur heard.  Pulmonary/Chest: Effort normal. No respiratory distress. She has no wheezes.   Abdominal: Soft. She exhibits distension. There is no tenderness.   Musculoskeletal: She exhibits no edema.   Soft wrist restraints  No edema   Neurological:   She has occasional speech  She is confused and does not follow commands.    Skin: Skin is warm and dry. No rash noted. She is not diaphoretic.   Nursing note and vitals reviewed.      Fluids  No intake or output data in the 24 hours ending 04/11/19 1127    Laboratory  Recent Labs      04/09/19   0500  04/10/19   0335   WBC  6.9  7.5   RBC  4.24  4.49   HEMOGLOBIN  11.5*  12.2   HEMATOCRIT  35.8*  37.3   MCV  84.4  83.1   MCH  27.1  27.2   MCHC  32.1*  32.7*   RDW  44.3  43.3   PLATELETCT  261  275   MPV  10.5  10.1     Recent Labs      04/09/19   0500  04/10/19   0335  04/10/19   0917  04/11/19   0042   SODIUM  139  142   --   140   POTASSIUM  3.0*  2.9*  3.1*  2.6*   CHLORIDE  110  109   --   106   CO2  23  21   --   22   GLUCOSE  186*  152*   --   128*   BUN  10  9   --   7*   CREATININE  0.62  0.54   --   0.42*   CALCIUM  8.2*  8.3*   --   8.0*                   Imaging  DX-ABDOMEN FOR TUBE PLACEMENT   Final Result      1.  Feeding tube tip overlies the region of the gastric  antrum or 1st portion duodenum.   2.  There is a mild ileus.      DX-CHEST-PORTABLE (1 VIEW)   Final Result         1. Interval extubation. No other significant interval change.      DX-CHEST-PORTABLE (1 VIEW)   Final Result         1. No significant interval change.      US-ABDOMEN COMPLETE SURVEY   Final Result      1.  Mildly distended gallbladder with sludge present.   2.  No other evidence for acute cholecystitis.   3.  No significant biliary dilation.   4.  Limited evaluation of the bladder.         [id number] VIEW   Final Result      1.  Feeding tube tip overlies the gastric antrum.      2.  Mild ileus.      DX-CHEST-PORTABLE (1 VIEW)   Final Result         1.  No acute cardiopulmonary disease.   2.  Atherosclerosis         EC-ECHOCARDIOGRAM COMPLETE W/O CONT   Final Result      DX-CHEST-PORTABLE (1 VIEW)   Final Result      1.  Supportive tubing as described above.   2.  No pneumothorax.   3.  Persistent hypoinflation.      MR-BRAIN-W/O   Final Result      No evidence of intracranial hemorrhage, acute ischemia or mass on this severely motion degraded study      DX-CHEST-LIMITED (1 VIEW)   Final Result         1.  No acute cardiopulmonary disease.   2.  Atherosclerosis   3.  No radiodense foreign body or medical device identified which would exclude MRI.      CT-CTA NECK WITH & W/O-POST PROCESSING   Final Result         1.  Occlusion of the left subclavian artery to the level of the vertebral artery with reconstitution, appearance concerning for subclavian steal.   2.  Intimal thickening versus soft plaque and scattered atherosclerotic calcification of the left carotid arterial tree with less than 50% stenosis.   3.  Small caliber right internal carotid artery   4.  Extensive emphysema      These findings were discussed with the patient's clinician, Dr. Mcallister, on 4/6/2019 4:31 AM.      CT-CTA HEAD WITH & W/O-POST PROCESS   Final Result         1.  Hypoplastic right internal carotid artery, there is a  segment of occlusion seen in the distal right intracranial internal carotid artery near the petrous apex. The intracranial internal carotid artery reconstitutes distal to this segment of    occlusion.   2.  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   3.  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   4.  Changes of atrophy with nonspecific white matter changes, most commonly associated with small vessel ischemia.      These findings were discussed with the patient's clinician, Dr. Mcallister, on 4/6/2019 4:31 AM.      DX-ABDOMEN FOR TUBE PLACEMENT   Final Result         1.  Nonspecific bowel gas pattern.   2.  Dobbhoff tube tip overlying the expected location of the pylorus or first duodenal segment.      US-CAROTID DOPPLER BILAT   Final Result      [id number] VIEW   Final Result         1.  Moderate stool in the colon suggests changes of constipation, otherwise nonspecific bowel gas pattern      DX-CHEST-PORTABLE (1 VIEW)   Final Result         1.  No acute cardiopulmonary disease.   2.  Atherosclerosis   3.  No radiodense foreign body or medical device identified which would exclude MRI.      CT-HEAD W/O   Final Result         1. No acute intracranial abnormality. No evidence of acute intracranial hemorrhage or mass lesion.                    Assessment/Plan  * Altered mental status- (present on admission)   Assessment & Plan    Persistent; concern for meningoencephalitis.    Now afebrile, Resolved leukocytosis, Off pressors  LP-slight elevation protein, normal glucose, only 1 WBC  Meningitis panel-negative  HSV PCR-negative  EEG was abnormal, Neuro consulting, dc keppra in light of continued AMS, repeat EEG tomorrow  Immunologic workup pending  Blood cxs neg  DC'd vancomycin/amp/ceftraixone as CSF not c/w bacterial meningitis  CT scan did show small aneurysm  MRI did not reveal any stroke or evidence of vasculitis  DC  acyclovir    Neurology consulted.   Infectious disease consulting  Continue thiamine IV, vitamin B12    Continue to monitor         Respiratory failure (HCC)   Assessment & Plan    Requiring mechanical ventilation  She was successfully extubated on 4/8    Completed a 5-day course of Zosyn for aspiration PNA  Repeat CXR neg       Sepsis ([facility])   Assessment & Plan    This is severe sepsis with the following associated acute organ dysfunction(s): acute kidney failure, acute respiratory failure, metabolic/septic encephalopathy.   Possible source may be CNS thus she has been treated with empiric antibiotics  Blood cultures neg thus far  LP neg for infection  Off abx  ID on board         Systolic and diastolic CHF, chronic ([facility])   Assessment & Plan    Currently holding OMT including carvedilol and lisinopril     Coronary artery disease involving native coronary artery of native heart without angina pectoris- (present on admission)   Assessment & Plan    Continue aspirin and Lipitor     Chronic obstructive pulmonary disease ([facility])- (present on admission)   Assessment & Plan    History of; she does not have evidence of acute exacerbation   DuoNeb as needed  RT protocol     Controlled type 2 diabetes mellitus without complication, without long-term current use of insulin ([facility])- (present on admission)   Assessment & Plan    With hyperglycemia of 123 present upon admit.  Insulin sliding scale with serial Accu-Checks  Hypoglycemic protocol in place  Glycohemoglobin 6.6  She was on metformin outpatient.       Abdominal distention   Assessment & Plan    KUB showing mild ileus  Hold TF for now and repeat KUB in the morning to re-evaluate     Hypophosphatemia   Assessment & Plan    resolved     Hypomagnesemia   Assessment & Plan    Improving, monitor     Hypokalemia   Assessment & Plan    Persistently low, 2.9, replaced, however still low  Check mag  Replace KCL IV, repeat [id number]  Continue replacement as needed      Pharyngoesophageal dysphagia   Assessment & Plan    Cortrak feeding     Dyslipidemia- (present on admission)   Assessment & Plan    Continue Lipitor     .discussed plan of care with nursing, consults and SW this morning in detail  Repeat labs,check mg  Replacing K  Monitor confusion  EEG tomorrow  Immunologic workup ordered    VTE prophylaxis: heparin

## 2019-04-11 NOTE — PROGRESS NOTES
Called Dr. Anderson in regards to cortrak orders. Not to replace cortrak at this time until abdominal x-ray is done today. Physician aware that all medications have been held d/t pt being to drowsy to take.

## 2019-04-11 NOTE — THERAPY
Speech Therapy Contact Note  Attempted to see patient on this date for dysphagia treatment. Per RN, patient with possible ileus and going to x-ray. Patient currently has no source of nutrition but per RN may get cortrak replaced tonight depending on results. Will attempt at a later time as appropriate.

## 2019-04-11 NOTE — DOCUMENTATION QUERY
[facility]                                                                         Query Response Note      PATIENT:               JANE GARCIA  ACCT #:                  [id number]  MRN:                       [id number]  :                       1950  ADMIT DATE:       2019 3:37 PM  DISCH DATE:          RESPONDING  PROVIDER #:        [id number]           RESPONSE TEXT:    Aspiration PNA    QUERY TEXT:    Clarification of Clinical Diagnostic Findings    1. Per the  ID consult- leukocytosis is most concerning for aspiration  2. Per the  Intensivist Consult - Acute respiratory failure secondary to aspiration of contents into the airway;   3. Per the  Intensivist PN- Sepsis- source lungs and possible CNS resolved  4. Per the  Hospitalist PN- Sepsis - possible source may be CNS    Can the above documentation be further specified.      Note: If an appropriate response is not listed below, please respond with a new note.    The patient's Clinical Indicators include:  Clinical indicators- ; ;   CXR- No acute cardiopulmonary disease; no other significant interval change; pharyngoesophageal dysphagia; vomiting    Treatment - ABX, mechanical ventilation, cortrak feedings    Risks- pharyngoesophageal dysphagia; tube feeds held due to vomiting  Query created by: Suzan Jim on 4/10/2019 11:00 AM        Electronically signed by:  JEREMY M GONDA MD 2019 7:54 AM

## 2019-04-11 NOTE — PROGRESS NOTES
"Pt asleep constantly but easily aroused by opening her eyes and later in the evening, pt stated \"Yes\" to her name being called but had no other verbalization. No indications of distress or pain but pt did not like mouth being touched with swabs for opening mouth for assessment.  Pt on room air and pulse ox consistently 90% to 93% throughout night; pt mostly breathes through her mouth.  Safety precautions in place; room next to nurses' station, bed alarm on, bed in lowest and locked position, wearing non skid socks and call light within reach.   "

## 2019-04-12 NOTE — CARE PLAN
Problem: Bowel/Gastric:  Goal: Normal bowel function is maintained or improved  Outcome: PROGRESSING SLOWER THAN EXPECTED  Pt having large amounts of loose stool. Incontinent. Orders received for c-diff and stool cultures.     Problem: Skin Integrity  Goal: Risk for impaired skin integrity will decrease  Outcome: PROGRESSING AS EXPECTED  Skin to buttock remains intact. Redness to ivania area and under breast. Barrier cream applied.

## 2019-04-12 NOTE — PROGRESS NOTES
[facility] Neurology Progress Note:     Interval History: No acute events overnight.  No complaints today.  Unable to obtain review of systems as the patient is somnolent and not coherent.    Objective:   Vitals:    04/11/19 1512 04/11/19 1930 04/12/19 0340 04/12/19 0800   BP: 152/83 146/86 (!) 163/94 (!) 168/97   Pulse: 96 (!) 109 (!) 106 (!) 102   Resp: 18 17 18 17   Temp: 36.8 °C (98.2 °F) 36.3 °C (97.4 °F) 36.4 °C (97.6 °F) 37.2 °C (99 °F)   TempSrc: Temporal Temporal Temporal Temporal   SpO2: 95% 93% 90% 93%   Weight:       Height:           Labs:     Lab Results   Component Value Date/Time    PROTHROMBTM 15.8 (H) 04/06/2019 05:27 PM    INR 1.25 (H) 04/06/2019 05:27 PM      Lab Results   Component Value Date/Time    WBC 7.5 04/10/2019 03:35 AM    RBC 4.49 04/10/2019 03:35 AM    HEMOGLOBIN 12.2 04/10/2019 03:35 AM    HEMATOCRIT 37.3 04/10/2019 03:35 AM    MCV 83.1 04/10/2019 03:35 AM    MCH 27.2 04/10/2019 03:35 AM    MCHC 32.7 (L) 04/10/2019 03:35 AM    MPV 10.1 04/10/2019 03:35 AM    NEUTSPOLYS 74.60 (H) 04/10/2019 03:35 AM    LYMPHOCYTES 14.70 (L) 04/10/2019 03:35 AM    MONOCYTES 7.30 04/10/2019 03:35 AM    EOSINOPHILS 2.40 04/10/2019 03:35 AM    BASOPHILS 0.50 04/10/2019 03:35 AM      Lab Results   Component Value Date/Time    SODIUM 138 04/11/2019 11:44 PM    POTASSIUM 3.0 (L) 04/11/2019 11:44 PM    CHLORIDE 105 04/11/2019 11:44 PM    CO2 19 (L) 04/11/2019 11:44 PM    GLUCOSE 136 (H) 04/11/2019 11:44 PM    BUN 7 (L) 04/11/2019 11:44 PM    CREATININE 0.43 (L) 04/11/2019 11:44 PM    CREATININE 1.1 04/30/2008 08:15 PM      Lab Results   Component Value Date/Time    CHOLSTRLTOT 113 04/05/2019 04:17 AM    LDL 61 04/05/2019 04:17 AM    HDL 34 (A) 04/05/2019 04:17 AM    TRIGLYCERIDE 89 04/05/2019 04:17 AM       Lab Results   Component Value Date/Time    ALKPHOSPHAT 59 04/11/2019 11:44 PM    ASTSGOT 15 04/11/2019 11:44 PM    ALTSGPT 13 04/11/2019 11:44 PM    TBILIRUBIN 0.5 04/11/2019 11:44 PM        Imaging/Testing:    No new neuro imaging to review    Physical Exam:     General: Well-appearing 68-year-old female in no acute distress  Cardio: Normal S1/S2. No peripheral edema.   Pulm: CTAX2. No respiratory distress.   Skin: Warm, dry, no rashes or lesions   Psychiatric: Unable to assess due to encephalopathy  HEENT: Atraumatic head, normal sclera and conjunctiva, moist oral mucosa. No lid lag.  Abdomen: Soft, non tender. No masses or hepatosplenomegaly.    Neurologic:  Mental Status: Somnolent but arousable.  Able to answer simple questions however unable to maintain a coherent conversation.  Cranial Nerves: Pupils equal round reactive to light.  Extraocular movements intact.  Face symmetric.  Motor: Normal muscle tone and bulk.  Able to follow commands.  Unable to test strength segmentally.  Reflexes: 2/4 throughout  Coordination: Unable to assess due to restraints  Sensation: Withdraws to noxious stimuli throughout on.   Gait/Station: Unable to assess    Patient examined on 4/12/2019 compared to physical exam on 4/11/2019 no significant clinical change was seen.    Assessment/Plan:    Alice Yarbrough is a 68 y.o. female with history of coronary artery disease, COPD, hypertension, hyperlipidemia, history of myocardial infarction, diabetes, history of back surgery with chronic pain presenting to the hospital for altered mental status and consulted for altered mental status.  The patient has been experiencing a sudden change of mental status at home and throughout her hospitalization she continued to deteriorate with subsequent intubation for airway protection.  Subsequently the patient was able to be extubated and was answering some questions however she has remained severely encephalopathic and has been fluctuating.  Etiology of encephalopathy is unknown with lumbar puncture having been performed which was noninflammatory, and then extensive workup which has included infectious and autoimmune causes has been  unrevealing.  I also discontinued the patient's Keppra and switched to Vimpat as a potential cause of encephalopathy but this has not produced any results.    In this regard, I will initiate a continuous EEG for 24 hours to look for occult subclinical seizure activity.  If this does not occur then we will have to reevaluate potential causes of encephalopathy and consider even a empiric trial of steroids.    Plan:  1.  Initiate continuous EEG for 24 hours  2.  Continue Vimpat 100 mg twice a day  3.  CRP is 8 which is lower than original at 12.  Otherwise waiting on autoimmune workup  4.  Delirium precautions  5.  Plan discussed with the patient's consulting physician and nurse  6.  We will continue to follow    Alexandro Rowe M.D., Diplomat of the American Board of Psychiatry and Neurology  Diplomat of [facility]   Assistant Clinical Professor, [facility] Neurology Consultant

## 2019-04-12 NOTE — PROGRESS NOTES
[facility] Medicine Daily Progress Note    Date of Service  4/12/2019    Chief Complaint  68 y.o. female admitted 4/4/2019 with altered mental status    Hospital Course    Ms. Yarbrough has a past medical history of diabetes, COPD, and coronary artery disease that presented to the emergency room with confusion and expressive aphasia.  He was found to have a temperature of 103 and required intubation due to airway protection.  She was admitted to the intensive care unit.      Interval Problem Update  4/7 the patient intubated overnight was severely lethargic, currently somewhat improved, continues to be febrile at 103.1, LP done and not impressive, MR of the brain without explanation, seen by infectious disease and neurology.  Rechecking ammonia, possibility of vasculitis versus other, possible aspiration with pulmonary infection.  4/8 patient remains intubated, she opens her eyes and follows intermittently, afebrile, did have some emesis overnight with tube feeds on hold, off pressors, remains on empiric antibiotics and acyclovir for remote possibility of viral encephalitis, PCR pending, placing electrolytes.  EEG performed, continues on [facility]  4/9: Patient seen and evaluated in the intensive care unit. She was extubated on 4/8. Her  visited this morning.  I met with her sister at bedside and we discussed that her mentation has been poor at home with episodes of confusion, slow mentation, and inability to communicate effectively.       4/10: seen and examined this morning, arousable to verbal stimuli but closes her eyes right after, has wrist restraints. Only answered to her name. Otherwise not oriented to time , place.   VSS, on NC  CBC no white count  k persistently low  abd distention    4/11:  continues to be encephalopathic however is occasionally awake and alert.NAD  No events overnight  k persistently low, will replace and reorder  Neuro recs to repeat EEG tomorrow    4/12: no changes, still  encephalopathic, no conversation held today either, discussed with neurology in detail  AI workup pending  Repeating CBC, CMP,  Obtain lactic acid and ammonia  Ileus --> possible Partial SBO now- continue holding TF  VEEG 24hours   vimpat continued      Consultants/Specialty  Critical care.  Neurology.    Code Status  Code    Disposition  Pending medical improvement    Review of Systems  Review of Systems   Unable to perform ROS: Mental acuity        Physical Exam  Temp:  [36.3 °C (97.4 °F)-37.2 °C (99 °F)] 37.2 °C (99 °F)  Pulse:  [] 102  Resp:  [17-18] 17  BP: (146-168)/(83-97) 168/97  SpO2:  [90 %-95 %] 93 %    Physical Exam   Constitutional: No distress.   Not oriented to person, time or place, confused, minimal conversation, only saying yah   HENT:   Dry mucous membranes     Eyes: Right eye exhibits no discharge. Left eye exhibits no discharge.   Neck:   IJ central line  Her neck is supple   Cardiovascular: Normal rate and regular rhythm.    No murmur heard.  Pulmonary/Chest: Effort normal. No respiratory distress. She has no wheezes.   Abdominal: Soft. She exhibits distension. There is no tenderness.   Musculoskeletal: She exhibits no edema.   Soft wrist restraints  No edema   Neurological:   She has occasional speech  She is confused and does not follow commands.    Skin: Skin is warm and dry. No rash noted. She is not diaphoretic.   Nursing note and vitals reviewed.      Fluids    Intake/Output Summary (Last 24 hours) at 04/12/19 1341  Last data filed at 04/12/19 0900   Gross per 24 hour   Intake                0 ml   Output                0 ml   Net                0 ml       Laboratory  Recent Labs      04/10/19   0335   WBC  7.5   RBC  4.49   HEMOGLOBIN  12.2   HEMATOCRIT  37.3   MCV  83.1   MCH  27.2   MCHC  32.7*   RDW  43.3   PLATELETCT  275   MPV  10.1     Recent Labs      04/10/19   0335   04/11/19   0042  04/11/19   1146  04/11/19   2344  04/12/19   1120   SODIUM  142   --   140   --   138   --     POTASSIUM  2.9*   < >  2.6*  3.0*  3.0*  3.0*   CHLORIDE  109   --   106   --   105   --    CO2  21   --   22   --   19*   --    GLUCOSE  152*   --   128*   --   136*   --    BUN  9   --   7*   --   7*   --    CREATININE  0.54   --   0.42*   --   0.43*   --    CALCIUM  8.3*   --   8.0*   --   8.2*   --     < > = values in this interval not displayed.                   Imaging  [id number] VIEW   Final Result      1.  Increased dilatation of air-filled colonic loops is noted as described above compared to the prior radiograph. Findings could be due to a colonic ileus versus partial obstruction in the region of the splenic flexure.      2.  No free air is identified.                  DX-ABDOMEN FOR TUBE PLACEMENT   Final Result      1.  Feeding tube tip overlies the region of the gastric antrum or 1st portion duodenum.   2.  There is a mild ileus.      DX-CHEST-PORTABLE (1 VIEW)   Final Result         1. Interval extubation. No other significant interval change.      DX-CHEST-PORTABLE (1 VIEW)   Final Result         1. No significant interval change.      US-ABDOMEN COMPLETE SURVEY   Final Result      1.  Mildly distended gallbladder with sludge present.   2.  No other evidence for acute cholecystitis.   3.  No significant biliary dilation.   4.  Limited evaluation of the bladder.         SI-ANRNHBM-0 VIEW   Final Result      1.  Feeding tube tip overlies the gastric antrum.      2.  Mild ileus.      DX-CHEST-PORTABLE (1 VIEW)   Final Result         1.  No acute cardiopulmonary disease.   2.  Atherosclerosis         EC-ECHOCARDIOGRAM COMPLETE W/O CONT   Final Result      DX-CHEST-PORTABLE (1 VIEW)   Final Result      1.  Supportive tubing as described above.   2.  No pneumothorax.   3.  Persistent hypoinflation.      MR-BRAIN-W/O   Final Result      No evidence of intracranial hemorrhage, acute ischemia or mass on this severely motion degraded study      DX-CHEST-LIMITED (1 VIEW)   Final Result         1.  No  acute cardiopulmonary disease.   2.  Atherosclerosis   3.  No radiodense foreign body or medical device identified which would exclude MRI.      CT-CTA NECK WITH & W/O-POST PROCESSING   Final Result         1.  Occlusion of the left subclavian artery to the level of the vertebral artery with reconstitution, appearance concerning for subclavian steal.   2.  Intimal thickening versus soft plaque and scattered atherosclerotic calcification of the left carotid arterial tree with less than 50% stenosis.   3.  Small caliber right internal carotid artery   4.  Extensive emphysema      These findings were discussed with the patient's clinician, Dr. Mcallister, on 4/6/2019 4:31 AM.      CT-CTA HEAD WITH & W/O-POST PROCESS   Final Result         1.  Hypoplastic right internal carotid artery, there is a segment of occlusion seen in the distal right intracranial internal carotid artery near the petrous apex. The intracranial internal carotid artery reconstitutes distal to this segment of    occlusion.   2.  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   3.  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   4.  Changes of atrophy with nonspecific white matter changes, most commonly associated with small vessel ischemia.      These findings were discussed with the patient's clinician, Dr. Mcallister, on 4/6/2019 4:31 AM.      DX-ABDOMEN FOR TUBE PLACEMENT   Final Result         1.  Nonspecific bowel gas pattern.   2.  Dobbhoff tube tip overlying the expected location of the pylorus or first duodenal segment.      US-CAROTID DOPPLER BILAT   Final Result      [id number] VIEW   Final Result         1.  Moderate stool in the colon suggests changes of constipation, otherwise nonspecific bowel gas pattern      DX-CHEST-PORTABLE (1 VIEW)   Final Result         1.  No acute cardiopulmonary disease.   2.  Atherosclerosis   3.  No radiodense foreign body or  medical device identified which would exclude MRI.      CT-HEAD W/O   Final Result         1. No acute intracranial abnormality. No evidence of acute intracranial hemorrhage or mass lesion.               CT-STEALTH HEAD W/O    (Results Pending)        Assessment/Plan  * Altered mental status- (present on admission)   Assessment & Plan    Persistent; concern for meningoencephalitis.    Now afebrile, Resolved leukocytosis, Off pressors  LP-slight elevation protein, normal glucose, only 1 WBC  Meningitis panel-negative  HSV PCR-negative  EEG was abnormal, Neuro consulting, dc keppra in light of continued AMS, repeat EEG tomorrow  Immunologic workup pending  Blood cxs neg  DC'd vancomycin/amp/ceftraixone as CSF not c/w bacterial meningitis  CT scan did show small aneurysm  MRI did not reveal any stroke or evidence of vasculitis  DC acyclovir    Neurology consulted. Unsure of etiology, will do 24hr VEEG  I have ordered repeat a HCT today since patient is waxing and waning, cbc, cmp, lactic acid and ammonia also ordered  Infectious disease consulting  Continue thiamine , vitamin B12    Continue to monitor         Respiratory failure (HCC)   Assessment & Plan    Requiring mechanical ventilation  She was successfully extubated on 4/8    Completed a 5-day course of Zosyn for aspiration PNA  Repeat CXR neg       Sepsis (HCC)   Assessment & Plan    This is severe sepsis with the following associated acute organ dysfunction(s): acute kidney failure, acute respiratory failure, metabolic/septic encephalopathy.   Possible source may be CNS thus she has been treated with empiric antibiotics  Blood cultures neg thus far  LP neg for infection  Off abx  ID on board         Systolic and diastolic CHF, chronic (HCC)   Assessment & Plan    Currently holding OMT including carvedilol and lisinopril     Coronary artery disease involving native coronary artery of native heart without angina pectoris- (present on admission)   Assessment &  Plan    Continue aspirin and Lipitor     Chronic obstructive pulmonary disease (HCC)- (present on admission)   Assessment & Plan    History of; she does not have evidence of acute exacerbation   DuoNeb as needed  RT protocol     Controlled type 2 diabetes mellitus without complication, without long-term current use of insulin (HCC)- (present on admission)   Assessment & Plan    With hyperglycemia of 123 present upon admit.  Insulin sliding scale with serial Accu-Checks  Hypoglycemic protocol in place  Glycohemoglobin 6.6  She was on metformin outpatient.       Abdominal distention   Assessment & Plan    Repeat KUB: 1.  Increased dilatation of air-filled colonic loops is noted as described above compared to the prior radiograph. Findings could be due to a colonic ileus versus partial obstruction in the region of the splenic flexure.  Continue holding TF       Hypophosphatemia   Assessment & Plan    resolved     Hypomagnesemia   Assessment & Plan    Improving, monitor     Hypokalemia   Assessment & Plan    Persistently low, replaced, however still low  Replace mag   Replace KCL IV, repeat [id number]  Continue replacement as needed     Pharyngoesophageal dysphagia   Assessment & Plan    Cortrak feeding held due to possible ileus vs partial SBO     Dyslipidemia- (present on admission)   Assessment & Plan    Continue Lipitor         VTE prophylaxis: heparin

## 2019-04-12 NOTE — PROCEDURES
VIDEO ELECTROENCEPHALOGRAM REPORT      Referring provider: Dr. Rowe.     DOS:  4/12/2019 (total recording of 2 hours and 52 minutes).     INDICATION:  Alice Yarbrough 68 y.o. female presenting with altered mental status. Rule out non convulsive seizures.     CURRENT ANTIEPILEPTIC REGIMEN: Gabapentin 200 mg tid, Lacosamide 100 mg q 12 hrs.     TECHNIQUE: 30 channel extended video electroencephalogram (EEG) was performed in accordance with the international 10-20 system. The study was reviewed in bipolar and referential montages. The recording examined the patient during wakeful and drowsy/sleep state(s).     DESCRIPTION OF THE RECORD:  During the wakefulness, the background showed a symmetrical 5 Hz theta activity diffusely.  There was no reactivity to eye closure/opening.  During drowsiness, theta/delta frequencies were seen.    During the sleep state, background shows diffuse high-amplitude 4-5 Hz delta activity.      ACTIVATION PROCEDURES:   Not performed.     ICTAL AND/OR INTERICTAL FINDINGS:   Continuous triphasic waves and frontal sharps, intermittently and frequently becoming periodic at 1 HZ.     EKG: sampling of the EKG recording demonstrated sinus rhythm.     EVENTS:  Lethargic.     INTERPRETATION:  This is an abnormal extended video EEG recording in the awake, drowsy, and sleep state(s). A moderate toxic / metabolic encephalopathy is suggested. Continuous triphasic waves and frontal sharps noted. These intermittently and frequently becoming periodic with a 1 HZ generalized pattern. Although no clear seizures suggested, the patient is at a significantly increased risk for seizures. Clinical and radiological correlation is recommended.    Updates provided to Dr. Rowe.       Dave Poole MD   Epilepsy and Neurodiagnostics.   Clinical  of Neurology [facility].   Diplomate in Neurology, Epilepsy, and Electrodiagnostic Medicine.    Office: [phone]  Fax: [phone]

## 2019-04-12 NOTE — PROGRESS NOTES
· 2 RN skin check complete with AGNIESZKA Robertson.  · Devices in place: SCD's, wrist restraints, 2 PIV's and finger pulse ox.  · Skin assessed under devices: Yes.  · Confirmed pressure ulcers found on: N/A.  · New potential pressure ulcers noted on: None.   · The following interventions in place: waffle mattress, pillows to float boots, barrier cream, barrier wipes, nystatin powder, repositioning every 2 hours, frequent checks for incontinence, linens kept clean and dry.      Sacrum light red and blanching and intact; barrier wipes and barrier cream being utilized.  Circular scab to LUE with area around scab red and blanching.  LUE bruising.  RUE bruising to hand.  Redness under right breast fold; nystatin powder bid.

## 2019-04-12 NOTE — DIETARY
Nutrition support weekly update:  Day 6 of admit.  Alice Yarbrough is a 68 y.o. female with admitting DX of Altered mental status.  Tube feeding initiated on 4/6. Current TF off r/t ileus/obstruction, pt pulled Cortrak, has not been replaced yet.    Pt extubated 4/8. Per nursing notes, pt with large amounts of loose stool, C-diff pending. Pt encephalopathic per MD. TF off starting 4/9-present d/t ileus/obstruction.      Assessment:  Weight 86 kg (trending up from admit 79 kg, +4L per I/O)     Evaluation:   1. KUB showed ileus 4/11, MD holding TF   2. Labs: K 2.9, Glu 165  3. Lipitor, B12, SSI, lactulose, KCL, IV Thiamine/D5W @ 200 ml/hr  4. DAC @ 60 ml/hr provides provides 1728 kcal, 86g pro, 1181 ml free water - Held x3 days      Malnutrition risk: Pt at risk for malnutrition related to poor PO intake reported PTA and TF held last 3 days.    Recommendations/Plan:  1. Resume TF as medically able - Diabetisource AC advance as tolerated/per protocol to goal of 60 ml/hr.  2. Fluids per MD    RD following.

## 2019-04-12 NOTE — PROGRESS NOTES
Pt on RA sleeps constantly but easily aroused and opens eyes and then immediately goes back to sleep.  Pt has not made any verbalizations tonight.  No indications of distress.  Heart rate ran tachycardic early in shift and now will increase with passive movement.  Safety precautions in place; room next to nurses' station, bed alarm on, bed in lowest and locked position, wearing non skid socks and call light within reach.

## 2019-04-12 NOTE — PROGRESS NOTES
Pt has orders for head CT. Soonest test can be done is 2130 tonight. EEG still running on patient. Pt having large amounts of loose foul smelling stool. Page to physician and plans to order stool testing for c-diff. Pt no longer needing restraints. Wrist restraints have been off most of the day and pt has not attempted to pull at lines. No need for restraint order. Still sleeping in bed but awakes to stimuli. No signs of pain. Continues to be NPO and no plans for cotrak today d/t ileus/obstruction.

## 2019-04-12 NOTE — PROGRESS NOTES
Pt A&Ox1-2. Disoriented throughout the shift. Remained in bed. Incontinent of bowel and bladder. Yelling out for help and Man. Daughter called several times and updated on status. Physicians would like to meet with daughter tomorrow to discuss discharge planning. Daughter will NOT be in tomorrow-states she works 9a-5p. Physicians can call if it is before 8am. 2L oxygen on. Prosthetic at bedside. Ativan given for agitation. Moved to private room and sitter at bedside.

## 2019-04-12 NOTE — PROGRESS NOTES
· 2 RN skin check complete with AGNIESZKA Berkowitz.  · Devices in place 2 PIV, SCD, bilateral wrist restraints, finger pulse ox .  · Skin assessed under devices, Yes.  · Confirmed pressure ulcers found on N/A.  · New potential pressure ulcers noted on right heel. Wound consult placed and wound reported.  · The following interventions in place waffle air mattress, pillows floating heels, barrier cream, barrier wipes, nystatin powder under breasts, repositioning every 2 hours.    Scab to right upper arm, redness under bilateral breasts, buttock pink and blanching, right heel red, bruising to BUE.

## 2019-04-12 NOTE — PROGRESS NOTES
Pt has EEG connected now. Neurology aware and plans to have monitoring hooked up for 3 hours and then will review and make further plans. Pt drowsy. Awakes to stimuli. Safety precautions in place.

## 2019-04-12 NOTE — CARE PLAN
Problem: Respiratory:  Goal: Respiratory status will improve  Monitor SPO2.    Problem: Safety - Medical Restraint  Goal: Remains free of injury from restraints (Restraint for Interference with Medical Device)  INTERVENTIONS:  1. Determine that other, less restrictive measures have been tried or would not be effective before applying the restraint  2. Evaluate the patient's condition at the time of restraint application  3. Inform patient/family regarding the reason for restraint  4. Q2H: Monitor safety, psychosocial status, comfort, nutrition and hydration   Bilateral soft wrist restraints utilized during shift to protect bilateral IV's.  Removed wrist restraints every 2 hours and performed ROM.

## 2019-04-12 NOTE — PROGRESS NOTES
Received orders for 24 hours EEG video monitoring. EEG unable to do this d/t being at capacity for devices. EEG able to completed 2 hour EEG and setting patient up right now. Page to neurology to update. Hospitalist on floor and updated. Waiting to see when patient can complete 24 hour monitoring and possible need to transfer to neurology unit.

## 2019-04-12 NOTE — THERAPY
"Pt seen for PT tx. Pt with no response to mobility in supine or at EOB. Pt required Total A x2 for mobility and demonstrated no balance reaction once sitting EOB. Pt with 2 moments of eyes opening during session, but never to command. Acute PT will continue to follow and assess any physical improvement as medical interventions continue. Based on current function, recommend post acute placement prior to DC home.    Physical Therapy Evaluation completed.   Bed Mobility:  Supine to Sit: Total Assist X 2  Transfers: Sit to Stand: Unable to Participate  Gait: Level Of Assist: Unable to Participate        Plan of Care: Will benefit from Physical Therapy 2 times per week  Discharge Recommendations: Equipment: Will Continue to Assess for Equipment Needs. Post-acute therapy: Discharge to a transitional care facility for continued skilled therapy services.    See \"Rehab Therapy-Acute\" Patient Summary Report for complete documentation.     "

## 2019-04-12 NOTE — PROGRESS NOTES
Pt opens eyes to stimulation. No nonverbal signs of pain when repositioning. Incontinent of bowel and bladder. No oral/cortrak medications given this shift d/t pt being NPO and cortrak being pulled by patient yesterday-physician aware. Unable to assess several things d/t patient unable to respond. Abdominal x-ray this evening. IVs to both arms. Several new medication orders today. EEG completed this morning. Bilateral wrist restraints in place. Redness to buttock and applying lamont. SCDs on. Sister at bedside part of the day.

## 2019-04-13 PROBLEM — R19.5 LOOSE STOOLS: Status: ACTIVE | Noted: 2019-01-01

## 2019-04-13 PROBLEM — D72.829 LEUKOCYTOSIS: Status: ACTIVE | Noted: 2019-01-01

## 2019-04-13 NOTE — PROGRESS NOTES
Assumed care of patient at change of shift.  During change of shift report, patient (pt) sleeping in bed but aroused to verbal stimulation and acknowledged this RN's introduction, on room air, with  in place.  During assessment, unable to assess pt's orientation due to mostly obtunded neuro status.  Pt was arousalable to both verbal and physical stimuli.      Pt has transfer order at this time due to need for 24 hour EEG.  Report called to rosa Santos RN, and all questions answered.

## 2019-04-13 NOTE — PROGRESS NOTES
[facility] Daily Progress Note    Date of Service  4/13/2019    Chief Complaint  68 y.o. female admitted 4/4/2019 with altered mental status    Hospital Course    Ms. Yarbrough has a past medical history of diabetes, COPD, and coronary artery disease that presented to the emergency room with confusion and expressive aphasia.  He was found to have a temperature of 103 and required intubation due to airway protection.  She was admitted to the intensive care unit.      Interval Problem Update  4/7 the patient intubated overnight was severely lethargic, currently somewhat improved, continues to be febrile at 103.1, LP done and not impressive, MR of the brain without explanation, seen by infectious disease and neurology.  Rechecking ammonia, possibility of vasculitis versus other, possible aspiration with pulmonary infection.  4/8 patient remains intubated, she opens her eyes and follows intermittently, afebrile, did have some emesis overnight with tube feeds on hold, off pressors, remains on empiric antibiotics and acyclovir for remote possibility of viral encephalitis, PCR pending, placing electrolytes.  EEG performed, continues on [facility]  4/9: Patient seen and evaluated in the intensive care unit. She was extubated on 4/8. Her  visited this morning.  I met with her sister at bedside and we discussed that her mentation has been poor at home with episodes of confusion, slow mentation, and inability to communicate effectively.       4/10: seen and examined this morning, arousable to verbal stimuli but closes her eyes right after, has wrist restraints. Only answered to her name. Otherwise not oriented to time , place.   VSS, on NC  CBC no white count  k persistently low  abd distention    4/11:  continues to be encephalopathic however is occasionally awake and alert.NAD  No events overnight  k persistently low, will replace and reorder  Neuro recs to repeat EEG tomorrow    4/12: no changes, still  encephalopathic, no conversation held today either, discussed with neurology in detail  AI workup pending  Repeating CBC, CMP,  Obtain lactic acid and ammonia  Ileus --> possible Partial SBO now- continue holding TF  VEEG 24hours   vimpat continued    4/13: continues to be confused, not answering any questions, disoriented.   2hr VEEG per neurology was extremely abnormal, I will transfer her to neuro floor at this time for continued 24 hr VEEG      Consultants/Specialty  Critical care.  Neurology.    Code Status  Code    Disposition  Transfer to neuro for 24hr VEEG    Review of Systems  Review of Systems   Unable to perform ROS: Mental acuity        Physical Exam  Temp:  [36.3 °C (97.3 °F)-37.7 °C (99.8 °F)] 37 °C (98.6 °F)  Pulse:  [] 88  Resp:  [17-18] 18  BP: (128-181)/(61-91) 128/68  SpO2:  [94 %-96 %] 95 %    Physical Exam   Constitutional: No distress.   Not oriented to person, time or place, confused, minimal conversation, only saying yah   HENT:   Dry mucous membranes     Eyes: Right eye exhibits no discharge. Left eye exhibits no discharge.   Neck:   IJ central line  Her neck is supple   Cardiovascular: Normal rate and regular rhythm.    No murmur heard.  Pulmonary/Chest: Effort normal. No respiratory distress. She has no wheezes.   Abdominal: Soft. She exhibits distension. There is no tenderness.   Musculoskeletal: She exhibits no edema.   Soft wrist restraints  No edema   Neurological:   She has occasional speech  She is confused and does not follow commands.    Skin: Skin is warm and dry. No rash noted. She is not diaphoretic.   Nursing note and vitals reviewed.      Fluids  No intake or output data in the 24 hours ending 04/13/19 1007    Laboratory  Recent Labs      04/12/19   1353   WBC  11.5*   RBC  4.60   HEMOGLOBIN  12.5   HEMATOCRIT  37.9   MCV  82.4   MCH  27.2   MCHC  33.0*   RDW  41.7   PLATELETCT  429   MPV  9.8     Recent Labs      04/11/19   0042   04/11/19   2344  04/12/19   1120   04/12/19   1353  04/12/19   2236   SODIUM  140   --   138   --   136   --    POTASSIUM  2.6*   < >  3.0*  3.0*  2.9*  3.2*   CHLORIDE  106   --   105   --   103   --    CO2  22   --   19*   --   21   --    GLUCOSE  128*   --   136*   --   165*   --    BUN  7*   --   7*   --   7*   --    CREATININE  0.42*   --   0.43*   --   0.34*   --    CALCIUM  8.0*   --   8.2*   --   8.0*   --     < > = values in this interval not displayed.                   Imaging  CT-STEALTH HEAD W/O   Final Result      1.  No evidence of acute territorial infarct, intracranial hemorrhage or mass lesion.   2.  Mild diffuse cerebral substance loss.   3.  Mild microangiopathic ischemic change versus demyelination or gliosis.      [id number] VIEW   Final Result      1.  Increased dilatation of air-filled colonic loops is noted as described above compared to the prior radiograph. Findings could be due to a colonic ileus versus partial obstruction in the region of the splenic flexure.      2.  No free air is identified.                  DX-ABDOMEN FOR TUBE PLACEMENT   Final Result      1.  Feeding tube tip overlies the region of the gastric antrum or 1st portion duodenum.   2.  There is a mild ileus.      DX-CHEST-PORTABLE (1 VIEW)   Final Result         1. Interval extubation. No other significant interval change.      DX-CHEST-PORTABLE (1 VIEW)   Final Result         1. No significant interval change.      US-ABDOMEN COMPLETE SURVEY   Final Result      1.  Mildly distended gallbladder with sludge present.   2.  No other evidence for acute cholecystitis.   3.  No significant biliary dilation.   4.  Limited evaluation of the bladder.         [id number] VIEW   Final Result      1.  Feeding tube tip overlies the gastric antrum.      2.  Mild ileus.      DX-CHEST-PORTABLE (1 VIEW)   Final Result         1.  No acute cardiopulmonary disease.   2.  Atherosclerosis         EC-ECHOCARDIOGRAM COMPLETE W/O CONT   Final Result      DX-CHEST-PORTABLE (1  VIEW)   Final Result      1.  Supportive tubing as described above.   2.  No pneumothorax.   3.  Persistent hypoinflation.      MR-BRAIN-W/O   Final Result      No evidence of intracranial hemorrhage, acute ischemia or mass on this severely motion degraded study      DX-CHEST-LIMITED (1 VIEW)   Final Result         1.  No acute cardiopulmonary disease.   2.  Atherosclerosis   3.  No radiodense foreign body or medical device identified which would exclude MRI.      CT-CTA NECK WITH & W/O-POST PROCESSING   Final Result         1.  Occlusion of the left subclavian artery to the level of the vertebral artery with reconstitution, appearance concerning for subclavian steal.   2.  Intimal thickening versus soft plaque and scattered atherosclerotic calcification of the left carotid arterial tree with less than 50% stenosis.   3.  Small caliber right internal carotid artery   4.  Extensive emphysema      These findings were discussed with the patient's clinician, Dr. Mcallister, on 4/6/2019 4:31 AM.      CT-CTA HEAD WITH & W/O-POST PROCESS   Final Result         1.  Hypoplastic right internal carotid artery, there is a segment of occlusion seen in the distal right intracranial internal carotid artery near the petrous apex. The intracranial internal carotid artery reconstitutes distal to this segment of    occlusion.   2.  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   3.  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   4.  Changes of atrophy with nonspecific white matter changes, most commonly associated with small vessel ischemia.      These findings were discussed with the patient's clinician, Dr. Mcallister, on 4/6/2019 4:31 AM.      DX-ABDOMEN FOR TUBE PLACEMENT   Final Result         1.  Nonspecific bowel gas pattern.   2.  Dobbhoff tube tip overlying the expected location of the pylorus or first duodenal segment.      US-CAROTID DOPPLER  BILAT   Final Result      [id number] VIEW   Final Result         1.  Moderate stool in the colon suggests changes of constipation, otherwise nonspecific bowel gas pattern      DX-CHEST-PORTABLE (1 VIEW)   Final Result         1.  No acute cardiopulmonary disease.   2.  Atherosclerosis   3.  No radiodense foreign body or medical device identified which would exclude MRI.      CT-HEAD W/O   Final Result         1. No acute intracranial abnormality. No evidence of acute intracranial hemorrhage or mass lesion.                    Assessment/Plan  * Altered mental status- (present on admission)   Assessment & Plan    Persistent; concern for meningoencephalitis vs seziures  Now afebrile, Resolved leukocytosis, Off pressors  LP-slight elevation protein, normal glucose, only 1 WBC  Meningitis panel-negative  HSV PCR-negative  EEG was abnormal, Neuro consulting, dc keppra in light of continued AMS,repeat 2 hr EEG was highly abnormal, at this time transfer to neuro floor for continue VEEG x 24hours  Immunologic workup pending  Ammonia 48, lactulose, however she is already having loose stools  Blood cxs neg  DC'd vancomycin/amp/ceftraixone as CSF not c/w bacterial meningitis  CT scan did show small aneurysm  MRI did not reveal any stroke or evidence of vasculitis  DC acyclovir    Neurology consulted. Unsure of etiology, will do 24hr VEEG   repeat a HCT essentially neg for acute changes  Infectious disease consulting  Continue thiamine , vitamin B12    Continue to monitor         Respiratory failure (HCC)   Assessment & Plan    Requiring mechanical ventilation  She was successfully extubated on 4/8    Completed a 5-day course of Zosyn for aspiration PNA  Repeat CXR neg       Sepsis (HCC)   Assessment & Plan    This is severe sepsis with the following associated acute organ dysfunction(s): acute kidney failure, acute respiratory failure, metabolic/septic encephalopathy.   Possible source may be CNS thus she has been treated  with empiric antibiotics  Blood cultures neg thus far  LP neg for infection  Off abx  ID on board         Systolic and diastolic CHF, chronic (HCC)   Assessment & Plan    Currently holding OMT including carvedilol and lisinopril     Coronary artery disease involving native coronary artery of native heart without angina pectoris- (present on admission)   Assessment & Plan    Continue aspirin and Lipitor     Chronic obstructive pulmonary disease (HCC)- (present on admission)   Assessment & Plan    History of; she does not have evidence of acute exacerbation   DuoNeb as needed  RT protocol     Controlled type 2 diabetes mellitus without complication, without long-term current use of insulin ([facility])- (present on admission)   Assessment & Plan    With hyperglycemia of 123 present upon admit.  Insulin sliding scale with serial Accu-Checks  Hypoglycemic protocol in place  Glycohemoglobin 6.6  She was on metformin outpatient.       Leukocytosis   Assessment & Plan    Mild elevation 11K  UA clean  Not on abx  Having diarrhea now, therefore I will obtain stool cultures and c.diff  Could also be stress reaction  Continue to monitor     Loose stools   Assessment & Plan    As noted by nursing, fould smelling, will send stool cultures and c.diff  pn KUB patient did have ileus vs partial SBO  Continue to hold TF and monitor     Abdominal distention   Assessment & Plan    Repeat KUB: 1.  Increased dilatation of air-filled colonic loops is noted as described above compared to the prior radiograph. Findings could be due to a colonic ileus versus partial obstruction in the region of the splenic flexure.  Continue holding TF       Hypophosphatemia   Assessment & Plan    resolved     Hypomagnesemia   Assessment & Plan    Improving, monitor     Hypokalemia   Assessment & Plan    Persistently low despite continued IV replacement  Continue to monitor closely  Replace daily       Pharyngoesophageal dysphagia   Assessment & Plan    Cortrak  feeding held due to possible ileus vs partial SBO     Dyslipidemia- (present on admission)   Assessment & Plan    Continue Lipitor         VTE prophylaxis: heparin

## 2019-04-13 NOTE — CARE PLAN
Problem: Safety  Goal: Will remain free from injury  Outcome: PROGRESSING AS EXPECTED  Bed low and locked, alarm set, call bell within reach.  Hourly rounding continues      Problem: Mobility  Goal: Risk for activity intolerance will decrease  Outcome: PROGRESSING AS EXPECTED  Pt unable to get oob; q2 hour positioning provided

## 2019-04-13 NOTE — ASSESSMENT & PLAN NOTE
C. difficile negative  pn KUB patient did have ileus vs partial SBO  D/t colitis noted on CT abdomen. She has already completed 5 days of broad spectrum antibiotics.

## 2019-04-13 NOTE — PROGRESS NOTES
Hospital Neurology Progress Note:     Interval History: No acute events overnight.  No complaints today.  Unable to obtain review of systems as the patient is somnolent and not coherent.    Objective:   Vitals:    04/13/19 0340 04/13/19 0741 04/13/19 1005 04/13/19 1114   BP: 141/61 (!) 181/75 128/68 126/48   Pulse: 92 96 88 94   Resp: 17 18 18 16   Temp: 36.3 °C (97.3 °F) 36.9 °C (98.5 °F) 37 °C (98.6 °F) 36.8 °C (98.3 °F)   TempSrc: Temporal Temporal Temporal Oral   SpO2: 94% 94% 95% 92%   Weight:       Height:           Labs:     Lab Results   Component Value Date/Time    PROTHROMBTM 15.8 (H) 04/06/2019 05:27 PM    INR 1.25 (H) 04/06/2019 05:27 PM      Lab Results   Component Value Date/Time    WBC 11.5 (H) 04/12/2019 01:53 PM    RBC 4.60 04/12/2019 01:53 PM    HEMOGLOBIN 12.5 04/12/2019 01:53 PM    HEMATOCRIT 37.9 04/12/2019 01:53 PM    MCV 82.4 04/12/2019 01:53 PM    MCH 27.2 04/12/2019 01:53 PM    MCHC 33.0 (L) 04/12/2019 01:53 PM    MPV 9.8 04/12/2019 01:53 PM    NEUTSPOLYS 81.80 (H) 04/12/2019 01:53 PM    LYMPHOCYTES 8.70 (L) 04/12/2019 01:53 PM    MONOCYTES 8.20 04/12/2019 01:53 PM    EOSINOPHILS 0.30 04/12/2019 01:53 PM    BASOPHILS 0.30 04/12/2019 01:53 PM      Lab Results   Component Value Date/Time    SODIUM 136 04/12/2019 01:53 PM    POTASSIUM 3.0 (L) 04/13/2019 12:03 PM    CHLORIDE 103 04/12/2019 01:53 PM    CO2 21 04/12/2019 01:53 PM    GLUCOSE 165 (H) 04/12/2019 01:53 PM    BUN 7 (L) 04/12/2019 01:53 PM    CREATININE 0.34 (L) 04/12/2019 01:53 PM    CREATININE 1.1 04/30/2008 08:15 PM      Lab Results   Component Value Date/Time    CHOLSTRLTOT 113 04/05/2019 04:17 AM    LDL 61 04/05/2019 04:17 AM    HDL 34 (A) 04/05/2019 04:17 AM    TRIGLYCERIDE 89 04/05/2019 04:17 AM       Lab Results   Component Value Date/Time    ALKPHOSPHAT 56 04/12/2019 01:53 PM    ASTSGOT 14 04/12/2019 01:53 PM    ALTSGPT 13 04/12/2019 01:53 PM    TBILIRUBIN 0.5 04/12/2019 01:53 PM        Imaging/Testing:   EEG on 4/12/2019 was  personally reviewed and discussed with the interpreting neurologist with findings concerning for a severe encephalopathy and or nonconvulsive seizures.    Physical Exam:     General: Well-appearing 68-year-old female in no acute distress  Cardio: Normal S1/S2. No peripheral edema.   Pulm: CTAX2. No respiratory distress.   Skin: Warm, dry, no rashes or lesions   Psychiatric: Unable to assess due to encephalopathy  HEENT: Atraumatic head, normal sclera and conjunctiva, moist oral mucosa. No lid lag.  Abdomen: Soft, non tender. No masses or hepatosplenomegaly.    Neurologic:  Mental Status: Somnolent but arousable.  Able to answer simple questions however unable to maintain a coherent conversation.  Cranial Nerves: Pupils equal round reactive to light.  Extraocular movements intact.  Face symmetric.  Motor: Normal muscle tone and bulk.  Able to follow commands.  Unable to test strength segmentally.  Reflexes: 2/4 throughout  Coordination: Unable to assess due to restraints  Sensation: Withdraws to noxious stimuli throughout on.   Gait/Station: Unable to assess    Patient seen and examined on 4/13/2019 and compared to physical exam on 4/12/2019 no significant clinical change was seen.    Assessment/Plan:    Alice Yarbrough is a 68 y.o. female with history of coronary artery disease, COPD, hypertension, hyperlipidemia, history of myocardial infarction, diabetes, history of back surgery with chronic pain presenting to the hospital for altered mental status and consulted for altered mental status.  The patient has been experiencing a sudden change of mental status at home and throughout her hospitalization she continued to deteriorate with subsequent intubation for airway protection.  Subsequently the patient was able to be extubated and was answering some questions however she has remained severely encephalopathic and has been fluctuating.  Etiology of encephalopathy is unknown with lumbar puncture having been  performed which was noninflammatory, and then extensive workup which has included infectious and autoimmune causes has been unrevealing.  I also discontinued the patient's Keppra and switched to Vimpat as a potential cause of encephalopathy but this has not produced any results.    Spot EEG check on 4/12/2019 was concerning for nonconvulsive seizures.  At that time, the patient received a Depakote load and continuous EEG is to be initiated today for further monitoring and titration of treatment.    Plan:  1.  Continuous EEG today  2.  Continue Vimpat 100 mg twice a day  3.  Continue Depakote 1000 mg twice a day  4.  Delirium precautions  5.  Autoimmune workup pending.  6.  We will continue to follow    Alexandro Rowe M.D., Diplomat of the American Board of Psychiatry and Neurology  Diplomat of [facility] Epilepsy [facility]specialty   Assistant Clinical Professor, [facility] Neurology Consultant

## 2019-04-13 NOTE — PROGRESS NOTES
2 RN skin check completed with AGNIESZKA Reynoso.  Pt's heels are pink but blanchable.  She has a black scab surrounded by blanchable purple discoloration on the RUE.  Questionable right foot drop.  She has a tiny bruise at the site on her back from getting a lumbar puncture.  Rash noted under bilateral breasts that she is receiving Nystatin powder for.  Redness noted to anterior neck.  Waffle overlay in place on mattress and q2 hour turning and positioning to be completed.

## 2019-04-13 NOTE — PROCEDURES
VIDEO ELECTROENCEPHALOGRAM REPORT        Referring provider: Dr. Rowe.      DOS:  4/13/2019 - 4/14/2019 (total recording of 15 hours and 1 minutes).      INDICATION:  Alice Yarbrough 68 y.o. female presenting with altered mental status, and non convulsive seizures.      CURRENT ANTIEPILEPTIC REGIMEN: Gabapentin 200 mg tid, Lacosamide 150 mg q 12 hrs, Valproic Acid 1000 mg q 12 hrs, Lorazepam prn.      TECHNIQUE: 30 channel 24 hrs video electroencephalogram (EEG) was performed in accordance with the international 10-20 system. The study was reviewed in bipolar and referential montages. The recording examined an encephalopathic patient.      DESCRIPTION OF THE RECORD:  Diffuse delta activity with high amplitude frontal sharps and triphasic waves, intermittently becoming periodic to suggest brief non convulsive seizures, with some improvement of the EEG as medications have been adjusted/added without any further seizures, but study remains significantly and intermittently abnormal with recurrent of frontal sharps and triphasic waves with brief runs of Generalized Periodic Discharges, but no clear seizures towards the end of the study. The background has also improved to a theta activity in the posterior regions.       ACTIVATION PROCEDURES:   Not performed.      EKG: sampling of the EKG recording demonstrated sinus rhythm.      EVENTS:  None.      INTERPRETATION:  This is an abnormal 24 hrs video EEG recording in the awake, drowsy, and sleep state(s). A moderate toxic / metabolic encephalopathy is suggested. Initially, continuous, amplitude frontal sharps and triphasic waves, intermittently exhibiting a generalized periodic pattern to suggest brief non convulsive seizures, with some improvement of the EEG as medications have been adjusted/added without any further seizures, however study remains abnormal with intermittently periodic frontal sharps and triphasic waves, but again, no clear seizures towards  the end of the study. There has been also some improvement in the background to suggest improvement of the encephalopathy. Clinical and radiological correlation is recommended.     Updates provided to Dr. Rowe.         Dave Poole MD   Epilepsy and Neurodiagnostics.   Clinical  of Neurology [facility] of Medicine.   Diplomate in Neurology, Epilepsy, and Electrodiagnostic Medicine.   Office: [phone]  Fax: [phone]

## 2019-04-13 NOTE — OR NURSING
· 2 RN skin check complete with AGNIESZKA Bradford.  · Devices in place: SCD's, 2 PIV's and finger pulse ox.  · Skin assessed under devices: Yes.  · Confirmed pressure ulcers found on: N/A.  · New potential pressure ulcers noted on: None.   · The following interventions in place: waffle mattress, pillows to float heels, barrier cream, barrier wipes, nystatin powder, repositioning every 2 hours, frequent checks for incontinence, linens kept clean and dry.      Sacrum intact and light red with blanching; barrier wipes and barrier cream being utilized.  Circular scab to LUE with area around scab red and blanching.  LUE bruising.  RUE bruising to hand.  Redness under bilateral breast folds; nystatin powder bid.  Anterior neck and bilateral sides of neck redness with blanching.

## 2019-04-13 NOTE — PROGRESS NOTES
Assumed care of pt at 1115 when she arrived to unit from the medical floor.  Unable to assess orientation due to patient being obtunded.  She opens eyes to voice but cannot follow commands and does not answer questions.  She does not demonstrate any s/sx of pain or discomfort.  Pupils are sluggish but equal.  Unable to assess strength but pt does withdraw to tickling or painful stimuli to each extremity.  Breathing is shallow and she has pursed lips with each breath.  RRT nurse Dennys rounding on floor and asked to take a look at the patient.  He thinks that the pursed lip breathing is due to her not having teeth.  Pt is on  and sating well.  Lungs sound clear.  Heart murmur noted (and had already been documented).  Pt having loose BMs; remains NPO.  Seizure precautions maintained.  Q2 hour turning and positioning provided.  Bed low and locked, alarm set and call bell within reach.  Hourly rounding continues.    1325: K came back at 3.0; Dr. Anderson paged; awaiting call back

## 2019-04-13 NOTE — PROGRESS NOTES
Pt on RA still sleeping constantly but opens eyes when name is called and with core body repositioning by staff.  Pt remains strict NPO.  Pt has not made any verbalizations tonight.  No indications of distress but sometimes has short a glazed over look in eyes.  Safety precautions in place; room next to nurses' station, bed alarm on, bed in lowest and locked position, wearing non skid socks and call light within reach.

## 2019-04-14 NOTE — PROCEDURES
VIDEO ELECTROENCEPHALOGRAM REPORT        Referring provider: Dr. Rowe.      DOS:  4/14/2019 - 4/15/2019 (total recording of 23 hours and 56 minutes).      INDICATION:  Alice Yarbrough 68 y.o. female presenting with altered mental status, and non convulsive seizures, abnormal eeg.      CURRENT ANTIEPILEPTIC REGIMEN: Gabapentin 200 mg tid, Lacosamide 200 mg q 12 hrs, Valproic Acid 1000 mg q 12 hrs, Lorazepam prn.      TECHNIQUE: 30 channel 24 hrs video electroencephalogram (EEG) was performed in accordance with the international 10-20 system. The study was reviewed in bipolar and referential montages. The recording examined an encephalopathic patient.      DESCRIPTION OF THE RECORD:  Diffuse delta activity with high amplitude frontal sharps and triphasic waves, intermittently becoming briefly periodic, but no clear suggestion of seizures during the study. There has been some improvement of the background to a theta activity.      ACTIVATION PROCEDURES:   Not performed.      EKG: sampling of the EKG recording demonstrated sinus rhythm.      EVENTS:  None.      INTERPRETATION:  This is an abnormal 24 hrs video EEG recording in the awake, drowsy, and sleep state(s). A moderate toxic / metabolic encephalopathy is suggested. Frequent, generalized, frontal sharps and triphasic waves noted, intermittently exhibiting a brief generalized periodic pattern (GPDs), but without clear suggestion of seizures. There has been some improvement of the EEG with further adjustment of medications. However, the patient remains at a high risk for seizure recurrence. The findings suggest underlying areas of cortical irritability and possible structural abnormality. There has been also some improvement in the background to suggest improvement of the encephalopathy. Clinical and radiological correlation is recommended.     Updates provided to Dr. Rowe.         Dave Poole MD   Epilepsy and Neurodiagnostics.    Clinical  of Neurology [facility].   Diplomate in Neurology, Epilepsy, and Electrodiagnostic Medicine.   Office: [phone]  Fax: [phone]

## 2019-04-14 NOTE — PROGRESS NOTES
Pt can answer simple questions with yes or no's. Coretrack placed. MD notified of potassium level.

## 2019-04-14 NOTE — PROGRESS NOTES
Hospital Neurology Progress Note:     Interval History: No acute events overnight.  No complaints today.  Unable to obtain review of systems due to encephalopathy.    Objective:   Vitals:    04/13/19 2349 04/14/19 0400 04/14/19 0726 04/14/19 1200   BP: 124/86 134/72 147/92 158/84   Pulse: (!) 104 96 99 96   Resp: 18 18 16 16   Temp: 37.4 °C (99.3 °F) 36.9 °C (98.4 °F) 36.8 °C (98.3 °F) 37.6 °C (99.6 °F)   TempSrc: Temporal Temporal Temporal Temporal   SpO2: 96% 93% 96% 95%   Weight:       Height:           Labs:     Lab Results   Component Value Date/Time    PROTHROMBTM 15.8 (H) 04/06/2019 05:27 PM    INR 1.25 (H) 04/06/2019 05:27 PM      Lab Results   Component Value Date/Time    WBC 11.5 (H) 04/12/2019 01:53 PM    RBC 4.60 04/12/2019 01:53 PM    HEMOGLOBIN 12.5 04/12/2019 01:53 PM    HEMATOCRIT 37.9 04/12/2019 01:53 PM    MCV 82.4 04/12/2019 01:53 PM    MCH 27.2 04/12/2019 01:53 PM    MCHC 33.0 (L) 04/12/2019 01:53 PM    MPV 9.8 04/12/2019 01:53 PM    NEUTSPOLYS 81.80 (H) 04/12/2019 01:53 PM    LYMPHOCYTES 8.70 (L) 04/12/2019 01:53 PM    MONOCYTES 8.20 04/12/2019 01:53 PM    EOSINOPHILS 0.30 04/12/2019 01:53 PM    BASOPHILS 0.30 04/12/2019 01:53 PM      Lab Results   Component Value Date/Time    SODIUM 141 04/14/2019 12:00 AM    POTASSIUM 2.7 (LL) 04/14/2019 11:41 AM    CHLORIDE 105 04/14/2019 12:00 AM    CO2 25 04/14/2019 12:00 AM    GLUCOSE 130 (H) 04/14/2019 12:00 AM    BUN 10 04/14/2019 12:00 AM    CREATININE 0.37 (L) 04/14/2019 12:00 AM    CREATININE 1.1 04/30/2008 08:15 PM      Lab Results   Component Value Date/Time    CHOLSTRLTOT 113 04/05/2019 04:17 AM    LDL 61 04/05/2019 04:17 AM    HDL 34 (A) 04/05/2019 04:17 AM    TRIGLYCERIDE 89 04/05/2019 04:17 AM       Lab Results   Component Value Date/Time    ALKPHOSPHAT 58 04/14/2019 12:00 AM    ASTSGOT 13 04/14/2019 12:00 AM    ALTSGPT 13 04/14/2019 12:00 AM    TBILIRUBIN 0.5 04/14/2019 12:00 AM        Imaging/Testing:   EEG tracing on 4/13/19 through 4/14/19  was personally reviewed and discussed with the interpreting neurologist which showed slowing, as well as generalized periodic discharges with triphasic morphology which at times became rhythmic.  Overall, these findings were concerning for subclinical seizures.  This pattern appears to have improved on 4/14/2019 however the patient's encephalopathy remains.    Physical Exam:     General: Well-appearing 68-year-old female in no acute distress  Cardio: Normal S1/S2. No peripheral edema.   Pulm: CTAX2. No respiratory distress.   Skin: Warm, dry, no rashes or lesions   Psychiatric: Appropriate affect. No active psychosis.  HEENT: Atraumatic head, normal sclera and conjunctiva, moist oral mucosa. No lid lag.  Abdomen: Soft, non tender. No masses or hepatosplenomegaly.    Neurologic:  Mental Status: Somnolent but arousable.  When awake, the patient can answer yes or no questions however is unable to provide full sentences or have a coherent conversation.  She is unable to follow commands.  Cranial Nerves:  PERRL. EOMi. Face symmetric, palate/tongue midline. Visual fields full to confrontation. Facial sensation intact.   Motor: Normal muscle tone and bulk.  Patient is observed to move all extremities symmetrically and equally  Reflexes: Flexor plantar responses bilaterally  Coordination: Unable to perform  Sensation: Normal to light touch throughout.   Gait/Station: Unable to perform    Assessment/Plan:    Alice Yarbrough is a 68 y.o. female with history of coronary artery disease, COPD, hypertension, hyperlipidemia, history of myocardial infarction, diabetes, history of back surgery with chronic pain presenting to the hospital for altered mental status and consulted for altered mental status.  Patient's change of mental status was reportedly sudden.  At one time, she needed intubation for respiratory compromise however she has been extubated and has tolerated this well.  Unfortunately, etiology of encephalopathy  is unknown as the patient's autoimmune workup has returned normal, as has the patient's lumbar punctures and brain MRI.  EEG has been suggestive of a propensity for seizure activity however even in the absence of this pattern, the patient is still severely encephalopathic.  Several antiseizure medications have been tried such as Keppra however currently she is on Vimpat and Depakote.  At this time, considering an empiric trial of steroids.    Plan:  1.  Continuous EEG  2.  Continue Vimpat 200 mg twice a day increased from 150 mg twice a day  3.  Continue Depakote 1000 mg twice a day  4.  Delirium precautions  5.  Autoimmune workup has been largely negative  6.  Initiate trial of Solu-Medrol 1 g daily for 5 days.  7.  Plan discussed with consulting physician and patient's nurse.     Alexandro Rowe M.D., Diplomat of the American Board of Psychiatry and Neurology  Diplomat of [facility]   Assistant Clinical Professor, [facility] Neurology Consultant

## 2019-04-14 NOTE — CARE PLAN
Problem: Safety  Goal: Will remain free from injury  Outcome: PROGRESSING AS EXPECTED  Bed alarm on, treaded socks on    Problem: Skin Integrity  Goal: Risk for impaired skin integrity will decrease  Outcome: PROGRESSING AS EXPECTED  Q2 turns, Rn skin check

## 2019-04-14 NOTE — PROGRESS NOTES
2 RN skin check.  Pt's heels are pink but blanchable.  She has a black scab surrounded by blanchable purple discoloration on the RUE.  Questionable right foot drop.  She has a tiny bruise at the site on her back from getting a lumbar puncture.  Rash noted under bilateral breasts that she is receiving Nystatin powder for.  Redness noted to anterior neck.  Waffle overlay in place on mattress and q2 hour turning and positioning to be completed.

## 2019-04-14 NOTE — PROGRESS NOTES
Patient lethargic vs alert throughout shift. At her best she will open her eyes spontaneously and respond yes/no with hesitation to simple questions, other times she does not open eyes or respond. VSS with some hypertension at start of shift, MD paged, Orders received, now BP wnl. Breath sounds clear to diminished. Hypoactive bowel sounds. Incontinent of urine. Skin intact, blanchable redness on buttocks, rash on chest and under breast. Will continue to monitor.

## 2019-04-14 NOTE — DISCHARGE PLANNING
Anticipated Discharge Disposition:   TBD    Action:    Request for AD for healthcare or POLST from spouse to bedside Светлана AARON.  AGNIESZKA VITALE notified today.  Pt lacking decisional capacity.    RN WINIFRED spoke with patient's spouse, Richadr.  He will be here today at approx 1730 or 1800 and can discuss POLST with RACHEL or AGNIESZKA VITALE for completion.    Spoke with [facility] Darcy RAMOS and she will plan to meet with patient's spouse to complete the POLST.    Spoke to Dr. Mcallister regarding above and for approval and signature when completed.      Barriers to Discharge:      Medical clearance    Plan:    FU on POLST completion.

## 2019-04-14 NOTE — PROGRESS NOTES
[facility] Medicine Daily Progress Note    Date of Service  4/14/2019    Chief Complaint  68 y.o. female admitted 4/4/2019 with altered mental status    Hospital Course    Ms. Yarbrough has a past medical history of diabetes, COPD, and coronary artery disease that presented to the emergency room with confusion and expressive aphasia.  He was found to have a temperature of 103 and required intubation due to airway protection.  She was admitted to the intensive care unit.      Interval Problem Update  4/7 the patient intubated overnight was severely lethargic, currently somewhat improved, continues to be febrile at 103.1, LP done and not impressive, MR of the brain without explanation, seen by infectious disease and neurology.  Rechecking ammonia, possibility of vasculitis versus other, possible aspiration with pulmonary infection.  4/8 patient remains intubated, she opens her eyes and follows intermittently, afebrile, did have some emesis overnight with tube feeds on hold, off pressors, remains on empiric antibiotics and acyclovir for remote possibility of viral encephalitis, PCR pending, placing electrolytes.  EEG performed, continues on [facility]  4/9: Patient seen and evaluated in the intensive care unit. She was extubated on 4/8. Her  visited this morning.  I met with her sister at bedside and we discussed that her mentation has been poor at home with episodes of confusion, slow mentation, and inability to communicate effectively.       4/10: seen and examined this morning, arousable to verbal stimuli but closes her eyes right after, has wrist restraints. Only answered to her name. Otherwise not oriented to time , place.   VSS, on NC  CBC no white count  k persistently low  abd distention    4/11:  continues to be encephalopathic however is occasionally awake and alert.NAD  No events overnight  k persistently low, will replace and reorder  Neuro recs to repeat EEG tomorrow    4/12: no changes, still  encephalopathic, no conversation held today either, discussed with neurology in detail  AI workup pending  Repeating CBC, CMP,  Obtain lactic acid and ammonia  Ileus --> possible Partial SBO now- continue holding TF  VEEG 24hours   vimpat continued    4/13: continues to be confused, not answering any questions, disoriented.   2hr VEEG per neurology was extremely abnormal, I will transfer her to neuro floor at this time for continued 24 hr VEEG    4/14  Patient is undergoing continuous video EEG.  She does not respond to my question and is not following commands.  Tube feeds has been felt in the last 3 days due to possible small bowel obstruction based on abdominal x-ray.  I will order CT of the abdomen.  If it did not show obstruction I will start tube feeds or may order small bowel follow-through, for which she will need NG tube.        Consultants/Specialty  Critical care.  Neurology.    Code Status  Code    Disposition  Neuro floor for 24 hours video EEG    Review of Systems  Review of Systems   Unable to perform ROS: Mental acuity        Physical Exam  Temp:  [36.3 °C (97.3 °F)-37.7 °C (99.8 °F)] 36.8 °C (98.3 °F)  Pulse:  [] 99  Resp:  [16-18] 16  BP: (124-176)/(65-92) 147/92  SpO2:  [93 %-96 %] 96 %    Physical Exam   Constitutional: She appears lethargic. No distress.   Does not respond to questions.  Does not follow commands   HENT:   Dry mucous membranes     Eyes: Right eye exhibits no discharge. Left eye exhibits no discharge.   Neck:   IJ central line  Her neck is supple   Cardiovascular: Normal rate and regular rhythm.    No murmur heard.  Pulmonary/Chest: Effort normal. No respiratory distress. She has no wheezes.   Abdominal: Soft. She exhibits distension. There is no tenderness.   Musculoskeletal: She exhibits no edema.   Soft wrist restraints  No edema   Neurological: She appears lethargic.   Can move lower extremity in response to pain stimuli.  Does not move upper extremity during my  examination   Skin: Skin is warm and dry. No rash noted. She is not diaphoretic.   Nursing note and vitals reviewed.      Fluids  No intake or output data in the 24 hours ending 04/14/19 1129    Laboratory  Recent Labs      04/12/19   1353   WBC  11.5*   RBC  4.60   HEMOGLOBIN  12.5   HEMATOCRIT  37.9   MCV  82.4   MCH  27.2   MCHC  33.0*   RDW  41.7   PLATELETCT  429   MPV  9.8     Recent Labs      04/11/19   2344   04/12/19   1353  04/12/19   2236  04/13/19   1203  04/14/19   0000   SODIUM  138   --   136   --    --   141   POTASSIUM  3.0*   < >  2.9*  3.2*  3.0*  3.1*   CHLORIDE  105   --   103   --    --   105   CO2  19*   --   21   --    --   25   GLUCOSE  136*   --   165*   --    --   130*   BUN  7*   --   7*   --    --   10   CREATININE  0.43*   --   0.34*   --    --   0.37*   CALCIUM  8.2*   --   8.0*   --    --   8.3*    < > = values in this interval not displayed.                   Imaging  CT-STEALTH HEAD W/O   Final Result      1.  No evidence of acute territorial infarct, intracranial hemorrhage or mass lesion.   2.  Mild diffuse cerebral substance loss.   3.  Mild microangiopathic ischemic change versus demyelination or gliosis.      [id number] VIEW   Final Result      1.  Increased dilatation of air-filled colonic loops is noted as described above compared to the prior radiograph. Findings could be due to a colonic ileus versus partial obstruction in the region of the splenic flexure.      2.  No free air is identified.                  DX-ABDOMEN FOR TUBE PLACEMENT   Final Result      1.  Feeding tube tip overlies the region of the gastric antrum or 1st portion duodenum.   2.  There is a mild ileus.      DX-CHEST-PORTABLE (1 VIEW)   Final Result         1. Interval extubation. No other significant interval change.      DX-CHEST-PORTABLE (1 VIEW)   Final Result         1. No significant interval change.      US-ABDOMEN COMPLETE SURVEY   Final Result      1.  Mildly distended gallbladder with sludge  present.   2.  No other evidence for acute cholecystitis.   3.  No significant biliary dilation.   4.  Limited evaluation of the bladder.         [id number] VIEW   Final Result      1.  Feeding tube tip overlies the gastric antrum.      2.  Mild ileus.      DX-CHEST-PORTABLE (1 VIEW)   Final Result         1.  No acute cardiopulmonary disease.   2.  Atherosclerosis         EC-ECHOCARDIOGRAM COMPLETE W/O CONT   Final Result      DX-CHEST-PORTABLE (1 VIEW)   Final Result      1.  Supportive tubing as described above.   2.  No pneumothorax.   3.  Persistent hypoinflation.      MR-BRAIN-W/O   Final Result      No evidence of intracranial hemorrhage, acute ischemia or mass on this severely motion degraded study      DX-CHEST-LIMITED (1 VIEW)   Final Result         1.  No acute cardiopulmonary disease.   2.  Atherosclerosis   3.  No radiodense foreign body or medical device identified which would exclude MRI.      CT-CTA NECK WITH & W/O-POST PROCESSING   Final Result         1.  Occlusion of the left subclavian artery to the level of the vertebral artery with reconstitution, appearance concerning for subclavian steal.   2.  Intimal thickening versus soft plaque and scattered atherosclerotic calcification of the left carotid arterial tree with less than 50% stenosis.   3.  Small caliber right internal carotid artery   4.  Extensive emphysema      These findings were discussed with the patient's clinician, Dr. Mcallister, on 4/6/2019 4:31 AM.      CT-CTA HEAD WITH & W/O-POST PROCESS   Final Result         1.  Hypoplastic right internal carotid artery, there is a segment of occlusion seen in the distal right intracranial internal carotid artery near the petrous apex. The intracranial internal carotid artery reconstitutes distal to this segment of    occlusion.   2.  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   3.  Bilateral persistent trigeminal arteries contribute to the posterior  cerebral arteries.   4.  Changes of atrophy with nonspecific white matter changes, most commonly associated with small vessel ischemia.      These findings were discussed with the patient's clinician, Dr. Mcallister, on 4/6/2019 4:31 AM.      DX-ABDOMEN FOR TUBE PLACEMENT   Final Result         1.  Nonspecific bowel gas pattern.   2.  Dobbhoff tube tip overlying the expected location of the pylorus or first duodenal segment.      US-CAROTID DOPPLER BILAT   Final Result      [id number] VIEW   Final Result         1.  Moderate stool in the colon suggests changes of constipation, otherwise nonspecific bowel gas pattern      DX-CHEST-PORTABLE (1 VIEW)   Final Result         1.  No acute cardiopulmonary disease.   2.  Atherosclerosis   3.  No radiodense foreign body or medical device identified which would exclude MRI.      CT-HEAD W/O   Final Result         1. No acute intracranial abnormality. No evidence of acute intracranial hemorrhage or mass lesion.                    Assessment/Plan  * Altered mental status- (present on admission)   Assessment & Plan    Persistent; concern for meningoencephalitis vs seziures  Now afebrile, Resolved leukocytosis, Off pressors  LP-slight elevation protein, normal glucose, only 1 WBC  Meningitis panel-negative  HSV PCR-negative  EEG was abnormal, Neuro consulting, dc keppra in light of continued AMS,repeat 2 hr EEG was highly abnormal, at this time transfer to neuro floor for continue VEEG x 24hours  Immunologic workup pending  Ammonia 48, lactulose, however she is already having loose stools  Blood cxs neg  DC'd vancomycin/amp/ceftraixone as CSF not c/w bacterial meningitis  CT scan did show small aneurysm  MRI did not reveal any stroke or evidence of vasculitis  DC acyclovir    Neurology consulted. Unsure of etiology, will do 24hr VEEG   repeat a HCT essentially neg for acute changes  Infectious disease consulting  Continue thiamine , vitamin B12    Anti double-stranded DNA  positive         Respiratory failure (HCC)   Assessment & Plan    Requiring mechanical ventilation  She was successfully extubated on 4/8    Completed a 5-day course of Zosyn for aspiration PNA  Repeat CXR neg  On room air as of 4/14       Sepsis ([facility])   Assessment & Plan    This is severe sepsis with the following associated acute organ dysfunction(s): acute kidney failure, acute respiratory failure, metabolic/septic encephalopathy.   Source of infection is unclear  LP neg for infection  Off abx  ID on board  Sepsis appears to be resolved       Systolic and diastolic CHF, chronic ([facility])   Assessment & Plan    Not in exacerbation  carvedilol and lisinopril     Coronary artery disease involving native coronary artery of native heart without angina pectoris- (present on admission)   Assessment & Plan    Continue aspirin and Lipitor     Chronic obstructive pulmonary disease ([facility])- (present on admission)   Assessment & Plan    History of; she does not have evidence of acute exacerbation   DuoNeb as needed  RT protocol     Controlled type 2 diabetes mellitus without complication, without long-term current use of insulin ([facility])- (present on admission)   Assessment & Plan    With hyperglycemia of 123 present upon admit.    Glycohemoglobin 6.6  She was on metformin outpatient.  Blood sugar fairly controlled, will DC sliding scale 4/14       Leukocytosis   Assessment & Plan    no obvious source of infection.  Continue monitoring     Loose stools   Assessment & Plan    C. difficile negative  pn KUB patient did have ileus vs partial SBO  Continue to hold TF and monitor     Abdominal distention   Assessment & Plan    Repeat KUB: 1.  Increased dilatation of air-filled colonic loops is noted as described above compared to the prior radiograph. Findings could be due to a colonic ileus versus partial obstruction in the region of the splenic flexure.  Continue holding TF    Will order CT of the abdomen with contrast 4/14  Will  consider small bowel follow-through     Hypophosphatemia   Assessment & Plan    resolved     Hypomagnesemia   Assessment & Plan    Improving, monitor     Hypokalemia   Assessment & Plan    Persistently low despite continued IV replacement  Continue to monitor closely  Replace daily       Pharyngoesophageal dysphagia   Assessment & Plan    Cortrak feeding held due to possible ileus vs partial SBO     Dyslipidemia- (present on admission)   Assessment & Plan    Continue Lipitor         VTE prophylaxis: heparin

## 2019-04-15 NOTE — THERAPY
"Occupational Therapy Evaluation completed.   Functional Status:  Total A BADLs, Total A x2 supine<>sit  Plan of Care: Will benefit from Occupational Therapy 2 times per week  Discharge Recommendations:  Equipment: Will Continue to Assess for Equipment Needs. Post-acute therapy Discharge to a transitional care facility for continued skilled therapy services.    See \"Rehab Therapy-Acute\" Patient Summary Report for complete documentation.      Pt seen for OT tx session. Pt did not respond to pain, deep touch, or light touch stimuli. Pt sat EOB and opened eyes 2-3x when you said her name, otherwise she followed no commands throughout session. Pt appears primarily limited by her cognitive abilities at this time which impacts balance, functional mobility, activity tolerance, and functional independence. Will continue to follow at a low frequency unless pt becomes more participatory with therapy.   "

## 2019-04-15 NOTE — DISCHARGE PLANNING
Agency/Facility Name: [facility]  Outcome: Attempted to follow up, however, no answer. Voicemail left.

## 2019-04-15 NOTE — PROGRESS NOTES
called, discussed POC with him. Addressed concerns.     CT called, will take pt around 11 since EEG is completed

## 2019-04-15 NOTE — PROGRESS NOTES
Cortrak Placement    Tube Team verified patient name and medical record number prior to tube placement.  Cortrak tube (43 inches, 10 Moldovan) placed at 63 cm in right nare.  Per Cortrak picture, tube appears to be in the stomach.    Nursing Instructions: Awaiting KUB to confirm placement before use for medications or feeding. Once placement confirmed, flush tube with 30 ml of water, and then remove and save stylet, in patient medication drawer.

## 2019-04-15 NOTE — PROGRESS NOTES
[facility] Medicine Daily Progress Note    Date of Service  4/15/2019    Chief Complaint  68 y.o. female admitted 4/4/2019 with altered mental status    Hospital Course    Ms. Yarbrough has a past medical history of diabetes, COPD, and coronary artery disease that presented to the emergency room with confusion and expressive aphasia.  He was found to have a temperature of 103 and required intubation due to airway protection.  She was admitted to the intensive care unit.      Interval Problem Update  4/7 the patient intubated overnight was severely lethargic, currently somewhat improved, continues to be febrile at 103.1, LP done and not impressive, MR of the brain without explanation, seen by infectious disease and neurology.  Rechecking ammonia, possibility of vasculitis versus other, possible aspiration with pulmonary infection.  4/8 patient remains intubated, she opens her eyes and follows intermittently, afebrile, did have some emesis overnight with tube feeds on hold, off pressors, remains on empiric antibiotics and acyclovir for remote possibility of viral encephalitis, PCR pending, placing electrolytes.  EEG performed, continues on [facility]  4/9: Patient seen and evaluated in the intensive care unit. She was extubated on 4/8. Her  visited this morning.  I met with her sister at bedside and we discussed that her mentation has been poor at home with episodes of confusion, slow mentation, and inability to communicate effectively.       4/10: seen and examined this morning, arousable to verbal stimuli but closes her eyes right after, has wrist restraints. Only answered to her name. Otherwise not oriented to time , place.   VSS, on NC  CBC no white count  k persistently low  abd distention    4/11:  continues to be encephalopathic however is occasionally awake and alert.NAD  No events overnight  k persistently low, will replace and reorder  Neuro recs to repeat EEG tomorrow    4/12: no changes, still  encephalopathic, no conversation held today either, discussed with neurology in detail  AI workup pending  Repeating CBC, CMP,  Obtain lactic acid and ammonia  Ileus --> possible Partial SBO now- continue holding TF  VEEG 24hours   vimpat continued    4/13: continues to be confused, not answering any questions, disoriented.   2hr VEEG per neurology was extremely abnormal, I will transfer her to neuro floor at this time for continued 24 hr VEEG    4/14  Patient is undergoing continuous video EEG.  She does not respond to my question and is not following commands.  Tube feeds has been felt in the last 3 days due to possible small bowel obstruction based on abdominal x-ray.  I will order CT of the abdomen.  If it did not show obstruction I will start tube feeds or may order small bowel follow-through, for which she will need NG tube.    4/15  Patient is more alert than yesterday, but still does not follow commands and does not answer questions.  Spontaneous movement of all 4 extremities noted, weak though  CT abdomen pending to rule out SBO.  Okay to use NG tube for medications until CT resulted.  Normal saline 100 cc/h running        Consultants/Specialty  Critical care.  Neurology.    Code Status  Code    Disposition  Neuro floor for 24 hours video EEG    Review of Systems  Review of Systems   Unable to perform ROS: Mental acuity        Physical Exam  Temp:  [36.4 °C (97.5 °F)-37.6 °C (99.6 °F)] 36.4 °C (97.5 °F)  Pulse:  [] 83  Resp:  [16-18] 18  BP: (127-165)/() 165/78  SpO2:  [93 %-96 %] 93 %    Physical Exam   Constitutional: She appears lethargic. No distress.   Does not respond to questions.  Does not follow commands   HENT:   Dry mucous membranes     Eyes: Right eye exhibits no discharge. Left eye exhibits no discharge.   Neck:   IJ central line  Her neck is supple   Cardiovascular: Normal rate and regular rhythm.    No murmur heard.  Pulmonary/Chest: Effort normal. No respiratory distress. She has  no wheezes.   Abdominal: Soft. She exhibits distension. There is no tenderness.   Musculoskeletal: She exhibits no edema.   Soft wrist restraints  No edema   Neurological: She appears lethargic. She exhibits abnormal muscle tone.   Little spontaneous movements in all 4 extremities observed  Is not following commands, not answering questions   Skin: Skin is warm and dry. No rash noted. She is not diaphoretic.   Nursing note and vitals reviewed.      Fluids  No intake or output data in the 24 hours ending 04/15/19 1109    Laboratory  Recent Labs      04/12/19   1353  04/15/19   0526   WBC  11.5*  8.1   RBC  4.60  4.29   HEMOGLOBIN  12.5  11.6*   HEMATOCRIT  37.9  36.3*   MCV  82.4  84.6   MCH  27.2  27.0   MCHC  33.0*  32.0*   RDW  41.7  45.1   PLATELETCT  429  446   MPV  9.8  10.0     Recent Labs      04/14/19   0000   04/14/19   1853  04/15/19   0001  04/15/19   0526   SODIUM  141   --   138   --   137   POTASSIUM  3.1*   < >  3.3*  4.0  3.4*   CHLORIDE  105   --   105   --   107   CO2  25   --   24   --   24   GLUCOSE  130*   --   122*   --   106*   BUN  10   --   12   --   15   CREATININE  0.37*   --   0.35*   --   0.37*   CALCIUM  8.3*   --   8.5   --   8.5    < > = values in this interval not displayed.                   Imaging  DX-ABDOMEN FOR TUBE PLACEMENT   Final Result      Feeding tube extends below the diaphragm with tip at the level of the gastric body.      CT-STEALTH HEAD W/O   Final Result      1.  No evidence of acute territorial infarct, intracranial hemorrhage or mass lesion.   2.  Mild diffuse cerebral substance loss.   3.  Mild microangiopathic ischemic change versus demyelination or gliosis.      [id number] VIEW   Final Result      1.  Increased dilatation of air-filled colonic loops is noted as described above compared to the prior radiograph. Findings could be due to a colonic ileus versus partial obstruction in the region of the splenic flexure.      2.  No free air is identified.                   DX-ABDOMEN FOR TUBE PLACEMENT   Final Result      1.  Feeding tube tip overlies the region of the gastric antrum or 1st portion duodenum.   2.  There is a mild ileus.      DX-CHEST-PORTABLE (1 VIEW)   Final Result         1. Interval extubation. No other significant interval change.      DX-CHEST-PORTABLE (1 VIEW)   Final Result         1. No significant interval change.      US-ABDOMEN COMPLETE SURVEY   Final Result      1.  Mildly distended gallbladder with sludge present.   2.  No other evidence for acute cholecystitis.   3.  No significant biliary dilation.   4.  Limited evaluation of the bladder.         [id number] VIEW   Final Result      1.  Feeding tube tip overlies the gastric antrum.      2.  Mild ileus.      DX-CHEST-PORTABLE (1 VIEW)   Final Result         1.  No acute cardiopulmonary disease.   2.  Atherosclerosis         EC-ECHOCARDIOGRAM COMPLETE W/O CONT   Final Result      DX-CHEST-PORTABLE (1 VIEW)   Final Result      1.  Supportive tubing as described above.   2.  No pneumothorax.   3.  Persistent hypoinflation.      MR-BRAIN-W/O   Final Result      No evidence of intracranial hemorrhage, acute ischemia or mass on this severely motion degraded study      DX-CHEST-LIMITED (1 VIEW)   Final Result         1.  No acute cardiopulmonary disease.   2.  Atherosclerosis   3.  No radiodense foreign body or medical device identified which would exclude MRI.      CT-CTA NECK WITH & W/O-POST PROCESSING   Final Result         1.  Occlusion of the left subclavian artery to the level of the vertebral artery with reconstitution, appearance concerning for subclavian steal.   2.  Intimal thickening versus soft plaque and scattered atherosclerotic calcification of the left carotid arterial tree with less than 50% stenosis.   3.  Small caliber right internal carotid artery   4.  Extensive emphysema      These findings were discussed with the patient's clinician, Dr. Mcallister, on 4/6/2019 4:31 AM.       CT-CTA HEAD WITH & W/O-POST PROCESS   Final Result         1.  Hypoplastic right internal carotid artery, there is a segment of occlusion seen in the distal right intracranial internal carotid artery near the petrous apex. The intracranial internal carotid artery reconstitutes distal to this segment of    occlusion.   2.  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   3.  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   4.  Changes of atrophy with nonspecific white matter changes, most commonly associated with small vessel ischemia.      These findings were discussed with the patient's clinician, Dr. Mcallister, on 4/6/2019 4:31 AM.      DX-ABDOMEN FOR TUBE PLACEMENT   Final Result         1.  Nonspecific bowel gas pattern.   2.  Dobbhoff tube tip overlying the expected location of the pylorus or first duodenal segment.      US-CAROTID DOPPLER BILAT   Final Result      [id number] VIEW   Final Result         1.  Moderate stool in the colon suggests changes of constipation, otherwise nonspecific bowel gas pattern      DX-CHEST-PORTABLE (1 VIEW)   Final Result         1.  No acute cardiopulmonary disease.   2.  Atherosclerosis   3.  No radiodense foreign body or medical device identified which would exclude MRI.      CT-HEAD W/O   Final Result         1. No acute intracranial abnormality. No evidence of acute intracranial hemorrhage or mass lesion.               CT-ABDOMEN-PELVIS WITH    (Results Pending)        Assessment/Plan  * Altered mental status- (present on admission)   Assessment & Plan    Persistent; concern for meningoencephalitis vs seziures  Now afebrile, Resolved leukocytosis, Off pressors  LP-slight elevation protein, normal glucose, only 1 WBC  Meningitis panel-negative  HSV PCR-negative  EEG was abnormal, Neuro consulting, dc keppra in light of continued AMS,repeat 2 hr EEG was highly abnormal, at this time transfer to  neuro floor for continue VEEG x 24hours  Immunologic workup pending  Ammonia 48, lactulose, however she is already having loose stools  Blood cxs neg  DC'd vancomycin/amp/ceftraixone as CSF not c/w bacterial meningitis  CT scan did show small aneurysm  MRI did not reveal any stroke or evidence of vasculitis  DC acyclovir    Neurology consulted. Unsure of etiology, will do 24hr VEEG   repeat a HCT essentially neg for acute changes  Infectious disease consulting  Continue thiamine , vitamin B12    Anti double-stranded DNA positive         Respiratory failure ([facility])   Assessment & Plan    Requiring mechanical ventilation  She was successfully extubated on 4/8    Completed a 5-day course of Zosyn for aspiration PNA  Repeat CXR neg  On room air as of 4/14       Sepsis ([facility])   Assessment & Plan    This is severe sepsis with the following associated acute organ dysfunction(s): acute kidney failure, acute respiratory failure, metabolic/septic encephalopathy.   Source of infection is unclear  LP neg for infection  Off abx  ID on board  Sepsis appears to be resolved       Systolic and diastolic CHF, chronic ([facility])   Assessment & Plan    Not in exacerbation  carvedilol and lisinopril     Coronary artery disease involving native coronary artery of native heart without angina pectoris- (present on admission)   Assessment & Plan    Continue aspirin and Lipitor     Chronic obstructive pulmonary disease ([facility])- (present on admission)   Assessment & Plan    History of; she does not have evidence of acute exacerbation   DuoNeb as needed  RT protocol     Controlled type 2 diabetes mellitus without complication, without long-term current use of insulin ([facility])- (present on admission)   Assessment & Plan    With hyperglycemia of 123 present upon admit.    Glycohemoglobin 6.6  She was on metformin outpatient.  Blood sugar fairly controlled, will DC sliding scale 4/14       Leukocytosis   Assessment & Plan    no obvious source of  infection.  Continue monitoring     Loose stools   Assessment & Plan    C. difficile negative  pn KUB patient did have ileus vs partial SBO  Continue to hold TF and monitor     Abdominal distention   Assessment & Plan    Repeat KUB: 1.  Increased dilatation of air-filled colonic loops is noted as described above compared to the prior radiograph. Findings could be due to a colonic ileus versus partial obstruction in the region of the splenic flexure.  Continue holding TF    CT of abdomen with contrast pending     Hypophosphatemia   Assessment & Plan    resolved     Hypomagnesemia   Assessment & Plan    Improving, monitor     Hypokalemia   Assessment & Plan    Persistently low despite continued IV replacement  Continue to monitor closely  Replace daily       Pharyngoesophageal dysphagia   Assessment & Plan    Cortrak feeding held due to possible ileus vs partial SBO     Dyslipidemia- (present on admission)   Assessment & Plan    Continue Lipitor         VTE prophylaxis: heparin

## 2019-04-15 NOTE — PROGRESS NOTES
Patient ordered to have CT of ABD with contrast to reevaluate her SBO. Patient is on continuous EEG monitoring. She is also ordered to have tube feeds started. I talked to the covering hospitalist and it was decided to hold her CT until neuro signs off on putting the EEG on hold, Hold her tube feeds until her SBO is evaluated, and convert her Po potassium to IV potassium. A wound consult was also placed to assess the large scab with reddened perimeter located on her right bicep. Will continue to monitor.

## 2019-04-15 NOTE — CARE PLAN
Problem: Safety  Goal: Will remain free from injury  Outcome: PROGRESSING AS EXPECTED  Bed alarm on, clutter free environment    Problem: Skin Integrity  Goal: Risk for impaired skin integrity will decrease  Outcome: PROGRESSING AS EXPECTED  q2 turns, 2 RN skin check

## 2019-04-15 NOTE — DISCHARGE PLANNING
Medical Social Work    LSW met with pt's  and sons at bedside. POLST completed and placed on pt chart for MD signature. Family would still like full code at this time, per our discussion.

## 2019-04-15 NOTE — DIETARY
Nutrition update:  Day 9 of admit.  Alice Yarbrough is a 68 y.o. female with admitting DX of altered mental status.  Tube feeding initiated on 4/6. Current TF off r/t ileus/obstruction.     Assessment:  Weight 81.5kg- Via Bedscale (+4.2 L per I/Os)     Evaluation:     1. Noted KUB showed ileus 4/11, MD holding TF. RD discussed with MD- verbally confirmed TF will resume today.    2. No recommendation per SLP d/t possible ileus  3. Potential wt loss of 4.5 kg (9.9lbs) in past 5 days, indicating severe 5% loss within < 1 week likely d/t holding of TF.   4. Labs: potassium 3.3, Glucose 106, Creatinine 0.37  5. Meds: vit B12, lactulose, thiamine tablet, depacon, vit D,   6. Last Bm: 4/14  7. Diabetisource AC @ goal rate of 60 ml/hr provides provides 1728 kcal, 86g pro, 1181 ml free water- to resume today    Malnutrition risk: Pt with severe malnutrition in the context of acute disease related malnutriton related to ileus/obstruction resulting in held TFs as evidenced by <50% of estimated energy requirements for ~ 5 days and severe wt loss of 5% within 5 days.     Recommendations/Plan:  1.  Resume TF when medically feasible to Diabetisource AC @ 25mL/hr to advance per protocol of 60mL/hr to provide 1728 kcal, 86g pro, 1181 ml free water  2.  Fluids per MD  3. Follow SLP recommendations for potential advancement to PO diet as medically feasible.    RD following

## 2019-04-15 NOTE — THERAPY
"Physical Therapy Treatment completed.   Bed Mobility:  Supine to Sit: Total Assist X 2  Transfers: Sit to Stand: Unable to Participate  Gait: Level Of Assist: Unable to Participate with Front-Wheel Walker       Plan of Care: Will benefit from Physical Therapy 2 times per week  Discharge Recommendations: Equipment: Will Continue to Assess for Equipment Needs. Post-acute therapy Recommend inpatient transitional care services for continued physical therapy services.      See \"Rehab Therapy-Acute\" Patient Summary Report for complete documentation.     Pt continues to present w/ decreased functional mobility. Pt had no purposeful interaction during therapy. Pt not following any cues and would only intermittently open eyes when name called. Pt appears to be most limited by cognition. Pt not engaging at all while sitting EOB. No righting reactions w/ LOB and pt not holding head up. Pt continues to be at a level in which she will require post acute placement.  "

## 2019-04-15 NOTE — DISCHARGE PLANNING
Agency/Facility Name: [facility]  Spoke To: Reza  Outcome: Sheela(Liaison) to do onsite eval.    Agency/Facility Name: [facility]  Spoke To: Steph  Outcome: Patient declined. Steph will call this [facility] back with reason for denial.

## 2019-04-15 NOTE — DISCHARGE PLANNING
Anticipated Discharge Disposition: SNF    Action: LSW requested [facility] f/u with SNF referrals.     LSW notified that [facility] has declined pt and [facility] will conduct an onsite eval today.    Barriers to Discharge: Medical Clearance; SNF Acceptance    Plan: LSW to f/u with [facility]

## 2019-04-15 NOTE — PROGRESS NOTES
CT w/ contrast consent signed in chart.  and sons are at the beside. Discussed POC with family. Addressed concerns. Called Darcy on call RACHEL to go over POLST with  when at the beside, RACHEL said she would be around 1830.

## 2019-04-16 NOTE — DOCUMENTATION QUERY
[facility]                                                                       Query Response Note      PATIENT:               JANE GARCIA  ACCT #:                  [id number]  MRN:                     [id number]  :                      1950  ADMIT DATE:       2019 3:37 PM  DISCH DATE:          RESPONDING  PROVIDER #:        [id number]           QUERY TEXT:    Pressors were initiatiated on ;  Sepsis is documented in the Medical Record. Can a further diagnosis be specified based on the use of the pressors.     NOTE:  If an appropriate response is not listed below, please respond with a new note.    The patient's Clinical Indicators include:   B/P-  56/40; 75/47; 89/51  sepsis documented upon admit on   Treatment- levophed; IVF  Risks- possible source of sepsis is lungs or CNS    Query created by: Suzan Jim on 4/10/2019 11:19 AM    RESPONSE TEXT:    Septic shock          Electronically signed by:  RICK DUNLAP MD 2019 7:28 AM

## 2019-04-16 NOTE — WOUND TEAM
Wound consult placed for evaluation of anterior right upper arm wound.  Found wound bloody and apparently scab removed (? Scratched by patient).  Shallow and clean appearing.  Fior wound mildly pink but doesn't appear infected.  Covered with adhesive foam and discussed with RN.  No advanced wound care needs at this time.

## 2019-04-16 NOTE — PROGRESS NOTES
"NEUROLOGY CONSULT PROGRESS NOTE:    Requesting Physician: Aly Owens M.D.  Admission Date: 4/4/2019    Reason for Consult: Persisting encephalopathy.     Subjective:  Pt remains mute and profoundly confused.    Physical Exam:  /59   Pulse 76   Temp 36 °C (96.8 °F) (Temporal)   Resp 16   Ht 1.727 m (5' 8\") Comment: Per 's license in chart  Wt 81.5 kg (179 lb 10.8 oz)   SpO2 93%   Breastfeeding? No   BMI 27.32 kg/m²     1. GEN: eyes open but mute and unable to follow any commands.   2. EYES: Pupils 4mm->2mm bilaterally to light direct & consensual. No RADP, non-icteric sclerae, moist conjunctivae; no lid-lag.  3. NECK: Trachea midline, supple.   4. EXT: pedal pulses 2+ bilaterally, no digital cyanosis.   5. PSYCH: calm, very lethargic, mute.     6. Neurological Examination:   Face symmetric, eyes in the primary position, at times tracking but not consistent, unable to follow any commands, feels pains in all limbs and briskly localizes and withdraws, good symmetric strengths in all limbs, 2/2 deep tendon reflexes throughout but absent in ankles.     Labs: personally reviewed    Recent Labs      04/14/19   0000   04/14/19   1853   04/15/19   0526  04/15/19   1130  04/15/19   2340   SODIUM  141   --   138   --   137   --    --    POTASSIUM  3.1*   < >  3.3*   < >  3.4*  3.3*  3.3*   CHLORIDE  105   --   105   --   107   --    --    CO2  25   --   24   --   24   --    --    GLUCOSE  130*   --   122*   --   106*   --    --    BUN  10   --   12   --   15   --    --     < > = values in this interval not displayed.     Radiology/Reports: independently reviewed by me (see below).    ASSESSMENT:  68 yr old w/ HTN, HL, DM, CAD w/ MI, COPD and chronic pain admitted on 4/4/19 for several months of gradual decline w/ diminished oral intake and medication and several days of gradual onset confusion that became profound to the level of muteness and inability to follow any commands. Initial CMP, CBC " "unremarkable and UA, urine tox, procalcitonin negative. She then showed signs of septic shock w/ fever of 103.1 on and requiring intubation and IV pressors to support her BP.  No true source of infection was found.  CSF studies and MRI brain w/o contrast on 4/6/19 reported unremarkable. On my review MRI was degraded by motions but no clear sign of PRES or stroke or mass effect. So far the primary team and Dr. Rowe ruled out autoimmune and infectious processes although anti-double strand DNA returned positive. No true seizure seems to have been detected on any of the multiple EEGs other than \"frontal sharps and triphasic waves.\" Dr. Rowe put her on Vimpat and Dapakote. Thyroid peroxidase antibodies returned negative. Anti-thyroglobulin abs pending.     It would be reasonable to suspect acute lupus flare which can cause seizures and psychosis.     RECOMMENDATIONS:  - Ordered anti-thyroglobulin and anti-peroxidase antibodies for Hashimoto's encephalitis.   - Trial of IV solumedrol 1000 mg daily for 5 days for possible severe lupus attack. This would also work for Hashimoto's.   - Tapered down Valproic acid to 500 mg BID. Continue Vimpat.      The above was discussed in details with the primary team including Dr. Aly Owens M.D.    I have spent 35 minutes total more than half of which was spent reviewing the images and lab results and coordinating the care with the primary team. No family member at the bedside.    Stacia Melton MD  Acute Neurology Consultant  Tiger Text ID \"Ab Melton\"    "

## 2019-04-16 NOTE — PROGRESS NOTES
2 RN skin check    Pt's bilateral heels are pink and blanching.  Pt's sacrum is intact and not red.  Pt does have a scab on her right upper arm that remains intact.

## 2019-04-16 NOTE — DIETARY
Nutrition Support: Brief Update   Day 10 of admit.  Alice Yarbrough is a 68 y.o. female with admitting DX of Altered mental status. Tube feeding initiated on 4/6.     RD re-consulted for TF, though RD currently following pt with TF recommendations in place. TF resumed yesterday per verbal confirmation per MD. Per I/Os, Diabetisource AC TF appears to be running at goal rate of 60mL/hr.    Wt on 4/13: 81.5kg     Malnutrition risk noted on 4/15: Pt with severe malnutrition in the context of acute disease related malnutriton related to ileus/obstruction resulting in held TFs as evidenced by <50% of estimated energy requirements for ~ 5 days and severe wt loss of 5% within 5 days.     Recommendations/Plan:  1.  Continue Diabetisource AC at goal rate of 60mL/hr to provide 1728 kcal, 86g pro, 1181 ml free water  2.  Fluids per MD  3. Follow SLP recommendations for potential advancement to PO diet as medically feasible.    RD following

## 2019-04-16 NOTE — DISCHARGE PLANNING
Anticipated Discharge Disposition: SNF     Action: LSW requested [facility] f/u with [facility] for acceptance.    LSW notified by [facility] that [facility] declined pt until she is better able to participate in therapies.      Barriers to Discharge: Medical Clearance; SNF Acceptance     Plan: TYW to f/u with MD

## 2019-04-16 NOTE — CARE PLAN
Problem: Infection  Goal: Will remain free from infection  Outcome: PROGRESSING AS EXPECTED  Continuing to monitor the pt's vital signs and lab results for any signs or symptoms of infection, none evident at this time.    Problem: Knowledge Deficit  Goal: Knowledge of disease process/condition, treatment plan, diagnostic tests, and medications will improve  Outcome: PROGRESSING SLOWER THAN EXPECTED  Pt is not able to conversate or indicate that she can understand what staff are saying to her, making educational efforts difficult to say the least. Will continue to monitor and attempt education when possible.

## 2019-04-16 NOTE — PROGRESS NOTES
[facility] Medicine Daily Progress Note    Date of Service  4/16/2019    Chief Complaint  68 y.o. female admitted 4/4/2019 with altered mental status    Hospital Course    Ms. Yarbrough is a 68-year-old female with past medical history of  diabetes, COPD, and coronary artery disease that presented to the emergency room with confusion and expressive aphasia.  Per the  patient had been declining over several months which included not taking her medications or eating.  She was started on empiric antibiotics including vancomycin and Rocephin and acyclovir.  Her mental status continued to deteriorate and she began having fevers.  ID was consulted and ampicillin was added on and vancomycin was discontinued.  CT head an MRI of brain did not reveal potential cause of her encephalopathy.  She began developing signs and symptoms of sepsis and was transferred to ICU and required intubation and pressors on 4/6.  Her lumbar puncture done on 4/6 was not impressive for infection and her HSV PCR was  so her acyclovir, ampicillin and ceftriaxone were discontinued.  While intubated patient was receiving tube feeds and had an episode of emesis and was started on Zosyn for possible aspiration pneumonia which she completed a 5-day course of.  She was given thiamine, multivitamins, folate and vitamin B12 for possible metabolic etiology of her encephalopathy.  EEG was done which showed nonspecific focal cortical area irritability over frontal right greater than left side and patient was started on Keppra empirically.  She was extubated on 4/8.  Due to continued encephalopathy, patient was only be oriented to self and not following commands EEG was repeated.  Her EEG continued to show focal cortical irritability over her frontal right greater than left side so she was also started on Vimpat.  Rheumatologic cause was considered which included ESR, CALEB, dsDNA, RF which were all negative except for an elevated CRP and positive dsDNA but  negative antibody titer.  On 4/13 patient again underwent a 24-hour video EEG which continued to show moderate toxic/metabolic encephalopathy with frontal waves.  There was concern for possible SBO for which her tube feeds were held.        Interval Problem Update  Her mental status is unchanged  She appears somnolent and barely able to keep eyes open  Per nursing staff she has been having diarrhea at least since yesterday   CT abdomen showed diffuse colonic wall thickening consistent with colitis possibly due to infection or ischemia  I discussed case with neuro and we will check hashimotos antibodies and empirically start IV steroids at this time  Her K levels continue to be low. I will change her fluids to NS with potassium.        Consultants/Specialty  Critical care.  Neurology.    Code Status  Full code    Disposition  TBD    Review of Systems  Review of Systems   Unable to perform ROS: Mental acuity   Gastrointestinal: Positive for diarrhea.        Physical Exam  Temp:  [36 °C (96.8 °F)-36.4 °C (97.5 °F)] 36 °C (96.8 °F)  Pulse:  [75-79] 75  Resp:  [16] 16  BP: (112-139)/(59-60) 112/59  SpO2:  [92 %-93 %] 93 %    Physical Exam   Constitutional: She appears lethargic. No distress.   Does not respond to questions.  Does not follow commands   HENT:   Dry mucous membranes     Eyes: Pupils are equal, round, and reactive to light. Right eye exhibits no discharge. Left eye exhibits no discharge.   Neck: Neck supple.   Cardiovascular: Normal rate and regular rhythm.    No murmur heard.  Pulmonary/Chest: Effort normal. No respiratory distress. She has no wheezes.   Abdominal: Soft. She exhibits distension. There is no tenderness.   Musculoskeletal: She exhibits edema (trace edema in feet).   Soft wrist restraints  No edema   Neurological: She appears lethargic.   Little spontaneous movements in all 4 extremities observed  Is not following commands, not answering questions  Does not withdraw to pain   Skin: Skin is  warm and dry. No rash noted. She is not diaphoretic.   Nursing note and vitals reviewed.      Fluids    Intake/Output Summary (Last 24 hours) at 04/16/19 1445  Last data filed at 04/16/19 1400   Gross per 24 hour   Intake              360 ml   Output                0 ml   Net              360 ml       Laboratory  Recent Labs      04/15/19   0526   WBC  8.1   RBC  4.29   HEMOGLOBIN  11.6*   HEMATOCRIT  36.3*   MCV  84.6   MCH  27.0   MCHC  32.0*   RDW  45.1   PLATELETCT  446   MPV  10.0     Recent Labs      04/14/19   0000   04/14/19   1853   04/15/19   0526  04/15/19   1130  04/15/19   2340  04/16/19   0819   SODIUM  141   --   138   --   137   --    --    --    POTASSIUM  3.1*   < >  3.3*   < >  3.4*  3.3*  3.3*  2.9*   CHLORIDE  105   --   105   --   107   --    --    --    CO2  25   --   24   --   24   --    --    --    GLUCOSE  130*   --   122*   --   106*   --    --    --    BUN  10   --   12   --   15   --    --    --    CREATININE  0.37*   --   0.35*   --   0.37*   --    --    --    CALCIUM  8.3*   --   8.5   --   8.5   --    --    --     < > = values in this interval not displayed.                   Imaging  CT-ABDOMEN-PELVIS WITH   Final Result      Relatively diffuse colonic wall thickening consistent with colitis possibly due to infection or ischemia.      Coarse calcified plaque in the abdominal aorta and branch vessels without aneurysm.      Fatty infiltration of the liver.      DX-ABDOMEN FOR TUBE PLACEMENT   Final Result      Feeding tube extends below the diaphragm with tip at the level of the gastric body.      CT-STEALTH HEAD W/O   Final Result      1.  No evidence of acute territorial infarct, intracranial hemorrhage or mass lesion.   2.  Mild diffuse cerebral substance loss.   3.  Mild microangiopathic ischemic change versus demyelination or gliosis.      [id number] VIEW   Final Result      1.  Increased dilatation of air-filled colonic loops is noted as described above compared to the prior  radiograph. Findings could be due to a colonic ileus versus partial obstruction in the region of the splenic flexure.      2.  No free air is identified.                  DX-ABDOMEN FOR TUBE PLACEMENT   Final Result      1.  Feeding tube tip overlies the region of the gastric antrum or 1st portion duodenum.   2.  There is a mild ileus.      DX-CHEST-PORTABLE (1 VIEW)   Final Result         1. Interval extubation. No other significant interval change.      DX-CHEST-PORTABLE (1 VIEW)   Final Result         1. No significant interval change.      US-ABDOMEN COMPLETE SURVEY   Final Result      1.  Mildly distended gallbladder with sludge present.   2.  No other evidence for acute cholecystitis.   3.  No significant biliary dilation.   4.  Limited evaluation of the bladder.         [id number] VIEW   Final Result      1.  Feeding tube tip overlies the gastric antrum.      2.  Mild ileus.      DX-CHEST-PORTABLE (1 VIEW)   Final Result         1.  No acute cardiopulmonary disease.   2.  Atherosclerosis         EC-ECHOCARDIOGRAM COMPLETE W/O CONT   Final Result      DX-CHEST-PORTABLE (1 VIEW)   Final Result      1.  Supportive tubing as described above.   2.  No pneumothorax.   3.  Persistent hypoinflation.      MR-BRAIN-W/O   Final Result      No evidence of intracranial hemorrhage, acute ischemia or mass on this severely motion degraded study      DX-CHEST-LIMITED (1 VIEW)   Final Result         1.  No acute cardiopulmonary disease.   2.  Atherosclerosis   3.  No radiodense foreign body or medical device identified which would exclude MRI.      CT-CTA NECK WITH & W/O-POST PROCESSING   Final Result         1.  Occlusion of the left subclavian artery to the level of the vertebral artery with reconstitution, appearance concerning for subclavian steal.   2.  Intimal thickening versus soft plaque and scattered atherosclerotic calcification of the left carotid arterial tree with less than 50% stenosis.   3.  Small caliber  right internal carotid artery   4.  Extensive emphysema      These findings were discussed with the patient's clinician, Dr. Mcallister, on 4/6/2019 4:31 AM.      CT-CTA HEAD WITH & W/O-POST PROCESS   Final Result         1.  Hypoplastic right internal carotid artery, there is a segment of occlusion seen in the distal right intracranial internal carotid artery near the petrous apex. The intracranial internal carotid artery reconstitutes distal to this segment of    occlusion.   2.  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   3.  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   4.  Changes of atrophy with nonspecific white matter changes, most commonly associated with small vessel ischemia.      These findings were discussed with the patient's clinician, Dr. Mcallister, on 4/6/2019 4:31 AM.      DX-ABDOMEN FOR TUBE PLACEMENT   Final Result         1.  Nonspecific bowel gas pattern.   2.  Dobbhoff tube tip overlying the expected location of the pylorus or first duodenal segment.      US-CAROTID DOPPLER BILAT   Final Result      [id number] VIEW   Final Result         1.  Moderate stool in the colon suggests changes of constipation, otherwise nonspecific bowel gas pattern      DX-CHEST-PORTABLE (1 VIEW)   Final Result         1.  No acute cardiopulmonary disease.   2.  Atherosclerosis   3.  No radiodense foreign body or medical device identified which would exclude MRI.      CT-HEAD W/O   Final Result         1. No acute intracranial abnormality. No evidence of acute intracranial hemorrhage or mass lesion.                    Assessment/Plan  * Altered mental status- (present on admission)   Assessment & Plan    Persistent; concern for meningoencephalitis vs seziures  Now afebrile, Resolved leukocytosis, Off pressors  LP-slight elevation protein, normal glucose, only 1 WBC  Meningitis panel-negative  HSV PCR-negative  DC'd  vancomycin/amp/ceftraixone/acyclovir as CSF not c/w bacterial or viral meningitis  She has had EEG x 3, all showing some frontal cortical dysfunction. I discussed with Dr. Melton. He does not think this is seizures. She has been on anti epileptics for over 1 week now with no resolution of AMS  Immunologic workup: RF negative, CRP elevated, CALEB negative, ds DNA positive but negative antibody titer  Ammonia 48, lactulose, however she is already having loose stools  Blood cxs neg  CT scan did show small aneurysm  MRI did not reveal any stroke or evidence of vasculitis  Cont thiamine and B12  Anti TPO antibodies per neuro to rule out hashimoto's encephalitis  I have empirically started her on IV steroids x 5 days             Respiratory failure ([facility])   Assessment & Plan    Requiring mechanical ventilation  She was successfully extubated on 4/8    Completed a 5-day course of Zosyn for aspiration PNA  Repeat CXR neg  On room air as of 4/14       Sepsis ([facility])   Assessment & Plan    This is severe sepsis with the following associated acute organ dysfunction(s): acute kidney failure, acute respiratory failure, metabolic/septic encephalopathy.   Source of infection is unclear  LP neg for infection  Off abx  ID on board  Sepsis appears to be resolved       Systolic and diastolic CHF, chronic ([facility])   Assessment & Plan    Not in exacerbation  carvedilol and lisinopril     Coronary artery disease involving native coronary artery of native heart without angina pectoris- (present on admission)   Assessment & Plan    Continue aspirin and Lipitor     Chronic obstructive pulmonary disease ([facility])- (present on admission)   Assessment & Plan    History of; she does not have evidence of acute exacerbation   DuoNeb as needed  RT protocol     Controlled type 2 diabetes mellitus without complication, without long-term current use of insulin ([facility])- (present on admission)   Assessment & Plan    glycohemoglobin 6.6  She was on metformin  outpatient.  Blood sugar fairly controlled, will DC sliding scale 4/14       Leukocytosis   Assessment & Plan    no obvious source of infection.  Continue monitoring     Loose stools   Assessment & Plan    C. difficile negative  pn KUB patient did have ileus vs partial SBO  D/t colitis noted on CT abdomen. She has already completed 5 days of broad spectrum antibiotics.      Abdominal distention   Assessment & Plan    Repeat KUB: 1.  Increased dilatation of air-filled colonic loops is noted as described above compared to the prior radiograph. Findings could be due to a colonic ileus versus partial obstruction in the region of the splenic flexure.  CT of abdomen with contrast consistent with colitis  Still having diarrhea  c diff negative on 4/12     Hypophosphatemia   Assessment & Plan    resolved     Hypomagnesemia   Assessment & Plan    Improving, monitor     Hypokalemia   Assessment & Plan    Persistently low despite continued IV replacement  Continue to monitor closely  Replace daily  I have added potassium to her IVF       Pharyngoesophageal dysphagia   Assessment & Plan    Cortrak feeding held due to possible ileus vs partial SBO     Dyslipidemia- (present on admission)   Assessment & Plan    Continue Lipitor         VTE prophylaxis: heparin

## 2019-04-16 NOTE — PROGRESS NOTES
Unable to assess the pt's orientation as she is currently non-verbal and is not able to respond to staff's questions at this time. Pt does occasionally open her eyes to verbal stimulation, and at other times does to physical stimulation. Pt is receiving tube feeding around the clock via Cortrak (goal rate of 60 mL/hr) and IV fluids in the form of normal saline. Pt remains incontinent of bowel and bladder and staff continue to turn the pt every 2 hours to mitigate the risk of skin breakdown due to friction and shear. Pt was given a full bed-bath this evening, which she tolerated well. See pt skin note for details on skin status. Will continue to monitor.

## 2019-04-16 NOTE — DISCHARGE PLANNING
Agency/Facility Name: [facility]  Spoke To: Sheela  Outcome: Patient not participating at this time. [employer] to re-eval patient when patient improves. Larissa(TYW) notified.

## 2019-04-17 PROBLEM — J96.01 ACUTE RESPIRATORY FAILURE WITH HYPOXIA (HCC): Status: ACTIVE | Noted: 2019-01-01

## 2019-04-17 NOTE — DOCUMENTATION QUERY
[facility]                                                                       Query Response Note      PATIENT:               JANE GARCIA  ACCT #:                  [id number]  MRN:                     [id number]  :                      1950  ADMIT DATE:       2019 3:37 PM  DISCH DATE:          RESPONDING  PROVIDER #:        [id number]           QUERY TEXT:    Per the 4/15 dietician evaluation, it is documented - Pt with severe malnutrition in the context of acute disease related malnutriton related to ileus/obstruction resulting in held TFs as evidenced by <50% of estimated energy requirements for ~ 5 days and severe wt loss of 5% within 5 days.     Please clarify status of this condition:    NOTE:  If an appropriate response is not listed below, please respond with a new note.       The patient's Clinical Indicators include:  Clinical indicators- (see 4/15 dietician documentation)  Treatment-  dietician evals; SLP evals; cortrak w/tube feeds  Risks;  possible SBO; vomiting, tube feeds held    Query created by: Suzan Jim on 2019 6:54 AM    RESPONSE TEXT:    Severe protein calorie malnutrition is ruled in          Electronically signed by:  CONCHIS GUARDADO MD 2019 11:03 AM

## 2019-04-17 NOTE — CARE PLAN
Problem: Respiratory:  Goal: Respiratory status will improve    Intervention: Administer and titrate oxygen therapy  Pt now requiring 5 L of O2, RT assessed pt and gave nebulizer treatment       Problem: Urinary Elimination:  Goal: Ability to reestablish a normal urinary elimination pattern will improve    Intervention: Evaluate need to continue indwelling urinary catheter  Greene placed for strict I/O's

## 2019-04-17 NOTE — PROGRESS NOTES
"BRIEF NEUROLOGY CONSULT UPDATE NOTE:    The patient showing some improvement after 1 dose of IV solumedrol 1000 mg yesterday. She began speaking \"yes/no\" to questions appropriately.     Per , the patient has been showing odd behavioral changes for a few months with decreased oral intake. But the initial CMP was wnl and no osmotic myelinolysis changes seen on MRI.  Thiamine deficiency is possible since she has only received very low dose of Thiamine while here.  The other possibility that has not been ruled out is paraneoplastic or non-paraneoplastic encephalitis.     Recommendations:   - Ordered STAT thiamine serum level.   - Ordered non-paraneoplastic limbic encephalitis labs: anti-NMDA, anti-AMPAR, anti-VGKC abs.  - Ordered paraneoplastic encephalitis labs: anti-Hu/Yo/Ri abs.  - Continue IV steroid for total 5 days as ordered.  - If no further improvement, will consider IVIG for possible autoimmune encephalitis.     The above was discussed with Dr. Aly Owens M.D.    Stacia Melton MD  Acute Neurology Consultant  Tiger Text ID \"Ab Melton\"    "

## 2019-04-17 NOTE — CARE PLAN
Problem: Respiratory:  Goal: Respiratory status will improve  Outcome: PROGRESSING AS EXPECTED  Pt continues to oxygenate well on room air. Pt had issues with what appeared to be increased respiratory effort earlier this admission that was exacerbated by pt positioning (flat on her back or at a 90 degree angle led to this difficulty breathing). Pt remains with her HOB at 30 degrees (d/t continuous tube feeds) and does not exhibit any respiratory issues. Lungs are clear at the apices and diminished in the bases, no adventitious sounds appreciated on auscultation.    Problem: Bowel/Gastric:  Goal: Normal bowel function is maintained or improved  Outcome: PROGRESSING AS EXPECTED  Pt has had frequent loose stools over the past day or two as a result of lactulose administration. Staff are continuing to ensure that the pt is cleaned as soon as possible after incontinent episodes. Pt continues to receive continuous tube feeding at the prescribed rate. Bowel sounds can be appreciated on auscultation but are hypoactive in nature. Will continue to monitor.

## 2019-04-17 NOTE — PROGRESS NOTES
2 RN skin check with [place] RN    Heels pink/blanching  Sacrum intact  Right upper arm with small wound.   Waffle overlay in place,  Q2 turns

## 2019-04-17 NOTE — PROGRESS NOTES
2 RN skin check    Pt's bilateral heels are pink and blanching.  Pt's sacrum is intact, no redness noted upon assessment.  Pt has a small wound present on her right upper arm, wound care team has been consulted and has seen the pt, currently dressed.  Waffle overlay mattress in place.

## 2019-04-17 NOTE — THERAPY
Attempted to see pt for OT tx. Pt currently in rapid response. RN asked to hold. Will see pt later as appropriate.

## 2019-04-17 NOTE — PROGRESS NOTES
A small amount of blood was noted in pt's silver tubing at 1530. Prior to this, pt's urine was pale and clear. Dr. Owens notified. Urine is pale and clear at this time. Pt's tube feeding has been restarted per MD.

## 2019-04-17 NOTE — PROGRESS NOTES
[facility] Medicine Daily Progress Note    Date of Service  4/17/2019    Chief Complaint  68 y.o. female admitted 4/4/2019 with altered mental status    Hospital Course    Ms. Yarbrough is a 68-year-old female with past medical history of  diabetes, COPD, and coronary artery disease that presented to the emergency room with confusion and expressive aphasia.  Per the  patient had been declining over several months which included not taking her medications or eating.  She was started on empiric antibiotics including vancomycin and Rocephin and acyclovir.  Her mental status continued to deteriorate and she began having fevers.  ID was consulted and ampicillin was added on and vancomycin was discontinued.  CT head an MRI of brain did not reveal potential cause of her encephalopathy.  She began developing signs and symptoms of sepsis and was transferred to ICU and required intubation and pressors on 4/6.  Her lumbar puncture done on 4/6 was not impressive for infection and her HSV PCR was  so her acyclovir, ampicillin and ceftriaxone were discontinued.  While intubated patient was receiving tube feeds and had an episode of emesis and was started on Zosyn for possible aspiration pneumonia which she completed a 5-day course of.  She was given thiamine, multivitamins, folate and vitamin B12 for possible metabolic etiology of her encephalopathy.  EEG was done which showed nonspecific focal cortical area irritability over frontal right greater than left side and patient was started on Keppra empirically.  She was extubated on 4/8.  Due to continued encephalopathy, patient was only be oriented to self and not following commands EEG was repeated.  Her EEG continued to show focal cortical irritability over her frontal right greater than left side so she was also started on Vimpat.  Rheumatologic cause was considered which included ESR, CALEB, dsDNA, RF which were all negative except for an elevated CRP and positive dsDNA but  "negative antibody titer.  On 4/13 patient again underwent a 24-hour video EEG which continued to show moderate toxic/metabolic encephalopathy with frontal waves.  There was concern for possible SBO for which her tube feeds were held.        Interval Problem Update  Rapid response this morning due to increased work of breathing and patient was hypoxia  Her mental status is slightly improved she is able to follow with her eyes and able to respond to yes  She does appear in respiratory distress  I emergently evaluated patient at bedside and chest x-ray reviewed by me showed new onset pulmonary edema.  I discontinued her IV fluids and started her on IV Lasix  I discussed case with neurology and will continue with IV steroids for a total of 5 days.  If she does not improve then at that time we will need to consider possibility of IVIG      Consultants/Specialty  Critical care.  Neurology.    Code Status  Full code    Disposition  TBD    Review of Systems  Review of Systems   Unable to perform ROS: Mental acuity   Gastrointestinal: Positive for diarrhea.        Physical Exam  Temp:  [36.1 °C (96.9 °F)-36.4 °C (97.6 °F)] 36.2 °C (97.1 °F)  Pulse:  [] 83  Resp:  [16-22] 22  BP: (139-179)/() 179/121  SpO2:  [91 %-97 %] 95 %    Physical Exam   Constitutional: She appears lethargic. She appears distressed.   Able to answer \"yes\" when asked if her breathing is difficult. Able to track more with eyes today   HENT:   Dry mucous membranes     Eyes: EOM are normal. Right eye exhibits no discharge. Left eye exhibits no discharge.   Cardiovascular: Normal rate and regular rhythm.    No murmur heard.  Pulmonary/Chest: She is in respiratory distress. She has no wheezes.   Course breath sounds with crackles throughout both lung fields   Abdominal: Soft. She exhibits distension. There is no tenderness.   Musculoskeletal: She exhibits edema (trace edema in feet).   Soft wrist restraints  No edema   Neurological: She appears " lethargic.   Not withdrawing to pain but was able to speak several words today    Skin: Skin is warm and dry. No rash noted. She is not diaphoretic.   Nursing note and vitals reviewed.      Fluids    Intake/Output Summary (Last 24 hours) at 04/17/19 1119  Last data filed at 04/17/19 1105   Gross per 24 hour   Intake              210 ml   Output             1300 ml   Net            -1090 ml       Laboratory  Recent Labs      04/15/19   0526  04/17/19   0335   WBC  8.1  5.2   RBC  4.29  4.50   HEMOGLOBIN  11.6*  12.2   HEMATOCRIT  36.3*  38.2   MCV  84.6  84.9   MCH  27.0  27.1   MCHC  32.0*  31.9*   RDW  45.1  45.6   PLATELETCT  446  476*   MPV  10.0  10.2     Recent Labs      04/14/19   1853   04/15/19   0526   04/16/19   0819  04/16/19   2338  04/17/19   0335   SODIUM  138   --   137   --    --    --   139   POTASSIUM  3.3*   < >  3.4*   < >  2.9*  3.9  3.9   CHLORIDE  105   --   107   --    --    --   107   CO2  24   --   24   --    --    --   23   GLUCOSE  122*   --   106*   --    --    --   294*   BUN  12   --   15   --    --    --   22   CREATININE  0.35*   --   0.37*   --    --    --   0.38*   CALCIUM  8.5   --   8.5   --    --    --   8.6    < > = values in this interval not displayed.                   Imaging  DX-CHEST-LIMITED (1 VIEW)   Final Result      Diffuse interstitial edema, new since prior study. No focal consolidation or pleural effusions.      CT-ABDOMEN-PELVIS WITH   Final Result      Relatively diffuse colonic wall thickening consistent with colitis possibly due to infection or ischemia.      Coarse calcified plaque in the abdominal aorta and branch vessels without aneurysm.      Fatty infiltration of the liver.      DX-ABDOMEN FOR TUBE PLACEMENT   Final Result      Feeding tube extends below the diaphragm with tip at the level of the gastric body.      CT-STEALTH HEAD W/O   Final Result      1.  No evidence of acute territorial infarct, intracranial hemorrhage or mass lesion.   2.  Mild  diffuse cerebral substance loss.   3.  Mild microangiopathic ischemic change versus demyelination or gliosis.      [id number] VIEW   Final Result      1.  Increased dilatation of air-filled colonic loops is noted as described above compared to the prior radiograph. Findings could be due to a colonic ileus versus partial obstruction in the region of the splenic flexure.      2.  No free air is identified.                  DX-ABDOMEN FOR TUBE PLACEMENT   Final Result      1.  Feeding tube tip overlies the region of the gastric antrum or 1st portion duodenum.   2.  There is a mild ileus.      DX-CHEST-PORTABLE (1 VIEW)   Final Result         1. Interval extubation. No other significant interval change.      DX-CHEST-PORTABLE (1 VIEW)   Final Result         1. No significant interval change.      US-ABDOMEN COMPLETE SURVEY   Final Result      1.  Mildly distended gallbladder with sludge present.   2.  No other evidence for acute cholecystitis.   3.  No significant biliary dilation.   4.  Limited evaluation of the bladder.         [id number] VIEW   Final Result      1.  Feeding tube tip overlies the gastric antrum.      2.  Mild ileus.      DX-CHEST-PORTABLE (1 VIEW)   Final Result         1.  No acute cardiopulmonary disease.   2.  Atherosclerosis         EC-ECHOCARDIOGRAM COMPLETE W/O CONT   Final Result      DX-CHEST-PORTABLE (1 VIEW)   Final Result      1.  Supportive tubing as described above.   2.  No pneumothorax.   3.  Persistent hypoinflation.      MR-BRAIN-W/O   Final Result      No evidence of intracranial hemorrhage, acute ischemia or mass on this severely motion degraded study      DX-CHEST-LIMITED (1 VIEW)   Final Result         1.  No acute cardiopulmonary disease.   2.  Atherosclerosis   3.  No radiodense foreign body or medical device identified which would exclude MRI.      CT-CTA NECK WITH & W/O-POST PROCESSING   Final Result         1.  Occlusion of the left subclavian artery to the level of the  vertebral artery with reconstitution, appearance concerning for subclavian steal.   2.  Intimal thickening versus soft plaque and scattered atherosclerotic calcification of the left carotid arterial tree with less than 50% stenosis.   3.  Small caliber right internal carotid artery   4.  Extensive emphysema      These findings were discussed with the patient's clinician, Dr. Mcallister, on 4/6/2019 4:31 AM.      CT-CTA HEAD WITH & W/O-POST PROCESS   Final Result         1.  Hypoplastic right internal carotid artery, there is a segment of occlusion seen in the distal right intracranial internal carotid artery near the petrous apex. The intracranial internal carotid artery reconstitutes distal to this segment of    occlusion.   2.  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   3.  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   4.  Changes of atrophy with nonspecific white matter changes, most commonly associated with small vessel ischemia.      These findings were discussed with the patient's clinician, Dr. Mcallister, on 4/6/2019 4:31 AM.      DX-ABDOMEN FOR TUBE PLACEMENT   Final Result         1.  Nonspecific bowel gas pattern.   2.  Dobbhoff tube tip overlying the expected location of the pylorus or first duodenal segment.      US-CAROTID DOPPLER BILAT   Final Result      [id number] VIEW   Final Result         1.  Moderate stool in the colon suggests changes of constipation, otherwise nonspecific bowel gas pattern      DX-CHEST-PORTABLE (1 VIEW)   Final Result         1.  No acute cardiopulmonary disease.   2.  Atherosclerosis   3.  No radiodense foreign body or medical device identified which would exclude MRI.      CT-HEAD W/O   Final Result         1. No acute intracranial abnormality. No evidence of acute intracranial hemorrhage or mass lesion.                    Assessment/Plan  * Altered mental status- (present on admission)    Assessment & Plan    Persistent; concern for meningoencephalitis vs seziures  Now afebrile, Resolved leukocytosis, Off pressors  LP-slight elevation protein, normal glucose, only 1 WBC  Meningitis panel-negative  HSV PCR-negative  DC'd vancomycin/amp/ceftraixone/acyclovir as CSF not c/w bacterial or viral meningitis  She has had EEG x 3, all showing some frontal cortical dysfunction. I discussed with Dr. Melton. He does not think this is seizures. She has been on anti epileptics for over 1 week now with no resolution of AMS  Immunologic workup: RF negative, CRP elevated, CALEB negative, ds DNA positive but negative antibody titer  Ammonia 48, lactulose, however she is already having loose stools  Blood cxs neg  CT scan did show small aneurysm  MRI did not reveal any stroke or evidence of vasculitis  Cont thiamine and B12  Anti TPO antibodies per neuro to rule out hashimoto's encephalitis  I have empirically started her on IV steroids x 5 days             Respiratory failure ([facility])   Assessment & Plan    Requiring mechanical ventilation  She was successfully extubated on 4/8    Completed a 5-day course of Zosyn for aspiration PNA  Repeat CXR neg  On room air as of 4/14       Sepsis ([facility])   Assessment & Plan    This is severe sepsis with the following associated acute organ dysfunction(s): acute kidney failure, acute respiratory failure, metabolic/septic encephalopathy.   Source of infection is unclear  LP neg for infection  Off abx  ID on board  Sepsis appears to be resolved       Systolic and diastolic CHF, chronic ([facility])   Assessment & Plan    Not in exacerbation  carvedilol and lisinopril     Coronary artery disease involving native coronary artery of native heart without angina pectoris- (present on admission)   Assessment & Plan    Continue aspirin and Lipitor     Chronic obstructive pulmonary disease (HCC)- (present on admission)   Assessment & Plan    History of; she does not have evidence of acute exacerbation    DuoNeb as needed  RT protocol     Controlled type 2 diabetes mellitus without complication, without long-term current use of insulin (HCC)- (present on admission)   Assessment & Plan    glycohemoglobin 6.6  She was on metformin outpatient.  Blood sugar fairly controlled, will DC sliding scale 4/14       Leukocytosis   Assessment & Plan    no obvious source of infection.  Continue monitoring     Loose stools   Assessment & Plan    C. difficile negative  pn KUB patient did have ileus vs partial SBO  D/t colitis noted on CT abdomen. She has already completed 5 days of broad spectrum antibiotics.      Abdominal distention   Assessment & Plan    Repeat KUB: 1.  Increased dilatation of air-filled colonic loops is noted as described above compared to the prior radiograph. Findings could be due to a colonic ileus versus partial obstruction in the region of the splenic flexure.  CT of abdomen with contrast consistent with colitis  Still having diarrhea  c diff negative on 4/12     Hypophosphatemia   Assessment & Plan    resolved     Hypomagnesemia   Assessment & Plan    Improving, monitor     Hypokalemia   Assessment & Plan    Persistently low despite continued IV replacement  Continue to monitor closely  Replace daily  I have added potassium to her IVF       Pharyngoesophageal dysphagia   Assessment & Plan    Cortrak feeding held due to possible ileus vs partial SBO     Dyslipidemia- (present on admission)   Assessment & Plan    Continue Lipitor         VTE prophylaxis: heparin    The patient is critically ill, with high chance of deterioration into acute respiratory distress, respiratory failure, and eventually death if left untreated. The care that has been undertaken is medically  complex. I spent 35 minutes of critical care time, including managing medical   issues, coordination of care, not including doing procedures, with no overlap in critical  care time.

## 2019-04-17 NOTE — PROGRESS NOTES
Rapid response called d/t increased work of breathing. Pt was on room air upon arrival. Pt desats to 83%, 3 L placed and holding onto 90%. Increased to 5L and sats at 94%. RT at bedside, gave nebulizer treatment and did NT suction. Bloody mucus plug retrieved. Rapid response team called at this time. CXR and ABG ordered. MD notified, orders to reconsult neuro and talk to family about code status. 1x dose of lasix and silver placed for strict I/O's. BP meds given for . Will continue to monitor patient for any changes. Added to rapid team rounding list.

## 2019-04-17 NOTE — CARE PLAN
Problem: Nutritional:  Goal: Nutrition support tolerated and meeting greater than 85% of estimated needs  Outcome: MET Date Met: 04/17/19  Diabetisource AC @ 60 mL/hr

## 2019-04-18 PROBLEM — E53.8 VITAMIN B12 DEFICIENCY: Status: ACTIVE | Noted: 2019-01-01

## 2019-04-18 NOTE — CARE PLAN
Problem: Skin Integrity  Goal: Risk for impaired skin integrity will decrease  Outcome: PROGRESSING AS EXPECTED  Patient has been turned and repositioned Q2hrs to keep skin integrity intact.

## 2019-04-18 NOTE — THERAPY
"Occupational Therapy Treatment completed with focus on ADLs, ADL transfers and patient education.  Functional Status:  Pt seen for OT tx. Prior to EOB activity pt opening eyes to name and tracking therapist voice. Total A supine > sit, total A for seated balance EOB. Pt not following commands in sitting. O2 stats remained above 90% throughout session. Poor head control and trunk control during session w/ no righting reaction. Total A rolling to Rt and Lt during session for extensive clean up for BM d/t incontinence. Pt left supine, RN aware of session.    Plan of Care: Will benefit from Occupational Therapy 2 times per week  Discharge Recommendations:  Equipment Will Continue to Assess for Equipment Needs.     See \"Rehab Therapy-Acute\" Patient Summary Report for complete documentation.   "

## 2019-04-18 NOTE — PROGRESS NOTES
Lost IV site on left AC. IV site on right hand infiltrated. New IV inserted left hand. Both arms are pitifully bruised. Hard to locate usable vein. Dr. Owens visited with patient and I explained above issue. Will place order for Mid-Line. WCTM

## 2019-04-18 NOTE — PROGRESS NOTES
Dr. Owens came to see patient. Not as lively as earlier. Vimpat had been given within last 30 min. Made a difference in pts alertness. Eyes open with voice, but drifts right back to sleep. WCTM

## 2019-04-18 NOTE — CARE PLAN
Problem: Fluid Volume:  Goal: Will maintain balanced intake and output  Outcome: PROGRESSING AS EXPECTED  Pt became fluid overloaded during the day today and required cessation of peripheral IV fluids and diuretic therapy. Pt currently receiving her tube feeding at the goal rate, no IV fluids are running at this time. The pt does have a Greene catheter in order to record strict intake and output. Pt's has good urine output at this time. Will continue to monitor and record the pt's intake and output.    Problem: Bowel/Gastric:  Goal: Normal bowel function is maintained or improved  Outcome: PROGRESSING AS EXPECTED  Pt continues to have frequent loose stools as a result of lactulose therapy. Stool is thin in nature also as a result of tube feed enteral nutrition. Expecting continued frequent and loose stools for as long as she continues to receive lactulose BID.

## 2019-04-18 NOTE — PROGRESS NOTES
Patient just had huge BM. Spoke with Dr. Owens, gave telephone order to discontinue Lactulose. WCTM

## 2019-04-18 NOTE — PROGRESS NOTES
[facility] Medicine Daily Progress Note    Date of Service  4/18/2019    Chief Complaint  68 y.o. female admitted 4/4/2019 with altered mental status    Hospital Course    Ms. Yarbrough is a 68-year-old female with past medical history of  diabetes, COPD, and coronary artery disease that presented to the emergency room with confusion and expressive aphasia.  Per the  patient had been declining over several months which included not taking her medications or eating.  She was started on empiric antibiotics including vancomycin and Rocephin and acyclovir.  Her mental status continued to deteriorate and she began having fevers.  ID was consulted and ampicillin was added on and vancomycin was discontinued.  CT head an MRI of brain did not reveal potential cause of her encephalopathy.  She began developing signs and symptoms of sepsis and was transferred to ICU and required intubation and pressors on 4/6.  Her lumbar puncture done on 4/6 was not impressive for infection and her HSV PCR was  so her acyclovir, ampicillin and ceftriaxone were discontinued.  While intubated patient was receiving tube feeds and had an episode of emesis and was started on Zosyn for possible aspiration pneumonia which she completed a 5-day course of.  She was given thiamine, multivitamins, folate and vitamin B12 for possible metabolic etiology of her encephalopathy.  EEG was done which showed nonspecific focal cortical area irritability over frontal right greater than left side and patient was started on Keppra empirically.  She was extubated on 4/8.  Due to continued encephalopathy, patient was only be oriented to self and not following commands EEG was repeated.  Her EEG continued to show focal cortical irritability over her frontal right greater than left side so she was also started on Vimpat.  Rheumatologic cause was considered which included ESR, CALEB, dsDNA, RF which were all negative except for an elevated CRP and positive dsDNA but  negative antibody titer.  On 4/13 patient again underwent a 24-hour video EEG which continued to show moderate toxic/metabolic encephalopathy with frontal waves.  There was concern for possible SBO for which her tube feeds were held.        Interval Problem Update  On room air today  Her mentation is continuing to improve  Moving her extremities and saying her name per report. Upon my exam she is lethargic but grimaces to pain now. Apparently got vimpat 30 mins prior my exam which may be contributing to her lethargy  I discussed case with Dr. Melton and will d/c depakote     Consultants/Specialty  Critical care.  Neurology.    Code Status  Full code    Disposition  TBD    Review of Systems  Review of Systems   Unable to perform ROS: Mental acuity   Gastrointestinal: Positive for diarrhea.        Physical Exam  Temp:  [36.2 °C (97.1 °F)-36.7 °C (98.1 °F)] 36.4 °C (97.6 °F)  Pulse:  [81-90] 85  Resp:  [16] 16  BP: (118-156)/(69-89) 130/71  SpO2:  [92 %-98 %] 97 %    Physical Exam   Constitutional: She appears lethargic. No distress.   HENT:   Dry mucous membranes     Eyes: Right eye exhibits no discharge. Left eye exhibits no discharge.   Cardiovascular: Normal rate and regular rhythm.    No murmur heard.  Pulmonary/Chest: No respiratory distress. She has no wheezes.   Abdominal: Soft. She exhibits distension. There is no tenderness.   Musculoskeletal: She exhibits no edema.   Neurological: She appears lethargic.   Lethargic, grimacing to pain now  Still not following commands  Opening eyes more   Skin: Skin is warm and dry. No rash noted. She is not diaphoretic.   Nursing note and vitals reviewed.      Fluids    Intake/Output Summary (Last 24 hours) at 04/18/19 1428  Last data filed at 04/18/19 1301   Gross per 24 hour   Intake             3563 ml   Output             4000 ml   Net             -437 ml       Laboratory  Recent Labs      04/17/19   0335   WBC  5.2   RBC  4.50   HEMOGLOBIN  12.2   HEMATOCRIT  38.2   MCV   84.9   MCH  27.1   MCHC  31.9*   RDW  45.6   PLATELETCT  476*   MPV  10.2     Recent Labs      04/17/19   0335  04/17/19   1137  04/17/19   2342   SODIUM  139   --   142   POTASSIUM  3.9  4.1  3.0*  3.0*   CHLORIDE  107   --   102   CO2  23   --   30   GLUCOSE  294*   --   362*   BUN  22   --   27*   CREATININE  0.38*   --   0.57   CALCIUM  8.6   --   8.5                   Imaging  DX-CHEST-LIMITED (1 VIEW)   Final Result      Diffuse interstitial edema, new since prior study. No focal consolidation or pleural effusions.      CT-ABDOMEN-PELVIS WITH   Final Result      Relatively diffuse colonic wall thickening consistent with colitis possibly due to infection or ischemia.      Coarse calcified plaque in the abdominal aorta and branch vessels without aneurysm.      Fatty infiltration of the liver.      DX-ABDOMEN FOR TUBE PLACEMENT   Final Result      Feeding tube extends below the diaphragm with tip at the level of the gastric body.      CT-STEALTH HEAD W/O   Final Result      1.  No evidence of acute territorial infarct, intracranial hemorrhage or mass lesion.   2.  Mild diffuse cerebral substance loss.   3.  Mild microangiopathic ischemic change versus demyelination or gliosis.      [id number] VIEW   Final Result      1.  Increased dilatation of air-filled colonic loops is noted as described above compared to the prior radiograph. Findings could be due to a colonic ileus versus partial obstruction in the region of the splenic flexure.      2.  No free air is identified.                  DX-ABDOMEN FOR TUBE PLACEMENT   Final Result      1.  Feeding tube tip overlies the region of the gastric antrum or 1st portion duodenum.   2.  There is a mild ileus.      DX-CHEST-PORTABLE (1 VIEW)   Final Result         1. Interval extubation. No other significant interval change.      DX-CHEST-PORTABLE (1 VIEW)   Final Result         1. No significant interval change.      US-ABDOMEN COMPLETE SURVEY   Final Result      1.   Mildly distended gallbladder with sludge present.   2.  No other evidence for acute cholecystitis.   3.  No significant biliary dilation.   4.  Limited evaluation of the bladder.         GI-[id number]-0 VIEW   Final Result      1.  Feeding tube tip overlies the gastric antrum.      2.  Mild ileus.      DX-CHEST-PORTABLE (1 VIEW)   Final Result         1.  No acute cardiopulmonary disease.   2.  Atherosclerosis         EC-ECHOCARDIOGRAM COMPLETE W/O CONT   Final Result      DX-CHEST-PORTABLE (1 VIEW)   Final Result      1.  Supportive tubing as described above.   2.  No pneumothorax.   3.  Persistent hypoinflation.      MR-BRAIN-W/O   Final Result      No evidence of intracranial hemorrhage, acute ischemia or mass on this severely motion degraded study      DX-CHEST-LIMITED (1 VIEW)   Final Result         1.  No acute cardiopulmonary disease.   2.  Atherosclerosis   3.  No radiodense foreign body or medical device identified which would exclude MRI.      CT-CTA NECK WITH & W/O-POST PROCESSING   Final Result         1.  Occlusion of the left subclavian artery to the level of the vertebral artery with reconstitution, appearance concerning for subclavian steal.   2.  Intimal thickening versus soft plaque and scattered atherosclerotic calcification of the left carotid arterial tree with less than 50% stenosis.   3.  Small caliber right internal carotid artery   4.  Extensive emphysema      These findings were discussed with the patient's clinician, Dr. Mcallister, on 4/6/2019 4:31 AM.      CT-CTA HEAD WITH & W/O-POST PROCESS   Final Result         1.  Hypoplastic right internal carotid artery, there is a segment of occlusion seen in the distal right intracranial internal carotid artery near the petrous apex. The intracranial internal carotid artery reconstitutes distal to this segment of    occlusion.   2.  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   3.  Bilateral persistent  trigeminal arteries contribute to the posterior cerebral arteries.   4.  Changes of atrophy with nonspecific white matter changes, most commonly associated with small vessel ischemia.      These findings were discussed with the patient's clinician, Dr. Mcallister, on 4/6/2019 4:31 AM.      DX-ABDOMEN FOR TUBE PLACEMENT   Final Result         1.  Nonspecific bowel gas pattern.   2.  Dobbhoff tube tip overlying the expected location of the pylorus or first duodenal segment.      US-CAROTID DOPPLER BILAT   Final Result      [id number] VIEW   Final Result         1.  Moderate stool in the colon suggests changes of constipation, otherwise nonspecific bowel gas pattern      DX-CHEST-PORTABLE (1 VIEW)   Final Result         1.  No acute cardiopulmonary disease.   2.  Atherosclerosis   3.  No radiodense foreign body or medical device identified which would exclude MRI.      CT-HEAD W/O   Final Result         1. No acute intracranial abnormality. No evidence of acute intracranial hemorrhage or mass lesion.                    Assessment/Plan  * Altered mental status- (present on admission)   Assessment & Plan    Persistent; concern for meningoencephalitis vs seziures  Now afebrile, Resolved leukocytosis, Off pressors  LP-slight elevation protein, normal glucose, only 1 WBC  Meningitis panel-negative  HSV PCR-negative  DC'd vancomycin/amp/ceftraixone/acyclovir as CSF not c/w bacterial or viral meningitis  She has had EEG x 3, all showing some frontal cortical dysfunction. I discussed with Dr. Melton. He does not think this is seizures. She has been on anti epileptics for over 1 week now with no resolution of AMS  Immunologic workup: RF negative, CRP elevated, CALEB negative, ds DNA positive but negative antibody titer  Ammonia 48, lactulose, however she is already having loose stools  Blood cxs neg  CT scan did show small aneurysm  MRI did not reveal any stroke or evidence of vasculitis  Cont thiamine and B12  Anti TPO antibodies  and encephalitis panel per neuro to rule out hashimoto's encephalitis  Neurological status is continuing to improve slowly.  Continue steroids until 4/20  I have discussed with  and will DC Depakote at this time as patient does not seem to have a seizure disorder which was contributing to her symptoms and is likely causing her lethargy at this time  IVIG is still a consideration after completion of steroids.                 Acute respiratory failure with hypoxia (HCC)   Assessment & Plan    Requiring mechanical ventilation  She was successfully extubated on 4/8  Completed a 5-day course of Zosyn for aspiration PNA    On room air as of 4/14 but on 4/17 morning she had sudden increased work of breathing and was hypoxic. Rapid response was called and I evaluated patient at bedside. She was tachypneic and appeared to be in distress. I ordered stat CXR which I reviewed and per my read showed new pulmonary edema. I discontinued her IVF and gave her IV lasix 60 mg STAT. I have turned off her TF with concern of possible aspiration. I will hold off on further antibiotics for now. Patient is critically ill. If her respiratory status continues to decline she may require transfer to ICU for intubation.        Sepsis ([facility])   Assessment & Plan    This is severe sepsis with the following associated acute organ dysfunction(s): acute kidney failure, acute respiratory failure, metabolic/septic encephalopathy.   Source of infection is unclear  LP neg for infection  Off abx  Sepsis appears to be resolved       Systolic and diastolic CHF, chronic (HCC)   Assessment & Plan    Not in exacerbation  carvedilol and lisinopril     Coronary artery disease involving native coronary artery of native heart without angina pectoris- (present on admission)   Assessment & Plan    Continue aspirin and Lipitor     Chronic obstructive pulmonary disease (HCC)- (present on admission)   Assessment & Plan    History of; she does not have evidence of  acute exacerbation   DuoNeb as needed  RT protocol     Controlled type 2 diabetes mellitus without complication, without long-term current use of insulin (HCC)- (present on admission)   Assessment & Plan    glycohemoglobin 6.6  She was on metformin outpatient.  Blood sugar fairly controlled, will DC sliding scale 4/14       Vitamin B12 deficiency   Assessment & Plan    Continue VIt b12 injections to Q7 days as her levels were 190 when last checked. Repeat B12 levels next week     Leukocytosis   Assessment & Plan    no obvious source of infection.  Continue monitoring     Loose stools   Assessment & Plan    C. difficile negative  pn KUB patient did have ileus vs partial SBO  D/t colitis noted on CT abdomen. She has already completed 5 days of broad spectrum antibiotics.      Abdominal distention   Assessment & Plan    Repeat KUB: 1.  Increased dilatation of air-filled colonic loops is noted as described above compared to the prior radiograph. Findings could be due to a colonic ileus versus partial obstruction in the region of the splenic flexure.  CT of abdomen with contrast consistent with colitis  Still having diarrhea  c diff negative on 4/12     Hypophosphatemia   Assessment & Plan    resolved     Hypomagnesemia   Assessment & Plan    Improving, monitor     Hypokalemia   Assessment & Plan    Persistently low despite continued IV replacement  Continue to monitor closely  Replace daily  I have added potassium to her IVF       Pharyngoesophageal dysphagia   Assessment & Plan    Cortrak feeding held due to possible ileus vs partial SBO     Dyslipidemia- (present on admission)   Assessment & Plan    Continue Lipitor         VTE prophylaxis: heparin    Time spend: 32 minutes. > 50 % time spend providing counseling / co ordination of care.

## 2019-04-19 NOTE — PROGRESS NOTES
[facility] Medicine Daily Progress Note    Date of Service  4/19/2019    Chief Complaint  68 y.o. female admitted 4/4/2019 with altered mental status    Hospital Course    Ms. Yarbrough is a 68-year-old female with past medical history of  diabetes, COPD, and coronary artery disease that presented to the emergency room with confusion and expressive aphasia.  Per the  patient had been declining over several months which included not taking her medications or eating.  She was started on empiric antibiotics including vancomycin and Rocephin and acyclovir.  Her mental status continued to deteriorate and she began having fevers.  ID was consulted and ampicillin was added on and vancomycin was discontinued.  CT head an MRI of brain did not reveal potential cause of her encephalopathy.  She began developing signs and symptoms of sepsis and was transferred to ICU and required intubation and pressors on 4/6.  Her lumbar puncture done on 4/6 was not impressive for infection and her HSV PCR was  so her acyclovir, ampicillin and ceftriaxone were discontinued.  While intubated patient was receiving tube feeds and had an episode of emesis and was started on Zosyn for possible aspiration pneumonia which she completed a 5-day course of.  She was given thiamine, multivitamins, folate and vitamin B12 for possible metabolic etiology of her encephalopathy.  EEG was done which showed nonspecific focal cortical area irritability over frontal right greater than left side and patient was started on Keppra empirically.  She was extubated on 4/8.  Due to continued encephalopathy, patient was only be oriented to self and not following commands EEG was repeated.  Her EEG continued to show focal cortical irritability over her frontal right greater than left side so she was also started on Vimpat.  Rheumatologic cause was considered which included ESR, CALEB, dsDNA, RF which were all negative except for an elevated CRP and positive dsDNA but  negative antibody titer.  On 4/13 patient again underwent a 24-hour video EEG which continued to show moderate toxic/metabolic encephalopathy with frontal waves.  There was concern for possible SBO for which her tube feeds were held.        Interval Problem Update  Her mentation is unchanged from yesterday from my exam. Upon discussion with  when he saw pt yesterday she was able to follow simple commands (raise 2 fingers when asked to do so, answer how many quarters in a dollar) and no longer doing this today   Plan for IVIG tomorrow if no improvement  Her BS remain uncontrolled. Likely d/t steroids. I will increase her ISS    Consultants/Specialty  Critical care.  Neurology.    Code Status  Full code    Disposition  TBD    Review of Systems  Review of Systems   Unable to perform ROS: Mental acuity        Physical Exam  Temp:  [36.1 °C (97 °F)-36.9 °C (98.4 °F)] 36.6 °C (97.8 °F)  Pulse:  [82-91] 91  Resp:  [16-18] 16  BP: (128-141)/(77-84) 131/77  SpO2:  [94 %] 94 %    Physical Exam   Constitutional: She appears lethargic. No distress.   HENT:   Dry mucous membranes     Eyes: Right eye exhibits no discharge. Left eye exhibits no discharge.   Cardiovascular: Normal rate and regular rhythm.    No murmur heard.  Pulmonary/Chest: No respiratory distress. She has no wheezes.   Abdominal: Soft. She exhibits distension. There is no tenderness.   Musculoskeletal: She exhibits no edema.   Neurological: She appears lethargic.   Lethargic, grimacing to pain now  Still not following commands  Opening eyes more   Skin: Skin is warm and dry. No rash noted. She is not diaphoretic.   Nursing note and vitals reviewed.      Fluids    Intake/Output Summary (Last 24 hours) at 04/19/19 2002  Last data filed at 04/19/19 1800   Gross per 24 hour   Intake             1900 ml   Output             1450 ml   Net              450 ml       Laboratory  Recent Labs      04/17/19   0335  04/19/19   1438   WBC  5.2  16.1*   RBC  4.50  4.35    HEMOGLOBIN  12.2  12.0   HEMATOCRIT  38.2  37.0   MCV  84.9  85.1   MCH  27.1  27.6   MCHC  31.9*  32.4*   RDW  45.6  45.6   PLATELETCT  476*  454*   MPV  10.2  9.7     Recent Labs      04/17/19   0335  04/17/19   1137  04/17/19   2342  04/19/19   1438   SODIUM  139   --   142  138   POTASSIUM  3.9  4.1  3.0*  3.0*  3.8   CHLORIDE  107   --   102  97   CO2  23   --   30  33   GLUCOSE  294*   --   362*  373*   BUN  22   --   27*  32*   CREATININE  0.38*   --   0.57  0.45*   CALCIUM  8.6   --   8.5  8.9                   Imaging  DX-CHEST-LIMITED (1 VIEW)   Final Result      Diffuse interstitial edema, new since prior study. No focal consolidation or pleural effusions.      CT-ABDOMEN-PELVIS WITH   Final Result      Relatively diffuse colonic wall thickening consistent with colitis possibly due to infection or ischemia.      Coarse calcified plaque in the abdominal aorta and branch vessels without aneurysm.      Fatty infiltration of the liver.      DX-ABDOMEN FOR TUBE PLACEMENT   Final Result      Feeding tube extends below the diaphragm with tip at the level of the gastric body.      CT-STEALTH HEAD W/O   Final Result      1.  No evidence of acute territorial infarct, intracranial hemorrhage or mass lesion.   2.  Mild diffuse cerebral substance loss.   3.  Mild microangiopathic ischemic change versus demyelination or gliosis.      [id number] VIEW   Final Result      1.  Increased dilatation of air-filled colonic loops is noted as described above compared to the prior radiograph. Findings could be due to a colonic ileus versus partial obstruction in the region of the splenic flexure.      2.  No free air is identified.                  DX-ABDOMEN FOR TUBE PLACEMENT   Final Result      1.  Feeding tube tip overlies the region of the gastric antrum or 1st portion duodenum.   2.  There is a mild ileus.      DX-CHEST-PORTABLE (1 VIEW)   Final Result         1. Interval extubation. No other significant interval  change.      DX-CHEST-PORTABLE (1 VIEW)   Final Result         1. No significant interval change.      US-ABDOMEN COMPLETE SURVEY   Final Result      1.  Mildly distended gallbladder with sludge present.   2.  No other evidence for acute cholecystitis.   3.  No significant biliary dilation.   4.  Limited evaluation of the bladder.         [id number] VIEW   Final Result      1.  Feeding tube tip overlies the gastric antrum.      2.  Mild ileus.      DX-CHEST-PORTABLE (1 VIEW)   Final Result         1.  No acute cardiopulmonary disease.   2.  Atherosclerosis         EC-ECHOCARDIOGRAM COMPLETE W/O CONT   Final Result      DX-CHEST-PORTABLE (1 VIEW)   Final Result      1.  Supportive tubing as described above.   2.  No pneumothorax.   3.  Persistent hypoinflation.      MR-BRAIN-W/O   Final Result      No evidence of intracranial hemorrhage, acute ischemia or mass on this severely motion degraded study      DX-CHEST-LIMITED (1 VIEW)   Final Result         1.  No acute cardiopulmonary disease.   2.  Atherosclerosis   3.  No radiodense foreign body or medical device identified which would exclude MRI.      CT-CTA NECK WITH & W/O-POST PROCESSING   Final Result         1.  Occlusion of the left subclavian artery to the level of the vertebral artery with reconstitution, appearance concerning for subclavian steal.   2.  Intimal thickening versus soft plaque and scattered atherosclerotic calcification of the left carotid arterial tree with less than 50% stenosis.   3.  Small caliber right internal carotid artery   4.  Extensive emphysema      These findings were discussed with the patient's clinician, Dr. Mcallister, on 4/6/2019 4:31 AM.      CT-CTA HEAD WITH & W/O-POST PROCESS   Final Result         1.  Hypoplastic right internal carotid artery, there is a segment of occlusion seen in the distal right intracranial internal carotid artery near the petrous apex. The intracranial internal carotid artery reconstitutes distal to  this segment of    occlusion.   2.  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   3.  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   4.  Changes of atrophy with nonspecific white matter changes, most commonly associated with small vessel ischemia.      These findings were discussed with the patient's clinician, Dr. Mcallister, on 4/6/2019 4:31 AM.      DX-ABDOMEN FOR TUBE PLACEMENT   Final Result         1.  Nonspecific bowel gas pattern.   2.  Dobbhoff tube tip overlying the expected location of the pylorus or first duodenal segment.      US-CAROTID DOPPLER BILAT   Final Result      [id number] VIEW   Final Result         1.  Moderate stool in the colon suggests changes of constipation, otherwise nonspecific bowel gas pattern      DX-CHEST-PORTABLE (1 VIEW)   Final Result         1.  No acute cardiopulmonary disease.   2.  Atherosclerosis   3.  No radiodense foreign body or medical device identified which would exclude MRI.      CT-HEAD W/O   Final Result         1. No acute intracranial abnormality. No evidence of acute intracranial hemorrhage or mass lesion.                    Assessment/Plan  * Altered mental status- (present on admission)   Assessment & Plan    Persistent; concern for meningoencephalitis vs seziures  Now afebrile, Resolved leukocytosis, Off pressors  LP-slight elevation protein, normal glucose, only 1 WBC  Meningitis panel-negative  HSV PCR-negative  DC'd vancomycin/amp/ceftraixone/acyclovir as CSF not c/w bacterial or viral meningitis  She has had EEG x 3, all showing some frontal cortical dysfunction. Dr. Melton. He does not think this is seizures. She has been on anti epileptics for over 1 week now with no resolution of AMS  Immunologic workup: RF negative, CRP elevated, CALEB negative, ds DNA positive but negative antibody titer  Ammonia 48, lactulose, however she is already having loose stools  Blood cxs  neg  CT scan did show small aneurysm  MRI did not reveal any stroke or evidence of vasculitis  Cont thiamine and B12  Anti TPO antibodies and encephalitis panel per neuro to rule out hashimoto's encephalitis  Neurological status is continuing to improve slowly.  Continue steroids until 4/20  depakote discontinued. Still on vimpat  I discussed with Dr. Melton and plan for IVIG tomorrow if no improvement               Acute respiratory failure with hypoxia (HCC)   Assessment & Plan    Requiring mechanical ventilation  She was successfully extubated on 4/8  Completed a 5-day course of Zosyn for aspiration PNA  4/17 rapid response for worsening hypoxia. Had pulmonary edema, no resovled       Sepsis (HCC)   Assessment & Plan    This is severe sepsis with the following associated acute organ dysfunction(s): acute kidney failure, acute respiratory failure, metabolic/septic encephalopathy.   Source of infection is unclear  LP neg for infection  Off abx  Sepsis appears to be resolved       Systolic and diastolic CHF, chronic (HCC)   Assessment & Plan    Not in exacerbation  carvedilol and lisinopril     Coronary artery disease involving native coronary artery of native heart without angina pectoris- (present on admission)   Assessment & Plan    Continue aspirin and Lipitor     Chronic obstructive pulmonary disease (HCC)- (present on admission)   Assessment & Plan    History of; she does not have evidence of acute exacerbation   DuoNeb as needed  RT protocol     Controlled type 2 diabetes mellitus without complication, without long-term current use of insulin ([facility])- (present on admission)   Assessment & Plan    glycohemoglobin 6.6  She was on metformin outpatient.  Blood sugar fairly controlled, will DC sliding scale 4/14       Vitamin B12 deficiency   Assessment & Plan    Continue VIt b12 injections to Q7 days as her levels were 190 when last checked. Repeat B12 levels next week     Leukocytosis   Assessment & Plan    no  obvious source of infection.  Continue monitoring     Loose stools   Assessment & Plan    C. difficile negative  pn KUB patient did have ileus vs partial SBO  D/t colitis noted on CT abdomen. She has already completed 5 days of broad spectrum antibiotics.      Abdominal distention   Assessment & Plan    Repeat KUB: 1.  Increased dilatation of air-filled colonic loops is noted as described above compared to the prior radiograph. Findings could be due to a colonic ileus versus partial obstruction in the region of the splenic flexure.  CT of abdomen with contrast consistent with colitis  c diff negative on 4/12  Diarrhea improved with cessation of lactulose     Hypophosphatemia   Assessment & Plan    resolved     Hypomagnesemia   Assessment & Plan    Improving, monitor     Hypokalemia   Assessment & Plan    Persistently low despite continued IV replacement  Continue to monitor closely  Replace daily  I have added potassium to her IVF       Pharyngoesophageal dysphagia   Assessment & Plan    Cortrak feeding held due to possible ileus vs partial SBO     Dyslipidemia- (present on admission)   Assessment & Plan    Continue Lipitor         VTE prophylaxis: heparin

## 2019-04-19 NOTE — PROGRESS NOTES
· 2 RN skin check complete with AGNIESZKA Palma.  · Devices in place Cortrak right nare, pulse ox tubing, Greene. Skin assessed under devices intact. Perineal care provided around Greene, small, tan drainage.  · Right upper arm scab with adhesive foam dressing in place. Patient's bilateral ears redness, blanching. Patient has redness under bilateral breasts and under bilateral heels, blanching. Patient's trunk neck/upper trunk redness, blanching. Patient's sacrum small region of redness, blanching.   · The following interventions in place: SCDs placed back on and turned on, Q2 turns, waffle overlay in use, perineal and Greene care, pillows in use to float heels, assessing line placement during each turn, HOB>30 degrees.

## 2019-04-19 NOTE — CARE PLAN
Problem: Safety  Goal: Will remain free from injury  Outcome: PROGRESSING AS EXPECTED  Bed alarm in place, bed locked    Problem: Bowel/Gastric:  Goal: Will not experience complications related to bowel motility  Outcome: PROGRESSING AS EXPECTED  Pt has been having regular bowel movements    Problem: Knowledge Deficit  Goal: Knowledge of the prescribed therapeutic regimen will improve  Outcome: PROGRESSING AS EXPECTED

## 2019-04-19 NOTE — PROGRESS NOTES
2 RN skin check complete.  Cortrak right henderson.  Right upper arm scab with biatin dressing covering.   Redness under bilateral breasts  Waffle overlay in place.  Q2H turns.   Sacrum is pink and blanching.

## 2019-04-19 NOTE — PROGRESS NOTES
0815: Spoke with Dr. Melton, neurologist, regarding neuro findings this morning. Patient is lethargic, opens eyes to some verbal stimuli and sternal rub. Unable to assess orientation. Patient unable to follow commands at this time and nonverbal. Pupils are responsive. Per Dr. Melton no new imaging needed at this time.     0945: Discussed patient with Dr. Owens during morning rounds. Updated MD regarding small tan Greene discharge, potential for Greene removal, and lethargy this morning. Per Dr. Owens to leave Greene in place for now and no new orders at this time.     1240: Per charge RNDanielle spoke with Dr. Owens regarding indication for Greene. Per Dr. Graciela gibson to d/c Greene. Removed Greene at this time and perineal care provided.

## 2019-04-19 NOTE — DIETARY
Nutrition support weekly update:  Day 13 of admit.  Alice Yarbrough is a 68 y.o. female with admitting DX of altered mental status.  Tube feeding initiated on 4/6. Current TF via gastric Cortrak is Diabetisource AC @ 60 mL/hr, providing 1728 kcal, 86 gm protein, and 1181 mL of free water per day.     Assessment:  Weight from 4/13 was 81.5 kg via bed scale.  No new recent wt.  Please obtain new measured wt as feasible in order to assess wt trends and nutritional status.     Evaluation:   1. Pt has been tolerating TF @ goal - TF restarted again on 4/17.   2. SLP last evaluated pt 4/5 and attempted to see pt on 4/11 however not indicated at that time given possible ileus.    3. No staged wounds per Wound team note from 4/16.   4. Labs: Potassium: 3.0, Glucose: 362, BUN: 27.  5. Meds: Vitamin B12, SSI, Solu-medrol, Thiamine, Vitamin D.  6. Last BM: 4/18.   7. Current feeding remains appropriate at this time.     Malnutrition risk: No risk identified at this time.     Recommendations/Plan:  1. Continue current TF formula and goal rate.   2. Fluids per MD.  3. Pt may benefit from Culturelle.  4. Monitor weight - please obtain new measured wt as feasible.  5. Safest PO diet per SLP as appropriate.    RD following.

## 2019-04-19 NOTE — PROGRESS NOTES
Pt laying in bed, call light within reach, bed lowered and locked, fall education reinforced. Unable to evaluate pt orientation and alertness but pt does respond to stimuli. Lung sounds present with fine crackles in all lobes, bowel sounds are normoactive in all quadrants,heart sounds are within defined limits. Pt has a cortrak in the right henderson running diabetisource at 60 mL which is goal. Pt has a silver catheter in place that is outputting a moderate amount of yellow urine. Q2H turns in place. Plan is for steroids until the 20th. Pt IV is clean,dry,intact,and patent and saline locked.

## 2019-04-20 NOTE — PROGRESS NOTES
· 2 RN skin check complete with AGNIESZKA Spears.  · Devices in place Cortrak right nare, pulse ox tubing. Skin assessed under devices intact.   · Right upper arm scab with adhesive foam dressing in place. Patient's bilateral ears redness, blanching. Patient has redness under bilateral breasts and under bilateral heels, blanching. Patient's trunk neck/upper trunk redness, blanching. Patient's sacrum small region of redness, blanching. Rash to ivania region, nystatin applied per orders and barrier cream applied after episodes of incontinence.  · The following interventions in place: SCDs on, Q2 turns, waffle overlay in use, perineal care after incontinence, pillows in use to float heels, pillows in use for support/repositioning, assessing line placement during each turn, HOB>30 degrees.

## 2019-04-20 NOTE — CARE PLAN
Problem: Respiratory:  Goal: Respiratory status will improve  Outcome: PROGRESSING AS EXPECTED  Patient's oxygen saturation in 90%s on 0.5L of oxygen. Patient dips down during turns and when attempting to wean off oxygen at this time. Will continue to try and monitor.    Problem: Safety  Goal: Will remain free from injury  Outcome: PROGRESSING AS EXPECTED  Patient has bed alarm on, bed locked and in low position, call light within reach. Patient has  in use and SCDs in use. Patient has three side rails up and fall, aspiration, and seizure precautions in place. Skin breakdown precautions, Q2 turns, and hourly rounding in place too.     Problem: Venous Thromboembolism (VTW)/Deep Vein Thrombosis (DVT) Prevention:  Goal: Patient will participate in Venous Thrombosis (VTE)/Deep Vein Thrombosis (DVT)Prevention Measures  Outcome: PROGRESSING AS EXPECTED  Patient has unfractionated heparin and SCDs in use for DVT prophylaxis.     Problem: Skin Integrity  Goal: Risk for impaired skin integrity will decrease  Outcome: PROGRESSING AS EXPECTED  Greene removed today, patient incontinent of urine. Patient provided with perineal care and nystatin powder reapplied. Patient has rash to ivania/groin area. See skin note for full details. Q2 turns and skin breakdown precautions in place.

## 2019-04-20 NOTE — PROGRESS NOTES
[facility] Medicine Daily Progress Note    Date of Service  4/20/2019    Chief Complaint  68 y.o. female admitted 4/4/2019 with altered mental status    Hospital Course    Ms. Yarbrough is a 68-year-old female with past medical history of  diabetes, COPD, and coronary artery disease that presented to the emergency room with confusion and expressive aphasia.  Per the  patient had been declining over several months which included not taking her medications or eating.  She was started on empiric antibiotics including vancomycin and Rocephin and acyclovir.  Her mental status continued to deteriorate and she began having fevers.  ID was consulted and ampicillin was added on and vancomycin was discontinued.  CT head an MRI of brain did not reveal potential cause of her encephalopathy.  She began developing signs and symptoms of sepsis and was transferred to ICU and required intubation and pressors on 4/6.  Her lumbar puncture done on 4/6 was not impressive for infection and her HSV PCR was  so her acyclovir, ampicillin and ceftriaxone were discontinued.  While intubated patient was receiving tube feeds and had an episode of emesis and was started on Zosyn for possible aspiration pneumonia which she completed a 5-day course of.  She was given thiamine, multivitamins, folate and vitamin B12 for possible metabolic etiology of her encephalopathy.  EEG was done which showed nonspecific focal cortical area irritability over frontal right greater than left side and patient was started on Keppra empirically.  She was extubated on 4/8.  Due to continued encephalopathy, patient was only be oriented to self and not following commands EEG was repeated.  Her EEG continued to show focal cortical irritability over her frontal right greater than left side so she was also started on Vimpat.  Rheumatologic cause was considered which included ESR, CALEB, dsDNA, RF which were all negative except for an elevated CRP and positive dsDNA but  negative antibody titer.  On 4/13 patient again underwent a 24-hour video EEG which continued to show moderate toxic/metabolic encephalopathy with frontal waves.  There was concern for possible SBO for which her tube feeds were held.        Interval Problem Update  Not answering questions today, does respond to pain, protecting her airway  Discussed with neuro who will start ivig today  Ros otherwise unobtainable, strength equal throughout    Consultants/Specialty  Critical care.  Neurology.    Code Status  Full code    Disposition  TBD    Review of Systems  Review of Systems   Unable to perform ROS: Mental acuity        Physical Exam  Temp:  [36.4 °C (97.6 °F)-36.9 °C (98.4 °F)] 36.9 °C (98.4 °F)  Pulse:  [] 92  Resp:  [15-16] 16  BP: ()/(66-90) 148/84  SpO2:  [88 %-95 %] 94 %    Physical Exam   Constitutional: She appears well-developed and well-nourished. She appears lethargic. No distress.   HENT:   Head: Normocephalic and atraumatic.   Mouth/Throat: Oropharynx is clear and moist.   Dry mucous membranes     Eyes: Conjunctivae are normal. Right eye exhibits no discharge. Left eye exhibits no discharge.   Neck: Normal range of motion. Neck supple.   Cardiovascular: Normal rate, regular rhythm, normal heart sounds and intact distal pulses.  Exam reveals no gallop and no friction rub.    No murmur heard.  Pulmonary/Chest: Effort normal and breath sounds normal. No respiratory distress. She has no wheezes. She has no rales. She exhibits no tenderness.   Abdominal: Soft. Bowel sounds are normal. She exhibits no distension and no mass. There is no tenderness. There is no rebound and no guarding.   Musculoskeletal: Normal range of motion. She exhibits no edema or tenderness.   Neurological: She has normal reflexes. She appears lethargic. No cranial nerve deficit. She exhibits normal muscle tone. Coordination normal.   Opening eyes and responding to painful stimuli   Skin: Skin is warm and dry. No rash noted.  She is not diaphoretic. No erythema. No pallor.   Nursing note and vitals reviewed.      Fluids    Intake/Output Summary (Last 24 hours) at 04/20/19 1159  Last data filed at 04/20/19 0715   Gross per 24 hour   Intake              780 ml   Output             1100 ml   Net             -320 ml       Laboratory  Recent Labs      04/19/19   1438   WBC  16.1*   RBC  4.35   HEMOGLOBIN  12.0   HEMATOCRIT  37.0   MCV  85.1   MCH  27.6   MCHC  32.4*   RDW  45.6   PLATELETCT  454*   MPV  9.7     Recent Labs      04/17/19   2342  04/19/19   1438   SODIUM  142  138   POTASSIUM  3.0*  3.0*  3.8   CHLORIDE  102  97   CO2  30  33   GLUCOSE  362*  373*   BUN  27*  32*   CREATININE  0.57  0.45*   CALCIUM  8.5  8.9                   Imaging  DX-CHEST-LIMITED (1 VIEW)   Final Result      Diffuse interstitial edema, new since prior study. No focal consolidation or pleural effusions.      CT-ABDOMEN-PELVIS WITH   Final Result      Relatively diffuse colonic wall thickening consistent with colitis possibly due to infection or ischemia.      Coarse calcified plaque in the abdominal aorta and branch vessels without aneurysm.      Fatty infiltration of the liver.      DX-ABDOMEN FOR TUBE PLACEMENT   Final Result      Feeding tube extends below the diaphragm with tip at the level of the gastric body.      CT-STEALTH HEAD W/O   Final Result      1.  No evidence of acute territorial infarct, intracranial hemorrhage or mass lesion.   2.  Mild diffuse cerebral substance loss.   3.  Mild microangiopathic ischemic change versus demyelination or gliosis.      [id number] VIEW   Final Result      1.  Increased dilatation of air-filled colonic loops is noted as described above compared to the prior radiograph. Findings could be due to a colonic ileus versus partial obstruction in the region of the splenic flexure.      2.  No free air is identified.                  DX-ABDOMEN FOR TUBE PLACEMENT   Final Result      1.  Feeding tube tip overlies the  region of the gastric antrum or 1st portion duodenum.   2.  There is a mild ileus.      DX-CHEST-PORTABLE (1 VIEW)   Final Result         1. Interval extubation. No other significant interval change.      DX-CHEST-PORTABLE (1 VIEW)   Final Result         1. No significant interval change.      US-ABDOMEN COMPLETE SURVEY   Final Result      1.  Mildly distended gallbladder with sludge present.   2.  No other evidence for acute cholecystitis.   3.  No significant biliary dilation.   4.  Limited evaluation of the bladder.         [id number] VIEW   Final Result      1.  Feeding tube tip overlies the gastric antrum.      2.  Mild ileus.      DX-CHEST-PORTABLE (1 VIEW)   Final Result         1.  No acute cardiopulmonary disease.   2.  Atherosclerosis         EC-ECHOCARDIOGRAM COMPLETE W/O CONT   Final Result      DX-CHEST-PORTABLE (1 VIEW)   Final Result      1.  Supportive tubing as described above.   2.  No pneumothorax.   3.  Persistent hypoinflation.      MR-BRAIN-W/O   Final Result      No evidence of intracranial hemorrhage, acute ischemia or mass on this severely motion degraded study      DX-CHEST-LIMITED (1 VIEW)   Final Result         1.  No acute cardiopulmonary disease.   2.  Atherosclerosis   3.  No radiodense foreign body or medical device identified which would exclude MRI.      CT-CTA NECK WITH & W/O-POST PROCESSING   Final Result         1.  Occlusion of the left subclavian artery to the level of the vertebral artery with reconstitution, appearance concerning for subclavian steal.   2.  Intimal thickening versus soft plaque and scattered atherosclerotic calcification of the left carotid arterial tree with less than 50% stenosis.   3.  Small caliber right internal carotid artery   4.  Extensive emphysema      These findings were discussed with the patient's clinician, Dr. Mcallister, on 4/6/2019 4:31 AM.      CT-CTA HEAD WITH & W/O-POST PROCESS   Final Result         1.  Hypoplastic right internal carotid  artery, there is a segment of occlusion seen in the distal right intracranial internal carotid artery near the petrous apex. The intracranial internal carotid artery reconstitutes distal to this segment of    occlusion.   2.  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   3.  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   4.  Changes of atrophy with nonspecific white matter changes, most commonly associated with small vessel ischemia.      These findings were discussed with the patient's clinician, Dr. Mcallister, on 4/6/2019 4:31 AM.      DX-ABDOMEN FOR TUBE PLACEMENT   Final Result         1.  Nonspecific bowel gas pattern.   2.  Dobbhoff tube tip overlying the expected location of the pylorus or first duodenal segment.      US-CAROTID DOPPLER BILAT   Final Result      [id number] VIEW   Final Result         1.  Moderate stool in the colon suggests changes of constipation, otherwise nonspecific bowel gas pattern      DX-CHEST-PORTABLE (1 VIEW)   Final Result         1.  No acute cardiopulmonary disease.   2.  Atherosclerosis   3.  No radiodense foreign body or medical device identified which would exclude MRI.      CT-HEAD W/O   Final Result         1. No acute intracranial abnormality. No evidence of acute intracranial hemorrhage or mass lesion.                    Assessment/Plan  * Altered mental status- (present on admission)   Assessment & Plan    Persistent; concern for meningoencephalitis vs seziures  Now afebrile, Resolved leukocytosis, Off pressors  LP-slight elevation protein, normal glucose, only 1 WBC  Meningitis panel-negative  HSV PCR-negative  DC'd vancomycin/amp/ceftraixone/acyclovir as CSF not c/w bacterial or viral meningitis  She has had EEG x 3, all showing some frontal cortical dysfunction. Dr. Melton. He does not think this is seizures. She has been on anti epileptics for over 1 week now with no resolution of  AMS  Immunologic workup: RF negative, CRP elevated, CALEB negative, ds DNA positive but negative antibody titer  Ammonia 48, lactulose, however she is already having loose stools  Blood cxs neg  CT scan did show small aneurysm  MRI did not reveal any stroke or evidence of vasculitis  Cont thiamine and B12  Anti TPO antibodies and encephalitis panel per neuro to rule out hashimoto's encephalitis  Neurological status is continuing to improve slowly.  Continue steroids until 4/20  depakote discontinued. Still on vimpat  I discussed with Dr. Melton and plan for IVIG tomorrow if no improvement               Acute respiratory failure with hypoxia ([facility])   Assessment & Plan    Requiring mechanical ventilation  She was successfully extubated on 4/8  Completed a 5-day course of Zosyn for aspiration PNA  4/17 rapid response for worsening hypoxia. Had pulmonary edema, no resovled       Sepsis ([facility])   Assessment & Plan    This is severe sepsis with the following associated acute organ dysfunction(s): acute kidney failure, acute respiratory failure, metabolic/septic encephalopathy.   Source of infection is unclear  LP neg for infection  Off abx  Sepsis appears to be resolved       Systolic and diastolic CHF, chronic ([facility])   Assessment & Plan    Not in exacerbation  carvedilol and lisinopril     Coronary artery disease involving native coronary artery of native heart without angina pectoris- (present on admission)   Assessment & Plan    Continue aspirin and Lipitor     Chronic obstructive pulmonary disease ([facility])- (present on admission)   Assessment & Plan    History of; she does not have evidence of acute exacerbation   DuoNeb as needed  RT protocol     Controlled type 2 diabetes mellitus without complication, without long-term current use of insulin ([facility])- (present on admission)   Assessment & Plan    glycohemoglobin 6.6  She was on metformin outpatient.  Blood sugar fairly controlled, will DC sliding scale 4/14       Vitamin B12  deficiency   Assessment & Plan    Continue VIt b12 injections to Q7 days as her levels were 190 when last checked. Repeat B12 levels next week     Leukocytosis   Assessment & Plan    no obvious source of infection.  Continue monitoring     Loose stools   Assessment & Plan    C. difficile negative  pn KUB patient did have ileus vs partial SBO  D/t colitis noted on CT abdomen. She has already completed 5 days of broad spectrum antibiotics.      Abdominal distention   Assessment & Plan    Repeat KUB: 1.  Increased dilatation of air-filled colonic loops is noted as described above compared to the prior radiograph. Findings could be due to a colonic ileus versus partial obstruction in the region of the splenic flexure.  CT of abdomen with contrast consistent with colitis  c diff negative on 4/12  Diarrhea improved with cessation of lactulose     Hypophosphatemia   Assessment & Plan    resolved     Hypomagnesemia   Assessment & Plan    Improving, monitor     Hypokalemia   Assessment & Plan    Persistently low despite continued IV replacement  Continue to monitor closely  Replace daily  I have added potassium to her IVF       Pharyngoesophageal dysphagia   Assessment & Plan    Cortrak feeding held due to possible ileus vs partial SBO     Dyslipidemia- (present on admission)   Assessment & Plan    Continue Lipitor         VTE prophylaxis: heparin

## 2019-04-20 NOTE — PROGRESS NOTES
"NEUROLOGY CONSULT PROGRESS NOTE:    Requesting Physician: Haydee Rubalcava M.D.  Admission Date: 4/4/2019    Reason for Consult: Persisting encephalopathy, slow decline over several months.    Subjective: Pt remains profoundly lethargic and unable to stay awake.    Physical Exam:  BP (!) 99/66 Comment: R.N. notified  Pulse 80   Temp 36.4 °C (97.6 °F) (Temporal)   Resp 16   Ht 1.727 m (5' 8\") Comment: Per 's license in chart  Wt 81.4 kg (179 lb 7.3 oz)   SpO2 95%   Breastfeeding? No   BMI 27.29 kg/m²     1. GEN: eyes open but mute and unable to follow any commands.   2. EYES: Pupils 4mm->2mm bilaterally to light direct & consensual. No RADP, non-icteric sclerae, moist conjunctivae; no lid-lag.  3. NECK: Trachea midline, supple.   4. EXT: pedal pulses 2+ bilaterally, no digital cyanosis.   5. PSYCH: calm, very lethargic, mute.   6. Neurological Examination:   Face symmetric, eyes in the primary position, tracking but quickly closes the eyes due lethargy, not following any commands, feels pains in all limbs and briskly localizes and withdraws, good symmetric strengths in all limbs, 2/2 deep tendon reflexes throughout but absent in ankles.       Labs: personally reviewed    Recent Labs      04/17/19   1137  04/17/19   2342  04/19/19   1438   SODIUM   --   142  138   POTASSIUM  4.1  3.0*  3.0*  3.8   CHLORIDE   --   102  97   CO2   --   30  33   GLUCOSE   --   362*  373*   BUN   --   27*  32*     Radiology/Reports: independently reviewed by me (see below).    ASSESSMENT:  68 yr old w/ HTN, HL, DM, CAD w/ MI, COPD and chronic pain admitted on 4/4/19 for several months of gradual decline w/ diminished oral intake and medication and several days of gradual onset confusion that became profound to the level of muteness and inability to follow any commands. Initial CMP, CBC unremarkable and UA, urine tox, procalcitonin negative. She then showed signs of septic shock w/ fever of 103.1 on and requiring intubation and " "IV pressors to support her BP.  No true source of infection was found.  CSF studies and MRI brain w/o contrast on 4/6/19 reported unremarkable. On my review MRI was degraded by motions but no clear sign of PRES or stroke or mass effect. So far the primary team and Dr. Rowe ruled out autoimmune and infectious processes although anti-dsDNA returned positive its titers was low. No true seizure seems to have been detected on any of the multiple EEGs other than \"frontal sharps and triphasic waves.\" Dr. Rowe put her on Vimpat and Dapakote. Hashimoto's encephalitis antibodies negative. Thiamine not deficient. Limbic encephalitis labs ([facility] for paraneoplastic and non-paraneoplastic) ordered few days ago and pending.     After the first dose of IV solumedrol, she had a moment of lucidity where she was able to tell me her name, answer to questions w/ one words and follow all commands including 2 fingers in the L, answered correctly 4 quarters in a dollar but since then she fell back to her profound lethargy unable to do much or stay awake.     For now, it would be reasonable to empirically treat for limbic encephalitis with IVIG.     RECOMMENDATIONS:  - Ordered IVIG 400 mg daily x5.   - Neurology team will re-evaluate next week.     The above was discussed in details with the primary team including Dr. Haydee Rubalcava M.D.    Stacia Melton MD  Acute Neurology Consultant  Tiger Text ID \"Ab [email or web address]\"    "

## 2019-04-21 PROBLEM — E87.6 HYPOKALEMIA: Status: RESOLVED | Noted: 2019-01-01 | Resolved: 2019-01-01

## 2019-04-21 NOTE — CARE PLAN
Problem: Respiratory:  Goal: Respiratory status will improve  Outcome: PROGRESSING AS EXPECTED  Patient still has crackles in bilateral lung fields, however, patient decreased from 2L to 1.5L with oxygen saturation in 90%s at this time. Patient sat up >45 degrees during most of the shift. Congested, weak cough.    Problem: Safety  Goal: Will remain free from injury  Outcome: PROGRESSING AS EXPECTED  Patient has bed alarm on, bed locked and in low position, call light and belongings in reach. Patient has SCDs in use and heparin for DVT prophylaxis. Fall, aspiration, seizure, and skin precautions in place. Q2 turns and hourly rounding in place. Patient lethargic and arousable by sternal rub or by loud verbal stimulation. Patient unable to follow commands at this time.    Problem: Knowledge Deficit  Goal: Knowledge of disease process/condition, treatment plan, diagnostic tests, and medications will improve  Outcome: PROGRESSING SLOWER THAN EXPECTED  Patient lethargic and unable to follow commands throughout shift. Education provided regarding POC, medications, precautions, however, no learning evidence.

## 2019-04-21 NOTE — PROGRESS NOTES
Pt desaturating to 80s, attempted suction multiple times, increased O2 via NC from 1.5L to 3.5L, pt now saturating in 90s. Notified RT Day of pt's increased O2 demand at extension 3762.

## 2019-04-21 NOTE — CARE PLAN
Problem: Communication  Goal: The ability to communicate needs accurately and effectively will improve  Outcome: PROGRESSING SLOWER THAN EXPECTED  Pt very lethargic, unable to follow commands. Opens eyes and responds/withdraws to painful stimuli, unable to make needs known.    Problem: Safety  Goal: Will remain free from injury  Outcome: PROGRESSING AS EXPECTED  Bed locked and in lowest position, bed alarm on. Q2H turns and hourly rounding in place.

## 2019-04-21 NOTE — PROGRESS NOTES
Assumed care of pt at approximately 1900. Pt very lethargic, responsive to painful stimuli however unable to follow commands. 2 RN skin check complete, Q2H turns in place as pt is unable to reposition self. Pt currently resting comfortably. Bed locked and in lowest position, bed alarm on. Will continue to round hourly.

## 2019-04-21 NOTE — PROGRESS NOTES
Patient appears to be sleeping during bedside report and did not rouse to voice.  Low BP noted, O2 Sat 96 on 3LNC.  Diabetisource infusing at goal rate of 60ml/hr.  She was positioned for comfort, safety measures are in place, call light in reach.

## 2019-04-21 NOTE — PROGRESS NOTES
[facility] Medicine Daily Progress Note    Date of Service  4/21/2019    Chief Complaint  68 y.o. female admitted 4/4/2019 with altered mental status    Hospital Course    Ms. Yarbrough is a 68-year-old female with past medical history of  diabetes, COPD, and coronary artery disease that presented to the emergency room with confusion and expressive aphasia.  Per the  patient had been declining over several months which included not taking her medications or eating.  She was started on empiric antibiotics including vancomycin and Rocephin and acyclovir.  Her mental status continued to deteriorate and she began having fevers.  ID was consulted and ampicillin was added on and vancomycin was discontinued.  CT head an MRI of brain did not reveal potential cause of her encephalopathy.  She began developing signs and symptoms of sepsis and was transferred to ICU and required intubation and pressors on 4/6.  Her lumbar puncture done on 4/6 was not impressive for infection and her HSV PCR was  so her acyclovir, ampicillin and ceftriaxone were discontinued.  While intubated patient was receiving tube feeds and had an episode of emesis and was started on Zosyn for possible aspiration pneumonia which she completed a 5-day course of.  She was given thiamine, multivitamins, folate and vitamin B12 for possible metabolic etiology of her encephalopathy.  EEG was done which showed nonspecific focal cortical area irritability over frontal right greater than left side and patient was started on Keppra empirically.  She was extubated on 4/8.  Due to continued encephalopathy, patient was only be oriented to self and not following commands EEG was repeated.  Her EEG continued to show focal cortical irritability over her frontal right greater than left side so she was also started on Vimpat.  Rheumatologic cause was considered which included ESR, CALEB, dsDNA, RF which were all negative except for an elevated CRP and positive dsDNA but  negative antibody titer.  On 4/13 patient again underwent a 24-hour video EEG which continued to show moderate toxic/metabolic encephalopathy with frontal waves.  There was concern for possible SBO for which her tube feeds were held.        Interval Problem Update  Some improvement in responsiveness today, she did easily open her eyes to verbal stimuli and is protecting her face, not following commands  Remains on ivig   Blood pressures labile overnight, following      Consultants/Specialty  Critical care.  Neurology.    Code Status  Full code    Disposition  TBD    Review of Systems  Review of Systems   Unable to perform ROS: Mental acuity        Physical Exam  Temp:  [36.2 °C (97.2 °F)-37.2 °C (99 °F)] 37.2 °C (99 °F)  Pulse:  [] 82  Resp:  [16-20] 18  BP: ()/(49-97) 104/53  SpO2:  [87 %-95 %] 95 %    Physical Exam   Constitutional: She appears well-developed and well-nourished. She appears lethargic. No distress.   HENT:   Head: Normocephalic and atraumatic.   Mouth/Throat: Oropharynx is clear and moist.   Dry mucous membranes     Eyes: Conjunctivae are normal. Right eye exhibits no discharge. Left eye exhibits no discharge.   Neck: Normal range of motion. Neck supple.   Cardiovascular: Normal rate, regular rhythm, normal heart sounds and intact distal pulses.  Exam reveals no gallop and no friction rub.    No murmur heard.  Pulmonary/Chest: Effort normal and breath sounds normal. No respiratory distress. She has no wheezes. She has no rales. She exhibits no tenderness.   Abdominal: Soft. Bowel sounds are normal. She exhibits no distension and no mass. There is no tenderness. There is no rebound and no guarding.   Musculoskeletal: Normal range of motion. She exhibits no edema or tenderness.   Neurological: She has normal reflexes. She appears lethargic. No cranial nerve deficit. She exhibits normal muscle tone. Coordination normal.   See above  More alert today   Skin: Skin is warm and dry. No rash  noted. She is not diaphoretic. No erythema. No pallor.   Nursing note and vitals reviewed.      Fluids    Intake/Output Summary (Last 24 hours) at 04/21/19 0907  Last data filed at 04/21/19 0656   Gross per 24 hour   Intake             2140 ml   Output                0 ml   Net             2140 ml       Laboratory  Recent Labs      04/19/19   1438  04/21/19   0311   WBC  16.1*  16.0*   RBC  4.35  4.02*   HEMOGLOBIN  12.0  10.9*   HEMATOCRIT  37.0  34.1*   MCV  85.1  84.8   MCH  27.6  27.1   MCHC  32.4*  32.0*   RDW  45.6  45.8   PLATELETCT  454*  350   MPV  9.7  10.4     Recent Labs      04/19/19   1438  04/21/19   0311   SODIUM  138  136   POTASSIUM  3.8  3.6   CHLORIDE  97  96   CO2  33  32   GLUCOSE  373*  316*   BUN  32*  31*   CREATININE  0.45*  0.46*   CALCIUM  8.9  9.0                   Imaging  DX-CHEST-LIMITED (1 VIEW)   Final Result      Diffuse interstitial edema, new since prior study. No focal consolidation or pleural effusions.      CT-ABDOMEN-PELVIS WITH   Final Result      Relatively diffuse colonic wall thickening consistent with colitis possibly due to infection or ischemia.      Coarse calcified plaque in the abdominal aorta and branch vessels without aneurysm.      Fatty infiltration of the liver.      DX-ABDOMEN FOR TUBE PLACEMENT   Final Result      Feeding tube extends below the diaphragm with tip at the level of the gastric body.      CT-STEALTH HEAD W/O   Final Result      1.  No evidence of acute territorial infarct, intracranial hemorrhage or mass lesion.   2.  Mild diffuse cerebral substance loss.   3.  Mild microangiopathic ischemic change versus demyelination or gliosis.      [id number] VIEW   Final Result      1.  Increased dilatation of air-filled colonic loops is noted as described above compared to the prior radiograph. Findings could be due to a colonic ileus versus partial obstruction in the region of the splenic flexure.      2.  No free air is identified.                   DX-ABDOMEN FOR TUBE PLACEMENT   Final Result      1.  Feeding tube tip overlies the region of the gastric antrum or 1st portion duodenum.   2.  There is a mild ileus.      DX-CHEST-PORTABLE (1 VIEW)   Final Result         1. Interval extubation. No other significant interval change.      DX-CHEST-PORTABLE (1 VIEW)   Final Result         1. No significant interval change.      US-ABDOMEN COMPLETE SURVEY   Final Result      1.  Mildly distended gallbladder with sludge present.   2.  No other evidence for acute cholecystitis.   3.  No significant biliary dilation.   4.  Limited evaluation of the bladder.         OP-[id number]-1 VIEW   Final Result      1.  Feeding tube tip overlies the gastric antrum.      2.  Mild ileus.      DX-CHEST-PORTABLE (1 VIEW)   Final Result         1.  No acute cardiopulmonary disease.   2.  Atherosclerosis         EC-ECHOCARDIOGRAM COMPLETE W/O CONT   Final Result      DX-CHEST-PORTABLE (1 VIEW)   Final Result      1.  Supportive tubing as described above.   2.  No pneumothorax.   3.  Persistent hypoinflation.      MR-BRAIN-W/O   Final Result      No evidence of intracranial hemorrhage, acute ischemia or mass on this severely motion degraded study      DX-CHEST-LIMITED (1 VIEW)   Final Result         1.  No acute cardiopulmonary disease.   2.  Atherosclerosis   3.  No radiodense foreign body or medical device identified which would exclude MRI.      CT-CTA NECK WITH & W/O-POST PROCESSING   Final Result         1.  Occlusion of the left subclavian artery to the level of the vertebral artery with reconstitution, appearance concerning for subclavian steal.   2.  Intimal thickening versus soft plaque and scattered atherosclerotic calcification of the left carotid arterial tree with less than 50% stenosis.   3.  Small caliber right internal carotid artery   4.  Extensive emphysema      These findings were discussed with the patient's clinician, Dr. Mcallister, on 4/6/2019 4:31 AM.      CT-CTA HEAD  WITH & W/O-POST PROCESS   Final Result         1.  Hypoplastic right internal carotid artery, there is a segment of occlusion seen in the distal right intracranial internal carotid artery near the petrous apex. The intracranial internal carotid artery reconstitutes distal to this segment of    occlusion.   2.  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   3.  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   4.  Changes of atrophy with nonspecific white matter changes, most commonly associated with small vessel ischemia.      These findings were discussed with the patient's clinician, Dr. Mcallister, on 4/6/2019 4:31 AM.      DX-ABDOMEN FOR TUBE PLACEMENT   Final Result         1.  Nonspecific bowel gas pattern.   2.  Dobbhoff tube tip overlying the expected location of the pylorus or first duodenal segment.      US-CAROTID DOPPLER BILAT   Final Result      [id number] VIEW   Final Result         1.  Moderate stool in the colon suggests changes of constipation, otherwise nonspecific bowel gas pattern      DX-CHEST-PORTABLE (1 VIEW)   Final Result         1.  No acute cardiopulmonary disease.   2.  Atherosclerosis   3.  No radiodense foreign body or medical device identified which would exclude MRI.      CT-HEAD W/O   Final Result         1. No acute intracranial abnormality. No evidence of acute intracranial hemorrhage or mass lesion.                    Assessment/Plan  * Altered mental status- (present on admission)   Assessment & Plan    Persistent; concern for meningoencephalitis vs seziures  Now afebrile, Resolved leukocytosis, Off pressors  LP-slight elevation protein, normal glucose, only 1 WBC  Meningitis panel-negative  HSV PCR-negative  DC'd vancomycin/amp/ceftraixone/acyclovir as CSF not c/w bacterial or viral meningitis  Non specific findings on EEG x 3, per neuro, seizures unlikely the cause but remains on  vimpat  Immunologic workup: RF negative, CRP elevated, CALEB negative, ds DNA positive but negative antibody titer  Suspected autoimmune encephalitis - IVIG treatments to being today  Blood cxs neg  CT scan did show small aneurysm  MRI did not reveal any stroke or evidence of vasculitis  Continue to follow closely  Neuro continues to follow                 Acute respiratory failure with hypoxia ([facility])   Assessment & Plan    Requiring mechanical ventilation  She was successfully extubated on 4/8  Completed a 5-day course of Zosyn for aspiration PNA  4/17 rapid response for worsening hypoxia. Had pulmonary edema, now resovled  Currently stable on 2L     Sepsis ([facility])   Assessment & Plan    This is severe sepsis with the following associated acute organ dysfunction(s): acute kidney failure, acute respiratory failure, metabolic/septic encephalopathy.   Source of infection is unclear  LP neg for infection  Off abx  Sepsis appears to be resolved       Systolic and diastolic CHF, chronic ([facility])   Assessment & Plan    Not in exacerbation  carvedilol and lisinopril     Coronary artery disease involving native coronary artery of native heart without angina pectoris- (present on admission)   Assessment & Plan    Continue aspirin and Lipitor     Chronic obstructive pulmonary disease (HCC)- (present on admission)   Assessment & Plan    History of; she does not have evidence of acute exacerbation   DuoNeb as needed  RT protocol     Controlled type 2 diabetes mellitus without complication, without long-term current use of insulin ([facility])- (present on admission)   Assessment & Plan    glycohemoglobin 6.6  She was on metformin outpatient.  Blood sugar fairly controlled, will DC sliding scale 4/14       Vitamin B12 deficiency   Assessment & Plan    Continue VIt b12 injections to Q7 days as her levels were 190 when last checked. Repeat B12 levels next week     Leukocytosis   Assessment & Plan    no obvious source of infection.  Continue  monitoring     Loose stools   Assessment & Plan    C. difficile negative  pn KUB patient did have ileus vs partial SBO  D/t colitis noted on CT abdomen. She has already completed 5 days of broad spectrum antibiotics.      Abdominal distention   Assessment & Plan    Repeat KUB: 1.  Increased dilatation of air-filled colonic loops is noted as described above compared to the prior radiograph. Findings could be due to a colonic ileus versus partial obstruction in the region of the splenic flexure.  CT of abdomen with contrast consistent with colitis  c diff negative on 4/12  Diarrhea improved with cessation of lactulose     Hypophosphatemia   Assessment & Plan    resolved     Hypomagnesemia   Assessment & Plan    Improving, monitor     Hypokalemia   Assessment & Plan    Resolved  Will recheck in am       Pharyngoesophageal dysphagia   Assessment & Plan    Cortrak feeding held due to possible ileus vs partial SBO     Dyslipidemia- (present on admission)   Assessment & Plan    Continue Lipitor         VTE prophylaxis: heparin

## 2019-04-21 NOTE — PROGRESS NOTES
2 RN skin check performed.  Skin intact with protective mepilex to bilateral elbows.  Preventive measures in place and effective.

## 2019-04-21 NOTE — PROGRESS NOTES
2 RN skin check with AGNIESZKA Tello    -Redness to lower neck/ upper chest   -Skin behind bilateral ears pink, blanching   -Bruising to BUE   -Skin tear to right upper arm  -Adhesive foam applied to bilateral elbows  -Redness/ excoriation to perianal, perineal area, barrier cream applied  -Heels pink, blanching, float boots in place  -Otherwise skin intact, pink, blanching

## 2019-04-22 NOTE — CARE PLAN
Problem: Respiratory:  Goal: Respiratory status will improve  Outcome: PROGRESSING SLOWER THAN EXPECTED      Problem: Fluid Volume:  Goal: Will maintain balanced intake and output  Outcome: PROGRESSING AS EXPECTED      Problem: Communication  Goal: The ability to communicate needs accurately and effectively will improve  Outcome: PROGRESSING SLOWER THAN EXPECTED

## 2019-04-22 NOTE — THERAPY
"Physical Therapy Treatment completed.   Bed Mobility:  Supine to Sit: Total Assist X 2  Transfers: Sit to Stand: Unable to Participate  Gait: Level Of Assist: Unable to Participate with Front-Wheel Walker       Discharge Recommendations: Equipment: Will Continue to Assess for Equipment Needs.    See \"Rehab Therapy-Acute\" Patient Summary Report for complete documentation.     Pt continues to present w/ no purposeful movement or interaction during therapy. In supine, pt occassionally opening eyes to name. Once in sitting, pt not opening eyes or following any cues. Pt only responding once when RN gave meds through NG. Pt not showing any trunk or head control in sitting. Pt w/ no righting reactions when support taken away. Pt is not at a level in which she can participate w/ skilled and meaningful therapy. RN stated she would work on getting the pt up to the cardiac chair. Please re-order once pt is able to participate in purposeful therapy.  "

## 2019-04-22 NOTE — THERAPY
Speech therapy contact note:     Attempted to see patient for third time in which she was unable to actively participate in therapy and did not response or oral stim or TTS. Given patient's ALOC and inability to participate in dysphagia therapy, SLP will place dysphagia therapy on hold an await new orders once medically appropriate. If patient to remain on current trajectory and not able to progress with PO alimentation, a more permanent source of nutrition may be indicated.Please re-order if pt demonstrates an improved level of arousal and is able to participate in therapy.

## 2019-04-22 NOTE — PROGRESS NOTES
2 RN skin check completed.    No concerning areas noted to skin. Heels and bilateral knees are pink and blanching. Bruising noted to abdomen from heparin injections and bilateral upper arms.Oxygen tubing repositioned. Cortrack and bridle repositioned.  Right upper arm with skin tear with a mepitel covering it. Neck and upper chest area are bright red. Elbows are pink and blanching. Mepilex are covering them both preventatively. Heel flotation boots in place. Interdry kept under breasts to wick moisture. Patient on a Q2 hour and prn turn schedule. Barrier creams and wipes used for incontinence episdoes Waffle overlay in place.

## 2019-04-22 NOTE — PROGRESS NOTES
Patient responsive to name during bedside report.  She is clean, dry, and positioned for comfort.  O2 titrated down to 1LNC with a Sat of 94%.  Patient is in view of the nursing station.  Safety measures in place.

## 2019-04-22 NOTE — PROGRESS NOTES
Patient Diagnosis and Management daily plan per neurology:        1) Subacute Encephalopathy: of unidentified etiology.  Extensive computer, and laboratory test were reviewed in this patient, that has been hospitalized since 4/04     Never really with seizures described, but with ongoing triphasic pattern.    Currently only on Vimpat 200 mg BID  Her gabapentin is not used as antiepileptic in her case.  Patient remains barely responsive, and apparently has not being quite normal for at least 3 weeks, , but it was thought mostly related with medications due to chronic back pain.  LP follow up to be done, and we will test this time, for autoimmune encephalitis as well as Tau and 1433 PCR.  This patient is also on IVIG , without any evidence for autoimmunity with a very normal initial LP.  Gabapentin to be discontinued for now as well.      Plan: repeat EEG to see evolution of pattern.      Prolonged unspecific encephalopathy, usually carry a poor prognosis, and they are not responsive to AEDs, unless clearly epileptic.      Complete neurological note to support plan is below.    Chief Complain:F/U for: altered mental status     SUBJECTIVE:Patient itself minimally responsive   Per nurse no changing         ROS:  Unable to provide       PHYSICAL EXAMINATION:      Constitutional: lying in bed, eyes closed, does not respond to call.  Blowing movement of the mouth when stimulated.    No visible distress.    CARDIOVASCULAR:s1,s2    PULMONARY:crackles in the bases     EXTREMITIES:minimal pedal edema,     NEUROLOGICAL:    MENTAL:awake, Kiarra EO: 2 VR: 1 MR: 4= 7    CRANIAL NERVES: 2-12 Normal    MOTOR:not really obeying, barely mobilizes her extremities at least 2/5 fairly symmetric  Snout positive     SENSORY:to pain in all four    DTR:+2    BABINSKI: geo    Movements: No abnormal noticed.    CEREBELLAR:  Unable.    GAIT: unable.                      Vitals:    04/21/19 2100 04/22/19 0200 04/22/19 0400 04/22/19 0800   BP:    153/76 156/71   Pulse:   100 (!) 103   Resp:   16 18   Temp:   36.9 °C (98.5 °F) 37.5 °C (99.5 °F)   TempSrc:   Temporal Temporal   SpO2: 93% 94% 95% 94%   Weight:    79.7 kg (175 lb 11.3 oz)   Height:                      Imaging: neuroimaging reviewed and directly visualized by me  DX-CHEST-LIMITED (1 VIEW)   Final Result      Diffuse interstitial edema, new since prior study. No focal consolidation or pleural effusions.      CT-ABDOMEN-PELVIS WITH   Final Result      Relatively diffuse colonic wall thickening consistent with colitis possibly due to infection or ischemia.      Coarse calcified plaque in the abdominal aorta and branch vessels without aneurysm.      Fatty infiltration of the liver.      DX-ABDOMEN FOR TUBE PLACEMENT   Final Result      Feeding tube extends below the diaphragm with tip at the level of the gastric body.      CT-STEALTH HEAD W/O   Final Result      1.  No evidence of acute territorial infarct, intracranial hemorrhage or mass lesion.   2.  Mild diffuse cerebral substance loss.   3.  Mild microangiopathic ischemic change versus demyelination or gliosis.      [id number]-3 VIEW   Final Result      1.  Increased dilatation of air-filled colonic loops is noted as described above compared to the prior radiograph. Findings could be due to a colonic ileus versus partial obstruction in the region of the splenic flexure.      2.  No free air is identified.                  DX-ABDOMEN FOR TUBE PLACEMENT   Final Result      1.  Feeding tube tip overlies the region of the gastric antrum or 1st portion duodenum.   2.  There is a mild ileus.      DX-CHEST-PORTABLE (1 VIEW)   Final Result         1. Interval extubation. No other significant interval change.      DX-CHEST-PORTABLE (1 VIEW)   Final Result         1. No significant interval change.      US-ABDOMEN COMPLETE SURVEY   Final Result      1.  Mildly distended gallbladder with sludge present.   2.  No other evidence for acute cholecystitis.   3.   No significant biliary dilation.   4.  Limited evaluation of the bladder.         [id number]-2 VIEW   Final Result      1.  Feeding tube tip overlies the gastric antrum.      2.  Mild ileus.      DX-CHEST-PORTABLE (1 VIEW)   Final Result         1.  No acute cardiopulmonary disease.   2.  Atherosclerosis         EC-ECHOCARDIOGRAM COMPLETE W/O CONT   Final Result      DX-CHEST-PORTABLE (1 VIEW)   Final Result      1.  Supportive tubing as described above.   2.  No pneumothorax.   3.  Persistent hypoinflation.      MR-BRAIN-W/O   Final Result      No evidence of intracranial hemorrhage, acute ischemia or mass on this severely motion degraded study      DX-CHEST-LIMITED (1 VIEW)   Final Result         1.  No acute cardiopulmonary disease.   2.  Atherosclerosis   3.  No radiodense foreign body or medical device identified which would exclude MRI.      CT-CTA NECK WITH & W/O-POST PROCESSING   Final Result         1.  Occlusion of the left subclavian artery to the level of the vertebral artery with reconstitution, appearance concerning for subclavian steal.   2.  Intimal thickening versus soft plaque and scattered atherosclerotic calcification of the left carotid arterial tree with less than 50% stenosis.   3.  Small caliber right internal carotid artery   4.  Extensive emphysema      These findings were discussed with the patient's clinician, Dr. Mcallister, on 4/6/2019 4:31 AM.      CT-CTA HEAD WITH & W/O-POST PROCESS   Final Result         1.  Hypoplastic right internal carotid artery, there is a segment of occlusion seen in the distal right intracranial internal carotid artery near the petrous apex. The intracranial internal carotid artery reconstitutes distal to this segment of    occlusion.   2.  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   3.  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   4.  Changes of atrophy with nonspecific  white matter changes, most commonly associated with small vessel ischemia.      These findings were discussed with the patient's clinician, Dr. Mcallister, on 4/6/2019 4:31 AM.      DX-ABDOMEN FOR TUBE PLACEMENT   Final Result         1.  Nonspecific bowel gas pattern.   2.  Dobbhoff tube tip overlying the expected location of the pylorus or first duodenal segment.      US-CAROTID DOPPLER BILAT   Final Result      VO-[id number]-3 VIEW   Final Result         1.  Moderate stool in the colon suggests changes of constipation, otherwise nonspecific bowel gas pattern      DX-CHEST-PORTABLE (1 VIEW)   Final Result         1.  No acute cardiopulmonary disease.   2.  Atherosclerosis   3.  No radiodense foreign body or medical device identified which would exclude MRI.      CT-HEAD W/O   Final Result         1. No acute intracranial abnormality. No evidence of acute intracranial hemorrhage or mass lesion.                     Labs:  Recent Labs      04/19/19 1438 04/21/19 0311 04/22/19 0337   WBC  16.1*  16.0*  13.2*   RBC  4.35  4.02*  4.24   HEMOGLOBIN  12.0  10.9*  11.6*   HEMATOCRIT  37.0  34.1*  35.7*   MCV  85.1  84.8  84.2   MCH  27.6  27.1  27.4   MCHC  32.4*  32.0*  32.5*   RDW  45.6  45.8  45.9   PLATELETCT  454*  350  338   MPV  9.7  10.4  9.7     Recent Labs      04/19/19 1438 04/21/19 0311 04/22/19   0337   SODIUM  138  136  133*   POTASSIUM  3.8  3.6  3.9   CHLORIDE  97  96  95*   CO2  33  32  32   GLUCOSE  373*  316*  317*   BUN  32*  31*  23*   CREATININE  0.45*  0.46*  0.48*   CALCIUM  8.9  9.0  8.8                     Recent Labs      04/19/19 1438 04/21/19 0311 04/22/19   0337   SODIUM  138  136  133*   POTASSIUM  3.8  3.6  3.9   CHLORIDE  97  96  95*   CO2  33  32  32   GLUCOSE  373*  316*  317*   BUN  32*  31*  23*     Recent Labs      04/19/19 1438 04/21/19 0311 04/22/19   0337   SODIUM  138  136  133*   POTASSIUM  3.8  3.6  3.9   CHLORIDE  97  96  95*   CO2  33  32  32   BUN  32*   31*  23*   CREATININE  0.45*  0.46*  0.48*   CALCIUM  8.9  9.0  8.8         No results found for this or any previous visit.                  Yvette Mcdermott M.D.    Diplomate Neurology, Vascular Neurology and Neurophysiology

## 2019-04-22 NOTE — PROGRESS NOTES
[facility] Medicine Daily Progress Note    Date of Service  4/22/2019    Chief Complaint  68 y.o. female admitted 4/4/2019 with altered mental status    Hospital Course    Ms. Yarbrough is a 68-year-old female with past medical history of  diabetes, COPD, and coronary artery disease that presented to the emergency room with confusion and expressive aphasia.  Per the  patient had been declining over several months which included not taking her medications or eating.  She was started on empiric antibiotics including vancomycin and Rocephin and acyclovir.  Her mental status continued to deteriorate and she began having fevers.  ID was consulted and ampicillin was added on and vancomycin was discontinued.  CT head an MRI of brain did not reveal potential cause of her encephalopathy.  She began developing signs and symptoms of sepsis and was transferred to ICU and required intubation and pressors on 4/6.  Her lumbar puncture done on 4/6 was not impressive for infection and her HSV PCR was  so her acyclovir, ampicillin and ceftriaxone were discontinued.  While intubated patient was receiving tube feeds and had an episode of emesis and was started on Zosyn for possible aspiration pneumonia which she completed a 5-day course of.  She was given thiamine, multivitamins, folate and vitamin B12 for possible metabolic etiology of her encephalopathy.  EEG was done which showed nonspecific focal cortical area irritability over frontal right greater than left side and patient was started on Keppra empirically.  She was extubated on 4/8.  Due to continued encephalopathy, patient was only be oriented to self and not following commands EEG was repeated.  Her EEG continued to show focal cortical irritability over her frontal right greater than left side so she was also started on Vimpat.  Rheumatologic cause was considered which included ESR, CALEB, dsDNA, RF which were all negative except for an elevated CRP and positive dsDNA but  negative antibody titer.  On 4/13 patient again underwent a 24-hour video EEG which continued to show moderate toxic/metabolic encephalopathy with frontal waves.  There was concern for possible SBO for which her tube feeds were held.        Interval Problem Update  No change in mental status  Day 3 of IVIG  Plan for repeat LP tomorrow to check for CJD and tau protein  I discussed with pt's  about unchanged mental status and how this carries a poor prognosis and despite extensive investigation, cause of encephalopathy remains unclear. He understands that things are not looking well and endorses that she would not want to be kept alive in a vegetative state.    Consultants/Specialty  Critical care.  Neurology.    Code Status  Full code- readdress tomorrow    Disposition  TBD    Review of Systems  Review of Systems   Unable to perform ROS: Mental acuity        Physical Exam  Temp:  [36.6 °C (97.8 °F)-37.5 °C (99.5 °F)] 37.5 °C (99.5 °F)  Pulse:  [] 103  Resp:  [16-18] 18  BP: (131-156)/(68-79) 156/71  SpO2:  [93 %-96 %] 94 %    Physical Exam   Constitutional: She appears well-developed and well-nourished. She appears lethargic. No distress.   HENT:   Head: Normocephalic and atraumatic.   Dry mucous membranes     Cardiovascular: Normal rate and regular rhythm.    No murmur heard.  Pulmonary/Chest: Effort normal and breath sounds normal. No respiratory distress.   Abdominal: Soft. Bowel sounds are normal. She exhibits no distension. There is no tenderness.   Musculoskeletal: Normal range of motion. She exhibits no edema or tenderness.   Neurological: She appears lethargic.   Near comatose state. Not withdrawing to pain today   Skin: Skin is warm and dry. No rash noted. She is not diaphoretic. No erythema. No pallor.   Nursing note and vitals reviewed.      Fluids    Intake/Output Summary (Last 24 hours) at 04/22/19 0872  Last data filed at 04/22/19 0400   Gross per 24 hour   Intake              655 ml    Output                0 ml   Net              655 ml       Laboratory  Recent Labs      04/19/19   1438  04/21/19   0311  04/22/19   0337   WBC  16.1*  16.0*  13.2*   RBC  4.35  4.02*  4.24   HEMOGLOBIN  12.0  10.9*  11.6*   HEMATOCRIT  37.0  34.1*  35.7*   MCV  85.1  84.8  84.2   MCH  27.6  27.1  27.4   MCHC  32.4*  32.0*  32.5*   RDW  45.6  45.8  45.9   PLATELETCT  454*  350  338   MPV  9.7  10.4  9.7     Recent Labs      04/19/19   1438  04/21/19   0311  04/22/19   0337   SODIUM  138  136  133*   POTASSIUM  3.8  3.6  3.9   CHLORIDE  97  96  95*   CO2  33  32  32   GLUCOSE  373*  316*  317*   BUN  32*  31*  23*   CREATININE  0.45*  0.46*  0.48*   CALCIUM  8.9  9.0  8.8                   Imaging  DX-CHEST-LIMITED (1 VIEW)   Final Result      Diffuse interstitial edema, new since prior study. No focal consolidation or pleural effusions.      CT-ABDOMEN-PELVIS WITH   Final Result      Relatively diffuse colonic wall thickening consistent with colitis possibly due to infection or ischemia.      Coarse calcified plaque in the abdominal aorta and branch vessels without aneurysm.      Fatty infiltration of the liver.      DX-ABDOMEN FOR TUBE PLACEMENT   Final Result      Feeding tube extends below the diaphragm with tip at the level of the gastric body.      CT-STEALTH HEAD W/O   Final Result      1.  No evidence of acute territorial infarct, intracranial hemorrhage or mass lesion.   2.  Mild diffuse cerebral substance loss.   3.  Mild microangiopathic ischemic change versus demyelination or gliosis.      [id number] VIEW   Final Result      1.  Increased dilatation of air-filled colonic loops is noted as described above compared to the prior radiograph. Findings could be due to a colonic ileus versus partial obstruction in the region of the splenic flexure.      2.  No free air is identified.                  DX-ABDOMEN FOR TUBE PLACEMENT   Final Result      1.  Feeding tube tip overlies the region of the gastric  antrum or 1st portion duodenum.   2.  There is a mild ileus.      DX-CHEST-PORTABLE (1 VIEW)   Final Result         1. Interval extubation. No other significant interval change.      DX-CHEST-PORTABLE (1 VIEW)   Final Result         1. No significant interval change.      US-ABDOMEN COMPLETE SURVEY   Final Result      1.  Mildly distended gallbladder with sludge present.   2.  No other evidence for acute cholecystitis.   3.  No significant biliary dilation.   4.  Limited evaluation of the bladder.         [id number]-6 VIEW   Final Result      1.  Feeding tube tip overlies the gastric antrum.      2.  Mild ileus.      DX-CHEST-PORTABLE (1 VIEW)   Final Result         1.  No acute cardiopulmonary disease.   2.  Atherosclerosis         EC-ECHOCARDIOGRAM COMPLETE W/O CONT   Final Result      DX-CHEST-PORTABLE (1 VIEW)   Final Result      1.  Supportive tubing as described above.   2.  No pneumothorax.   3.  Persistent hypoinflation.      MR-BRAIN-W/O   Final Result      No evidence of intracranial hemorrhage, acute ischemia or mass on this severely motion degraded study      DX-CHEST-LIMITED (1 VIEW)   Final Result         1.  No acute cardiopulmonary disease.   2.  Atherosclerosis   3.  No radiodense foreign body or medical device identified which would exclude MRI.      CT-CTA NECK WITH & W/O-POST PROCESSING   Final Result         1.  Occlusion of the left subclavian artery to the level of the vertebral artery with reconstitution, appearance concerning for subclavian steal.   2.  Intimal thickening versus soft plaque and scattered atherosclerotic calcification of the left carotid arterial tree with less than 50% stenosis.   3.  Small caliber right internal carotid artery   4.  Extensive emphysema      These findings were discussed with the patient's clinician, Dr. Mcallister, on 4/6/2019 4:31 AM.      CT-CTA HEAD WITH & W/O-POST PROCESS   Final Result         1.  Hypoplastic right internal carotid artery, there is a  segment of occlusion seen in the distal right intracranial internal carotid artery near the petrous apex. The intracranial internal carotid artery reconstitutes distal to this segment of    occlusion.   2.  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   3.  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   4.  Changes of atrophy with nonspecific white matter changes, most commonly associated with small vessel ischemia.      These findings were discussed with the patient's clinician, Dr. Mcallister, on 4/6/2019 4:31 AM.      DX-ABDOMEN FOR TUBE PLACEMENT   Final Result         1.  Nonspecific bowel gas pattern.   2.  Dobbhoff tube tip overlying the expected location of the pylorus or first duodenal segment.      US-CAROTID DOPPLER BILAT   Final Result      [id number]-7 VIEW   Final Result         1.  Moderate stool in the colon suggests changes of constipation, otherwise nonspecific bowel gas pattern      DX-CHEST-PORTABLE (1 VIEW)   Final Result         1.  No acute cardiopulmonary disease.   2.  Atherosclerosis   3.  No radiodense foreign body or medical device identified which would exclude MRI.      CT-HEAD W/O   Final Result         1. No acute intracranial abnormality. No evidence of acute intracranial hemorrhage or mass lesion.                    Assessment/Plan  * Altered mental status- (present on admission)   Assessment & Plan    Persistent; concern for meningoencephalitis vs seziures  Now afebrile, Resolved leukocytosis, Off pressors  LP-slight elevation protein, normal glucose, only 1 WBC  Meningitis panel-negative  HSV PCR-negative  DC'd vancomycin/amp/ceftraixone/acyclovir as CSF not c/w bacterial or viral meningitis  Non specific findings on EEG x 3, per neuro, seizures unlikely the cause but remains on vimpat  Immunologic workup: RF negative, CRP elevated, CALEB negative, ds DNA positive but negative antibody titer  Blood  cxs neg  CT scan did show small aneurysm  MRI did not reveal any stroke or evidence of vasculitis  Continue to follow closely  Neuro continues to follow  Suspected autoimmune encephalitis - IVIG treatment started and some improvement noted  Continue to follow closely               Acute respiratory failure with hypoxia ([facility])   Assessment & Plan    Requiring mechanical ventilation  She was successfully extubated on 4/8  Completed a 5-day course of Zosyn for aspiration PNA  4/17 rapid response for worsening hypoxia. Had pulmonary edema, now resovled  Some fluctuation in o2 requirements, increased overnight - now tolerating wean  Continue to follow closely     Sepsis ([facility])   Assessment & Plan    This is severe sepsis with the following associated acute organ dysfunction(s): acute kidney failure, acute respiratory failure, metabolic/septic encephalopathy.   Source of infection is unclear  LP neg for infection  Off abx  Sepsis appears to be resolved       Systolic and diastolic CHF, chronic ([facility])   Assessment & Plan    Not in exacerbation  carvedilol and lisinopril     Coronary artery disease involving native coronary artery of native heart without angina pectoris- (present on admission)   Assessment & Plan    Continue aspirin and Lipitor     Chronic obstructive pulmonary disease (HCC)- (present on admission)   Assessment & Plan    History of; she does not have evidence of acute exacerbation   DuoNeb as needed  RT protocol  See above  following     Controlled type 2 diabetes mellitus without complication, without long-term current use of insulin ([facility])- (present on admission)   Assessment & Plan    glycohemoglobin 6.6  She was on metformin outpatient.  Bs increased with tf, will resume ssi, following       Vitamin B12 deficiency   Assessment & Plan    Continue VIt b12 injections to Q7 days as her levels were 190 when last checked. Repeat B12 levels next week     Leukocytosis   Assessment & Plan    no obvious source of  infection.  Continue monitoring     Loose stools   Assessment & Plan    C. difficile negative  pn KUB patient did have ileus vs partial SBO  D/t colitis noted on CT abdomen. She has already completed 5 days of broad spectrum antibiotics.      Abdominal distention   Assessment & Plan    Repeat KUB: 1.  Increased dilatation of air-filled colonic loops is noted as described above compared to the prior radiograph. Findings could be due to a colonic ileus versus partial obstruction in the region of the splenic flexure.  CT of abdomen with contrast consistent with colitis  c diff negative on 4/12  Diarrhea improved with cessation of lactulose     Hypophosphatemia   Assessment & Plan    resolved     Hypomagnesemia   Assessment & Plan    Improving, monitor     Pharyngoesophageal dysphagia   Assessment & Plan    Cortrak feeding held due to possible ileus vs partial SBO     Dyslipidemia- (present on admission)   Assessment & Plan    Continue Lipitor         VTE prophylaxis: heparin    23 mins of non face to face time spent discussing with patient's  as summarized above.

## 2019-04-22 NOTE — THERAPY
"Occupational Therapy Treatment completed with focus on ADLs and ADL transfers.  Functional Status:  Total A/Dependendent BADLs and supine<>sit  Plan of Care: Decreasing frequency to monitor status, please re-order if pt becomes more arousable and is able to participate in therapy session.   Discharge Recommendations:  Equipment Will Continue to Assess for Equipment Needs. Post-acute therapy Discharge to a transitional care facility for continued skilled therapy services.    See \"Rehab Therapy-Acute\" Patient Summary Report for complete documentation.     Pt continues to present w/ no purposeful movement, impaired level of arousal, and no interaction with therapy. Pt occasionally opened eyes to name, when seated EOB pt did not demo any increased alertness and followed no commands. Pt did not demo any head or core control when seated EOB and is unable to participate in meaningful therapy at this time. Decreasing pt's frequency to monitor status. Please re-order if pt demonstrates an improved level of arousal and is able to participate in therapy.   "

## 2019-04-23 NOTE — PROCEDURES
Date: 4/23/19  Time: 1:46PM  Indication: Altered Mental Status  Resident: Dr. Centeno  Supervising Resident: Dr. Oakley   Attending: Dr. Graciela WALL time-out was completed verifying correct patient, procedure, site, positioning, and special equipment if applicable. The patient was placed in the right lateral decubitus position in a semi-fetal position with help from the nursing staff. The area was cleansed and draped in usual sterile fashion. 1% lidocaine was used anesthetize the surrounding skin area. A <20-gauge 3.5-inch> spinal needle was placed in the L3-L4 interspace. Clear cerebral spinal fluid was not obtained Attending and Residents were present for the entire procedure  Estimated Blood Loss: <2 ml  The patient tolerated the procedure well and there were no complications.

## 2019-04-23 NOTE — PROGRESS NOTES
2 RN skin check    Pt's heels are pink and blanching.  Pt's elbows are pink and blanching.  Pt's right upper arm with mild skin tear, DUGLAS.  Pt's sacral area intact, no redness noted.    Pt on waffle-mattress overlay, heels elevated with pillow. Q 2 hour turns. Barrier wipes for incontinence episodes.

## 2019-04-23 NOTE — PROGRESS NOTES
Patient Diagnosis and Management daily plan per neurology:        1) Subacute Encephalopathy: not etiology found.  LP pending to repeat as yesterday she got heparin for DVT.  EEG still pending.  Continue with Vimpat 200 mg BID.  Finish IVIG as already ordered, but not clear reason for treatment, and not changes with treatment         Prolonged unspecific encephalopathy, usually carry a poor prognosis.    Complete neurological note to support plan is below.    Chief Complain:F/U for: altered mental status     SUBJECTIVE:Patient itself minimally responsive   Not changes per nursing        ROS:  Unable to provide       PHYSICAL EXAMINATION:      Constitutional: lying in bed, eyes closed, does not respond to call.  With pain all she does is the blowing mouth movement       No visible distress.    CARDIOVASCULAR:s1,s2    PULMONARY:crackles in the bases     EXTREMITIES:minimal pedal edema,     NEUROLOGICAL:    MENTAL:awake, Kiarra EO: 2 VR: 1 MR: 4= 7    CRANIAL NERVES: 2-12 Normal    MOTOR:not really obeying, barely mobilizes her extremities at least 2/5 fairly symmetric  Not snout today     SENSORY:to pain in all four    DTR:+2    BABINSKI: geo    Movements: No abnormal noticed.    CEREBELLAR:  Unable.    GAIT: unable.                      Vitals:    04/22/19 1600 04/22/19 2000 04/23/19 0400 04/23/19 0800   BP: 152/73 104/63 135/71 141/89   Pulse: 86 89 96 91   Resp: 18 16 16 16   Temp: 36.6 °C (97.8 °F) 36.6 °C (97.9 °F) 36 °C (96.8 °F) 36.2 °C (97.2 °F)   TempSrc: Temporal Temporal Temporal Temporal   SpO2: 94% 92% 93% 95%   Weight:   76.4 kg (168 lb 6.9 oz)    Height:                      Imaging: neuroimaging reviewed and directly visualized by me  DX-CHEST-LIMITED (1 VIEW)   Final Result      Diffuse interstitial edema, new since prior study. No focal consolidation or pleural effusions.      CT-ABDOMEN-PELVIS WITH   Final Result      Relatively diffuse colonic wall thickening consistent with colitis possibly due  to infection or ischemia.      Coarse calcified plaque in the abdominal aorta and branch vessels without aneurysm.      Fatty infiltration of the liver.      DX-ABDOMEN FOR TUBE PLACEMENT   Final Result      Feeding tube extends below the diaphragm with tip at the level of the gastric body.      CT-STEALTH HEAD W/O   Final Result      1.  No evidence of acute territorial infarct, intracranial hemorrhage or mass lesion.   2.  Mild diffuse cerebral substance loss.   3.  Mild microangiopathic ischemic change versus demyelination or gliosis.      [id number]-2 VIEW   Final Result      1.  Increased dilatation of air-filled colonic loops is noted as described above compared to the prior radiograph. Findings could be due to a colonic ileus versus partial obstruction in the region of the splenic flexure.      2.  No free air is identified.                  DX-ABDOMEN FOR TUBE PLACEMENT   Final Result      1.  Feeding tube tip overlies the region of the gastric antrum or 1st portion duodenum.   2.  There is a mild ileus.      DX-CHEST-PORTABLE (1 VIEW)   Final Result         1. Interval extubation. No other significant interval change.      DX-CHEST-PORTABLE (1 VIEW)   Final Result         1. No significant interval change.      US-ABDOMEN COMPLETE SURVEY   Final Result      1.  Mildly distended gallbladder with sludge present.   2.  No other evidence for acute cholecystitis.   3.  No significant biliary dilation.   4.  Limited evaluation of the bladder.         IV-[id number]-1 VIEW   Final Result      1.  Feeding tube tip overlies the gastric antrum.      2.  Mild ileus.      DX-CHEST-PORTABLE (1 VIEW)   Final Result         1.  No acute cardiopulmonary disease.   2.  Atherosclerosis         EC-ECHOCARDIOGRAM COMPLETE W/O CONT   Final Result      DX-CHEST-PORTABLE (1 VIEW)   Final Result      1.  Supportive tubing as described above.   2.  No pneumothorax.   3.  Persistent hypoinflation.      MR-BRAIN-W/O   Final Result       No evidence of intracranial hemorrhage, acute ischemia or mass on this severely motion degraded study      DX-CHEST-LIMITED (1 VIEW)   Final Result         1.  No acute cardiopulmonary disease.   2.  Atherosclerosis   3.  No radiodense foreign body or medical device identified which would exclude MRI.      CT-CTA NECK WITH & W/O-POST PROCESSING   Final Result         1.  Occlusion of the left subclavian artery to the level of the vertebral artery with reconstitution, appearance concerning for subclavian steal.   2.  Intimal thickening versus soft plaque and scattered atherosclerotic calcification of the left carotid arterial tree with less than 50% stenosis.   3.  Small caliber right internal carotid artery   4.  Extensive emphysema      These findings were discussed with the patient's clinician, Dr. Mcallister, on 4/6/2019 4:31 AM.      CT-CTA HEAD WITH & W/O-POST PROCESS   Final Result         1.  Hypoplastic right internal carotid artery, there is a segment of occlusion seen in the distal right intracranial internal carotid artery near the petrous apex. The intracranial internal carotid artery reconstitutes distal to this segment of    occlusion.   2.  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   3.  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   4.  Changes of atrophy with nonspecific white matter changes, most commonly associated with small vessel ischemia.      These findings were discussed with the patient's clinician, Dr. Mcallister, on 4/6/2019 4:31 AM.      DX-ABDOMEN FOR TUBE PLACEMENT   Final Result         1.  Nonspecific bowel gas pattern.   2.  Dobbhoff tube tip overlying the expected location of the pylorus or first duodenal segment.      US-CAROTID DOPPLER BILAT   Final Result      [id number] VIEW   Final Result         1.  Moderate stool in the colon suggests changes of constipation, otherwise nonspecific bowel  gas pattern      DX-CHEST-PORTABLE (1 VIEW)   Final Result         1.  No acute cardiopulmonary disease.   2.  Atherosclerosis   3.  No radiodense foreign body or medical device identified which would exclude MRI.      CT-HEAD W/O   Final Result         1. No acute intracranial abnormality. No evidence of acute intracranial hemorrhage or mass lesion.                     Labs:  Recent Labs      04/21/19 0311 04/22/19   0337  04/23/19   0246   WBC  16.0*  13.2*  8.0   RBC  4.02*  4.24  3.88*   HEMOGLOBIN  10.9*  11.6*  10.6*   HEMATOCRIT  34.1*  35.7*  33.2*   MCV  84.8  84.2  85.6   MCH  27.1  27.4  27.3   MCHC  32.0*  32.5*  31.9*   RDW  45.8  45.9  45.7   PLATELETCT  350  338  330   MPV  10.4  9.7  10.7     Recent Labs      04/21/19   0311  04/22/19   0337   SODIUM  136  133*   POTASSIUM  3.6  3.9   CHLORIDE  96  95*   CO2  32  32   GLUCOSE  316*  317*   BUN  31*  23*   CREATININE  0.46*  0.48*   CALCIUM  9.0  8.8     Recent Labs      04/23/19   0942   APTT  45.5*   INR  1.03                 Recent Labs      04/21/19   0311  04/22/19   0337   SODIUM  136  133*   POTASSIUM  3.6  3.9   CHLORIDE  96  95*   CO2  32  32   GLUCOSE  316*  317*   BUN  31*  23*     Recent Labs      04/21/19   0311  04/22/19   0337   SODIUM  136  133*   POTASSIUM  3.6  3.9   CHLORIDE  96  95*   CO2  32  32   BUN  31*  23*   CREATININE  0.46*  0.48*   CALCIUM  9.0  8.8     Recent Labs      04/23/19   0942   APTT  45.5*   INR  1.03     No results found for this or any previous visit.                  Yvette Mcdermott M.D.    Diplomate Neurology, Vascular Neurology and Neurophysiology

## 2019-04-23 NOTE — CARE PLAN
Problem: Urinary Elimination:  Goal: Ability to reestablish a normal urinary elimination pattern will improve  Outcome: PROGRESSING SLOWER THAN EXPECTED  Pt remains incontinent of bladder and is too lethargic to let staff know when she needs to urinate. Staff are compensating for this by checking to see if the pt has urinated every hour when rounding. Will continue to monitor and assess.    Problem: Skin Integrity  Goal: Risk for impaired skin integrity will decrease  Outcome: PROGRESSING AS EXPECTED  Pt remains at risk for impaired skin integrity due to her persistent confusion/lethargic state and her inability to mobilize herself in bed. Pt is currently being turned every 2 hours, is on a waffle-mattress overlay, and staff are using barrier wipes and cream to help protect the integrity of her skin whenever the pt is cleaned up after an incontinent episode. Pt is incontinent of bowel and bladder so staff are checking to see if the pt needs cleaned up every hour when rounding. Pt's skin is still intact and there does not appear to be any skin breakdown at the high risk areas of the sacrum, elbows, and heels. Will continue to monitor and assess.

## 2019-04-23 NOTE — PROGRESS NOTES
[facility] Medicine Daily Progress Note    Date of Service  4/23/2019    Chief Complaint  68 y.o. female admitted 4/4/2019 with altered mental status    Hospital Course    Ms. Yarbrough is a 68-year-old female with past medical history of  diabetes, COPD, and coronary artery disease that presented to the emergency room with confusion and expressive aphasia.  Per the  patient had been declining over several months which included not taking her medications or eating.  She was started on empiric antibiotics including vancomycin and Rocephin and acyclovir.  Her mental status continued to deteriorate and she began having fevers.  ID was consulted and ampicillin was added on and vancomycin was discontinued.  CT head an MRI of brain did not reveal potential cause of her encephalopathy.  She began developing signs and symptoms of sepsis and was transferred to ICU and required intubation and pressors on 4/6.  Her lumbar puncture done on 4/6 was not impressive for infection and her HSV PCR was  so her acyclovir, ampicillin and ceftriaxone were discontinued.  While intubated patient was receiving tube feeds and had an episode of emesis and was started on Zosyn for possible aspiration pneumonia which she completed a 5-day course of.  She was given thiamine, multivitamins, folate and vitamin B12 for possible metabolic etiology of her encephalopathy.  EEG was done which showed nonspecific focal cortical area irritability over frontal right greater than left side and patient was started on Keppra empirically.  She was extubated on 4/8.  Due to continued encephalopathy, patient was only be oriented to self and not following commands EEG was repeated.  Her EEG continued to show focal cortical irritability over her frontal right greater than left side so she was also started on Vimpat.  Rheumatologic cause was considered which included ESR, CALEB, dsDNA, RF which were all negative except for an elevated CRP and positive dsDNA but  negative antibody titer.  On 4/13 patient again underwent a 24-hour video EEG which continued to show moderate toxic/metabolic encephalopathy with frontal waves.  There was concern for possible SBO for which her tube feeds were held.        Interval Problem Update  No change in mental status  Day 4 of IVIG  Bedside LP attempted, unable to obtain CSF. I have ordered IR guided LP    Consultants/Specialty  Critical care.  Neurology.    Code Status  Full code    Disposition  TBD    Review of Systems  Review of Systems   Unable to perform ROS: Mental acuity        Physical Exam  Temp:  [36 °C (96.8 °F)-36.6 °C (97.9 °F)] 36.2 °C (97.2 °F)  Pulse:  [86-96] 91  Resp:  [16-18] 16  BP: (104-152)/(63-89) 141/89  SpO2:  [92 %-95 %] 95 %    Physical Exam   Constitutional: She appears well-developed and well-nourished. She appears lethargic. No distress.   HENT:   Head: Normocephalic and atraumatic.   Dry mucous membranes     Cardiovascular: Normal rate and regular rhythm.    No murmur heard.  Pulmonary/Chest: Effort normal and breath sounds normal. No respiratory distress.   Abdominal: Soft. Bowel sounds are normal. She exhibits no distension. There is no tenderness.   Musculoskeletal: Normal range of motion. She exhibits no edema or tenderness.   Neurological: She appears lethargic.   Near comatose state. Not withdrawing to pain today   Skin: Skin is warm and dry. No rash noted. She is not diaphoretic. No erythema. No pallor.   Nursing note and vitals reviewed.      Fluids    Intake/Output Summary (Last 24 hours) at 04/23/19 1351  Last data filed at 04/23/19 1200   Gross per 24 hour   Intake              803 ml   Output                0 ml   Net              803 ml       Laboratory  Recent Labs      04/21/19   0311  04/22/19   0337  04/23/19   0246   WBC  16.0*  13.2*  8.0   RBC  4.02*  4.24  3.88*   HEMOGLOBIN  10.9*  11.6*  10.6*   HEMATOCRIT  34.1*  35.7*  33.2*   MCV  84.8  84.2  85.6   MCH  27.1  27.4  27.3   MCHC  32.0*   32.5*  31.9*   RDW  45.8  45.9  45.7   PLATELETCT  350  338  330   MPV  10.4  9.7  10.7     Recent Labs      04/21/19   0311  04/22/19   0337   SODIUM  136  133*   POTASSIUM  3.6  3.9   CHLORIDE  96  95*   CO2  32  32   GLUCOSE  316*  317*   BUN  31*  23*   CREATININE  0.46*  0.48*   CALCIUM  9.0  8.8     Recent Labs      04/23/19   0942   APTT  45.5*   INR  1.03               Imaging  DX-CHEST-LIMITED (1 VIEW)   Final Result      Diffuse interstitial edema, new since prior study. No focal consolidation or pleural effusions.      CT-ABDOMEN-PELVIS WITH   Final Result      Relatively diffuse colonic wall thickening consistent with colitis possibly due to infection or ischemia.      Coarse calcified plaque in the abdominal aorta and branch vessels without aneurysm.      Fatty infiltration of the liver.      DX-ABDOMEN FOR TUBE PLACEMENT   Final Result      Feeding tube extends below the diaphragm with tip at the level of the gastric body.      CT-STEALTH HEAD W/O   Final Result      1.  No evidence of acute territorial infarct, intracranial hemorrhage or mass lesion.   2.  Mild diffuse cerebral substance loss.   3.  Mild microangiopathic ischemic change versus demyelination or gliosis.      [id number] VIEW   Final Result      1.  Increased dilatation of air-filled colonic loops is noted as described above compared to the prior radiograph. Findings could be due to a colonic ileus versus partial obstruction in the region of the splenic flexure.      2.  No free air is identified.                  DX-ABDOMEN FOR TUBE PLACEMENT   Final Result      1.  Feeding tube tip overlies the region of the gastric antrum or 1st portion duodenum.   2.  There is a mild ileus.      DX-CHEST-PORTABLE (1 VIEW)   Final Result         1. Interval extubation. No other significant interval change.      DX-CHEST-PORTABLE (1 VIEW)   Final Result         1. No significant interval change.      US-ABDOMEN COMPLETE SURVEY   Final Result      1.   Mildly distended gallbladder with sludge present.   2.  No other evidence for acute cholecystitis.   3.  No significant biliary dilation.   4.  Limited evaluation of the bladder.         [id number] VIEW   Final Result      1.  Feeding tube tip overlies the gastric antrum.      2.  Mild ileus.      DX-CHEST-PORTABLE (1 VIEW)   Final Result         1.  No acute cardiopulmonary disease.   2.  Atherosclerosis         EC-ECHOCARDIOGRAM COMPLETE W/O CONT   Final Result      DX-CHEST-PORTABLE (1 VIEW)   Final Result      1.  Supportive tubing as described above.   2.  No pneumothorax.   3.  Persistent hypoinflation.      MR-BRAIN-W/O   Final Result      No evidence of intracranial hemorrhage, acute ischemia or mass on this severely motion degraded study      DX-CHEST-LIMITED (1 VIEW)   Final Result         1.  No acute cardiopulmonary disease.   2.  Atherosclerosis   3.  No radiodense foreign body or medical device identified which would exclude MRI.      CT-CTA NECK WITH & W/O-POST PROCESSING   Final Result         1.  Occlusion of the left subclavian artery to the level of the vertebral artery with reconstitution, appearance concerning for subclavian steal.   2.  Intimal thickening versus soft plaque and scattered atherosclerotic calcification of the left carotid arterial tree with less than 50% stenosis.   3.  Small caliber right internal carotid artery   4.  Extensive emphysema      These findings were discussed with the patient's clinician, Dr. Mcallister, on 4/6/2019 4:31 AM.      CT-CTA HEAD WITH & W/O-POST PROCESS   Final Result         1.  Hypoplastic right internal carotid artery, there is a segment of occlusion seen in the distal right intracranial internal carotid artery near the petrous apex. The intracranial internal carotid artery reconstitutes distal to this segment of    occlusion.   2.  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   3.  Bilateral persistent  trigeminal arteries contribute to the posterior cerebral arteries.   4.  Changes of atrophy with nonspecific white matter changes, most commonly associated with small vessel ischemia.      These findings were discussed with the patient's clinician, Dr. Mcallister, on 4/6/2019 4:31 AM.      DX-ABDOMEN FOR TUBE PLACEMENT   Final Result         1.  Nonspecific bowel gas pattern.   2.  Dobbhoff tube tip overlying the expected location of the pylorus or first duodenal segment.      US-CAROTID DOPPLER BILAT   Final Result      VX-[id number]-0 VIEW   Final Result         1.  Moderate stool in the colon suggests changes of constipation, otherwise nonspecific bowel gas pattern      DX-CHEST-PORTABLE (1 VIEW)   Final Result         1.  No acute cardiopulmonary disease.   2.  Atherosclerosis   3.  No radiodense foreign body or medical device identified which would exclude MRI.      CT-HEAD W/O   Final Result         1. No acute intracranial abnormality. No evidence of acute intracranial hemorrhage or mass lesion.               DX-LUMBAR PUNCTURE FOR DIAGNOSIS    (Results Pending)   DX-LUMBAR PUNCTURE FOR DIAGNOSIS    (Results Pending)        Assessment/Plan  * Altered mental status- (present on admission)   Assessment & Plan    Persistent; concern for meningoencephalitis vs seziures  Now afebrile, Resolved leukocytosis, Off pressors  LP-slight elevation protein, normal glucose, only 1 WBC  Meningitis panel-negative  HSV PCR-negative  DC'd vancomycin/amp/ceftraixone/acyclovir as CSF not c/w bacterial or viral meningitis  Non specific findings on EEG x 3, per neuro, seizures unlikely the cause but remains on vimpat  Immunologic workup: RF negative, CRP elevated, CALEB negative, ds DNA positive but negative antibody titer  Blood cxs neg  CT scan did show small aneurysm  MRI did not reveal any stroke or evidence of vasculitis  Continue to follow closely  Neuro continues to follow  Suspected autoimmune encephalitis -no improvement s/p  sterois x 5 days. Day 3 of IVIG and still unchanged  I discussed with neurology and patient carries poor prognosis.  repeat EEG pending  Not successful with bedside LP, I have ordered IR guided LP for tau protein and CJD               Acute respiratory failure with hypoxia ([facility])   Assessment & Plan    Required mechanical ventilation  She was successfully extubated on 4/8  Completed a 5-day course of Zosyn for aspiration PNA  4/17 rapid response for worsening hypoxia. Had pulmonary edema, now resovled       Sepsis ([facility])   Assessment & Plan    This is severe sepsis with the following associated acute organ dysfunction(s): acute kidney failure, acute respiratory failure, metabolic/septic encephalopathy.   Source of infection is unclear  LP neg for infection  Off abx  Sepsis appears to be resolved       Systolic and diastolic CHF, chronic ([facility])   Assessment & Plan    Not in exacerbation  carvedilol and lisinopril     Coronary artery disease involving native coronary artery of native heart without angina pectoris- (present on admission)   Assessment & Plan    Continue aspirin and Lipitor     Chronic obstructive pulmonary disease (HCC)- (present on admission)   Assessment & Plan    History of; she does not have evidence of acute exacerbation   DuoNeb as needed  RT protocol  See above  following     Controlled type 2 diabetes mellitus without complication, without long-term current use of insulin ([facility])- (present on admission)   Assessment & Plan    glycohemoglobin 6.6  She was on metformin outpatient.  BS remain uncontrolled  Cont high scale insulin  I have started her on 15U lantus       Vitamin B12 deficiency   Assessment & Plan    Continue VIt b12 injections to Q7 days as her levels were 190 when last checked. Repeat B12 levels next week     Leukocytosis   Assessment & Plan    no obvious source of infection.  Continue monitoring     Loose stools   Assessment & Plan    C. difficile negative  pn KUB patient did have ileus  vs partial SBO  D/t colitis noted on CT abdomen. She has already completed 5 days of broad spectrum antibiotics.      Abdominal distention   Assessment & Plan    Repeat KUB: 1.  Increased dilatation of air-filled colonic loops is noted as described above compared to the prior radiograph. Findings could be due to a colonic ileus versus partial obstruction in the region of the splenic flexure.  CT of abdomen with contrast consistent with colitis  c diff negative on 4/12  Diarrhea improved with cessation of lactulose     Hypophosphatemia   Assessment & Plan    resolved     Hypomagnesemia   Assessment & Plan    Improving, monitor     Pharyngoesophageal dysphagia   Assessment & Plan    Cortrak feeding     Dyslipidemia- (present on admission)   Assessment & Plan    Continue Lipitor         VTE prophylaxis: heparin

## 2019-04-23 NOTE — PROCEDURES
ROUTINE ELECTROENCEPHALOGRAM REPORT      Referring provider: Dr. COLLINS: 4/23/2019 (total recording of 25 minutes)    INDICATION:  Alice Yarbrough 68 y.o. female presenting with altered mental status, h/o abnormal EEG.     CURRENT ANTIEPILEPTIC REGIMEN: Gabapentin 200 mg tid, Lacosamide 200 mg bid.     TECHNIQUE: 30 channel routine electroencephalogram (EEG) was performed in accordance with the international 10-20 system. The study was reviewed in bipolar and referential montages. The recording examined an encephalopathic patient.     DESCRIPTION OF THE RECORD:  Diffuse delta activity. No reactivity to stimulation. Did not wake up or follow commands. Frequent triphasic waves.     ACTIVATION PROCEDURES:   Not performed.     EKG: sampling of the EKG recording demonstrated sinus rhythm.     INTERPRETATION:  This is an abnormal routine EEG recording in an encephalopathic patient. Moderate to severe, toxic / metabolic encephalopathy suggested. No seizures captured during the study.  Clinical correlation is recommended.    Note: This EEG does not rule out epilepsy.  If the clinical suspicion remains high for seizures, a prolonged recording to capture clinical or subclinical events may be helpful.        Dave Poole MD   Epilepsy and Neurodiagnostics.   Clinical  of Neurology [facility].   Diplomate in Neurology, Epilepsy, and Electrodiagnostic Medicine.   Office: [phone]  Fax: [phone]

## 2019-04-23 NOTE — PROGRESS NOTES
2 RN skin check     Pt's heels are pink and blanching.  Pt's right upper arm with mild skin tear, DUGLAS.  Pt's sacral area intact, no redness noted.

## 2019-04-24 NOTE — PROGRESS NOTES
PT needs to be flat for 3 hours- unfortunately tube feeds have to be on hold when flat feeds will be resumed at 1330.

## 2019-04-24 NOTE — PROGRESS NOTES
For LP this AM- IR asked about consent- called   Linnea,Robert Spouse [phone]   As IR is attempting to do procedure at 0830. He stated he needs to take his son to work then can be over to consent her for procedure.

## 2019-04-24 NOTE — PROGRESS NOTES
Patient Diagnosis and Management daily plan per neurology:        1) Subacute Encephalopathy: not etiology found.  Got LP, which demonstrate protein increasing in CSF despite of the IVIG treatment   Lab pending.  EEG again with triphasic waves, but no seizures         Prolonged unspecific encephalopathy, usually carry a poor prognosis, at this point there is nothing further to add with this patient.  She will need placement, PEG.      Complete neurological note to support plan is below.    Chief Complain:F/U for: altered mental status     SUBJECTIVE:Patient itself minimally responsive   No changes        ROS:  Unable to provide       PHYSICAL EXAMINATION:    Examination unchanged from 4/23/2019      Constitutional: lying in bed, eyes closed, does not respond to call.      CARDIOVASCULAR:s1,s2    PULMONARY:crackles in the bases     EXTREMITIES:pedal edema,     NEUROLOGICAL:    MENTAL:awake, Kiarra EO: 2 VR: 1 MR: 4= 7    CRANIAL NERVES: 2-12 Normal    MOTOR:  mobilizes her extremities t 2/5 symmetric      SENSORY:to pain in all     DTR:+2    BABINSKI: geo    Movements: No abnormal noticed.    CEREBELLAR:  Unable.    GAIT: unable.                      Vitals:    04/24/19 0400 04/24/19 0715 04/24/19 0718 04/24/19 1515   BP: 136/80 122/79  144/84   Pulse: 100 (!) 107  (!) 103   Resp: 16 16  16   Temp: 36.7 °C (98.1 °F) 36.9 °C (98.5 °F)  36.4 °C (97.5 °F)   TempSrc: Temporal Temporal  Temporal   SpO2: 97% 92% (!) 87% 93%   Weight:       Height:                      Imaging: neuroimaging reviewed and directly visualized by me  DX-LUMBAR PUNCTURE FOR DIAGNOSIS   Final Result      Fluoroscopic-guided lumbar puncture as described above.      DX-CHEST-LIMITED (1 VIEW)   Final Result      Diffuse interstitial edema, new since prior study. No focal consolidation or pleural effusions.      CT-ABDOMEN-PELVIS WITH   Final Result      Relatively diffuse colonic wall thickening consistent with colitis possibly due to infection or  ischemia.      Coarse calcified plaque in the abdominal aorta and branch vessels without aneurysm.      Fatty infiltration of the liver.      DX-ABDOMEN FOR TUBE PLACEMENT   Final Result      Feeding tube extends below the diaphragm with tip at the level of the gastric body.      CT-STEALTH HEAD W/O   Final Result      1.  No evidence of acute territorial infarct, intracranial hemorrhage or mass lesion.   2.  Mild diffuse cerebral substance loss.   3.  Mild microangiopathic ischemic change versus demyelination or gliosis.      [id number] VIEW   Final Result      1.  Increased dilatation of air-filled colonic loops is noted as described above compared to the prior radiograph. Findings could be due to a colonic ileus versus partial obstruction in the region of the splenic flexure.      2.  No free air is identified.                  DX-ABDOMEN FOR TUBE PLACEMENT   Final Result      1.  Feeding tube tip overlies the region of the gastric antrum or 1st portion duodenum.   2.  There is a mild ileus.      DX-CHEST-PORTABLE (1 VIEW)   Final Result         1. Interval extubation. No other significant interval change.      DX-CHEST-PORTABLE (1 VIEW)   Final Result         1. No significant interval change.      US-ABDOMEN COMPLETE SURVEY   Final Result      1.  Mildly distended gallbladder with sludge present.   2.  No other evidence for acute cholecystitis.   3.  No significant biliary dilation.   4.  Limited evaluation of the bladder.         [id number] VIEW   Final Result      1.  Feeding tube tip overlies the gastric antrum.      2.  Mild ileus.      DX-CHEST-PORTABLE (1 VIEW)   Final Result         1.  No acute cardiopulmonary disease.   2.  Atherosclerosis         EC-ECHOCARDIOGRAM COMPLETE W/O CONT   Final Result      DX-CHEST-PORTABLE (1 VIEW)   Final Result      1.  Supportive tubing as described above.   2.  No pneumothorax.   3.  Persistent hypoinflation.      MR-BRAIN-W/O   Final Result      No evidence of  intracranial hemorrhage, acute ischemia or mass on this severely motion degraded study      DX-CHEST-LIMITED (1 VIEW)   Final Result         1.  No acute cardiopulmonary disease.   2.  Atherosclerosis   3.  No radiodense foreign body or medical device identified which would exclude MRI.      CT-CTA NECK WITH & W/O-POST PROCESSING   Final Result         1.  Occlusion of the left subclavian artery to the level of the vertebral artery with reconstitution, appearance concerning for subclavian steal.   2.  Intimal thickening versus soft plaque and scattered atherosclerotic calcification of the left carotid arterial tree with less than 50% stenosis.   3.  Small caliber right internal carotid artery   4.  Extensive emphysema      These findings were discussed with the patient's clinician, Dr. Mcallister, on 4/6/2019 4:31 AM.      CT-CTA HEAD WITH & W/O-POST PROCESS   Final Result         1.  Hypoplastic right internal carotid artery, there is a segment of occlusion seen in the distal right intracranial internal carotid artery near the petrous apex. The intracranial internal carotid artery reconstitutes distal to this segment of    occlusion.   2.  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   3.  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   4.  Changes of atrophy with nonspecific white matter changes, most commonly associated with small vessel ischemia.      These findings were discussed with the patient's clinician, Dr. Mcallister, on 4/6/2019 4:31 AM.      DX-ABDOMEN FOR TUBE PLACEMENT   Final Result         1.  Nonspecific bowel gas pattern.   2.  Dobbhoff tube tip overlying the expected location of the pylorus or first duodenal segment.      US-CAROTID DOPPLER BILAT   Final Result      [id number] VIEW   Final Result         1.  Moderate stool in the colon suggests changes of constipation, otherwise nonspecific bowel gas pattern       DX-CHEST-PORTABLE (1 VIEW)   Final Result         1.  No acute cardiopulmonary disease.   2.  Atherosclerosis   3.  No radiodense foreign body or medical device identified which would exclude MRI.      CT-HEAD W/O   Final Result         1. No acute intracranial abnormality. No evidence of acute intracranial hemorrhage or mass lesion.                     Labs:  Recent Labs      04/22/19   0337  04/23/19   0246  04/24/19   0320   WBC  13.2*  8.0  8.9   RBC  4.24  3.88*  4.00*   HEMOGLOBIN  11.6*  10.6*  10.8*   HEMATOCRIT  35.7*  33.2*  34.5*   MCV  84.2  85.6  86.3   MCH  27.4  27.3  27.0   MCHC  32.5*  31.9*  31.3*   RDW  45.9  45.7  46.5   PLATELETCT  338  330  372   MPV  9.7  10.7  10.5     Recent Labs      04/22/19   0337   SODIUM  133*   POTASSIUM  3.9   CHLORIDE  95*   CO2  32   GLUCOSE  317*   BUN  23*   CREATININE  0.48*   CALCIUM  8.8     Recent Labs      04/23/19   0942   APTT  45.5*   INR  1.03                 Recent Labs      04/22/19   0337   SODIUM  133*   POTASSIUM  3.9   CHLORIDE  95*   CO2  32   GLUCOSE  317*   BUN  23*     Recent Labs      04/22/19   0337   SODIUM  133*   POTASSIUM  3.9   CHLORIDE  95*   CO2  32   BUN  23*   CREATININE  0.48*   CALCIUM  8.8     Recent Labs      04/23/19   0942   APTT  45.5*   INR  1.03     No results found for this or any previous visit.                  Yvette Mcdermott M.D.    Diplomate Neurology, Vascular Neurology and Neurophysiology

## 2019-04-24 NOTE — PROGRESS NOTES
2 RN skin check    Pt's heels are pink and blanching.  Pt's elbows are intact and blanching.  Pt's sacrum is intact, no redness appreciated.  Pt with existing healing small wound on her right upper arm, DUGLAS.    Pt is on waffle overlay mattress, turning pt every 2 hours.

## 2019-04-24 NOTE — DISCHARGE PLANNING
Anticipated Discharge Disposition: SNF    Action: Pt discussed during rounds. LSW requested updated PT/OT evaluations for SNF.    Barriers to Discharge: Medical Clearance; SNF Acceptance    Plan: LSW to f/u with [facility]

## 2019-04-24 NOTE — PROGRESS NOTES
1620: IViG infusing at 38.34 ml/hr  98.7, 133/76 95%SpO2, HR95. Tolerating WNL    1650: IViG infusing at 76.68 ml/hr   97.8. 137/77 94%SpO2 HR 97 tolerating WNL    1720: IViG infusing 153.36 ml/hr    97.8 133/78 94%SpO2     1750: IViG infusing at 255.6

## 2019-04-24 NOTE — CARE PLAN
Problem: Venous Thromboembolism (VTW)/Deep Vein Thrombosis (DVT) Prevention:  Goal: Patient will participate in Venous Thrombosis (VTE)/Deep Vein Thrombosis (DVT)Prevention Measures  Outcome: PROGRESSING AS EXPECTED  Pt with SCD sleeves on and machine running. Pt also receiving subcutaneous heparin injection for pharmacological DVT prophylaxis. Will continue to monitor.    Problem: Urinary Elimination:  Goal: Ability to reestablish a normal urinary elimination pattern will improve  Outcome: PROGRESSING AS EXPECTED  Pt remains incontinent of bladder. Staff are checking the pt every hour to see if she has urinated and if so, are cleaning the pt as soon as possible to better protect skin integrity.

## 2019-04-24 NOTE — PROGRESS NOTES
[facility] Medicine Daily Progress Note    Date of Service  4/24/2019    Chief Complaint  68 y.o. female admitted 4/4/2019 with altered mental status    Hospital Course    Ms. Yarbrough is a 68-year-old female with past medical history of  diabetes, COPD, and coronary artery disease that presented to the emergency room with confusion and expressive aphasia.  Per the  patient had been declining over several months which included not taking her medications or eating.  She was started on empiric antibiotics including vancomycin and Rocephin and acyclovir.  Her mental status continued to deteriorate and she began having fevers.  ID was consulted and ampicillin was added on and vancomycin was discontinued.  CT head an MRI of brain did not reveal potential cause of her encephalopathy.  She began developing signs and symptoms of sepsis and was transferred to ICU and required intubation and pressors on 4/6.  Her lumbar puncture done on 4/6 was not impressive for infection and her HSV PCR was  so her acyclovir, ampicillin and ceftriaxone were discontinued.  While intubated patient was receiving tube feeds and had an episode of emesis and was started on Zosyn for possible aspiration pneumonia which she completed a 5-day course of.  She was given thiamine, multivitamins, folate and vitamin B12 for possible metabolic etiology of her encephalopathy.  EEG was done which showed nonspecific focal cortical area irritability over frontal right greater than left side and patient was started on Keppra empirically.  She was extubated on 4/8.  Due to continued encephalopathy, patient was only be oriented to self and not following commands EEG was repeated.  Her EEG continued to show focal cortical irritability over her frontal right greater than left side so she was also started on Vimpat.  Rheumatologic cause was considered which included ESR, CALEB, dsDNA, RF which were all negative except for an elevated CRP and positive dsDNA but  negative antibody titer.  On 4/13 patient again underwent a 24-hour video EEG which continued to show moderate toxic/metabolic encephalopathy with frontal waves.  There was concern for possible SBO for which her tube feeds were held.        Interval Problem Update  No change in mental status  LP done this morning   No improvement with IVIG  I will discuss further with patient's  about POC    Consultants/Specialty  Critical care.  Neurology.    Code Status  Full code    Disposition  TBD    Review of Systems  Review of Systems   Unable to perform ROS: Mental acuity        Physical Exam  Temp:  [36.4 °C (97.5 °F)-36.9 °C (98.5 °F)] 36.4 °C (97.5 °F)  Pulse:  [] 95  Resp:  [16] 16  BP: (122-144)/(76-93) 133/76  SpO2:  [87 %-97 %] 96 %    Physical Exam   Constitutional: She appears well-developed and well-nourished. She appears lethargic. No distress.   HENT:   Head: Normocephalic and atraumatic.   Dry mucous membranes     Cardiovascular: Normal rate and regular rhythm.    No murmur heard.  Pulmonary/Chest: Effort normal and breath sounds normal. No respiratory distress.   Abdominal: Soft. Bowel sounds are normal. She exhibits no distension. There is no tenderness.   Musculoskeletal: Normal range of motion. She exhibits no edema or tenderness.   Neurological: She appears lethargic.   Near comatose state. Not withdrawing to pain today   Skin: Skin is warm and dry. No rash noted. She is not diaphoretic. No erythema. No pallor.   Nursing note and vitals reviewed.      Fluids    Intake/Output Summary (Last 24 hours) at 04/1951  Last data filed at 04/24/19 1630   Gross per 24 hour   Intake              220 ml   Output                0 ml   Net              220 ml       Laboratory  Recent Labs      04/22/19   0337  04/23/19   0246  04/24/19   0320   WBC  13.2*  8.0  8.9   RBC  4.24  3.88*  4.00*   HEMOGLOBIN  11.6*  10.6*  10.8*   HEMATOCRIT  35.7*  33.2*  34.5*   MCV  84.2  85.6  86.3   MCH  27.4  27.3   27.0   MCHC  32.5*  31.9*  31.3*   RDW  45.9  45.7  46.5   PLATELETCT  338  330  372   MPV  9.7  10.7  10.5     Recent Labs      04/22/19   0337   SODIUM  133*   POTASSIUM  3.9   CHLORIDE  95*   CO2  32   GLUCOSE  317*   BUN  23*   CREATININE  0.48*   CALCIUM  8.8     Recent Labs      04/23/19   0942   APTT  45.5*   INR  1.03               Imaging  DX-LUMBAR PUNCTURE FOR DIAGNOSIS   Final Result      Fluoroscopic-guided lumbar puncture as described above.      DX-CHEST-LIMITED (1 VIEW)   Final Result      Diffuse interstitial edema, new since prior study. No focal consolidation or pleural effusions.      CT-ABDOMEN-PELVIS WITH   Final Result      Relatively diffuse colonic wall thickening consistent with colitis possibly due to infection or ischemia.      Coarse calcified plaque in the abdominal aorta and branch vessels without aneurysm.      Fatty infiltration of the liver.      DX-ABDOMEN FOR TUBE PLACEMENT   Final Result      Feeding tube extends below the diaphragm with tip at the level of the gastric body.      CT-STEALTH HEAD W/O   Final Result      1.  No evidence of acute territorial infarct, intracranial hemorrhage or mass lesion.   2.  Mild diffuse cerebral substance loss.   3.  Mild microangiopathic ischemic change versus demyelination or gliosis.      [id number] VIEW   Final Result      1.  Increased dilatation of air-filled colonic loops is noted as described above compared to the prior radiograph. Findings could be due to a colonic ileus versus partial obstruction in the region of the splenic flexure.      2.  No free air is identified.                  DX-ABDOMEN FOR TUBE PLACEMENT   Final Result      1.  Feeding tube tip overlies the region of the gastric antrum or 1st portion duodenum.   2.  There is a mild ileus.      DX-CHEST-PORTABLE (1 VIEW)   Final Result         1. Interval extubation. No other significant interval change.      DX-CHEST-PORTABLE (1 VIEW)   Final Result         1. No  significant interval change.      US-ABDOMEN COMPLETE SURVEY   Final Result      1.  Mildly distended gallbladder with sludge present.   2.  No other evidence for acute cholecystitis.   3.  No significant biliary dilation.   4.  Limited evaluation of the bladder.         AD-FETVISY-0 VIEW   Final Result      1.  Feeding tube tip overlies the gastric antrum.      2.  Mild ileus.      DX-CHEST-PORTABLE (1 VIEW)   Final Result         1.  No acute cardiopulmonary disease.   2.  Atherosclerosis         EC-ECHOCARDIOGRAM COMPLETE W/O CONT   Final Result      DX-CHEST-PORTABLE (1 VIEW)   Final Result      1.  Supportive tubing as described above.   2.  No pneumothorax.   3.  Persistent hypoinflation.      MR-BRAIN-W/O   Final Result      No evidence of intracranial hemorrhage, acute ischemia or mass on this severely motion degraded study      DX-CHEST-LIMITED (1 VIEW)   Final Result         1.  No acute cardiopulmonary disease.   2.  Atherosclerosis   3.  No radiodense foreign body or medical device identified which would exclude MRI.      CT-CTA NECK WITH & W/O-POST PROCESSING   Final Result         1.  Occlusion of the left subclavian artery to the level of the vertebral artery with reconstitution, appearance concerning for subclavian steal.   2.  Intimal thickening versus soft plaque and scattered atherosclerotic calcification of the left carotid arterial tree with less than 50% stenosis.   3.  Small caliber right internal carotid artery   4.  Extensive emphysema      These findings were discussed with the patient's clinician, Dr. Mcallister, on 4/6/2019 4:31 AM.      CT-CTA HEAD WITH & W/O-POST PROCESS   Final Result         1.  Hypoplastic right internal carotid artery, there is a segment of occlusion seen in the distal right intracranial internal carotid artery near the petrous apex. The intracranial internal carotid artery reconstitutes distal to this segment of    occlusion.   2.  1.9 mm anterior communicating  artery aneurysm, followup IR evaluation for management.   3.  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   4.  Changes of atrophy with nonspecific white matter changes, most commonly associated with small vessel ischemia.      These findings were discussed with the patient's clinician, Dr. Mcallister, on 4/6/2019 4:31 AM.      DX-ABDOMEN FOR TUBE PLACEMENT   Final Result         1.  Nonspecific bowel gas pattern.   2.  Dobbhoff tube tip overlying the expected location of the pylorus or first duodenal segment.      US-CAROTID DOPPLER BILAT   Final Result      [id number] VIEW   Final Result         1.  Moderate stool in the colon suggests changes of constipation, otherwise nonspecific bowel gas pattern      DX-CHEST-PORTABLE (1 VIEW)   Final Result         1.  No acute cardiopulmonary disease.   2.  Atherosclerosis   3.  No radiodense foreign body or medical device identified which would exclude MRI.      CT-HEAD W/O   Final Result         1. No acute intracranial abnormality. No evidence of acute intracranial hemorrhage or mass lesion.                    Assessment/Plan  * Altered mental status- (present on admission)   Assessment & Plan    Persistent; concern for meningoencephalitis vs seziures  Now afebrile, Resolved leukocytosis, Off pressors  LP-slight elevation protein, normal glucose, only 1 WBC  Meningitis panel-negative  HSV PCR-negative  DC'd vancomycin/amp/ceftraixone/acyclovir as CSF not c/w bacterial or viral meningitis  Non specific findings on EEG x 4, per neuro, seizures unlikely the cause but remains on vimpat  Immunologic workup: RF negative, CRP elevated, CALEB negative, ds DNA positive but negative antibody titer  Blood cxs neg  CT scan did show small aneurysm  MRI did not reveal any stroke or evidence of vasculitis  Continue to follow closely  Neuro continues to follow  Suspected autoimmune encephalitis -no improvement s/p steroids x 5 days and IVIG  I  discussed with neurology and patient carries poor prognosis.    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 CSF fluid with more protein s/p steroids and IVIG. No further recommendations. I will discuss with patient's POA             Acute respiratory failure with hypoxia ([facility])   Assessment & Plan    Required mechanical ventilation  She was successfully extubated on 4/8  Completed a 5-day course of Zosyn for aspiration PNA  4/17 rapid response for worsening hypoxia. Had pulmonary edema, now resovled       Sepsis ([facility])   Assessment & Plan    This is severe sepsis with the following associated acute organ dysfunction(s): acute kidney failure, acute respiratory failure, metabolic/septic encephalopathy.   Source of infection is unclear  LP neg for infection  Off abx  Sepsis appears to be resolved       Systolic and diastolic CHF, chronic ([facility])   Assessment & Plan    Not in exacerbation  carvedilol and lisinopril     Coronary artery disease involving native coronary artery of native heart without angina pectoris- (present on admission)   Assessment & Plan    Continue aspirin and Lipitor     Chronic obstructive pulmonary disease ([facility])- (present on admission)   Assessment & Plan    History of; she does not have evidence of acute exacerbation   DuoNeb as needed  RT protocol  See above  following     Controlled type 2 diabetes mellitus without complication, without long-term current use of insulin ([facility])- (present on admission)   Assessment & Plan    glycohemoglobin 6.6  She was on metformin outpatient.  BS remain uncontrolled  Cont high scale insulin  I have increased her  lantus       Vitamin B12 deficiency   Assessment & Plan    Resolved with IM B12     Leukocytosis   Assessment & Plan    no obvious source of infection.  Continue monitoring     Loose stools   Assessment & Plan    C. difficile negative  pn KUB patient did have ileus vs partial SBO  D/t colitis noted on CT abdomen. She has already  completed 5 days of broad spectrum antibiotics.      Abdominal distention   Assessment & Plan    Repeat KUB: 1.  Increased dilatation of air-filled colonic loops is noted as described above compared to the prior radiograph. Findings could be due to a colonic ileus versus partial obstruction in the region of the splenic flexure.  CT of abdomen with contrast consistent with colitis  c diff negative on 4/12  Diarrhea improved with cessation of lactulose     Hypophosphatemia   Assessment & Plan    resolved     Hypomagnesemia   Assessment & Plan    Improving, monitor     Pharyngoesophageal dysphagia   Assessment & Plan    Cortrak feeding     Dyslipidemia- (present on admission)   Assessment & Plan    Continue Lipitor         VTE prophylaxis: heparin

## 2019-04-25 PROBLEM — Z71.89 ADVANCE CARE PLANNING: Status: ACTIVE | Noted: 2019-01-01

## 2019-04-25 NOTE — CARE PLAN
Problem: Urinary Elimination:  Goal: Ability to reestablish a normal urinary elimination pattern will improve  Outcome: PROGRESSING SLOWER THAN EXPECTED  Patient incontinent of urine. Frequent linen changes performed;     Problem: Skin Integrity  Goal: Risk for impaired skin integrity will decrease  Outcome: PROGRESSING AS EXPECTED  Q2 hour turns in place. Patient incontinent of urine. Barrier wipes and barrier cream in use;

## 2019-04-25 NOTE — PROGRESS NOTES
Patient lethargic. Opens eyes when name is called however, is nonverbal.  No signs of pain or discomfort noted upon assessment;  Tube feeds infusing at 60 mL/hr (goal rate).   Q2 hour turns in place  Patient incontinent of stool/urine. Frequent linen changes performed;  Patient resting comfortably in bed.  Call light within reach, bed locked and in lowest position, room near nurses station and hourly rounding in place.

## 2019-04-25 NOTE — PROGRESS NOTES
Patient Diagnosis and Management daily plan per neurology:        1) Subacute Encephalopathy: not etiology found.  Day # 21  Unchanged since admission  Recommend placement SNIF for now, uncertain till now , if amenable of improvement.  Will get Provigil 100 mg daily to see if improvement.  EEG repeated only triphasic waves as usual.  Pending: oligoclonal banding.  Tau and CJD.  Will get Voltage gated K antibodies as well.  PEG to be consider for placement     Neurology will sign off, test ordered will take more than 2 weeks to get back .      2) Akinetic Mutism: see above.    She will need placement, PEG.      Complete neurological note to support plan is below.    Chief Complain:F/U for: altered mental status     SUBJECTIVE:Patient itself minimally responsive   No changes        ROS:  Unable to provide       PHYSICAL EXAMINATION:    Examination unchanged from 4/24/2019      Constitutional: lying in bed, eyes closed, does not respond to call, or even some pain stimulation      CARDIOVASCULAR:s1,s2    PULMONARY:crackles in the bases     EXTREMITIES:pedal edema,     NEUROLOGICAL:    MENTAL:awake, Kiar[place] EO: 2 VR: 1 MR: 4= 7    CRANIAL NERVES: 2-12 Normal    MOTOR:  mobilizes her extremities  2/5 symmetric      SENSORY:to pain in all     DTR:+2    BABINSKI: geo    Movements: No abnormal noticed.    CEREBELLAR:  Unable.    GAIT: unable.                      Vitals:    04/24/19 2000 04/25/19 0400 04/25/19 0720 04/25/19 0725   BP: 132/84 140/81 (!) 90/59 (!) 92/59   Pulse: 95 (!) 105 (!) 101    Resp: 16 16 18    Temp: 37.2 °C (99 °F) 36.8 °C (98.3 °F) 36.6 °C (97.9 °F)    TempSrc: Temporal Temporal Temporal    SpO2: 98% 95% 96%    Weight:  74.4 kg (164 lb 0.4 oz)     Height:                      Imaging: neuroimaging reviewed and directly visualized by me  DX-LUMBAR PUNCTURE FOR DIAGNOSIS   Final Result      Fluoroscopic-guided lumbar puncture as described above.      DX-CHEST-LIMITED (1 VIEW)   Final Result      Diffuse  interstitial edema, new since prior study. No focal consolidation or pleural effusions.      CT-ABDOMEN-PELVIS WITH   Final Result      Relatively diffuse colonic wall thickening consistent with colitis possibly due to infection or ischemia.      Coarse calcified plaque in the abdominal aorta and branch vessels without aneurysm.      Fatty infiltration of the liver.      DX-ABDOMEN FOR TUBE PLACEMENT   Final Result      Feeding tube extends below the diaphragm with tip at the level of the gastric body.      CT-STEALTH HEAD W/O   Final Result      1.  No evidence of acute territorial infarct, intracranial hemorrhage or mass lesion.   2.  Mild diffuse cerebral substance loss.   3.  Mild microangiopathic ischemic change versus demyelination or gliosis.      [id number] VIEW   Final Result      1.  Increased dilatation of air-filled colonic loops is noted as described above compared to the prior radiograph. Findings could be due to a colonic ileus versus partial obstruction in the region of the splenic flexure.      2.  No free air is identified.                  DX-ABDOMEN FOR TUBE PLACEMENT   Final Result      1.  Feeding tube tip overlies the region of the gastric antrum or 1st portion duodenum.   2.  There is a mild ileus.      DX-CHEST-PORTABLE (1 VIEW)   Final Result         1. Interval extubation. No other significant interval change.      DX-CHEST-PORTABLE (1 VIEW)   Final Result         1. No significant interval change.      US-ABDOMEN COMPLETE SURVEY   Final Result      1.  Mildly distended gallbladder with sludge present.   2.  No other evidence for acute cholecystitis.   3.  No significant biliary dilation.   4.  Limited evaluation of the bladder.         [id number] VIEW   Final Result      1.  Feeding tube tip overlies the gastric antrum.      2.  Mild ileus.      DX-CHEST-PORTABLE (1 VIEW)   Final Result         1.  No acute cardiopulmonary disease.   2.  Atherosclerosis         EC-ECHOCARDIOGRAM  COMPLETE W/O CONT   Final Result      DX-CHEST-PORTABLE (1 VIEW)   Final Result      1.  Supportive tubing as described above.   2.  No pneumothorax.   3.  Persistent hypoinflation.      MR-BRAIN-W/O   Final Result      No evidence of intracranial hemorrhage, acute ischemia or mass on this severely motion degraded study      DX-CHEST-LIMITED (1 VIEW)   Final Result         1.  No acute cardiopulmonary disease.   2.  Atherosclerosis   3.  No radiodense foreign body or medical device identified which would exclude MRI.      CT-CTA NECK WITH & W/O-POST PROCESSING   Final Result         1.  Occlusion of the left subclavian artery to the level of the vertebral artery with reconstitution, appearance concerning for subclavian steal.   2.  Intimal thickening versus soft plaque and scattered atherosclerotic calcification of the left carotid arterial tree with less than 50% stenosis.   3.  Small caliber right internal carotid artery   4.  Extensive emphysema      These findings were discussed with the patient's clinician, Dr. Mcallister, on 4/6/2019 4:31 AM.      CT-CTA HEAD WITH & W/O-POST PROCESS   Final Result         1.  Hypoplastic right internal carotid artery, there is a segment of occlusion seen in the distal right intracranial internal carotid artery near the petrous apex. The intracranial internal carotid artery reconstitutes distal to this segment of    occlusion.   2.  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   3.  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   4.  Changes of atrophy with nonspecific white matter changes, most commonly associated with small vessel ischemia.      These findings were discussed with the patient's clinician, Dr. Mcallister, on 4/6/2019 4:31 AM.      DX-ABDOMEN FOR TUBE PLACEMENT   Final Result         1.  Nonspecific bowel gas pattern.   2.  Dobbhoff tube tip overlying the expected location of the  pylorus or first duodenal segment.      US-CAROTID DOPPLER BILAT   Final Result      [id number]-4 VIEW   Final Result         1.  Moderate stool in the colon suggests changes of constipation, otherwise nonspecific bowel gas pattern      DX-CHEST-PORTABLE (1 VIEW)   Final Result         1.  No acute cardiopulmonary disease.   2.  Atherosclerosis   3.  No radiodense foreign body or medical device identified which would exclude MRI.      CT-HEAD W/O   Final Result         1. No acute intracranial abnormality. No evidence of acute intracranial hemorrhage or mass lesion.                     Labs:  Recent Labs      04/23/19   0246  04/24/19   0320   WBC  8.0  8.9   RBC  3.88*  4.00*   HEMOGLOBIN  10.6*  10.8*   HEMATOCRIT  33.2*  34.5*   MCV  85.6  86.3   MCH  27.3  27.0   MCHC  31.9*  31.3*   RDW  45.7  46.5   PLATELETCT  330  372   MPV  10.7  10.5         Recent Labs      04/23/19   0942   APTT  45.5*   INR  1.03                 No results for input(s): SODIUM, POTASSIUM, CHLORIDE, CO2, GLUCOSE, BUN, CPKTOTAL in the last 72 hours.  No results for input(s): SODIUM, POTASSIUM, CHLORIDE, CO2, BUN, CREATININE, MAGNESIUM, PHOSPHORUS, CALCIUM in the last 72 hours.  Recent Labs      04/23/19   0942   APTT  45.5*   INR  1.03     No results found for this or any previous visit.                  Yvette Mcdermott M.D.    Diplomate Neurology, Vascular Neurology and Neurophysiology

## 2019-04-25 NOTE — PROGRESS NOTES
2 RN skin check complete;    Scabbing noted to left side of lower lip;  Existing, small wound noted to RUE;  Bruising noted to abdomen;.   Elbows pink and blanching; Preventative mepilex in place;  Heals pink and blanching. Heal float boots in place;  Waffle mattress overlay in use;  Q2 hour turns in place    No other skin issued noted.

## 2019-04-25 NOTE — PROGRESS NOTES
[facility] Medicine Daily Progress Note    Date of Service  4/25/2019    Chief Complaint  68 y.o. female admitted 4/4/2019 with altered mental status    Hospital Course    Ms. Yarbrough is a 68-year-old female with past medical history of  diabetes, COPD, and coronary artery disease that presented to the emergency room with confusion and expressive aphasia.  Per the  patient had been declining over several months which included not taking her medications or eating.  She was started on empiric antibiotics including vancomycin and Rocephin and acyclovir.  Her mental status continued to deteriorate and she began having fevers.  ID was consulted and ampicillin was added on and vancomycin was discontinued.  CT head an MRI of brain did not reveal potential cause of her encephalopathy.  She began developing signs and symptoms of sepsis and was transferred to ICU and required intubation and pressors on 4/6.  Her lumbar puncture done on 4/6 was not impressive for infection and her HSV PCR was  so her acyclovir, ampicillin and ceftriaxone were discontinued.  While intubated patient was receiving tube feeds and had an episode of emesis and was started on Zosyn for possible aspiration pneumonia which she completed a 5-day course of.  She was given thiamine, multivitamins, folate and vitamin B12 for possible metabolic etiology of her encephalopathy.  EEG was done which showed nonspecific focal cortical area irritability over frontal right greater than left side and patient was started on Keppra empirically.  She was extubated on 4/8.  Due to continued encephalopathy, patient was only be oriented to self and not following commands EEG was repeated.  Her EEG continued to show focal cortical irritability over her frontal right greater than left side so she was also started on Vimpat.  Rheumatologic cause was considered which included ESR, CALEB, dsDNA, RF which were all negative except for an elevated CRP and positive dsDNA but  negative antibody titer.  On 4/13 patient again underwent a 24-hour video EEG which continued to show moderate toxic/metabolic encephalopathy with frontal waves.  There was concern for possible SBO for which her tube feeds were held.        Interval Problem Update  No change in mental status  LP done yesterday with increased protein   I discussed case with neurology, and they do not have any further recommendations as patient has already tried course of antibiotics, antiepileptics, IV steroids, IVIG with no improvement. As we do not know the cause of this, there may be some potential for recovery but this is unknown.  Per neurology, it is reasonable to give patient at least 2 months to see if she recovers from this.  I had a lengthy discussion with patient's  and power of , Richard, and explained to him again all of the measures we have done including multiple EEGs and lumbar puncture.  I explained to him the neurologist recommendation including SNF placement and PEG tube placement.  Patient is open to this.  I will consult gastroenterology for PEG tube placement      Consultants/Specialty  Critical care.  Neurology.  Gastroenterology    Code Status  Full code    Disposition  SNF    Review of Systems  Review of Systems   Unable to perform ROS: Mental acuity        Physical Exam  Temp:  [36.4 °C (97.5 °F)-37.2 °C (99 °F)] 36.6 °C (97.9 °F)  Pulse:  [] 101  Resp:  [16-18] 18  BP: ()/(59-84) 92/59  SpO2:  [93 %-98 %] 96 %    Physical Exam   Constitutional: She appears well-developed and well-nourished. She appears lethargic. No distress.   HENT:   Head: Normocephalic and atraumatic.   Cardiovascular: Normal rate and regular rhythm.    No murmur heard.  Pulmonary/Chest: Effort normal and breath sounds normal. No respiratory distress.   Abdominal: Soft. Bowel sounds are normal. She exhibits no distension. There is no tenderness.   Musculoskeletal: Normal range of motion. She exhibits no edema or  tenderness.   Neurological: She appears lethargic.   Near comatose state. Not withdrawing to pain today   Skin: Skin is warm and dry. No rash noted. She is not diaphoretic. No erythema. No pallor.   Nursing note and vitals reviewed.      Fluids    Intake/Output Summary (Last 24 hours) at 04/25/19 1157  Last data filed at 04/25/19 0600   Gross per 24 hour   Intake             1100 ml   Output                0 ml   Net             1100 ml       Laboratory  Recent Labs      04/23/19   0246  04/24/19   0320   WBC  8.0  8.9   RBC  3.88*  4.00*   HEMOGLOBIN  10.6*  10.8*   HEMATOCRIT  33.2*  34.5*   MCV  85.6  86.3   MCH  27.3  27.0   MCHC  31.9*  31.3*   RDW  45.7  46.5   PLATELETCT  330  372   MPV  10.7  10.5         Recent Labs      04/23/19   0942   APTT  45.5*   INR  1.03               Imaging  DX-LUMBAR PUNCTURE FOR DIAGNOSIS   Final Result      Fluoroscopic-guided lumbar puncture as described above.      DX-CHEST-LIMITED (1 VIEW)   Final Result      Diffuse interstitial edema, new since prior study. No focal consolidation or pleural effusions.      CT-ABDOMEN-PELVIS WITH   Final Result      Relatively diffuse colonic wall thickening consistent with colitis possibly due to infection or ischemia.      Coarse calcified plaque in the abdominal aorta and branch vessels without aneurysm.      Fatty infiltration of the liver.      DX-ABDOMEN FOR TUBE PLACEMENT   Final Result      Feeding tube extends below the diaphragm with tip at the level of the gastric body.      CT-STEALTH HEAD W/O   Final Result      1.  No evidence of acute territorial infarct, intracranial hemorrhage or mass lesion.   2.  Mild diffuse cerebral substance loss.   3.  Mild microangiopathic ischemic change versus demyelination or gliosis.      [id number]-0 VIEW   Final Result      1.  Increased dilatation of air-filled colonic loops is noted as described above compared to the prior radiograph. Findings could be due to a colonic ileus versus partial  obstruction in the region of the splenic flexure.      2.  No free air is identified.                  DX-ABDOMEN FOR TUBE PLACEMENT   Final Result      1.  Feeding tube tip overlies the region of the gastric antrum or 1st portion duodenum.   2.  There is a mild ileus.      DX-CHEST-PORTABLE (1 VIEW)   Final Result         1. Interval extubation. No other significant interval change.      DX-CHEST-PORTABLE (1 VIEW)   Final Result         1. No significant interval change.      US-ABDOMEN COMPLETE SURVEY   Final Result      1.  Mildly distended gallbladder with sludge present.   2.  No other evidence for acute cholecystitis.   3.  No significant biliary dilation.   4.  Limited evaluation of the bladder.         [id number]-4 VIEW   Final Result      1.  Feeding tube tip overlies the gastric antrum.      2.  Mild ileus.      DX-CHEST-PORTABLE (1 VIEW)   Final Result         1.  No acute cardiopulmonary disease.   2.  Atherosclerosis         EC-ECHOCARDIOGRAM COMPLETE W/O CONT   Final Result      DX-CHEST-PORTABLE (1 VIEW)   Final Result      1.  Supportive tubing as described above.   2.  No pneumothorax.   3.  Persistent hypoinflation.      MR-BRAIN-W/O   Final Result      No evidence of intracranial hemorrhage, acute ischemia or mass on this severely motion degraded study      DX-CHEST-LIMITED (1 VIEW)   Final Result         1.  No acute cardiopulmonary disease.   2.  Atherosclerosis   3.  No radiodense foreign body or medical device identified which would exclude MRI.      CT-CTA NECK WITH & W/O-POST PROCESSING   Final Result         1.  Occlusion of the left subclavian artery to the level of the vertebral artery with reconstitution, appearance concerning for subclavian steal.   2.  Intimal thickening versus soft plaque and scattered atherosclerotic calcification of the left carotid arterial tree with less than 50% stenosis.   3.  Small caliber right internal carotid artery   4.  Extensive emphysema      These  findings were discussed with the patient's clinician, Dr. Mcallister, on 4/6/2019 4:31 AM.      CT-CTA HEAD WITH & W/O-POST PROCESS   Final Result         1.  Hypoplastic right internal carotid artery, there is a segment of occlusion seen in the distal right intracranial internal carotid artery near the petrous apex. The intracranial internal carotid artery reconstitutes distal to this segment of    occlusion.   2.  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   3.  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   4.  Changes of atrophy with nonspecific white matter changes, most commonly associated with small vessel ischemia.      These findings were discussed with the patient's clinician, Dr. Mcallister, on 4/6/2019 4:31 AM.      DX-ABDOMEN FOR TUBE PLACEMENT   Final Result         1.  Nonspecific bowel gas pattern.   2.  Dobbhoff tube tip overlying the expected location of the pylorus or first duodenal segment.      US-CAROTID DOPPLER BILAT   Final Result      [id number] VIEW   Final Result         1.  Moderate stool in the colon suggests changes of constipation, otherwise nonspecific bowel gas pattern      DX-CHEST-PORTABLE (1 VIEW)   Final Result         1.  No acute cardiopulmonary disease.   2.  Atherosclerosis   3.  No radiodense foreign body or medical device identified which would exclude MRI.      CT-HEAD W/O   Final Result         1. No acute intracranial abnormality. No evidence of acute intracranial hemorrhage or mass lesion.                    Assessment/Plan  * Altered mental status- (present on admission)   Assessment & Plan    Persistent; concern for meningoencephalitis vs seziures  Now afebrile, Resolved leukocytosis, Off pressors  LP-slight elevation protein, normal glucose, only 1 WBC  Meningitis panel-negative  HSV PCR-negative  DC'd vancomycin/amp/ceftraixone/acyclovir as CSF not c/w bacterial or viral  meningitis  Non specific findings on EEG x 4, per neuro, seizures unlikely the cause but remains on vimpat  Immunologic workup: RF negative, CRP elevated, CALEB negative, ds DNA positive but negative antibody titer  Blood cxs neg  CT scan did show small aneurysm  MRI did not reveal any stroke or evidence of vasculitis  Continue to follow closely  Neuro continues to follow  Suspected autoimmune encephalitis -no improvement s/p steroids x 5 days and IVIG  I discussed with neurology and patient carries poor prognosis.    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 CSF fluid with more protein s/p steroids and IVIG. No further recommendations.   I discussed with patient's  about SNF placement and PEG tube placement.  He is agreeable to this.             Acute respiratory failure with hypoxia (HCC)   Assessment & Plan    Required mechanical ventilation  She was successfully extubated on 4/8  Completed a 5-day course of Zosyn for aspiration PNA  4/17 rapid response for worsening hypoxia. Had pulmonary edema, now resovled       Sepsis ([facility])   Assessment & Plan    This is severe sepsis with the following associated acute organ dysfunction(s): acute kidney failure, acute respiratory failure, metabolic/septic encephalopathy.   Source of infection is unclear  LP neg for infection  Off abx  Sepsis appears to be resolved       Systolic and diastolic CHF, chronic (HCC)   Assessment & Plan    Not in exacerbation  carvedilol and lisinopril     Coronary artery disease involving native coronary artery of native heart without angina pectoris- (present on admission)   Assessment & Plan    Continue aspirin and Lipitor     Chronic obstructive pulmonary disease (HCC)- (present on admission)   Assessment & Plan    History of; she does not have evidence of acute exacerbation   DuoNeb as needed  RT protocol  See above  following     Controlled type 2 diabetes mellitus without complication, without long-term  current use of insulin (HCC)- (present on admission)   Assessment & Plan    glycohemoglobin 6.6  She was on metformin outpatient.  BS remain uncontrolled  Cont high scale insulin  I have increased her  lantus       Advance care planning   Assessment & Plan    Discussion with patient's  about patient's continued encephalopathy and near stuporous state.  As we were unable to find a diagnosis for this.  It is unclear if she will have any chance of meaningful recovery.  Per neurology it is reasonable to wait at least 2 months to see if she improves.  I discussed with patient at length about CODE STATUS.  He states his wife would not want to be in a vegetative state and that they have speak to about this before and neither of them would want to be hooked up to machines.  After lengthy discussion, her CODE STATUS has been changed from full code to DNR/DNI.     Vitamin B12 deficiency   Assessment & Plan    Resolved with IM B12     Leukocytosis   Assessment & Plan    no obvious source of infection.  Continue monitoring     Loose stools   Assessment & Plan    C. difficile negative  pn KUB patient did have ileus vs partial SBO  D/t colitis noted on CT abdomen. She has already completed 5 days of broad spectrum antibiotics.      Abdominal distention   Assessment & Plan    Repeat KUB: 1.  Increased dilatation of air-filled colonic loops is noted as described above compared to the prior radiograph. Findings could be due to a colonic ileus versus partial obstruction in the region of the splenic flexure.  CT of abdomen with contrast consistent with colitis  c diff negative on 4/12  Diarrhea improved with cessation of lactulose     Hypophosphatemia   Assessment & Plan    resolved     Hypomagnesemia   Assessment & Plan    Improving, monitor     Pharyngoesophageal dysphagia   Assessment & Plan    Cortrak feeding     Dyslipidemia- (present on admission)   Assessment & Plan    Continue Lipitor         VTE prophylaxis:  heparin    I discussed advance care planning with the patient for at least 20 minutes with the patient, including diagnosis, prognosis, plan of care, risks and benefits of any therapies that could be offered, as well as alternatives including palliation and hospice, as appropriate. The patient has opted medical management and CODE STATUS has been changed to DNR/DNI. Time spent is exclusive of evaluation and management or other separately billable procedures. Start time: 10:11 end time: 10:32

## 2019-04-26 PROBLEM — R00.0 TACHYCARDIA: Status: ACTIVE | Noted: 2019-01-01

## 2019-04-26 NOTE — CARE PLAN
Problem: Communication  Goal: The ability to communicate needs accurately and effectively will improve  Outcome: PROGRESSING SLOWER THAN EXPECTED  Pt lethargic; unable to follow commands or use call light. Nonverbal pain descriptors are being used to evaluate pain; hourly rounding, Q2H turns in place.    Problem: Urinary Elimination:  Goal: Ability to reestablish a normal urinary elimination pattern will improve  Outcome: PROGRESSING SLOWER THAN EXPECTED  Pt incontinent of urine x2 and stool x1, unable to call for assistance. Hourly rounding in place.

## 2019-04-26 NOTE — THERAPY
Physical Therapy: Request for updated notes from charge RN and case management. Please see last note from treatment done on 4/22 which report pt was unable to actively participate in therapy, showing no purposeful movement, unable to follow, total assist to EOB and dependent to maintain sitting. Request at that time for reorders if pt improved enough to actively participate in therapy. Update with today's nurse indicates that pt remains borderline obtunded and has not shown significant improvement that would indicate that she would be appropriate for therapy re-evaluation.-Olivia Peters, PT

## 2019-04-26 NOTE — PALLIATIVE CARE
Palliative Care   Attempted to visit with pt, pt is lethargic and does not engage in conversation.    Called spouse Richard ([phone]), he will be at bedside tomorrow 4/27 at 0900 and will be available to discuss GOC.     Updated:  PC Team    Plan:   Meet with spouse 4/27 at 0900 to discuss GOC.     Thank you for allowing Palliative Care to participate in this patient's care. Please feel free to call [phone] with any questions or concerns.  
yes

## 2019-04-26 NOTE — PROGRESS NOTES
Assumed care of pt at approximately 1900. Pt very lethargic, unable to follow commands however withdraws to painful stimuli and occasionally shrugs shoulders. 2 RN skin check complete. Pt tolerating Diabetisource tube feed at 60mL/hr. Last . Pt incontinent of urine x3, stool x1. Nonverbal pain descriptors used to evaluate pain; pt resting comfortably with no labored breathing, no restlessness, no grimacing noted. Q2H turns, heel float boots in place, pillows used for repositioning. Will continue to round hourly.

## 2019-04-26 NOTE — CARE PLAN
Problem: Safety  Goal: Will remain free from falls  Outcome: PROGRESSING AS EXPECTED  Treaded socks in place. Bed alarm on. Bed locked and in lowest position. Call light within reach.     Problem: Skin Integrity  Goal: Risk for impaired skin integrity will decrease  Outcome: PROGRESSING AS EXPECTED  Q2 turning and waffle overlay in place. Barrier wipes and cream in use.

## 2019-04-26 NOTE — DISCHARGE PLANNING
Agency/Facility Name: [facility]  Plan or Request: [facility] referral sent per Larissa's(TYW) request.

## 2019-04-26 NOTE — PROGRESS NOTES
Assumed care of pt at 0700.   Pt is lethargic, unable to assess orientation.   Pt opens eyes when her name is called, but falls back asleep almost immediately. Pt is not able to follow commands.  Coretrak in place in R nare running Diabetasource at 60ml/hr, which is goal.   Q2 turning and hourly rounding in place.

## 2019-04-26 NOTE — DIETARY
Nutrition support weekly update:  Day 20 of admit.  Alice Yarbrough is a 68 y.o. female with admitting DX of AMS.  Tube feeding initiated on 4/6. Current TF via gastric Cortrak is Diabetisource AC, goal rate 60 ml/hr, providing 1728 kcals, 86 grams protein, 1181 mL free water.      Assessment:  Weight 74.4 kg (admit wt 79 kg via bed scale 4/4)      Evaluation:   1. Weight loss 4.6 kg over 20 days, expected with hospitalization r/t reduced activity and fluid shifts.    2. Labs (4/22): Na 133, Glu 317, , 257, 284, 302, 301 (4/25-4/26), PAB 11, CRP 18.99  3. Meds: Lantus, SSI, Lipitor   4. Last BM 4/25  5. Current feeding meeting estimated nutrition needs.      Malnutrition risk: No indicators at this time.    Recommendations/Plan:  1. Continue TF formula Diabetisource at 60 ml/hr  2. Monitor weight  3. Fluids per MD  4. PO diet per SLP when medically feasible.     RD following

## 2019-04-26 NOTE — PROGRESS NOTES
2 RN skin completed with Elizabeth AARON. Pt has blanching redness to bilateral elbows and sacrum. Pt has bruising to abdomen and a scab to L lower lip. Bruising to bilateral upper extremities noted. All other skin WDL. Q2 turning and waffle overlay in place. Barrier wipes and mepilex in use.

## 2019-04-26 NOTE — PROGRESS NOTES
2 RN skin check with AGNIESZKA Smith    -Cortrak to right nare; skin pink, moist, intact  -Scab to left lower lip  -Bruising to BUE  -Skin tear to RUE  -Bilateral elbows red, blanching; protective mepilex in place  -Bruising to abdomen  -Sacrum red, blanching  -Skin otherwise intact, pink, warm, blanching  Q2H turns, heel float boots in place

## 2019-04-27 NOTE — PROGRESS NOTES
2 RN skin check with AGNIESZKA Smith    -Scab to left lower lip  -Bruising to BUE  -Bruising to trunk, abdomen  -Skin tear to RUE  -Sacrum red/pink, blanching   -Skin otherwise intact, pink, warm, blanching  Q2H turns, heel float boots, waffle cushion in place

## 2019-04-27 NOTE — PROGRESS NOTES
[facility] Medicine Daily Progress Note    Date of Service  4/27/2019    Chief Complaint  68 y.o. female admitted 4/4/2019 with altered mental status    Hospital Course    Ms. Yarbrough is a 68-year-old female with past medical history of  diabetes, COPD, and coronary artery disease that presented to the emergency room with confusion and expressive aphasia.  Per the  patient had been declining over several months which included not taking her medications or eating.  She was started on empiric antibiotics including vancomycin and Rocephin and acyclovir.  Her mental status continued to deteriorate and she began having fevers.  ID was consulted and ampicillin was added on and vancomycin was discontinued.  CT head an MRI of brain did not reveal potential cause of her encephalopathy.  She began developing signs and symptoms of sepsis and was transferred to ICU and required intubation and pressors on 4/6.  Her lumbar puncture done on 4/6 was not impressive for infection and her HSV PCR was  so her acyclovir, ampicillin and ceftriaxone were discontinued.  While intubated patient was receiving tube feeds and had an episode of emesis and was started on Zosyn for possible aspiration pneumonia which she completed a 5-day course of.  She was given thiamine, multivitamins, folate and vitamin B12 for possible metabolic etiology of her encephalopathy.  EEG was done which showed nonspecific focal cortical area irritability over frontal right greater than left side and patient was started on Keppra empirically.  She was extubated on 4/8.  Due to continued encephalopathy, patient was only be oriented to self and not following commands EEG was repeated.  Her EEG continued to show focal cortical irritability over her frontal right greater than left side so she was also started on Vimpat.  Rheumatologic cause was considered which included ESR, CALEB, dsDNA, RF which were all negative except for an elevated CRP and positive dsDNA but  negative antibody titer.  On 4/13 patient again underwent a 24-hour video EEG which continued to show moderate toxic/metabolic encephalopathy with frontal waves.  There was concern for possible SBO for which her tube feeds were held which eventually resolved. For possibility of autoimmune encephalitis, IV steroids x 5 days and IVIG x 5 days was empirically tried with no improvement in symptoms. Repeat lumbar puncture showed increased CSF protein.         Interval Problem Update  No change in mental status  Pt hypoxic last night requiring 10L oxymask, fever of 101, continued tachcyardia and hypotension  Received NS bolus and tylenol  Large mucus plug was suctioned out after which hypoxia improved  Meeting with palliative care today.   Pt again becoming more hypoxic and tachycardic this afternoon. I discussed with patient's  and readdressed his discussion with palliative. He understands the chance of meaningful recovery is slim. He reiterates that frances would not want to live this way. He has discussed with his children and he would like to pursue comfort care measures.       Consultants/Specialty  Critical care.  Neurology.  Palliative care    Code Status  Comfort care    Disposition  hospice    Review of Systems  Review of Systems   Unable to perform ROS: Mental acuity        Physical Exam  Temp:  [37.1 °C (98.8 °F)-38.4 °C (101.1 °F)] 37.2 °C (99 °F)  Pulse:  [105-119] 110  Resp:  [16-20] 20  BP: ()/(53-62) 106/61  SpO2:  [84 %-97 %] 91 %    Physical Exam   Constitutional: She appears well-developed and well-nourished. She appears lethargic. No distress.   HENT:   Head: Normocephalic and atraumatic.   Cardiovascular: Normal rate and regular rhythm.    No murmur heard.  Pulmonary/Chest: Effort normal and breath sounds normal. No respiratory distress.   Abdominal: Soft. Bowel sounds are normal. She exhibits no distension. There is no tenderness.   Musculoskeletal: Normal range of motion. She exhibits no  edema or tenderness.   Neurological: She appears lethargic.   Near comatose state. Not withdrawing to pain today   Skin: Skin is warm and dry. No rash noted. She is not diaphoretic. No erythema. No pallor.   Nursing note and vitals reviewed.      Fluids    Intake/Output Summary (Last 24 hours) at 04/27/19 1933  Last data filed at 04/27/19 1300   Gross per 24 hour   Intake             1480 ml   Output              300 ml   Net             1180 ml       Laboratory  Recent Labs      04/26/19 1947 04/27/19   0711   WBC  12.4*  15.5*   RBC  4.01*  3.90*   HEMOGLOBIN  11.1*  10.7*   HEMATOCRIT  35.2*  35.1*   MCV  87.8  90.0   MCH  27.7  27.4   MCHC  31.5*  30.5*   RDW  49.4  52.2*   PLATELETCT  572*  417   MPV  10.7  11.7     Recent Labs      04/26/19   1910   SODIUM  143   POTASSIUM  3.9   CHLORIDE  107   CO2  27   GLUCOSE  327*   BUN  32*   CREATININE  0.59   CALCIUM  9.4                   Imaging  DX-CHEST-LIMITED (1 VIEW)   Final Result         Patchy right basilar opacities, likely atelectasis      DX-LUMBAR PUNCTURE FOR DIAGNOSIS   Final Result      Fluoroscopic-guided lumbar puncture as described above.      DX-CHEST-LIMITED (1 VIEW)   Final Result      Diffuse interstitial edema, new since prior study. No focal consolidation or pleural effusions.      CT-ABDOMEN-PELVIS WITH   Final Result      Relatively diffuse colonic wall thickening consistent with colitis possibly due to infection or ischemia.      Coarse calcified plaque in the abdominal aorta and branch vessels without aneurysm.      Fatty infiltration of the liver.      DX-ABDOMEN FOR TUBE PLACEMENT   Final Result      Feeding tube extends below the diaphragm with tip at the level of the gastric body.      CT-STEALTH HEAD W/O   Final Result      1.  No evidence of acute territorial infarct, intracranial hemorrhage or mass lesion.   2.  Mild diffuse cerebral substance loss.   3.  Mild microangiopathic ischemic change versus demyelination or gliosis.       TI-[id number]-7 VIEW   Final Result      1.  Increased dilatation of air-filled colonic loops is noted as described above compared to the prior radiograph. Findings could be due to a colonic ileus versus partial obstruction in the region of the splenic flexure.      2.  No free air is identified.                  DX-ABDOMEN FOR TUBE PLACEMENT   Final Result      1.  Feeding tube tip overlies the region of the gastric antrum or 1st portion duodenum.   2.  There is a mild ileus.      DX-CHEST-PORTABLE (1 VIEW)   Final Result         1. Interval extubation. No other significant interval change.      DX-CHEST-PORTABLE (1 VIEW)   Final Result         1. No significant interval change.      US-ABDOMEN COMPLETE SURVEY   Final Result      1.  Mildly distended gallbladder with sludge present.   2.  No other evidence for acute cholecystitis.   3.  No significant biliary dilation.   4.  Limited evaluation of the bladder.         EB-[id number] VIEW   Final Result      1.  Feeding tube tip overlies the gastric antrum.      2.  Mild ileus.      DX-CHEST-PORTABLE (1 VIEW)   Final Result         1.  No acute cardiopulmonary disease.   2.  Atherosclerosis         EC-ECHOCARDIOGRAM COMPLETE W/O CONT   Final Result      DX-CHEST-PORTABLE (1 VIEW)   Final Result      1.  Supportive tubing as described above.   2.  No pneumothorax.   3.  Persistent hypoinflation.      MR-BRAIN-W/O   Final Result      No evidence of intracranial hemorrhage, acute ischemia or mass on this severely motion degraded study      DX-CHEST-LIMITED (1 VIEW)   Final Result         1.  No acute cardiopulmonary disease.   2.  Atherosclerosis   3.  No radiodense foreign body or medical device identified which would exclude MRI.      CT-CTA NECK WITH & W/O-POST PROCESSING   Final Result         1.  Occlusion of the left subclavian artery to the level of the vertebral artery with reconstitution, appearance concerning for subclavian steal.   2.  Intimal thickening versus  soft plaque and scattered atherosclerotic calcification of the left carotid arterial tree with less than 50% stenosis.   3.  Small caliber right internal carotid artery   4.  Extensive emphysema      These findings were discussed with the patient's clinician, Dr. Mcallister, on 4/6/2019 4:31 AM.      CT-CTA HEAD WITH & W/O-POST PROCESS   Final Result         1.  Hypoplastic right internal carotid artery, there is a segment of occlusion seen in the distal right intracranial internal carotid artery near the petrous apex. The intracranial internal carotid artery reconstitutes distal to this segment of    occlusion.   2.  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   3.  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   4.  Changes of atrophy with nonspecific white matter changes, most commonly associated with small vessel ischemia.      These findings were discussed with the patient's clinician, Dr. Mcallister, on 4/6/2019 4:31 AM.      DX-ABDOMEN FOR TUBE PLACEMENT   Final Result         1.  Nonspecific bowel gas pattern.   2.  Dobbhoff tube tip overlying the expected location of the pylorus or first duodenal segment.      US-CAROTID DOPPLER BILAT   Final Result      FR-[id number]-1 VIEW   Final Result         1.  Moderate stool in the colon suggests changes of constipation, otherwise nonspecific bowel gas pattern      DX-CHEST-PORTABLE (1 VIEW)   Final Result         1.  No acute cardiopulmonary disease.   2.  Atherosclerosis   3.  No radiodense foreign body or medical device identified which would exclude MRI.      CT-HEAD W/O   Final Result         1. No acute intracranial abnormality. No evidence of acute intracranial hemorrhage or mass lesion.                    Assessment/Plan  * Altered mental status- (present on admission)   Assessment & Plan    Persistent; concern for meningoencephalitis vs seziures  Now afebrile, Resolved  leukocytosis, Off pressors  LP-slight elevation protein, normal glucose, only 1 WBC  Meningitis panel-negative  HSV PCR-negative  DC'd vancomycin/amp/ceftraixone/acyclovir as CSF not c/w bacterial or viral meningitis  Non specific findings on EEG x 4, per neuro, seizures unlikely the cause but remains on vimpat  Immunologic workup: RF negative, CRP elevated, CALEB negative, ds DNA positive but negative antibody titer  Blood cxs neg  CT scan did show small aneurysm  MRI did not reveal any stroke or evidence of vasculitis  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 CSF fluid with more protein s/p steroids and IVIG. No further recommendations.   Pt now comfort care. Pending hospice eval             Acute respiratory failure with hypoxia (HCC)   Assessment & Plan    Required mechanical ventilation  She was successfully extubated on 4/8  Completed a 5-day course of Zosyn for aspiration PNA  4/17 rapid response for worsening hypoxia. Had pulmonary edema, now resovled       Sepsis (HCC)   Assessment & Plan    This is severe sepsis with the following associated acute organ dysfunction(s): acute kidney failure, acute respiratory failure, metabolic/septic encephalopathy.   Source of infection is unclear  LP neg for infection  Off abx  Sepsis appears to be resolved       Systolic and diastolic CHF, chronic (HCC)   Assessment & Plan    Not in exacerbation  carvedilol and lisinopril     Coronary artery disease involving native coronary artery of native heart without angina pectoris- (present on admission)   Assessment & Plan    disContinue aspirin and Lipitor  Comfort care     Chronic obstructive pulmonary disease (HCC)- (present on admission)   Assessment & Plan    History of; she does not have evidence of acute exacerbation   DuoNeb as needed  RT protocol  See above  following     Controlled type 2 diabetes mellitus  without complication, without long-term current use of insulin (HCC)- (present on admission)   Assessment & Plan    glycohemoglobin 6.6  She was on metformin outpatient.  BS remain uncontrolled    Pt now comfort care. D/c ISS and accuchecks       Tachycardia   Assessment & Plan    EKG reviewed by me showing sinus tachcyardia     Advance care planning   Assessment & Plan    4/27 Comfort care     Vitamin B12 deficiency   Assessment & Plan    Resolved with IM B12     Leukocytosis   Assessment & Plan    no obvious source of infection.  Continue monitoring     Loose stools   Assessment & Plan    C. difficile negative  pn KUB patient did have ileus vs partial SBO  D/t colitis noted on CT abdomen. She has already completed 5 days of broad spectrum antibiotics.      Abdominal distention   Assessment & Plan    Repeat KUB: 1.  Increased dilatation of air-filled colonic loops is noted as described above compared to the prior radiograph. Findings could be due to a colonic ileus versus partial obstruction in the region of the splenic flexure.  CT of abdomen with contrast consistent with colitis  c diff negative on 4/12  Diarrhea improved with cessation of lactulose     Hypophosphatemia   Assessment & Plan    resolved     Hypomagnesemia   Assessment & Plan    Improving, monitor     Pharyngoesophageal dysphagia   Assessment & Plan    Cortrak feeding     Dyslipidemia- (present on admission)   Assessment & Plan    disContinue Lipitor         VTE prophylaxis: none. Pt CC    I discussed advance care planning with the patient for at least 16 minutes with the patient, including diagnosis, prognosis, plan of care, risks and benefits of any therapies that could be offered, as well as alternatives including palliation and hospice, as appropriate. The patient has opted medical management and CODE STATUS has been changed to COMFORTCARE. Time spent is exclusive of evaluation and management or other separately billable procedures. Start time:  11:21 end time: 11:38am

## 2019-04-27 NOTE — PROGRESS NOTES
[facility] Medicine Daily Progress Note    Date of Service  4/26/2019    Chief Complaint  68 y.o. female admitted 4/4/2019 with altered mental status    Hospital Course    Ms. Yarbrough is a 68-year-old female with past medical history of  diabetes, COPD, and coronary artery disease that presented to the emergency room with confusion and expressive aphasia.  Per the  patient had been declining over several months which included not taking her medications or eating.  She was started on empiric antibiotics including vancomycin and Rocephin and acyclovir.  Her mental status continued to deteriorate and she began having fevers.  ID was consulted and ampicillin was added on and vancomycin was discontinued.  CT head an MRI of brain did not reveal potential cause of her encephalopathy.  She began developing signs and symptoms of sepsis and was transferred to ICU and required intubation and pressors on 4/6.  Her lumbar puncture done on 4/6 was not impressive for infection and her HSV PCR was  so her acyclovir, ampicillin and ceftriaxone were discontinued.  While intubated patient was receiving tube feeds and had an episode of emesis and was started on Zosyn for possible aspiration pneumonia which she completed a 5-day course of.  She was given thiamine, multivitamins, folate and vitamin B12 for possible metabolic etiology of her encephalopathy.  EEG was done which showed nonspecific focal cortical area irritability over frontal right greater than left side and patient was started on Keppra empirically.  She was extubated on 4/8.  Due to continued encephalopathy, patient was only be oriented to self and not following commands EEG was repeated.  Her EEG continued to show focal cortical irritability over her frontal right greater than left side so she was also started on Vimpat.  Rheumatologic cause was considered which included ESR, CALEB, dsDNA, RF which were all negative except for an elevated CRP and positive dsDNA but  negative antibody titer.  On 4/13 patient again underwent a 24-hour video EEG which continued to show moderate toxic/metabolic encephalopathy with frontal waves.  There was concern for possible SBO for which her tube feeds were held.        Interval Problem Update  No change in mental status  Tachycardic this afternoon, BP and O2 sats normal. I have ordered EKG  Pending PEG tube placement      Consultants/Specialty  Critical care.  Neurology.    Code Status  Full code    Disposition  SNF    Review of Systems  Review of Systems   Unable to perform ROS: Mental acuity        Physical Exam  Temp:  [36.7 °C (98.1 °F)-37.4 °C (99.3 °F)] 37.4 °C (99.3 °F)  Pulse:  [100-121] 121  Resp:  [16-18] 18  BP: (124-146)/(65-94) 146/94  SpO2:  [93 %-96 %] 94 %    Physical Exam   Constitutional: She appears well-developed and well-nourished. She appears lethargic. No distress.   HENT:   Head: Normocephalic and atraumatic.   Cardiovascular: Normal rate and regular rhythm.    No murmur heard.  Pulmonary/Chest: Effort normal and breath sounds normal. No respiratory distress.   Abdominal: Soft. Bowel sounds are normal. She exhibits no distension. There is no tenderness.   Musculoskeletal: Normal range of motion. She exhibits no edema or tenderness.   Neurological: She appears lethargic.   Near comatose state. Not withdrawing to pain today   Skin: Skin is warm and dry. No rash noted. She is not diaphoretic. No erythema. No pallor.   Nursing note and vitals reviewed.      Fluids    Intake/Output Summary (Last 24 hours) at 04/26/19 3058  Last data filed at 04/26/19 1300   Gross per 24 hour   Intake              410 ml   Output                0 ml   Net              410 ml       Laboratory  Recent Labs      04/24/19   0320   WBC  8.9   RBC  4.00*   HEMOGLOBIN  10.8*   HEMATOCRIT  34.5*   MCV  86.3   MCH  27.0   MCHC  31.3*   RDW  46.5   PLATELETCT  372   MPV  10.5                       Imaging  DX-LUMBAR PUNCTURE FOR DIAGNOSIS   Final Result       Fluoroscopic-guided lumbar puncture as described above.      DX-CHEST-LIMITED (1 VIEW)   Final Result      Diffuse interstitial edema, new since prior study. No focal consolidation or pleural effusions.      CT-ABDOMEN-PELVIS WITH   Final Result      Relatively diffuse colonic wall thickening consistent with colitis possibly due to infection or ischemia.      Coarse calcified plaque in the abdominal aorta and branch vessels without aneurysm.      Fatty infiltration of the liver.      DX-ABDOMEN FOR TUBE PLACEMENT   Final Result      Feeding tube extends below the diaphragm with tip at the level of the gastric body.      CT-STEALTH HEAD W/O   Final Result      1.  No evidence of acute territorial infarct, intracranial hemorrhage or mass lesion.   2.  Mild diffuse cerebral substance loss.   3.  Mild microangiopathic ischemic change versus demyelination or gliosis.      [id number] VIEW   Final Result      1.  Increased dilatation of air-filled colonic loops is noted as described above compared to the prior radiograph. Findings could be due to a colonic ileus versus partial obstruction in the region of the splenic flexure.      2.  No free air is identified.                  DX-ABDOMEN FOR TUBE PLACEMENT   Final Result      1.  Feeding tube tip overlies the region of the gastric antrum or 1st portion duodenum.   2.  There is a mild ileus.      DX-CHEST-PORTABLE (1 VIEW)   Final Result         1. Interval extubation. No other significant interval change.      DX-CHEST-PORTABLE (1 VIEW)   Final Result         1. No significant interval change.      US-ABDOMEN COMPLETE SURVEY   Final Result      1.  Mildly distended gallbladder with sludge present.   2.  No other evidence for acute cholecystitis.   3.  No significant biliary dilation.   4.  Limited evaluation of the bladder.         DF-[id number]-5 VIEW   Final Result      1.  Feeding tube tip overlies the gastric antrum.      2.  Mild ileus.      DX-CHEST-PORTABLE (1  VIEW)   Final Result         1.  No acute cardiopulmonary disease.   2.  Atherosclerosis         EC-ECHOCARDIOGRAM COMPLETE W/O CONT   Final Result      DX-CHEST-PORTABLE (1 VIEW)   Final Result      1.  Supportive tubing as described above.   2.  No pneumothorax.   3.  Persistent hypoinflation.      MR-BRAIN-W/O   Final Result      No evidence of intracranial hemorrhage, acute ischemia or mass on this severely motion degraded study      DX-CHEST-LIMITED (1 VIEW)   Final Result         1.  No acute cardiopulmonary disease.   2.  Atherosclerosis   3.  No radiodense foreign body or medical device identified which would exclude MRI.      CT-CTA NECK WITH & W/O-POST PROCESSING   Final Result         1.  Occlusion of the left subclavian artery to the level of the vertebral artery with reconstitution, appearance concerning for subclavian steal.   2.  Intimal thickening versus soft plaque and scattered atherosclerotic calcification of the left carotid arterial tree with less than 50% stenosis.   3.  Small caliber right internal carotid artery   4.  Extensive emphysema      These findings were discussed with the patient's clinician, Dr. Mcallister, on 4/6/2019 4:31 AM.      CT-CTA HEAD WITH & W/O-POST PROCESS   Final Result         1.  Hypoplastic right internal carotid artery, there is a segment of occlusion seen in the distal right intracranial internal carotid artery near the petrous apex. The intracranial internal carotid artery reconstitutes distal to this segment of    occlusion.   2.  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   3.  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   4.  Changes of atrophy with nonspecific white matter changes, most commonly associated with small vessel ischemia.      These findings were discussed with the patient's clinician, Dr. Mcallister, on 4/6/2019 4:31 AM.      DX-ABDOMEN FOR TUBE PLACEMENT   Final  Result         1.  Nonspecific bowel gas pattern.   2.  Dobbhoff tube tip overlying the expected location of the pylorus or first duodenal segment.      US-CAROTID DOPPLER BILAT   Final Result      [id number] VIEW   Final Result         1.  Moderate stool in the colon suggests changes of constipation, otherwise nonspecific bowel gas pattern      DX-CHEST-PORTABLE (1 VIEW)   Final Result         1.  No acute cardiopulmonary disease.   2.  Atherosclerosis   3.  No radiodense foreign body or medical device identified which would exclude MRI.      CT-HEAD W/O   Final Result         1. No acute intracranial abnormality. No evidence of acute intracranial hemorrhage or mass lesion.               IR-CONSULT AND TREAT    (Results Pending)        Assessment/Plan  * Altered mental status- (present on admission)   Assessment & Plan    Persistent; concern for meningoencephalitis vs seziures  Now afebrile, Resolved leukocytosis, Off pressors  LP-slight elevation protein, normal glucose, only 1 WBC  Meningitis panel-negative  HSV PCR-negative  DC'd vancomycin/amp/ceftraixone/acyclovir as CSF not c/w bacterial or viral meningitis  Non specific findings on EEG x 4, per neuro, seizures unlikely the cause but remains on vimpat  Immunologic workup: RF negative, CRP elevated, CALEB negative, ds DNA positive but negative antibody titer  Blood cxs neg  CT scan did show small aneurysm  MRI did not reveal any stroke or evidence of vasculitis  Continue to follow closely  Neuro continues to follow  Suspected autoimmune encephalitis -no improvement s/p steroids x 5 days and IVIG  I discussed with neurology and patient carries poor prognosis.    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 CSF fluid with more protein s/p steroids and IVIG. No further recommendations.   I discussed with patient's  about SNF placement and PEG tube placement.  He is agreeable to this.             Acute respiratory failure with  hypoxia ([facility])   Assessment & Plan    Required mechanical ventilation  She was successfully extubated on 4/8  Completed a 5-day course of Zosyn for aspiration PNA  4/17 rapid response for worsening hypoxia. Had pulmonary edema, now resovled       Sepsis ([facility])   Assessment & Plan    This is severe sepsis with the following associated acute organ dysfunction(s): acute kidney failure, acute respiratory failure, metabolic/septic encephalopathy.   Source of infection is unclear  LP neg for infection  Off abx  Sepsis appears to be resolved       Systolic and diastolic CHF, chronic ([facility])   Assessment & Plan    Not in exacerbation  carvedilol and lisinopril     Coronary artery disease involving native coronary artery of native heart without angina pectoris- (present on admission)   Assessment & Plan    Continue aspirin and Lipitor     Chronic obstructive pulmonary disease ([facility])- (present on admission)   Assessment & Plan    History of; she does not have evidence of acute exacerbation   DuoNeb as needed  RT protocol  See above  following     Controlled type 2 diabetes mellitus without complication, without long-term current use of insulin ([facility])- (present on admission)   Assessment & Plan    glycohemoglobin 6.6  She was on metformin outpatient.  BS remain uncontrolled  Cont high scale insulin  I have increased her  lantus       Tachycardia   Assessment & Plan    Unclear etiology  I will check EKG, CBC, and CMP     Advance care planning   Assessment & Plan    4/25 full code to DNR/DNI.     Vitamin B12 deficiency   Assessment & Plan    Resolved with IM B12     Leukocytosis   Assessment & Plan    no obvious source of infection.  Continue monitoring     Loose stools   Assessment & Plan    C. difficile negative  pn KUB patient did have ileus vs partial SBO  D/t colitis noted on CT abdomen. She has already completed 5 days of broad spectrum antibiotics.      Abdominal distention   Assessment & Plan    Repeat KUB: 1.  Increased  dilatation of air-filled colonic loops is noted as described above compared to the prior radiograph. Findings could be due to a colonic ileus versus partial obstruction in the region of the splenic flexure.  CT of abdomen with contrast consistent with colitis  c diff negative on 4/12  Diarrhea improved with cessation of lactulose     Hypophosphatemia   Assessment & Plan    resolved     Hypomagnesemia   Assessment & Plan    Improving, monitor     Pharyngoesophageal dysphagia   Assessment & Plan    Cortrak feeding     Dyslipidemia- (present on admission)   Assessment & Plan    Continue Lipitor         VTE prophylaxis: heparin

## 2019-04-27 NOTE — PROGRESS NOTES
At 1600 pt with HR sustaining in the 120's. MD notified and stat EKG ordered. MD notified of EKG results, labs ordered. No new orders for telemetry at this time.

## 2019-04-27 NOTE — PROGRESS NOTES
Assumed care of pt at 0700.   Pt is obtunded and does not respond to painful stimuli, MD aware.   Coretrak in place in R nare running Diabetisource at 60ml/hr, which is goal.     At 1100 pt with oxygen saturation at 85%. Pt's O2 turned up to 10L via oxy mask, O2 sats still at 85%. NT suctioning and oral suctioning performed several times. Thick yellow mucous obtained. Pt O2 sats up to 87% on 10L. Respiratory therapist called and at bedside. NT suctioning performed by RT. Large amounts of thick yellow mucous obtained. Pt's O2 sats up to 92% with pt on 3L O2. MD made aware.     At 1200 pt's blood pressure 88/55 and pulse 110. MD notified. 250ml bolus ordered. At 1245 pt's blood pressure 106/61 and pulse 110.     At 1345 pt transitioned to comfort care.

## 2019-04-27 NOTE — PROGRESS NOTES
Assumed care of pt at approximately 1900. Pt lethargic, unable to follow commands, responding slightly to painful stimuli in BUE, no movement in BLE. Crackles auscultated in bilateral upper lobes as well as RML. Pt deep suctioned, upper lobes and RML clear after initial intervention, however pt requiring frequent intervention throughout shift. Pt desaturating to 80s; frequent suctioning/ oral care provided, pt alternating between NC and face mask; face mask currently set to 10L O2, SpO2 back to baseline ranging in low 90s. Q2H turns, waffle cushion, heel float boots in place. 2 RN skin check complete. BG remaining above 200; last . Will continue to monitor and round frequently.

## 2019-04-27 NOTE — CARE PLAN
Problem: Respiratory:  Goal: Respiratory status will improve  Outcome: PROGRESSING SLOWER THAN EXPECTED  Pt desaturating to 80's frequently throughout shift. Frequent rounding/ reassessment in place; suction and oral care provided multiple times throughout shift; pt returning to baseline of SpO2 in 90s after interventions. Pt alternating between NC and face mask; pt currently using face mask with 10L O2    Problem: Urinary Elimination:  Goal: Ability to reestablish a normal urinary elimination pattern will improve  Outcome: PROGRESSING SLOWER THAN EXPECTED  Pt incontinent of urine throughout shift

## 2019-04-27 NOTE — PROGRESS NOTES
Patient requiring frequent suctioning throughout shift. Thick yellowish secretions suctioned. Increased O2 demand.    0400 vital signs: Temp: 101.1  HR:119 (sustaining)  BP: 97/59  MEWS Score: 6  PRN Tylenol administered for fever.   On call hospitalist, Dr. Light, paged regarding vital signs and MEWS score.  Orders received for 500 mL bolus, blood cultures, sputum culture, and chest x-ray. Orders placed.

## 2019-04-27 NOTE — CONSULTS
"Reason for PC Consult: Advance Care Planning    Consulted by:   Dr. Owens    Assessment:  General:   68 year old female admitted for altered mental status on 4/4/19. Pt has a history of COPD, CAD and Diabetes. Pt's spouse called EMS after pt became altered, looking at him as though she didn't know who he was.    Dyspnea: No, 96% on 2L Oxymask  Last BM: 04/26/19    Pain: Unable to determine    Depression: Unable to determine    Dementia: Unable to Determine       Spiritual:  Is Judaism or spirituality important for coping with this illness? Unable to determine, Family declined  Has a  or spiritual provider visit been requested? Unable to determine    Palliative Performance Scale: 10%    Advance Directive: Advance Directive    DPOA: Yes, Richard Linnea ([phone]); [address] Konstantin Linnea ([phone])  POLST: None on File     Code Status: DNR/DNI      Outcome:  PC RN met with Richard and son Amado, introduced self and role of PC. Discussed their understanding of clinical picture, Richard explained that pt started having memory problems 2 years ago. She would misplace her purse, phone, keys. And the few months before this admission pt would misplace her walker. Pt then started getting confused on who her family members were. She would look at Richard confused, not knowing who he was. She would have a blank stare and would not respond to him when he spoke to pt. Pt started becoming incontinent of bowel and bladder two weeks before admission and she slowly stopped eating. Richard states, \"It's almost like she had dementia or something\".      Richard expressed that he feels pt had \"given up inside\" a long time ago. Long discussion regarding quality vs quantity of life, benefits vs burdens of treatment, and pt's expressed wishes in her AD. Richard expressed \"suffering is not being awake. Comfort is loving, being awake, touching, not being in a coma in a bed I think\". Amado verbalized agreement.    Discussed PEG tube " "and SNF placement vs hospice care. Long discussion and education regarding both, Richard expressed concern about placing a PEG tube and long term SNF placement is going to achieve. He feels that pt would not want that, even if it would take \"possibly years\" to come out of the coma like state. He feels that pt is suffering by being in a coma like state, he is \"pretty sure mom [pt] wouldn't want any of this\". He expressed his mind is made up but he wants to talk with Amado and Konstantin when Konstantin is off work tonight before making any decisions.    PC RN discussed talking with a hospice liaison, Richard declined hospice referral. He wants to talk with family first. Declined POLST form until family has had a chance to discuss GOC.      Active listening, education, validation, normalization, and emotional support provided.     Updated:   Dr. Owens    Plan:   Family discussing GOC hospice vs PEG/SNF placement.     Recommendations: I recommend a hospice consult. If family wishes for comfort focused care.     Thank you for allowing Palliative Care to participate in this patient's care. Please feel free to call [phone] with any questions or concerns.  "

## 2019-04-27 NOTE — PROGRESS NOTES
2 RN skin check completed with Chuyita AARON. Pt has blanching redness to bilateral elbows, heels, and sacrum. Pt has scab to R upper arm. Bruising to abdomen and bilateral upper and lower extremities noted. Pt has scab to L lower lip. All other skin WDL. Q2 turning and waffle overlay in place. Barrier wipes and cream in use.

## 2019-04-28 NOTE — DISCHARGE SUMMARY
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      Comorbid Conditions at the Time of Death  Principal Problem:    Altered mental status POA: Yes  Active Problems:    Sepsis (HCC) POA: Unknown    Acute respiratory failure with hypoxia (HCC) POA: Unknown    Controlled type 2 diabetes mellitus without complication, without long-term current use of insulin (HCC) POA: Yes    Chronic obstructive pulmonary disease (HCC) POA: Yes    Coronary artery disease involving native coronary artery of native heart without angina pectoris POA: Yes    Systolic and diastolic CHF, chronic (HCC) POA: Unknown    Dyslipidemia POA: Yes    Pharyngoesophageal dysphagia POA: Unknown    Hypomagnesemia POA: Unknown    Cerebral aneurysm without rupture POA: Unknown    Hypophosphatemia POA: Unknown    Abdominal distention POA: Unknown    Loose stools POA: Unknown    Leukocytosis POA: Unknown    Vitamin B12 deficiency POA: Unknown    Advance care planning POA: Unknown    Tachycardia POA: Unknown  Resolved Problems:    Hypokalemia POA: Unknown      History of Presenting Illness and Hospital Course   Ms. Yarbrough is a 68-year-old female with past medical history of  diabetes, COPD, and coronary artery disease that presented to the emergency room with confusion and expressive aphasia.  Per the  patient had been declining over several months which included not taking her medications or eating.  She was started on empiric antibiotics including vancomycin and Rocephin and acyclovir.  Her mental status continued to deteriorate and she began having fevers.  ID was consulted and ampicillin was added on and vancomycin was discontinued.  CT head an MRI of brain did not reveal potential cause of her encephalopathy.  She began developing signs and symptoms of sepsis and was transferred to ICU and required intubation and pressors on 4/6.  Her lumbar puncture done on  4/6 was not impressive for infection and her HSV PCR was  so her acyclovir, ampicillin and ceftriaxone were discontinued.  While intubated patient was receiving tube feeds and had an episode of emesis and was started on Zosyn for possible aspiration pneumonia which she completed a 5-day course of.  She was given thiamine, multivitamins, folate and vitamin B12 for possible metabolic etiology of her encephalopathy.  EEG was done which showed nonspecific focal cortical area irritability over frontal right greater than left side and patient was started on Keppra empirically.  She was extubated on 4/8.  Due to continued encephalopathy, patient was only be oriented to self and not following commands EEG was repeated.  Her EEG continued to show focal cortical irritability over her frontal right greater than left side so she was also started on Vimpat.  Rheumatologic cause was considered which included ESR, CALBE, dsDNA, RF which were all negative except for an elevated CRP and positive dsDNA but negative antibody titer.  On 4/13 patient again underwent a 24-hour video EEG which continued to show moderate toxic/metabolic encephalopathy with frontal waves.  There was concern for possible SBO for which her tube feeds were held which eventually resolved. For possibility of autoimmune encephalitis, IV steroids x 5 days and IVIG x 5 days was empirically tried with no improvement in symptoms. Repeat lumbar puncture showed increased CSF protein. At length discussion with patient's  about despite maximal efforts there had been no improvement of mental status. As etiology of encephalopathy was unknown, neurology had recommended waiting 2 months for return of any meaningful function. Patient's , Richard, stated his wife would not want to live like this. Palliative care was consulted and patient was made comfort care on 4/27. She passed peacefully on 4/28 at 5:10am.     Death Date: 04/28/19   Death Time: 0510          Pronounced By (RN1): Miri Corrales RN  Pronounced By (RN2): Patty Vieira RN

## 2019-04-28 NOTE — PROGRESS NOTES
Patient is lethargic, not responsive to painful stimuli and has no gag reflex.  Breathing is rapid, shallow and wet.  Atropine given to dry secretions.  Patient looks comfortable and shows no signs of pain.  Will continue to monitor.

## 2019-04-28 NOTE — PROGRESS NOTES
Patient checked at 0500.  Patient breathing was rapid and shallow.  Skin to legs and arms molting.  Check on patient again at 0510 and patient was not breathing.  Patty Vieira RN and I listened for breathing and heartbeat.  Patient was death was declared at 0510.    Family, MD, NAM,  and [facility] [employer] [facility] notified.

## 2019-04-29 LAB — DSDNA AB TITR SER CLIF: DETECTED {TITER}

## 2019-04-29 PROCEDURE — 86235 NUCLEAR ANTIGEN ANTIBODY: CPT

## 2019-04-29 PROCEDURE — 86225 DNA ANTIBODY NATIVE: CPT

## 2019-04-29 PROCEDURE — 86256 FLUORESCENT ANTIBODY TITER: CPT

## 2019-04-30 LAB
BACTERIA UR CULT: ABNORMAL
BACTERIA UR CULT: ABNORMAL
DSDNA IGG TITR SER CLIF: NORMAL {TITER}
SIGNIFICANT IND 70042: ABNORMAL
SITE SITE: ABNORMAL
SOURCE SOURCE: ABNORMAL

## 2019-05-02 LAB
BACTERIA BLD CULT: NORMAL
CENTROMERE IGG TITR SER IF: 0 AU/ML (ref 0–40)
ENA JO1 AB TITR SER: 0 AU/ML (ref 0–40)
ENA JO1 AB TITR SER: 3 AU/ML (ref 0–40)
ENA SCL70 IGG SER QL: 1 AU/ML (ref 0–40)
ENA SCL70 IGG SER QL: 3 AU/ML (ref 0–40)
ENA SM IGG SER-ACNC: 0 AU/ML (ref 0–40)
ENA SM IGG SER-ACNC: 1 AU/ML (ref 0–40)
ENA SS-B IGG SER IA-ACNC: 13 AU/ML (ref 0–40)
ENA SS-B IGG SER IA-ACNC: 9 AU/ML (ref 0–40)
RIBOSOMAL P AB SER-ACNC: 1 AU/ML (ref 0–40)
SIGNIFICANT IND 70042: NORMAL
SITE SITE: NORMAL
SOURCE SOURCE: NORMAL
SSA52 R0ENA AB IGG Q0420: 1 AU/ML (ref 0–40)
SSA52 R0ENA AB IGG Q0420: 44 AU/ML (ref 0–40)
SSA60 R0ENA AB IGG Q0419: 1 AU/ML (ref 0–40)
SSA60 R0ENA AB IGG Q0419: 28 AU/ML (ref 0–40)
TEST NAME 95000: ABNORMAL
U1 SNRNP IGG SER QL: NORMAL AU/ML (ref 0–40)

## 2019-05-03 LAB — TEST NAME 95000: NORMAL

## 2019-05-06 LAB — U1 SNRNP IGG SER QL: NORMAL

## 2019-05-09 LAB — TEST NAME 95000: NORMAL

## 2019-05-14 LAB — TEST NAME 95000: NORMAL

## 2019-05-31 LAB — REF LAB TEST RESULTS: NORMAL

## 2019-06-01 LAB
MYCOBACTERIUM SPEC CULT: NORMAL
RHODAMINE-AURAMINE STN SPEC: NORMAL
SIGNIFICANT IND 70042: NORMAL
SITE SITE: NORMAL
SOURCE SOURCE: NORMAL

## 2022-07-02 NOTE — CARE PLAN
Problem: Bowel/Gastric:  Goal: Normal bowel function is maintained or improved  Intervention: Educate patient and significant other/support system about signs and symptoms of constipation and interventions to implement  Bowel protocol in place, RN administers scheduled stool softeners as ordered. Patient had large BM early this morning. RN educated to increase fluids and mobility to help regain normal bowel movements.      Problem: Psychosocial Needs:  Goal: Level of anxiety will decrease  Intervention: Identify and develop with patient strategies to cope with anxiety triggers  RN discussed POC with patient, all questions and concerns were addressed to patients satisfaction. RN encouraged patient to ask questions and voice concerns to reduce anxiety r/t hospitalization. Frequent rounding by staff enables patient to ask questions.           [phone]

## (undated) DEVICE — DRAPE HIP W/POCKET - (6/CA)

## (undated) DEVICE — ELECTRODE DUAL RETURN W/ CORD - (50/PK)

## (undated) DEVICE — CANISTER SUCTION 3000ML MECHANICAL FILTER AUTO SHUTOFF MEDI-VAC NONSTERILE LF DISP  (40EA/CA)

## (undated) DEVICE — SUTURE GENERAL

## (undated) DEVICE — KIT HIP PREP IM ENCHANCE TOTAL (5EA/BX)

## (undated) DEVICE — ALCOHOL RUBBING 70% ISOPROPYL 16OZ - (12EA/BX)

## (undated) DEVICE — CATHETER IV 18 GA X 1-1/4 - SAFETY BRAUN (50/BX)

## (undated) DEVICE — KIT  I.V. START (100EA/CA)

## (undated) DEVICE — GUIDE TRACHE TUBE INTUBATING STYLET 5.0-10.0MM 14FR (20EA/PK)

## (undated) DEVICE — TUBE E-T HI-LO CUFF 7.0MM (10EA/PK)

## (undated) DEVICE — SYRINGE SAFETY 3 ML 18 GA X 1 1/2 BLUNT LL (100/BX 8BX/CA)

## (undated) DEVICE — SODIUM CHL IRRIGATION 0.9% 1000ML (12EA/CA)

## (undated) DEVICE — DRAPE U ORTHOPEDIC - (10/BX)

## (undated) DEVICE — GOWN WARMING STANDARD FLEX - (30/CA)

## (undated) DEVICE — GLOVE BIOGEL PI ORTHO SZ 7.5 PF LF (40PR/BX)

## (undated) DEVICE — SODIUM CHL. IRRIGATION 0.9% 3000ML (4EA/CA 65CA/PF)

## (undated) DEVICE — SUTURE 1 VICRYL PLUS CTX - 36 INCH (36/BX)

## (undated) DEVICE — CLOSURE PRINEO SKIN - (2EA/BX)

## (undated) DEVICE — BRUSH FMCNL TIP IRR STRL - (12/PKG) FOR SURGILAV

## (undated) DEVICE — GOWN SURGICAL XX-LARGE - (28EA/CA) SIRUS NON REINFORCED

## (undated) DEVICE — SLEEVE, VASO, THIGH, MED

## (undated) DEVICE — DRAPE LARGE 3 QUARTER - (20/CA)

## (undated) DEVICE — PROTECTOR ULNA NERVE - (36PR/CA)

## (undated) DEVICE — TAPE CLOTH MEDIPORE 6 INCH - (12RL/CA)

## (undated) DEVICE — KIT ANESTHESIA W/CIRCUIT & 3/LT BAG W/FILTER (20EA/CA)

## (undated) DEVICE — MASK OXYGEN VNYL ADLT MED CONC WITH 7 FOOT TUBING  - (50EA/CA)

## (undated) DEVICE — DRESSING POST OP BORDER 4 X 10 (5EA/BX)

## (undated) DEVICE — GLOVE BIOGEL INDICATOR SZ 8 SURGICAL PF LTX - (50/BX 4BX/CA)

## (undated) DEVICE — TUBING CLEARLINK DUO-VENT - C-FLO (48EA/CA)

## (undated) DEVICE — GLOVE BIOGEL SZ 7.5 SURGICAL PF LTX - (50PR/BX 4BX/CA)

## (undated) DEVICE — LENS/HOOD FOR SPACESUIT - (32/PK) PEEL AWAY FACE

## (undated) DEVICE — GLOVE BIOGEL INDICATOR SZ 7.5 SURGICAL PF LTX - (50PR/BX 4BX/CA)

## (undated) DEVICE — PAD LAP STERILE 18 X 18 - (5/PK 40PK/CA)

## (undated) DEVICE — KIT ROOM DECONTAMINATION

## (undated) DEVICE — SUTURE 1 PDS II PLUS TP-1 - (12PK/BX)

## (undated) DEVICE — SUCTION INSTRUMENT YANKAUER BULBOUS TIP W/O VENT (50EA/CA)

## (undated) DEVICE — HANDPIECE 10FT INTPLS SCT PLS IRRIGATION HAND CONTROL SET (6/PK)

## (undated) DEVICE — HEAD HOLDER JUNIOR/ADULT

## (undated) DEVICE — SUTURE 5 TI-CRON HOS-14 - (36/BX)

## (undated) DEVICE — GLOVE BIOGEL SZ 8 SURGICAL PF LTX - (50PR/BX 4BX/CA)

## (undated) DEVICE — PACK TOTAL HIP - (1/CA)

## (undated) DEVICE — TIP INTPLS HFLO ML ORFC BTRY - (12/CS)  FOR SURGILAV

## (undated) DEVICE — NEPTUNE 4 PORT MANIFOLD - (20/PK)

## (undated) DEVICE — LEAD SET 6 DISP. EKG NIHON KOHDEN (100EA/CA) [9859].

## (undated) DEVICE — GLOVE BIOGEL SZ 8.5 SURGICAL PF LTX - (50PR/BX 4BX/CA)

## (undated) DEVICE — PILLOW ABDUCTION HIP MEDIUM - (6EA/CA)

## (undated) DEVICE — MASK ANESTHESIA ADULT  - (100/CA)

## (undated) DEVICE — Device

## (undated) DEVICE — BOVIE  BLADE 6 EXTENDED - (50/PK)

## (undated) DEVICE — CHLORAPREP 26 ML APPLICATOR - ORANGE TINT(25/CA)

## (undated) DEVICE — MIXER BONE CEMENT REVOLUTION - W/FEMORAL PRESSURIZER (6/CA)

## (undated) DEVICE — DRESSING PETROLEUM GAUZE 5 X 9" (50EA/BX 4BX/CA)"

## (undated) DEVICE — SUTURE 2-0 VICRYL PLUS CT-2 - 27 INCH (36/BX)

## (undated) DEVICE — DRAPE STRLE REG TOWEL 18X24 - (10/BX 4BX/CA)"

## (undated) DEVICE — SET LEADWIRE 5 LEAD BEDSIDE DISPOSABLE ECG (1SET OF 5/EA)

## (undated) DEVICE — SUTURE 2-0 MONOCRYL PLUS UNDYED CT-1 1 X 36 (36EA/BX)"

## (undated) DEVICE — SPONGE GAUZESTER 4 X 4 4PLY - (128PK/CA)

## (undated) DEVICE — LACTATED RINGERS INJ 1000 ML - (14EA/CA 60CA/PF)

## (undated) DEVICE — PACK MAJOR ORTHO - (2EA/CA)

## (undated) DEVICE — ELECTRODE 850 FOAM ADHESIVE - HYDROGEL RADIOTRNSPRNT (50/PK)

## (undated) DEVICE — GLOVE BIOGEL SZ 6.5 SURGICAL PF LTX (50PR/BX 4BX/CA)

## (undated) DEVICE — GLOVE BIOGEL SZ 7 SURGICAL PF LTX - (50PR/BX 4BX/CA)

## (undated) DEVICE — SPONGE GAUZE STER 4X4 8-PL - (2/PK 50PK/BX 12BX/CS)

## (undated) DEVICE — DETERGENT RENUZYME PLUS 10 OZ PACKET (50/BX)

## (undated) DEVICE — PILLOW ABDUCTION HIP SMALL - (6EA/CA)

## (undated) DEVICE — BOVIE BLADE COATED - (50/PK)

## (undated) DEVICE — GLOVE BIOGEL PI INDICATOR SZ 7.0 SURGICAL PF LF - (50/BX 4BX/CA)

## (undated) DEVICE — SUTURE 2-0 VICRYL PLUS CT-1 36 (36PK/BX)"

## (undated) DEVICE — SENSOR SPO2 NEO LNCS ADHESIVE (20/BX) SEE USER NOTES

## (undated) DEVICE — GLOVE BIOGEL INDICATOR SZ 8.5 SURGICAL PF LTX - (50/BX 4BX/CA)

## (undated) DEVICE — SUTURE 0 COATED VICRYL PLUS - CTX CR(12/BX)18 IN